# Patient Record
Sex: FEMALE | Race: WHITE | NOT HISPANIC OR LATINO | Employment: OTHER | URBAN - METROPOLITAN AREA
[De-identification: names, ages, dates, MRNs, and addresses within clinical notes are randomized per-mention and may not be internally consistent; named-entity substitution may affect disease eponyms.]

---

## 2019-02-22 LAB — HEMOCCULT STL QL IA: NEGATIVE

## 2019-06-03 ENCOUNTER — OFFICE VISIT (OUTPATIENT)
Dept: INTERNAL MEDICINE | Facility: CLINIC | Age: 64
End: 2019-06-03
Payer: COMMERCIAL

## 2019-06-03 VITALS
TEMPERATURE: 98 F | BODY MASS INDEX: 22.43 KG/M2 | HEART RATE: 81 BPM | SYSTOLIC BLOOD PRESSURE: 108 MMHG | HEIGHT: 63 IN | OXYGEN SATURATION: 98 % | DIASTOLIC BLOOD PRESSURE: 72 MMHG | WEIGHT: 126.56 LBS | RESPIRATION RATE: 16 BRPM

## 2019-06-03 DIAGNOSIS — Z85.41 HISTORY OF CERVICAL CANCER: ICD-10-CM

## 2019-06-03 DIAGNOSIS — Z85.048 HISTORY OF RECTAL OR ANAL CANCER: ICD-10-CM

## 2019-06-03 DIAGNOSIS — L03.012 ACUTE PARONYCHIA OF LEFT THUMB: ICD-10-CM

## 2019-06-03 DIAGNOSIS — E78.5 HYPERLIPIDEMIA, UNSPECIFIED HYPERLIPIDEMIA TYPE: Primary | ICD-10-CM

## 2019-06-03 DIAGNOSIS — K21.9 GASTROESOPHAGEAL REFLUX DISEASE, ESOPHAGITIS PRESENCE NOT SPECIFIED: ICD-10-CM

## 2019-06-03 PROCEDURE — 99999 PR PBB SHADOW E&M-NEW PATIENT-LVL III: ICD-10-PCS | Mod: PBBFAC,,, | Performed by: FAMILY MEDICINE

## 2019-06-03 PROCEDURE — 99999 PR PBB SHADOW E&M-NEW PATIENT-LVL III: CPT | Mod: PBBFAC,,, | Performed by: FAMILY MEDICINE

## 2019-06-03 PROCEDURE — 99204 PR OFFICE/OUTPT VISIT, NEW, LEVL IV, 45-59 MIN: ICD-10-PCS | Mod: S$GLB,,, | Performed by: FAMILY MEDICINE

## 2019-06-03 PROCEDURE — 99204 OFFICE O/P NEW MOD 45 MIN: CPT | Mod: S$GLB,,, | Performed by: FAMILY MEDICINE

## 2019-06-03 PROCEDURE — 3008F BODY MASS INDEX DOCD: CPT | Mod: CPTII,S$GLB,, | Performed by: FAMILY MEDICINE

## 2019-06-03 PROCEDURE — 3008F PR BODY MASS INDEX (BMI) DOCUMENTED: ICD-10-PCS | Mod: CPTII,S$GLB,, | Performed by: FAMILY MEDICINE

## 2019-06-03 RX ORDER — DEXLANSOPRAZOLE 60 MG/1
60 CAPSULE, DELAYED RELEASE ORAL
COMMUNITY
Start: 2019-03-12 | End: 2020-03-12 | Stop reason: SDUPTHER

## 2019-06-03 RX ORDER — SIMVASTATIN 40 MG/1
40 TABLET, FILM COATED ORAL NIGHTLY
Refills: 5 | COMMUNITY
Start: 2019-04-12 | End: 2020-05-17

## 2019-06-03 RX ORDER — MUPIROCIN 20 MG/G
OINTMENT TOPICAL 2 TIMES DAILY
Qty: 30 G | Refills: 1 | Status: SHIPPED | OUTPATIENT
Start: 2019-06-03 | End: 2019-12-03 | Stop reason: ALTCHOICE

## 2019-06-03 RX ORDER — SUCRALFATE 1 G/10ML
500 SUSPENSION ORAL 4 TIMES DAILY
Qty: 400 ML | Refills: 0 | Status: SHIPPED | OUTPATIENT
Start: 2019-06-03 | End: 2020-12-03

## 2019-06-03 RX ORDER — CHOLECALCIFEROL (VITAMIN D3) 25 MCG
185 TABLET ORAL
COMMUNITY

## 2019-06-03 RX ORDER — CLINDAMYCIN HYDROCHLORIDE 300 MG/1
300 CAPSULE ORAL EVERY 6 HOURS
Qty: 30 CAPSULE | Refills: 0 | Status: SHIPPED | OUTPATIENT
Start: 2019-06-03 | End: 2019-12-03 | Stop reason: ALTCHOICE

## 2019-06-03 RX ORDER — CYANOCOBALAMIN (VITAMIN B-12) 250 MCG
250 TABLET ORAL DAILY
COMMUNITY
Start: 2018-08-24

## 2019-06-04 NOTE — PROGRESS NOTES
Subjective:      Patient ID: Rita Dejesus is a 63 y.o. female.    Chief Complaint: Establish Care      Patient presents today to establish care.   She reports history of GERD, was previously taking dexilant daily, had stopped temporarily and started having more pain in esophageal area, recently resumed the dexilant but doesn't seem to be helping now.   She has hyperlipidemia which is controlled on statin.   She has history of cervical and anal cancers - both of which are in remission.   She reports today pain and swelling of left thumb, has been worsening for a few days.     Review of Systems   Constitutional: Negative for activity change, appetite change, fatigue and fever.   HENT: Negative for congestion.    Eyes: Negative for visual disturbance.   Respiratory: Negative for chest tightness and shortness of breath.    Cardiovascular: Negative for chest pain and leg swelling.   Gastrointestinal: Positive for abdominal pain (epigastric, esophageal after eating). Negative for constipation, diarrhea, nausea and vomiting.   Endocrine: Negative for polydipsia, polyphagia and polyuria.   Musculoskeletal: Negative for arthralgias and gait problem.   Skin: Positive for color change and wound.   Allergic/Immunologic: Negative for immunocompromised state.   Neurological: Negative for dizziness.   Psychiatric/Behavioral: Negative for sleep disturbance.     Past Medical History:   Diagnosis Date    GERD (gastroesophageal reflux disease)     History of cervical cancer     History of rectal or anal cancer      Past Surgical History:   Procedure Laterality Date    ADENOIDECTOMY      carpal tunel Bilateral     HERNIA REPAIR      TONSILLECTOMY       Family History   Problem Relation Age of Onset    Diabetes Mother         type II    Hyperlipidemia Mother     Heart disease Father     Hypertension Father     Hypertension Sister      Social History     Socioeconomic History    Marital status:      Spouse name: Not  "on file    Number of children: Not on file    Years of education: Not on file    Highest education level: Not on file   Occupational History    Not on file   Social Needs    Financial resource strain: Not on file    Food insecurity:     Worry: Not on file     Inability: Not on file    Transportation needs:     Medical: Not on file     Non-medical: Not on file   Tobacco Use    Smoking status: Never Smoker    Smokeless tobacco: Never Used   Substance and Sexual Activity    Alcohol use: Yes     Comment: Social     Drug use: Never    Sexual activity: Not Currently     Partners: Male   Lifestyle    Physical activity:     Days per week: Not on file     Minutes per session: Not on file    Stress: Not on file   Relationships    Social connections:     Talks on phone: Not on file     Gets together: Not on file     Attends Mandaeism service: Not on file     Active member of club or organization: Not on file     Attends meetings of clubs or organizations: Not on file     Relationship status: Not on file   Other Topics Concern    Not on file   Social History Narrative    Not on file     Review of patient's allergies indicates:   Allergen Reactions    Sulfa (sulfonamide antibiotics) Nausea And Vomiting    Nitrofuran analogues Nausea And Vomiting     Microdantin        Objective:       /72   Pulse 81   Temp 98.3 °F (36.8 °C)   Resp 16   Ht 5' 2.99" (1.6 m)   Wt 57.4 kg (126 lb 8.7 oz)   SpO2 98%   BMI 22.42 kg/m²   Physical Exam   Constitutional: She is oriented to person, place, and time. Vital signs are normal. She appears well-developed and well-nourished. No distress.   HENT:   Head: Normocephalic and atraumatic.   Right Ear: Hearing, tympanic membrane, external ear and ear canal normal.   Left Ear: Hearing, tympanic membrane, external ear and ear canal normal.   Nose: Nose normal.   Mouth/Throat: Uvula is midline, oropharynx is clear and moist and mucous membranes are normal. No oropharyngeal " exudate.   Eyes: Pupils are equal, round, and reactive to light. Conjunctivae and EOM are normal.   Neck: Normal range of motion. Neck supple. No tracheal deviation present. No thyromegaly present.   Cardiovascular: Normal rate, regular rhythm, normal heart sounds and intact distal pulses.   No murmur heard.  Pulmonary/Chest: Effort normal and breath sounds normal. No respiratory distress.   Abdominal: Soft. Bowel sounds are normal. There is no tenderness. There is no guarding. No hernia.   Musculoskeletal: Normal range of motion. She exhibits no edema.   Lymphadenopathy:     She has no cervical adenopathy.   Neurological: She is alert and oriented to person, place, and time.   Skin: Skin is warm and dry. Capillary refill takes less than 2 seconds. She is not diaphoretic.   Left thumb with erythema, fluctuance, visible pus just proximal to nail.   18 g needle inserted and moderate amount of pus expressed.   Psychiatric: She has a normal mood and affect. Her behavior is normal. Judgment and thought content normal.   Nursing note and vitals reviewed.    Assessment:     1. Hyperlipidemia, unspecified hyperlipidemia type    2. Gastroesophageal reflux disease, esophagitis presence not specified    3. History of cervical cancer    4. History of rectal or anal cancer    5. Acute paronychia of left thumb      Plan:   Hyperlipidemia, unspecified hyperlipidemia type    Gastroesophageal reflux disease, esophagitis presence not specified    History of cervical cancer    History of rectal or anal cancer    Acute paronychia of left thumb    Other orders  -     sucralfate (CARAFATE) 100 mg/mL suspension; Take 5 mLs (500 mg total) by mouth 4 (four) times daily.  Dispense: 400 mL; Refill: 0  -     mupirocin (BACTROBAN) 2 % ointment; Apply topically 2 (two) times daily.  Dispense: 30 g; Refill: 1  -     clindamycin (CLEOCIN) 300 MG capsule; Take 1 capsule (300 mg total) by mouth every 6 (six) hours.  Dispense: 30 capsule; Refill:  0      Medication List with Changes/Refills   New Medications    CLINDAMYCIN (CLEOCIN) 300 MG CAPSULE    Take 1 capsule (300 mg total) by mouth every 6 (six) hours.    MUPIROCIN (BACTROBAN) 2 % OINTMENT    Apply topically 2 (two) times daily.    SUCRALFATE (CARAFATE) 100 MG/ML SUSPENSION    Take 5 mLs (500 mg total) by mouth 4 (four) times daily.   Current Medications    CYANOCOBALAMIN (VITAMIN B-12) 250 MCG TABLET    Take 250 mcg by mouth.    DEXLANSOPRAZOLE (DEXILANT) 60 MG CAPSULE    Take 60 mg by mouth.    LACTOBACILLUS RHAMNOSUS GG (CULTURELLE) 10 BILLION CELL CAPSULE    Take 1 capsule by mouth once daily.    SIMVASTATIN (ZOCOR) 40 MG TABLET    Take 40 mg by mouth every evening.    VITAMIN D (VITAMIN D3) 1000 UNITS TAB    Take 185 mg by mouth.

## 2019-06-07 ENCOUNTER — PATIENT OUTREACH (OUTPATIENT)
Dept: ADMINISTRATIVE | Facility: HOSPITAL | Age: 64
End: 2019-06-07

## 2019-06-14 ENCOUNTER — PATIENT OUTREACH (OUTPATIENT)
Dept: ADMINISTRATIVE | Facility: HOSPITAL | Age: 64
End: 2019-06-14

## 2019-06-14 NOTE — PROGRESS NOTES
Received most recent mammogram, pap smear, dexa scan, and fecal immunochemical test scanned into chart today.

## 2019-06-17 ENCOUNTER — PATIENT OUTREACH (OUTPATIENT)
Dept: ADMINISTRATIVE | Facility: HOSPITAL | Age: 64
End: 2019-06-17

## 2019-11-29 ENCOUNTER — TELEPHONE (OUTPATIENT)
Dept: INTERNAL MEDICINE | Facility: CLINIC | Age: 64
End: 2019-11-29

## 2019-11-29 DIAGNOSIS — K21.9 GASTROESOPHAGEAL REFLUX DISEASE, ESOPHAGITIS PRESENCE NOT SPECIFIED: ICD-10-CM

## 2019-11-29 DIAGNOSIS — E55.9 VITAMIN D DEFICIENCY: ICD-10-CM

## 2019-11-29 DIAGNOSIS — E78.5 HYPERLIPIDEMIA, UNSPECIFIED HYPERLIPIDEMIA TYPE: Primary | ICD-10-CM

## 2019-12-02 ENCOUNTER — LAB VISIT (OUTPATIENT)
Dept: LAB | Facility: HOSPITAL | Age: 64
End: 2019-12-02
Attending: FAMILY MEDICINE
Payer: MEDICARE

## 2019-12-02 DIAGNOSIS — K21.9 GASTROESOPHAGEAL REFLUX DISEASE, ESOPHAGITIS PRESENCE NOT SPECIFIED: ICD-10-CM

## 2019-12-02 DIAGNOSIS — E55.9 VITAMIN D DEFICIENCY: ICD-10-CM

## 2019-12-02 DIAGNOSIS — E78.5 HYPERLIPIDEMIA, UNSPECIFIED HYPERLIPIDEMIA TYPE: ICD-10-CM

## 2019-12-02 LAB
25(OH)D3+25(OH)D2 SERPL-MCNC: 56 NG/ML (ref 30–96)
ALBUMIN SERPL BCP-MCNC: 4.1 G/DL (ref 3.5–5.2)
ALP SERPL-CCNC: 95 U/L (ref 55–135)
ALT SERPL W/O P-5'-P-CCNC: <5 U/L (ref 10–44)
ANION GAP SERPL CALC-SCNC: 8 MMOL/L (ref 8–16)
ANISOCYTOSIS BLD QL SMEAR: SLIGHT
AST SERPL-CCNC: 16 U/L (ref 10–40)
BASOPHILS # BLD AUTO: 0.04 K/UL (ref 0–0.2)
BASOPHILS NFR BLD: 0.9 % (ref 0–1.9)
BILIRUB SERPL-MCNC: 0.4 MG/DL (ref 0.1–1)
BUN SERPL-MCNC: 21 MG/DL (ref 8–23)
CALCIUM SERPL-MCNC: 10 MG/DL (ref 8.7–10.5)
CHLORIDE SERPL-SCNC: 111 MMOL/L (ref 95–110)
CHOLEST SERPL-MCNC: 179 MG/DL (ref 120–199)
CHOLEST/HDLC SERPL: 3.4 {RATIO} (ref 2–5)
CO2 SERPL-SCNC: 24 MMOL/L (ref 23–29)
CREAT SERPL-MCNC: 1.2 MG/DL (ref 0.5–1.4)
DIFFERENTIAL METHOD: ABNORMAL
EOSINOPHIL # BLD AUTO: 0.2 K/UL (ref 0–0.5)
EOSINOPHIL NFR BLD: 5.1 % (ref 0–8)
ERYTHROCYTE [DISTWIDTH] IN BLOOD BY AUTOMATED COUNT: 13.2 % (ref 11.5–14.5)
EST. GFR  (AFRICAN AMERICAN): 54.8 ML/MIN/1.73 M^2
EST. GFR  (NON AFRICAN AMERICAN): 47.5 ML/MIN/1.73 M^2
GLUCOSE SERPL-MCNC: 91 MG/DL (ref 70–110)
HCT VFR BLD AUTO: 41.8 % (ref 37–48.5)
HDLC SERPL-MCNC: 53 MG/DL (ref 40–75)
HDLC SERPL: 29.6 % (ref 20–50)
HGB BLD-MCNC: 13.7 G/DL (ref 12–16)
IMM GRANULOCYTES # BLD AUTO: 0.01 K/UL (ref 0–0.04)
IMM GRANULOCYTES NFR BLD AUTO: 0.2 % (ref 0–0.5)
LDLC SERPL CALC-MCNC: 85.6 MG/DL (ref 63–159)
LYMPHOCYTES # BLD AUTO: 1.6 K/UL (ref 1–4.8)
LYMPHOCYTES NFR BLD: 34.5 % (ref 18–48)
MCH RBC QN AUTO: 30 PG (ref 27–31)
MCHC RBC AUTO-ENTMCNC: 32.8 G/DL (ref 32–36)
MCV RBC AUTO: 92 FL (ref 82–98)
MONOCYTES # BLD AUTO: 0.4 K/UL (ref 0.3–1)
MONOCYTES NFR BLD: 8.5 % (ref 4–15)
NEUTROPHILS # BLD AUTO: 2.4 K/UL (ref 1.8–7.7)
NEUTROPHILS NFR BLD: 50.8 % (ref 38–73)
NONHDLC SERPL-MCNC: 126 MG/DL
NRBC BLD-RTO: 0 /100 WBC
PLATELET # BLD AUTO: 161 K/UL (ref 150–350)
PLATELET BLD QL SMEAR: ABNORMAL
PMV BLD AUTO: 14.3 FL (ref 9.2–12.9)
POIKILOCYTOSIS BLD QL SMEAR: SLIGHT
POTASSIUM SERPL-SCNC: 4.8 MMOL/L (ref 3.5–5.1)
PROT SERPL-MCNC: 7.2 G/DL (ref 6–8.4)
RBC # BLD AUTO: 4.56 M/UL (ref 4–5.4)
SODIUM SERPL-SCNC: 143 MMOL/L (ref 136–145)
TRIGL SERPL-MCNC: 202 MG/DL (ref 30–150)
TSH SERPL DL<=0.005 MIU/L-ACNC: 2.73 UIU/ML (ref 0.4–4)
WBC # BLD AUTO: 4.7 K/UL (ref 3.9–12.7)

## 2019-12-02 PROCEDURE — 80053 COMPREHEN METABOLIC PANEL: CPT

## 2019-12-02 PROCEDURE — 84443 ASSAY THYROID STIM HORMONE: CPT

## 2019-12-02 PROCEDURE — 85025 COMPLETE CBC W/AUTO DIFF WBC: CPT

## 2019-12-02 PROCEDURE — 80061 LIPID PANEL: CPT

## 2019-12-02 PROCEDURE — 82306 VITAMIN D 25 HYDROXY: CPT

## 2019-12-02 PROCEDURE — 36415 COLL VENOUS BLD VENIPUNCTURE: CPT | Mod: PO

## 2019-12-03 ENCOUNTER — OFFICE VISIT (OUTPATIENT)
Dept: INTERNAL MEDICINE | Facility: CLINIC | Age: 64
End: 2019-12-03
Payer: MEDICARE

## 2019-12-03 VITALS
WEIGHT: 130.06 LBS | HEART RATE: 79 BPM | BODY MASS INDEX: 24.55 KG/M2 | SYSTOLIC BLOOD PRESSURE: 98 MMHG | OXYGEN SATURATION: 99 % | HEIGHT: 61 IN | DIASTOLIC BLOOD PRESSURE: 62 MMHG | TEMPERATURE: 99 F

## 2019-12-03 DIAGNOSIS — K21.9 GASTROESOPHAGEAL REFLUX DISEASE, ESOPHAGITIS PRESENCE NOT SPECIFIED: ICD-10-CM

## 2019-12-03 DIAGNOSIS — F41.1 GAD (GENERALIZED ANXIETY DISORDER): ICD-10-CM

## 2019-12-03 DIAGNOSIS — E55.9 VITAMIN D DEFICIENCY: ICD-10-CM

## 2019-12-03 DIAGNOSIS — M79.604 PAIN IN BOTH LOWER EXTREMITIES: ICD-10-CM

## 2019-12-03 DIAGNOSIS — E78.5 HYPERLIPIDEMIA, UNSPECIFIED HYPERLIPIDEMIA TYPE: Primary | ICD-10-CM

## 2019-12-03 DIAGNOSIS — M79.605 PAIN IN BOTH LOWER EXTREMITIES: ICD-10-CM

## 2019-12-03 DIAGNOSIS — L30.9 DERMATITIS: ICD-10-CM

## 2019-12-03 PROCEDURE — 99214 PR OFFICE/OUTPT VISIT, EST, LEVL IV, 30-39 MIN: ICD-10-PCS | Mod: S$PBB,,, | Performed by: FAMILY MEDICINE

## 2019-12-03 PROCEDURE — 99214 OFFICE O/P EST MOD 30 MIN: CPT | Mod: PBBFAC,PO,25 | Performed by: FAMILY MEDICINE

## 2019-12-03 PROCEDURE — 99999 PR PBB SHADOW E&M-EST. PATIENT-LVL IV: ICD-10-PCS | Mod: PBBFAC,,, | Performed by: FAMILY MEDICINE

## 2019-12-03 PROCEDURE — 99999 PR PBB SHADOW E&M-EST. PATIENT-LVL IV: CPT | Mod: PBBFAC,,, | Performed by: FAMILY MEDICINE

## 2019-12-03 PROCEDURE — 99214 OFFICE O/P EST MOD 30 MIN: CPT | Mod: S$PBB,,, | Performed by: FAMILY MEDICINE

## 2019-12-03 PROCEDURE — G0009 ADMIN PNEUMOCOCCAL VACCINE: HCPCS | Mod: PBBFAC,PO

## 2019-12-03 RX ORDER — TIZANIDINE 2 MG/1
2 TABLET ORAL EVERY 6 HOURS PRN
Qty: 30 TABLET | Refills: 0 | Status: SHIPPED | OUTPATIENT
Start: 2019-12-03 | End: 2019-12-13

## 2019-12-03 NOTE — PROGRESS NOTES
Pt given pneumococcal conjugate 13 vaccine IM left deltiod. Pt advised to wait 15 minutes to observe for adverse reaction. Pt tolerated well.

## 2019-12-04 NOTE — PROGRESS NOTES
Subjective:      Patient ID: Rita Dejesus is a 64 y.o. female.    Chief Complaint: Follow-up (6 month)      Patient here for routine follow-up.  Labs drawn yesterday were reviewed with the patient.  Her cholesterol levels are well controlled, she is taking the simvastatin daily.  Patient reports worsened bladder leakage, it is worse when she 1st wakes up in the morning and stands up but happens all day.  Her gynecologist has told in the past she does have a prolapse, is scheduled to see him for follow-up in a couple months.  She feels that she has a lot of increased stress currently with her  and father both ill, finds she is always worrying about if she forgot to do something.  She also reports pain in both legs, often keeps her up at night, feels like muscle cramping .  She has a pruritic rash on anterior left neck , has had for about a week.    Review of Systems   Constitutional: Positive for fatigue. Negative for fever and unexpected weight change.   HENT: Negative for congestion.    Eyes: Negative for visual disturbance.   Respiratory: Negative for shortness of breath.    Cardiovascular: Negative for chest pain, palpitations and leg swelling.   Gastrointestinal: Negative for abdominal pain and constipation.   Endocrine: Negative for polydipsia, polyphagia and polyuria.   Genitourinary: Negative for dysuria, frequency and urgency.   Musculoskeletal: Positive for arthralgias. Negative for gait problem.   Skin: Positive for rash. Negative for color change.   Allergic/Immunologic: Negative for immunocompromised state.   Neurological: Negative for dizziness.   Psychiatric/Behavioral: Negative for dysphoric mood and sleep disturbance. The patient is nervous/anxious.      Past Medical History:   Diagnosis Date    GERD (gastroesophageal reflux disease)     History of cervical cancer     History of rectal or anal cancer           Past Surgical History:   Procedure Laterality Date    ADENOIDECTOMY      carpal  "tunel Bilateral     HERNIA REPAIR      TONSILLECTOMY       Family History   Problem Relation Age of Onset    Diabetes Mother         type II    Hyperlipidemia Mother     Heart disease Father     Hypertension Father     Hypertension Sister      Social History     Socioeconomic History    Marital status:      Spouse name: Not on file    Number of children: Not on file    Years of education: Not on file    Highest education level: Not on file   Occupational History    Not on file   Social Needs    Financial resource strain: Not on file    Food insecurity:     Worry: Not on file     Inability: Not on file    Transportation needs:     Medical: Not on file     Non-medical: Not on file   Tobacco Use    Smoking status: Never Smoker    Smokeless tobacco: Never Used   Substance and Sexual Activity    Alcohol use: Yes     Comment: Social     Drug use: Never    Sexual activity: Not Currently     Partners: Male   Lifestyle    Physical activity:     Days per week: Not on file     Minutes per session: Not on file    Stress: Not on file   Relationships    Social connections:     Talks on phone: Not on file     Gets together: Not on file     Attends Alevism service: Not on file     Active member of club or organization: Not on file     Attends meetings of clubs or organizations: Not on file     Relationship status: Not on file   Other Topics Concern    Not on file   Social History Narrative    Not on file     Review of patient's allergies indicates:   Allergen Reactions    Sulfa (sulfonamide antibiotics) Nausea And Vomiting    Nitrofuran analogues Nausea And Vomiting     Microdantin        Objective:       BP 98/62 (BP Location: Right arm)   Pulse 79   Temp 98.5 °F (36.9 °C) (Tympanic)   Ht 5' 1" (1.549 m)   Wt 59 kg (130 lb 1.1 oz)   SpO2 99%   BMI 24.58 kg/m²   Physical Exam   Constitutional: She is oriented to person, place, and time. Vital signs are normal. She appears well-developed and " well-nourished. No distress.   HENT:   Head: Normocephalic and atraumatic.   Right Ear: Hearing, tympanic membrane, external ear and ear canal normal.   Left Ear: Hearing, tympanic membrane, external ear and ear canal normal.   Nose: Nose normal.   Mouth/Throat: Uvula is midline, oropharynx is clear and moist and mucous membranes are normal. No oropharyngeal exudate.   Eyes: Pupils are equal, round, and reactive to light. Conjunctivae and EOM are normal.   Neck: Normal range of motion. Neck supple. No tracheal deviation present. No thyromegaly present.   Cardiovascular: Normal rate, regular rhythm, normal heart sounds and intact distal pulses.   No murmur heard.  Pulmonary/Chest: Effort normal and breath sounds normal. No respiratory distress.   Abdominal: Soft. Bowel sounds are normal. There is no tenderness. There is no guarding. No hernia.   Musculoskeletal: Normal range of motion. She exhibits no edema.   Lymphadenopathy:     She has no cervical adenopathy.   Neurological: She is alert and oriented to person, place, and time. No sensory deficit.   Skin: Skin is warm and dry. Capillary refill takes less than 2 seconds. Rash (left anterior neck with scattered papules and patches) noted. She is not diaphoretic.   Psychiatric: She has a normal mood and affect. Her behavior is normal. Judgment and thought content normal.   Vitals reviewed.    Assessment:     1. Hyperlipidemia, unspecified hyperlipidemia type    2. Gastroesophageal reflux disease, esophagitis presence not specified    3. Dermatitis    4. Pain in both lower extremities    5. CARLOS (generalized anxiety disorder)    6. Vitamin D deficiency      Plan:   Hyperlipidemia, unspecified hyperlipidemia type  -     CBC auto differential; Future; Expected date: 11/27/2020  -     Comprehensive metabolic panel; Future; Expected date: 11/27/2020  -     Lipid panel; Future; Expected date: 11/27/2020    Gastroesophageal reflux disease, esophagitis presence not  specified    Dermatitis   She has leftover steroid cream at home that she will try    Pain in both lower extremities  -     tiZANidine (ZANAFLEX) 2 MG tablet; Take 1 tablet (2 mg total) by mouth every 6 (six) hours as needed.  Dispense: 30 tablet; Refill: 0    CARLOS (generalized anxiety disorder)   Does not want to start any new medication at this time, will let me know if symptoms worsen and she does want to try something    Vitamin D deficiency  -     Vitamin D; Future; Expected date: 11/27/2020    Other orders  -     (In Office Administered) Pneumococcal Conjugate Vaccine (13 Valent) (IM)    Order to put in for labs for 1 year from now.  Medication List with Changes/Refills   New Medications    TIZANIDINE (ZANAFLEX) 2 MG TABLET    Take 1 tablet (2 mg total) by mouth every 6 (six) hours as needed.   Current Medications    CYANOCOBALAMIN (VITAMIN B-12) 250 MCG TABLET    Take 250 mcg by mouth.    DEXLANSOPRAZOLE (DEXILANT) 60 MG CAPSULE    Take 60 mg by mouth.    LACTOBACILLUS RHAMNOSUS GG (CULTURELLE) 10 BILLION CELL CAPSULE    Take 1 capsule by mouth once daily.    SIMVASTATIN (ZOCOR) 40 MG TABLET    Take 40 mg by mouth every evening.    SUCRALFATE (CARAFATE) 100 MG/ML SUSPENSION    Take 5 mLs (500 mg total) by mouth 4 (four) times daily.    VITAMIN D (VITAMIN D3) 1000 UNITS TAB    Take 185 mg by mouth.   Discontinued Medications    CLINDAMYCIN (CLEOCIN) 300 MG CAPSULE    Take 1 capsule (300 mg total) by mouth every 6 (six) hours.    MUPIROCIN (BACTROBAN) 2 % OINTMENT    Apply topically 2 (two) times daily.

## 2020-01-17 ENCOUNTER — PATIENT OUTREACH (OUTPATIENT)
Dept: ADMINISTRATIVE | Facility: HOSPITAL | Age: 65
End: 2020-01-17

## 2020-01-26 ENCOUNTER — PATIENT MESSAGE (OUTPATIENT)
Dept: INTERNAL MEDICINE | Facility: CLINIC | Age: 65
End: 2020-01-26

## 2020-01-27 RX ORDER — CITALOPRAM 20 MG/1
20 TABLET, FILM COATED ORAL DAILY
Qty: 90 TABLET | Refills: 3 | Status: SHIPPED | OUTPATIENT
Start: 2020-01-27 | End: 2020-07-10 | Stop reason: SDUPTHER

## 2020-02-28 LAB — HEMOCCULT STL QL IA: NEGATIVE

## 2020-03-12 ENCOUNTER — PATIENT MESSAGE (OUTPATIENT)
Dept: INTERNAL MEDICINE | Facility: CLINIC | Age: 65
End: 2020-03-12

## 2020-03-12 RX ORDER — DEXLANSOPRAZOLE 60 MG/1
60 CAPSULE, DELAYED RELEASE ORAL DAILY
Qty: 90 CAPSULE | Refills: 3 | Status: SHIPPED | OUTPATIENT
Start: 2020-03-12 | End: 2020-12-03 | Stop reason: SDUPTHER

## 2020-05-17 RX ORDER — SIMVASTATIN 40 MG/1
TABLET, FILM COATED ORAL
Qty: 90 TABLET | Refills: 3 | Status: SHIPPED | OUTPATIENT
Start: 2020-05-17 | End: 2020-12-03 | Stop reason: SDUPTHER

## 2020-07-10 ENCOUNTER — PATIENT MESSAGE (OUTPATIENT)
Dept: INTERNAL MEDICINE | Facility: CLINIC | Age: 65
End: 2020-07-10

## 2020-07-10 RX ORDER — CITALOPRAM 40 MG/1
40 TABLET, FILM COATED ORAL DAILY
Qty: 90 TABLET | Refills: 3 | Status: SHIPPED | OUTPATIENT
Start: 2020-07-10 | End: 2020-12-03

## 2020-09-17 ENCOUNTER — OFFICE VISIT (OUTPATIENT)
Dept: INTERNAL MEDICINE | Facility: CLINIC | Age: 65
End: 2020-09-17
Payer: MEDICARE

## 2020-09-17 VITALS
RESPIRATION RATE: 20 BRPM | SYSTOLIC BLOOD PRESSURE: 120 MMHG | HEIGHT: 63 IN | HEART RATE: 74 BPM | WEIGHT: 120.56 LBS | BODY MASS INDEX: 21.36 KG/M2 | OXYGEN SATURATION: 99 % | DIASTOLIC BLOOD PRESSURE: 66 MMHG | TEMPERATURE: 99 F

## 2020-09-17 DIAGNOSIS — Z12.11 COLON CANCER SCREENING: ICD-10-CM

## 2020-09-17 DIAGNOSIS — R30.0 DYSURIA: Primary | ICD-10-CM

## 2020-09-17 LAB
BILIRUB SERPL-MCNC: NEGATIVE MG/DL
BLOOD URINE, POC: NEGATIVE
CLARITY, POC UA: NORMAL
COLOR, POC UA: NORMAL
GLUCOSE UR QL STRIP: NORMAL
KETONES UR QL STRIP: NEGATIVE
LEUKOCYTE ESTERASE URINE, POC: NORMAL
NITRITE, POC UA: NEGATIVE
PH, POC UA: 5
PROTEIN, POC: NEGATIVE
SPECIFIC GRAVITY, POC UA: 1
UROBILINOGEN, POC UA: NORMAL

## 2020-09-17 PROCEDURE — 87086 URINE CULTURE/COLONY COUNT: CPT

## 2020-09-17 PROCEDURE — 99213 PR OFFICE/OUTPT VISIT, EST, LEVL III, 20-29 MIN: ICD-10-PCS | Mod: S$PBB,,, | Performed by: FAMILY MEDICINE

## 2020-09-17 PROCEDURE — 99999 PR PBB SHADOW E&M-EST. PATIENT-LVL IV: ICD-10-PCS | Mod: PBBFAC,,, | Performed by: FAMILY MEDICINE

## 2020-09-17 PROCEDURE — 90694 VACC AIIV4 NO PRSRV 0.5ML IM: CPT | Mod: PBBFAC,PO

## 2020-09-17 PROCEDURE — 99214 OFFICE O/P EST MOD 30 MIN: CPT | Mod: PBBFAC,PO | Performed by: FAMILY MEDICINE

## 2020-09-17 PROCEDURE — 81002 URINALYSIS NONAUTO W/O SCOPE: CPT | Mod: PBBFAC,PO | Performed by: FAMILY MEDICINE

## 2020-09-17 PROCEDURE — 99213 OFFICE O/P EST LOW 20 MIN: CPT | Mod: S$PBB,,, | Performed by: FAMILY MEDICINE

## 2020-09-17 PROCEDURE — G0008 ADMIN INFLUENZA VIRUS VAC: HCPCS | Mod: PBBFAC,PO

## 2020-09-17 PROCEDURE — 99999 PR PBB SHADOW E&M-EST. PATIENT-LVL IV: CPT | Mod: PBBFAC,,, | Performed by: FAMILY MEDICINE

## 2020-09-17 NOTE — PROGRESS NOTES
09/17/2020 4:58pm    High dose Flu injection given RD, patient tolerated well, patient advised to wait 15 minutes

## 2020-09-19 LAB — BACTERIA UR CULT: NO GROWTH

## 2020-09-22 ENCOUNTER — PATIENT MESSAGE (OUTPATIENT)
Dept: INTERNAL MEDICINE | Facility: CLINIC | Age: 65
End: 2020-09-22

## 2020-09-22 DIAGNOSIS — Z00.00 ROUTINE ADULT HEALTH MAINTENANCE: Primary | ICD-10-CM

## 2020-09-22 NOTE — PROGRESS NOTES
Subjective:      Patient ID: Rita Dejesus is a 64 y.o. female.    Chief Complaint: Nephrolithiasis      Patient reports two episodes of supapubic pain, has happened twice in the past week. Also reports some dysuria, had some dark urine which appeared to have small amount of blood in it.   Does not curently have any symptoms, thinking it might have been kidney stone.     Review of Systems   Constitutional: Negative for activity change, appetite change, fatigue and fever.   Gastrointestinal: Negative for abdominal pain.   Genitourinary: Positive for dysuria and hematuria. Negative for decreased urine volume, difficulty urinating, flank pain, frequency and urgency.   Musculoskeletal: Negative for back pain.     Past Medical History:   Diagnosis Date    GERD (gastroesophageal reflux disease)     History of cervical cancer     History of rectal or anal cancer           Past Surgical History:   Procedure Laterality Date    ADENOIDECTOMY      carpal tunel Bilateral     HERNIA REPAIR      TONSILLECTOMY       Family History   Problem Relation Age of Onset    Diabetes Mother         type II    Hyperlipidemia Mother     Heart disease Father     Hypertension Father     Hypertension Sister      Social History     Socioeconomic History    Marital status:      Spouse name: Not on file    Number of children: Not on file    Years of education: Not on file    Highest education level: Not on file   Occupational History    Not on file   Social Needs    Financial resource strain: Not on file    Food insecurity     Worry: Not on file     Inability: Not on file    Transportation needs     Medical: Not on file     Non-medical: Not on file   Tobacco Use    Smoking status: Never Smoker    Smokeless tobacco: Never Used   Substance and Sexual Activity    Alcohol use: Yes     Comment: Social     Drug use: Never    Sexual activity: Not Currently     Partners: Male   Lifestyle    Physical activity     Days per  "week: Not on file     Minutes per session: Not on file    Stress: Not on file   Relationships    Social connections     Talks on phone: Not on file     Gets together: Not on file     Attends [employer]ist service: Not on file     Active member of club or organization: Not on file     Attends meetings of clubs or organizations: Not on file     Relationship status: Not on file   Other Topics Concern    Not on file   Social History Narrative    Not on file     Review of patient's allergies indicates:   Allergen Reactions    Sulfa (sulfonamide antibiotics) Nausea And Vomiting    Nitrofuran analogues Nausea And Vomiting     Microdantin        Objective:       /66   Pulse 74   Temp 98.7 °F (37.1 °C) (Tympanic)   Resp 20   Ht 5' 2.5" (1.588 m)   Wt 54.7 kg (120 lb 9.5 oz)   SpO2 99%   BMI 21.71 kg/m²   Physical Exam  Vitals signs and nursing note reviewed.   Constitutional:       General: She is not in acute distress.     Appearance: She is well-developed. She is not diaphoretic.   Cardiovascular:      Rate and Rhythm: Normal rate and regular rhythm.      Heart sounds: Normal heart sounds.   Pulmonary:      Effort: Pulmonary effort is normal. No respiratory distress.      Breath sounds: Normal breath sounds.   Abdominal:      General: Bowel sounds are normal.      Palpations: Abdomen is soft.      Tenderness: There is no abdominal tenderness. There is no right CVA tenderness or left CVA tenderness.   Psychiatric:         Mood and Affect: Mood normal.         Behavior: Behavior normal.         Thought Content: Thought content normal.         Judgment: Judgment normal.       Assessment:     1. Dysuria      Plan:   Dysuria  -     POCT URINE DIPSTICK WITHOUT MICROSCOPE  -     Urine culture; Future; Expected date: 09/17/2020    Other orders  -     Influenza - Quadrivalent (Adjuvanted)    urine dip appears normal will follow culture results.  Medication List with Changes/Refills   Current Medications    CITALOPRAM " (CELEXA) 40 MG TABLET    Take 1 tablet (40 mg total) by mouth once daily.    CYANOCOBALAMIN (VITAMIN B-12) 250 MCG TABLET    Take 250 mcg by mouth.    DEXLANSOPRAZOLE (DEXILANT) 60 MG CAPSULE    Take 1 capsule (60 mg total) by mouth once daily.    LACTOBACILLUS RHAMNOSUS GG (CULTURELLE) 10 BILLION CELL CAPSULE    Take 1 capsule by mouth once daily.    SIMVASTATIN (ZOCOR) 40 MG TABLET    TAKE 1 TABLET BY MOUTH EVERY EVENING    SUCRALFATE (CARAFATE) 100 MG/ML SUSPENSION    Take 5 mLs (500 mg total) by mouth 4 (four) times daily.    VITAMIN D (VITAMIN D3) 1000 UNITS TAB    Take 185 mg by mouth.

## 2020-09-30 ENCOUNTER — PATIENT OUTREACH (OUTPATIENT)
Dept: ADMINISTRATIVE | Facility: OTHER | Age: 65
End: 2020-09-30

## 2020-09-30 NOTE — LETTER
September 30, 2020        Tuan Salmeron MD  [address]  [address]  [place] [address]             [facility]  [address]  [place] [address]  Phone: [phone]   Patient: Rita Dejesus   MR Number: [id number]   YOB: 1955           Dear Dr. Salmeron:    Rita Dejesus is a patient of Dr. Heredia (PCP) at [facility]. While reviewing his/her chart, it has come to our attention that there is an outstanding lab/procedure. Please help keep our Health Maintenance records as accurate and as up to date as possible by supplying the following:     2020 mammogram                                                                             Please fax to [facility] @ [phone].    Thank you for your assistance in our patient's healthcare.     Sincerely,     Steff [place] MA           CC  No Recipients

## 2020-10-01 ENCOUNTER — OFFICE VISIT (OUTPATIENT)
Dept: OPHTHALMOLOGY | Facility: CLINIC | Age: 65
End: 2020-10-01
Payer: MEDICARE

## 2020-10-01 DIAGNOSIS — H25.13 CATARACT, NUCLEAR SCLEROTIC SENILE, BILATERAL: Primary | ICD-10-CM

## 2020-10-01 DIAGNOSIS — H52.4 BILATERAL PRESBYOPIA: ICD-10-CM

## 2020-10-01 DIAGNOSIS — H52.03 HYPEROPIA, BILATERAL: ICD-10-CM

## 2020-10-01 DIAGNOSIS — Z00.00 ROUTINE ADULT HEALTH MAINTENANCE: ICD-10-CM

## 2020-10-01 PROCEDURE — 92015 DETERMINE REFRACTIVE STATE: CPT | Mod: ,,, | Performed by: OPTOMETRIST

## 2020-10-01 PROCEDURE — 99999 PR PBB SHADOW E&M-EST. PATIENT-LVL III: CPT | Mod: PBBFAC,,, | Performed by: OPTOMETRIST

## 2020-10-01 PROCEDURE — 92004 PR EYE EXAM, NEW PATIENT,COMPREHESV: ICD-10-PCS | Mod: S$PBB,,, | Performed by: OPTOMETRIST

## 2020-10-01 PROCEDURE — 92015 PR REFRACTION: ICD-10-PCS | Mod: ,,, | Performed by: OPTOMETRIST

## 2020-10-01 PROCEDURE — 99999 PR PBB SHADOW E&M-EST. PATIENT-LVL III: ICD-10-PCS | Mod: PBBFAC,,, | Performed by: OPTOMETRIST

## 2020-10-01 PROCEDURE — 92004 COMPRE OPH EXAM NEW PT 1/>: CPT | Mod: S$PBB,,, | Performed by: OPTOMETRIST

## 2020-10-01 PROCEDURE — 99213 OFFICE O/P EST LOW 20 MIN: CPT | Mod: PBBFAC | Performed by: OPTOMETRIST

## 2020-10-01 NOTE — LETTER
October 1, 2020      Tripp Heredia MD  [address] [address] [facility]  [address] [place] [address]  Phone: [phone]  Fax: [phone]          Patient: Rita Dejesus   MR Number: [id number]   YOB: 1955   Date of Visit: 10/1/2020       Dear Dr. Tripp Heredia:    Thank you for referring Rita Dejesus to me for evaluation. Attached you will find relevant portions of my assessment and plan of care.    If you have questions, please do not hesitate to call me. I look forward to following Rita Dejesus along with you.    Sincerely,    Ander Lemnos, OD    Enclosure  CC:  No Recipients    If you would like to receive this communication electronically, please contact [email or web address] or [phone] to request more information on [facility] access.    For providers and/or their staff who would like to refer a patient to [facility], please contact us through our one-stop-shop provider referral line, [facility], at [phone].    If you feel you have received this communication in error or would no longer like to receive these types of communications, please e-mail [email or web address]

## 2020-10-01 NOTE — PROGRESS NOTES
Health Maintenance Due   Topic Date Due    Hepatitis C Screening  1955    HIV Screening  11/17/1969    TETANUS VACCINE  11/17/1972    Shingles Vaccine (2 of 3) 02/12/2016    Pneumococcal Vaccine (65+ High/Highest Risk) (2 of 2 - PPSV23) 01/28/2020    Colorectal Cancer Screening  02/22/2020    Mammogram  02/22/2020     Updates were requested from care everywhere.  Chart was reviewed for overdue [facility] ([facility]) topics (CRS, Breast Cancer Screening, Eye exam)  Health Maintenance has been updated.  LINKS immunization registry triggered.  Immunizations were reconciled.  PETER sent to  for 2020 mammogram.

## 2020-10-01 NOTE — PROGRESS NOTES
HPI     Decreased near visual acuity.  Tired eyes when on the computer.  New patient last eye exam 2 years.  Update glasses RX.    Last edited by Ander Lemons, OD on 10/1/2020  3:24 PM. (History)            Assessment /Plan     For exam results, see Encounter Report.    Cataract, nuclear sclerotic senile, bilateral    Routine adult health maintenance  -     Ambulatory referral/consult to Optometry    Hyperopia, bilateral    Bilateral presbyopia      Significant cataracts OU, pt defers the cataract evaluation consult.  Failed glare test OD only    Pt wishes to see consult in 6 months, sooner if Va decreases.

## 2020-10-02 ENCOUNTER — PATIENT OUTREACH (OUTPATIENT)
Dept: ADMINISTRATIVE | Facility: HOSPITAL | Age: 65
End: 2020-10-02

## 2020-10-22 ENCOUNTER — PATIENT OUTREACH (OUTPATIENT)
Dept: ADMINISTRATIVE | Facility: OTHER | Age: 65
End: 2020-10-22

## 2020-10-29 ENCOUNTER — TELEPHONE (OUTPATIENT)
Dept: ENDOSCOPY | Facility: HOSPITAL | Age: 65
End: 2020-10-29

## 2020-10-29 DIAGNOSIS — Z13.9 SCREENING PROCEDURE: Primary | ICD-10-CM

## 2020-10-29 DIAGNOSIS — Z12.11 COLON CANCER SCREENING: Primary | ICD-10-CM

## 2020-10-29 RX ORDER — POLYETHYLENE GLYCOL 3350, SODIUM SULFATE ANHYDROUS, SODIUM BICARBONATE, SODIUM CHLORIDE, POTASSIUM CHLORIDE 236; 22.74; 6.74; 5.86; 2.97 G/4L; G/4L; G/4L; G/4L; G/4L
4 POWDER, FOR SOLUTION ORAL ONCE
Qty: 4000 ML | Refills: 0 | Status: SHIPPED | OUTPATIENT
Start: 2020-10-29 | End: 2020-10-29

## 2020-10-29 NOTE — TELEPHONE ENCOUNTER
Please enter a new order for a colonoscopy. We are not able to schedule on the current order in our computer system. Tentative date is 11/17/2020.

## 2020-10-29 NOTE — TELEPHONE ENCOUNTER
Location Screening:    If answers yes to any of the following, schedule at [facility] ONLY. If No, OK for either location.    1. Is there a diagnosis of heart failure, severe heart valve disease (aortic stenosis) or mechanical valve? no  a. Is the Left Ventricle Ejection Fraction <30% ? no    2. Does the pt have pulmonary hypertension? no   a. Is pulmonary arterial pressure gradient >50mmHg? no   b. Is there evidence of right ventricular dysfunction? no    3. Does the pt have achalasia? no    4. Any history of negative reaction to anesthesia? no   a. Myasthenia gravis? no   b. Malignant hyperthermia? no   c. Other? no    5. Is procedure for esophageal banding? no      COVID Screening    1. Have you had a fever in the last 7 days or have you used fever reducing medicines for a fever in the last 7 days?  no    2. Are you experiencing shortness of breath, cough, muscle aches, loss of taste or loss of smell?  no    If answered yes to questions 1 and 2, the patient must seek medical attention with their PCP.  Do not schedule their procedure.     3. Are you residing with anyone who has tested positive for Covid?  no    If answered yes to question 3, recommend 14 day self-quarantine from the date of relative's positive test and place special needs note in the depot.  Wait to schedule.     ENDO screening    1. Have you been admitted for cardiac, kidney or pulmonary causes to the hospital in the past 3 months? no   If yes, schedule an appointment with PCP before scheduling endoscopic procedure.     2. Have you had a stent placed in the last 12 months? no   If yes, for a screening visit, cancel and message the ordering provider.  The patient will need a new order when the time is appropriate.     3. Have you had a stroke or heart attack in the past 6 months? no   If yes, cancel and refer patient to ordering provider for clearance, also message ordering provider to inform.     4. Have you had any chest pain in the past 3 months?  "no   If yes, Have you been evaluated by your PCP and/or cardiologist and it was determined to not be heart related? not applicable   If No, Pt needs to be seen by PCP or Cardiologist .  Pt can be scheduled once clearance obtained by either of those providers.     5. Do you take prescription weight loss medications?  no   If yes, must stop for 2 weeks prior to procedure.     6. Have you been diagnosed with diverticulitis within the past 3 months? no   If yes, must have been seen by GI within the last 3 months, if not schedule with GI JULIEN.    If pt has been seen by GI, schedule procedure 8-12 weeks post antibiotic treatment.     7. Are you on Dialysis? no  If yes, schedule procedure for the day AFTER dialysis.  Appt time should be 9am or later, patient arrival time is 2 hours prior.  Nulytely or miralax prep for all patients with kidney disease.     8. Are you diabetic?  no   If yes, schedule morning appt. Advise pt to hold all diabetic meds day of procedure.     9. If pt is older than 80 years of age and HAS NOT been seen by GI or PCP within the last 6 months, needs appt with GI JULIEN.   If pt has been seen by the GI provider or PCP within the past 6  months AND meets criteria, schedule procedure AND send message to the endoscopist.     10. Is patient on a "high risk" medication (blood thinner/antiplatelet agent)?  no   If yes, has cardiac clearance been obtained within the last 60 days? N/A   If no, a new clearance needs to be obtained.     Final Questions:    1.I have reviewed the last colonoscopy for recommendations regarding next procedure bowel prep.  no  2. I have reviewed medications and allergies.  yes  3. I have verified the pharmacy information and appropriate prep sent if needed. yes  4. Prep instructions have been mailed or sent to portal per patient request. yes    Instructions sent via portal    All schedulers will have ability to reach out to the ordering GI provider to clarify any issues.     "

## 2020-11-13 ENCOUNTER — TELEPHONE (OUTPATIENT)
Dept: ENDOSCOPY | Facility: HOSPITAL | Age: 65
End: 2020-11-13

## 2020-11-13 ENCOUNTER — PATIENT MESSAGE (OUTPATIENT)
Dept: ENDOSCOPY | Facility: HOSPITAL | Age: 65
End: 2020-11-13

## 2020-11-13 RX ORDER — SODIUM, POTASSIUM,MAG SULFATES 17.5-3.13G
1 SOLUTION, RECONSTITUTED, ORAL ORAL DAILY
Qty: 1 KIT | Refills: 0 | Status: SHIPPED | OUTPATIENT
Start: 2020-11-13 | End: 2020-11-15

## 2020-11-14 ENCOUNTER — APPOINTMENT (OUTPATIENT)
Dept: URGENT CARE | Facility: CLINIC | Age: 65
End: 2020-11-14
Payer: MEDICARE

## 2020-11-14 DIAGNOSIS — Z13.9 SCREENING PROCEDURE: ICD-10-CM

## 2020-11-14 PROCEDURE — U0003 INFECTIOUS AGENT DETECTION BY NUCLEIC ACID (DNA OR RNA); SEVERE ACUTE RESPIRATORY SYNDROME CORONAVIRUS 2 (SARS-COV-2) (CORONAVIRUS DISEASE [COVID-19]), AMPLIFIED PROBE TECHNIQUE, MAKING USE OF HIGH THROUGHPUT TECHNOLOGIES AS DESCRIBED BY CMS-2020-01-R: HCPCS

## 2020-11-16 LAB — SARS-COV-2 RNA RESP QL NAA+PROBE: NOT DETECTED

## 2020-11-17 ENCOUNTER — ANESTHESIA (OUTPATIENT)
Dept: ENDOSCOPY | Facility: HOSPITAL | Age: 65
End: 2020-11-17
Payer: MEDICARE

## 2020-11-17 ENCOUNTER — ANESTHESIA EVENT (OUTPATIENT)
Dept: ENDOSCOPY | Facility: HOSPITAL | Age: 65
End: 2020-11-17
Payer: MEDICARE

## 2020-11-17 ENCOUNTER — HOSPITAL ENCOUNTER (OUTPATIENT)
Facility: HOSPITAL | Age: 65
Discharge: HOME | End: 2020-11-17
Attending: INTERNAL MEDICINE | Admitting: INTERNAL MEDICINE
Payer: MEDICARE

## 2020-11-17 VITALS
TEMPERATURE: 97 F | OXYGEN SATURATION: 98 % | WEIGHT: 118.63 LBS | BODY MASS INDEX: 21.35 KG/M2 | DIASTOLIC BLOOD PRESSURE: 78 MMHG | RESPIRATION RATE: 17 BRPM | HEART RATE: 72 BPM | SYSTOLIC BLOOD PRESSURE: 110 MMHG

## 2020-11-17 DIAGNOSIS — Z85.048 HISTORY OF RECTAL OR ANAL CANCER: Primary | ICD-10-CM

## 2020-11-17 PROCEDURE — 45385 COLONOSCOPY W/LESION REMOVAL: CPT | Mod: PT,,, | Performed by: INTERNAL MEDICINE

## 2020-11-17 PROCEDURE — 45385 COLONOSCOPY W/LESION REMOVAL: CPT | Performed by: INTERNAL MEDICINE

## 2020-11-17 PROCEDURE — 88341 IMHCHEM/IMCYTCHM EA ADD ANTB: CPT | Mod: 26,,, | Performed by: PATHOLOGY

## 2020-11-17 PROCEDURE — 63600175 PHARM REV CODE 636 W HCPCS: Performed by: NURSE ANESTHETIST, CERTIFIED REGISTERED

## 2020-11-17 PROCEDURE — 88342 CHG IMMUNOCYTOCHEMISTRY: ICD-10-PCS | Mod: 26,,, | Performed by: PATHOLOGY

## 2020-11-17 PROCEDURE — 88305 TISSUE EXAM BY PATHOLOGIST: CPT | Mod: 26,,, | Performed by: PATHOLOGY

## 2020-11-17 PROCEDURE — 88341 PR IHC OR ICC EACH ADD'L SINGLE ANTIBODY  STAINPR: ICD-10-PCS | Mod: 26,,, | Performed by: PATHOLOGY

## 2020-11-17 PROCEDURE — 88305 TISSUE EXAM BY PATHOLOGIST: CPT | Mod: 59 | Performed by: PATHOLOGY

## 2020-11-17 PROCEDURE — 45385 PR COLONOSCOPY,REMV LESN,SNARE: ICD-10-PCS | Mod: PT,,, | Performed by: INTERNAL MEDICINE

## 2020-11-17 PROCEDURE — 37000009 HC ANESTHESIA EA ADD 15 MINS: Performed by: INTERNAL MEDICINE

## 2020-11-17 PROCEDURE — 88342 IMHCHEM/IMCYTCHM 1ST ANTB: CPT | Mod: 26,,, | Performed by: PATHOLOGY

## 2020-11-17 PROCEDURE — 27201089 HC SNARE, DISP (ANY): Performed by: INTERNAL MEDICINE

## 2020-11-17 PROCEDURE — 88341 IMHCHEM/IMCYTCHM EA ADD ANTB: CPT | Performed by: PATHOLOGY

## 2020-11-17 PROCEDURE — 25000003 PHARM REV CODE 250: Performed by: NURSE ANESTHETIST, CERTIFIED REGISTERED

## 2020-11-17 PROCEDURE — 88342 IMHCHEM/IMCYTCHM 1ST ANTB: CPT | Performed by: PATHOLOGY

## 2020-11-17 PROCEDURE — 88305 TISSUE EXAM BY PATHOLOGIST: ICD-10-PCS | Mod: 26,,, | Performed by: PATHOLOGY

## 2020-11-17 PROCEDURE — 37000008 HC ANESTHESIA 1ST 15 MINUTES: Performed by: INTERNAL MEDICINE

## 2020-11-17 RX ORDER — LIDOCAINE HYDROCHLORIDE 10 MG/ML
INJECTION, SOLUTION EPIDURAL; INFILTRATION; INTRACAUDAL; PERINEURAL
Status: DISCONTINUED | OUTPATIENT
Start: 2020-11-17 | End: 2020-11-17

## 2020-11-17 RX ORDER — PROPOFOL 10 MG/ML
VIAL (ML) INTRAVENOUS
Status: DISCONTINUED | OUTPATIENT
Start: 2020-11-17 | End: 2020-11-17

## 2020-11-17 RX ORDER — SODIUM CHLORIDE, SODIUM LACTATE, POTASSIUM CHLORIDE, CALCIUM CHLORIDE 600; 310; 30; 20 MG/100ML; MG/100ML; MG/100ML; MG/100ML
INJECTION, SOLUTION INTRAVENOUS CONTINUOUS PRN
Status: DISCONTINUED | OUTPATIENT
Start: 2020-11-17 | End: 2020-11-17

## 2020-11-17 RX ADMIN — LIDOCAINE HYDROCHLORIDE 50 MG: 10 INJECTION, SOLUTION EPIDURAL; INFILTRATION; INTRACAUDAL; PERINEURAL at 01:11

## 2020-11-17 RX ADMIN — PROPOFOL 50 MG: 10 INJECTION, EMULSION INTRAVENOUS at 01:11

## 2020-11-17 RX ADMIN — SODIUM CHLORIDE, SODIUM LACTATE, POTASSIUM CHLORIDE, AND CALCIUM CHLORIDE: 600; 310; 30; 20 INJECTION, SOLUTION INTRAVENOUS at 01:11

## 2020-11-17 RX ADMIN — PROPOFOL 100 MG: 10 INJECTION, EMULSION INTRAVENOUS at 01:11

## 2020-11-17 NOTE — ANESTHESIA POSTPROCEDURE EVALUATION
Anesthesia Post Evaluation    Patient: Rita Dejesus    Procedure(s) Performed: Procedure(s) (LRB):  COLONOSCOPY (N/A)    Final Anesthesia Type: MAC    Patient location during evaluation: GI PACU  Patient participation: Yes- Able to Participate  Level of consciousness: awake and alert  Post-procedure vital signs: reviewed and stable  Pain management: adequate  Airway patency: patent    PONV status at discharge: No PONV  Anesthetic complications: no      Cardiovascular status: blood pressure returned to baseline  Respiratory status: unassisted and spontaneous ventilation  Hydration status: euvolemic  Follow-up not needed.          Vitals Value Taken Time   /78 11/17/20 1405   Temp 36.1 °C (97 °F) 11/17/20 1345   Pulse 72 11/17/20 1405   Resp 17 11/17/20 1405   SpO2 98 % 11/17/20 1405         Event Time   Out of Recovery 14:19:57         Pain/Mamadou Score: Mamadou Score: 10 (11/17/2020  2:05 PM)

## 2020-11-17 NOTE — TRANSFER OF CARE
Anesthesia Transfer of Care Note    Patient: Rita Dejesus    Procedure(s) Performed: Procedure(s) (LRB):  COLONOSCOPY (N/A)    Patient location: GI    Anesthesia Type: MAC    Transport from OR: Transported from OR on room air with adequate spontaneous ventilation    Post pain: adequate analgesia    Post assessment: no apparent anesthetic complications    Post vital signs: stable    Level of consciousness: awake, alert and oriented    Nausea/Vomiting: no nausea/vomiting    Complications: none    Transfer of care protocol was followed      Last vitals:   Visit Vitals  /78   Pulse 72   Temp 36.1 °C (97 °F)   Resp 17   Wt 53.8 kg (118 lb 9.7 oz)   SpO2 98%   Breastfeeding No   BMI 21.35 kg/m²

## 2020-11-17 NOTE — [employer] PATIENT INSTRUCTIONS
Discharge Summary/Instructions after an Endoscopic Procedure  Patient Name: Rita Dejesus  Patient MRN: [id number]  Patient YOB: 1955 Tuesday, November 17, 2020 Kendy De Leon MD  RESTRICTIONS:  During your procedure today, you received medications for sedation.  These   medications may affect your judgment, balance and coordination.  Therefore,   for 24 hours, you have the following restrictions:   - DO NOT drive a car, operate machinery, make legal/financial decisions,   sign important papers or drink alcohol.    ACTIVITY:  Today: no heavy lifting, straining or running due to procedural   sedation/anesthesia.  The following day: return to full activity including work.  DIET:  Eat and drink normally unless instructed otherwise.     TREATMENT FOR COMMON SIDE EFFECTS:  - Mild abdominal pain, nausea, belching, bloating or excessive gas:  rest,   eat lightly and use a heating pad.  - Sore Throat: treat with throat lozenges and/or gargle with warm salt   water.  - Because air was used during the procedure, expelling large amounts of air   from your rectum or belching is normal.  - If a bowel prep was taken, you may not have a bowel movement for 1-3 days.    This is normal.  SYMPTOMS TO WATCH FOR AND REPORT TO YOUR PHYSICIAN:  1. Abdominal pain or bloating, other than gas cramps.  2. Chest pain.  3. Back pain.  4. Signs of infection such as: chills or fever occurring within 24 hours   after the procedure.  5. Rectal bleeding, which would show as bright red, maroon, or black stools.   (A tablespoon of blood from the rectum is not serious, especially if   hemorrhoids are present.)  6. Vomiting.  7. Weakness or dizziness.  GO DIRECTLY TO THE NEAREST EMERGENCY ROOM IF YOU HAVE ANY OF THE FOLLOWING:      Difficulty breathing              Chills and/or fever over 101 F   Persistent vomiting and/or vomiting blood   Severe abdominal pain   Severe chest pain   Black, tarry stools   Bleeding- more than one  tablespoon   Any other symptom or condition that you feel may need urgent attention  Your doctor recommends these additional instructions:  If any biopsies were taken, your doctors clinic will contact you in 1 to 2   weeks with any results.  - Discharge patient to home (with escort).   - Resume previous diet.   - Continue present medications.   - Await pathology results.   - Repeat colonoscopy in 5 years for surveillance based on pathology results.     - Patient has a contact number available for emergencies.  The signs and   symptoms of potential delayed complications were discussed with the   patient.  Return to normal activities tomorrow.  Written discharge   instructions were provided to the patient.  For questions, problems or results please call your physician Kendy De Leon MD at Work:  [phone]  If you have any questions about the above instructions, call the GI   department at [phone] or call the endoscopy unit at [phone]   from 7am until 3 pm.  [facility], EMERGENCY ROOM PHONE NUMBER:   [phone]  IF A COMPLICATION OR EMERGENCY SITUATION ARISES AND YOU ARE UNABLE TO REACH   YOUR PHYSICIAN - GO DIRECTLY TO THE EMERGENCY ROOM.  I have read or have had read to me these discharge instructions for my   procedure and have received a written copy.  I understand these   instructions and will follow-up with my physician if I have any questions.     __________________________________       _____________________________________  Nurse Signature                                          Patient/Designated   Responsible Party Signature  MD Kendy Martinez MD  11/17/2020 1:58:00 PM  This report has been verified and signed electronically.  [employer]

## 2020-11-17 NOTE — ANESTHESIA PREPROCEDURE EVALUATION
11/17/2020  Rita Dejesus is a 65 y.o., female.    Anesthesia Evaluation    I have reviewed the Patient Summary Reports.    I have reviewed the Nursing Notes. I have reviewed the NPO Status.   I have reviewed the Medications.     Review of Systems  Anesthesia Hx:  No problems with previous Anesthesia  Denies Family Hx of Anesthesia complications.   Denies Personal Hx of Anesthesia complications.   Social:  Social Alcohol Use, Non-Smoker    Hematology/Oncology:  Hematology Normal      Oncology Comments: History of rectal or anal cancer  History of cervical cancer    Cardiovascular:   Denies Hypertension.  Denies MI.   Denies CABG/stent.      hyperlipidemia    Pulmonary:   Denies COPD.  Denies Asthma.  Denies Sleep Apnea.    Renal/:  Renal/ Normal     Hepatic/GI:   Bowel Prep. GERD Denies Liver Disease. Denies Hepatitis.    Musculoskeletal:  Musculoskeletal Normal    Neurological:   Denies CVA. Denies Seizures.    Endocrine:  Endocrine Normal        Physical Exam  General:  Well nourished    Airway/Jaw/Neck:  Airway Findings: Mouth Opening: Normal General Airway Assessment: Adult  Mallampati: II      Dental:  Dental Findings: In tact   Chest/Lungs:  Chest/Lungs Findings: Clear to auscultation, Normal Respiratory Rate     Heart/Vascular:  Heart Findings: Rate: Normal  Rhythm: Regular Rhythm             Anesthesia Plan  Type of Anesthesia, risks & benefits discussed:  Anesthesia Type:  MAC  Patient's Preference:   Intra-op Monitoring Plan:   Intra-op Monitoring Plan Comments:   Post Op Pain Control Plan:   Post Op Pain Control Plan Comments:   Induction:   IV  Beta Blocker:  Patient is not currently on a Beta-Blocker (No further documentation required).       Informed Consent: Patient understands risks and agrees with Anesthesia plan.  Questions answered. Anesthesia consent signed with patient.  ASA  Score: 2     Day of Surgery Review of History & Physical: I have interviewed and examined the patient. I have reviewed the patient's H&P dated:  There are no significant changes.  H&P update referred to the surgeon.         Ready For Surgery From Anesthesia Perspective.

## 2020-11-17 NOTE — DISCHARGE INSTRUCTIONS
Diverticulosis    Diverticulosis means that small pouches have formed in the wall of your large intestine (colon). Most often, this problem causes no symptoms and is common as people age. But the pouches in the colon are at risk of becoming infected. When this happens, the condition is called diverticulitis. Although most people with diverticulosis never develop diverticulitis, it is still not uncommon. Rectal bleeding can also occur and in less common situations, a type of colon inflammation called colitis.  While most people do not have symptoms, some people with diverticulosis may have:  · Abdominal cramps and pain  · Bloating  · Constipation  · Change in bowel habits  Causes  The exact cause of diverticulosis (and diverticulitis) has not been proved, but a few things are associated with the condition:  · Low-fiber diet  · Constipation  · Lack of exercise  Your healthcare provider will talk with you about how to manage your condition. Diet changes may be all that are needed to help control diverticulosis and prevent progression to diverticulitis. If you develop diverticulitis, you will likely need other treatments.  Home care  You may be told to take fiber supplements daily. Fiber adds bulk to the stool so that it passes through the colon more easily. Stool softeners may be recommended. You may also be given medications for pain relief. Be sure to take all medications as directed.  In the past, people were told to avoid corn, nuts, and seeds. This is no longer necessary.  Follow these guidelines when caring for yourself at home:  · Eat unprocessed foods that are high in fiber. Whole grains, fruits, and vegetables are good choices.  · Drink 6 to 8 glasses of water every day unless your healthcare provider has you limit how much fluid you should have.  · Watch for changes in your bowel movements. Tell your provider if you notice any changes.  · Begin an exercise program. Ask your provider how to get started.  Generally, walking is the best.  · Get plenty of rest and sleep.  Follow-up care  Follow up with your healthcare provider, or as advised. Regular visits may be needed to check on your health. Sometimes special procedures such as colonoscopy, are needed after an episode of diverticulitis or blooding. Be sure to keep all your appointments.  If a stool sample was taken, or cultures were done, you should be told if they are positive, or if your treatment needs to be changed. You can call as directed for the results.  If X-rays were done, a radiologist will look at them. You will be told if there is a change in your treatment.  If antibiotics were prescribed, be sure to finish them all.  When to seek medical advice  Call your healthcare provider right away if any of these occur:  · Fever of 100.4°F (38°C) or higher, or as directed by your healthcare provider  · Severe cramps in the lower left side of the abdomen or pain that is getting worse  · Tenderness in the lower left side of the abdomen or worsening pain throughout the abdomen  · Diarrhea or constipation that doesn't get better within 24 hours  · Nausea and vomiting  · Bleeding from the rectum  Call 911  Call emergency services if any of the following occur:  · Trouble breathing  · Confusion  · Very drowsy or trouble awakening  · Fainting or loss of consciousness  · Rapid heart rate  · Chest pain  Date Last Reviewed: 12/30/2015 © 2000-2017 [employer]. [address] [place] [address]. All rights reserved. This information is not intended as a substitute for professional medical care. Always follow your healthcare professional's instructions.        Understanding Colon and Rectal Polyps    The colon (also called the large intestine) is a muscular tube that forms the last part of the digestive tract. It absorbs water and stores food waste. The colon is about 4 to 6 feet long. The rectum is the last 6 inches of the colon. The colon and rectum  have a smooth lining composed of millions of cells. Changes in these cells can lead to growths in the colon that can become cancerous and should be removed. Multiple tests are available to screen for colon cancer, but the colonoscopy is the most recommended test. During colonoscopy, these polyps can be removed. How often you need this test depends on many things including your condition, your family history, symptoms, and what the findings were at the previous colonoscopy.   When the colon lining changes  Changes that happen in the cells that line the colon or rectum can lead to growths called polyps. Over a period of years, polyps can turn cancerous. Removing polyps early may prevent cancer from ever forming.  Polyps  Polyps are fleshy clumps of tissue that form on the lining of the colon or rectum. Small polyps are usually benign (not cancerous). However, over time, cells in a polyp can change and become cancerous. Certain types of polyps known as adenomatous polyps are premalignant. The risk for invasive cancer increases with the size of the polyp and certain cell and gene features. This means that they can become cancerous if they're not removed. Hyperplastic polyps are benign. They can grow quite large and not turn cancerous.   Cancer  Almost all colorectal cancers start when polyp cells begin growing abnormally. As a cancerous tumor grows, it may involve more and more of the colon or rectum. In time, cancer can also grow beyond the colon or rectum and spread to nearby organs or to glands called lymph nodes. The cells can also travel to other parts of the body. This is known as metastasis. The earlier a cancerous tumor is removed, the better the chance of preventing its spread.    Date Last Reviewed: 8/1/2016  © 1144-5888 [employer] [place]. [address] [place] [address], [place] [address]. All rights reserved. This information is not intended as a substitute for professional medical care. Always follow your  healthcare professional's instructions.

## 2020-11-17 NOTE — PLAN OF CARE
Dr De Leon came to bedside and discussed findings. NO N/V,  no abdominal pain, no GI bleeding, and vitals stable.  Pt discharged from unit.

## 2020-11-17 NOTE — H&P
PRE PROCEDURE H&P    Patient Name: Rita Dejesus  MRN: [id number]  : 1955  Date of Procedure:  2020  Referring Physician: Jaylyn Franco LPN  Primary Physician: Tripp Heredia MD  Procedure Physician: Kendy De Leon MD       Planned Procedure: Colonoscopy  Diagnosis: hx of anal cancer.  Chief Complaint: Same as above    HPI: Patient is an 65 y.o. female is here for the above. She has a hx of anal cancer dx in  while residing on [place]. She completed combed chemo and radiation in 2014. She relovated to [place] in  and has followed with heme/onc (Dr. Porter) and colorectal surgery (Dr. Smith) at [facility] until recently. Last colonoscopy in  by Dr. Florez (colorectal surgery) for rectal bleeding. No evidence of recurrence was found.      Last colonoscopy:   Family history: none  Anticoagulation: none    Past Medical History:   Past Medical History:   Diagnosis Date    GERD (gastroesophageal reflux disease)     History of cervical cancer     History of rectal or anal cancer         Past Surgical History:  Past Surgical History:   Procedure Laterality Date    ADENOIDECTOMY      carpal tunel Bilateral     HERNIA REPAIR      TONSILLECTOMY          Home Medications:  Prior to Admission medications    Medication Sig Start Date End Date Taking? Authorizing Provider   citalopram (CELEXA) 40 MG tablet Take 1 tablet (40 mg total) by mouth once daily. 7/10/20 7/10/21 Yes Tripp Heredia MD   cyanocobalamin (VITAMIN B-12) 250 MCG tablet Take 250 mcg by mouth. 18  Yes Historical Provider   dexlansoprazole (DEXILANT) 60 mg capsule Take 1 capsule (60 mg total) by mouth once daily. 3/12/20  Yes Tripp Heredia MD   Lactobacillus rhamnosus GG (CULTURELLE) 10 billion cell capsule Take 1 capsule by mouth once daily.   Yes Historical Provider   simvastatin (ZOCOR) 40 MG tablet TAKE 1 TABLET BY MOUTH EVERY EVENING 20  Yes Tripp Heredia MD   sucralfate (CARAFATE) 100  mg/mL suspension Take 5 mLs (500 mg total) by mouth 4 (four) times daily. 6/3/19  Yes Tripp Heredia MD   vitamin D (VITAMIN D3) 1000 units Tab Take 185 mg by mouth.   Yes Historical Provider        Allergies:  Review of patient's allergies indicates:   Allergen Reactions    Sulfa (sulfonamide antibiotics) Nausea And Vomiting    Nitrofuran analogues Nausea And Vomiting     Microdantin         Social History:   Social History     Socioeconomic History    Marital status:      Spouse name: Not on file    Number of children: Not on file    Years of education: Not on file    Highest education level: Not on file   Occupational History    Not on file   Social Needs    Financial resource strain: Not on file    Food insecurity     Worry: Not on file     Inability: Not on file    Transportation needs     Medical: Not on file     Non-medical: Not on file   Tobacco Use    Smoking status: Never Smoker    Smokeless tobacco: Never Used   Substance and Sexual Activity    Alcohol use: Yes     Comment: Social     Drug use: Never    Sexual activity: Not Currently     Partners: Male   Lifestyle    Physical activity     Days per week: Not on file     Minutes per session: Not on file    Stress: Not on file   Relationships    Social connections     Talks on phone: Not on file     Gets together: Not on file     Attends [employer] service: Not on file     Active member of club or organization: Not on file     Attends meetings of clubs or organizations: Not on file     Relationship status: Not on file   Other Topics Concern    Not on file   Social History Narrative    Not on file       Family History:  Family History   Problem Relation Age of Onset    Diabetes Mother         type II    Hyperlipidemia Mother     Heart disease Father     Hypertension Father     Cataracts Father     Hypertension Sister     Diabetes Maternal Grandmother     Diabetes Maternal Grandfather        ROS: No acute cardiac events,  no acute respiratory complaints.     Physical Exam (all patients):    /64 (BP Location: Left arm, Patient Position: Lying)   Pulse 90   Temp 97.2 °F (36.2 °C) (Temporal)   Resp 17   Wt 53.8 kg (118 lb 9.7 oz)   SpO2 99%   Breastfeeding No   BMI 21.35 kg/m²   Lungs: Clear to auscultation bilaterally, respirations unlabored  Heart: Regular rate and rhythm, S1 and S2 normal, no obvious murmurs  Abdomen:         Soft, non-tender, bowel sounds normal, no masses, no organomegaly    Lab Results   Component Value Date    WBC 4.70 12/02/2019    MCV 92 12/02/2019    RDW 13.2 12/02/2019     12/02/2019    GLU 91 12/02/2019    BUN 21 12/02/2019     12/02/2019    K 4.8 12/02/2019     (H) 12/02/2019        SEDATION PLAN: per anesthesia      History reviewed, vital signs satisfactory, cardiopulmonary status satisfactory, sedation options, risks and plans have been discussed with the patient  All their questions were answered and the patient agrees to the sedation procedures as planned and the patient is deemed an appropriate candidate for the sedation as planned.    The risks, benefits and alternatives of the procedure were discussed with the patient in detail. This discussion was had in the presence of her friend, Idalmis. The risks include, risks of adverse reaction to sedation requiring the use of reversal agents, bleeding requiring blood transfusion, perforation requiring surgical intervention and technical failure. Other risks include aspiration leading to respiratory distress and respiratory failure resulting in endotracheal intubation and mechanical ventilation including death. If anesthesia is being utilized for this procedure, it is up to the anesthesiologist to determine airway safety including elective endotracheal intubation. Questions were answered, they agree to proceed. There was no language barriers.     Procedure explained to patient, informed consent obtained and placed in  chart.    Kendy De Leon  11/17/2020  1:01 PM

## 2020-11-25 ENCOUNTER — LAB VISIT (OUTPATIENT)
Dept: LAB | Facility: HOSPITAL | Age: 65
End: 2020-11-25
Attending: FAMILY MEDICINE
Payer: MEDICARE

## 2020-11-25 DIAGNOSIS — E78.5 HYPERLIPIDEMIA, UNSPECIFIED HYPERLIPIDEMIA TYPE: ICD-10-CM

## 2020-11-25 DIAGNOSIS — E55.9 VITAMIN D DEFICIENCY: ICD-10-CM

## 2020-11-25 LAB
25(OH)D3+25(OH)D2 SERPL-MCNC: 66 NG/ML (ref 30–96)
ALBUMIN SERPL BCP-MCNC: 3.7 G/DL (ref 3.5–5.2)
ALP SERPL-CCNC: 80 U/L (ref 55–135)
ALT SERPL W/O P-5'-P-CCNC: 7 U/L (ref 10–44)
ANION GAP SERPL CALC-SCNC: 7 MMOL/L (ref 8–16)
AST SERPL-CCNC: 22 U/L (ref 10–40)
BASOPHILS # BLD AUTO: 0.04 K/UL (ref 0–0.2)
BASOPHILS NFR BLD: 1 % (ref 0–1.9)
BILIRUB SERPL-MCNC: 0.4 MG/DL (ref 0.1–1)
BUN SERPL-MCNC: 18 MG/DL (ref 8–23)
CALCIUM SERPL-MCNC: 9.3 MG/DL (ref 8.7–10.5)
CHLORIDE SERPL-SCNC: 109 MMOL/L (ref 95–110)
CHOLEST SERPL-MCNC: 161 MG/DL (ref 120–199)
CHOLEST/HDLC SERPL: 2.6 {RATIO} (ref 2–5)
CO2 SERPL-SCNC: 26 MMOL/L (ref 23–29)
CREAT SERPL-MCNC: 1.1 MG/DL (ref 0.5–1.4)
DIFFERENTIAL METHOD: ABNORMAL
EOSINOPHIL # BLD AUTO: 0.2 K/UL (ref 0–0.5)
EOSINOPHIL NFR BLD: 4 % (ref 0–8)
ERYTHROCYTE [DISTWIDTH] IN BLOOD BY AUTOMATED COUNT: 13.6 % (ref 11.5–14.5)
EST. GFR  (AFRICAN AMERICAN): >60 ML/MIN/1.73 M^2
EST. GFR  (NON AFRICAN AMERICAN): 52.4 ML/MIN/1.73 M^2
GLUCOSE SERPL-MCNC: 95 MG/DL (ref 70–110)
HCT VFR BLD AUTO: 39.9 % (ref 37–48.5)
HDLC SERPL-MCNC: 62 MG/DL (ref 40–75)
HDLC SERPL: 38.5 % (ref 20–50)
HGB BLD-MCNC: 12.4 G/DL (ref 12–16)
IMM GRANULOCYTES # BLD AUTO: 0.01 K/UL (ref 0–0.04)
IMM GRANULOCYTES NFR BLD AUTO: 0.3 % (ref 0–0.5)
LDLC SERPL CALC-MCNC: 82.4 MG/DL (ref 63–159)
LYMPHOCYTES # BLD AUTO: 1.3 K/UL (ref 1–4.8)
LYMPHOCYTES NFR BLD: 31.8 % (ref 18–48)
MCH RBC QN AUTO: 28.9 PG (ref 27–31)
MCHC RBC AUTO-ENTMCNC: 31.1 G/DL (ref 32–36)
MCV RBC AUTO: 93 FL (ref 82–98)
MONOCYTES # BLD AUTO: 0.4 K/UL (ref 0.3–1)
MONOCYTES NFR BLD: 9.8 % (ref 4–15)
NEUTROPHILS # BLD AUTO: 2.1 K/UL (ref 1.8–7.7)
NEUTROPHILS NFR BLD: 53.1 % (ref 38–73)
NONHDLC SERPL-MCNC: 99 MG/DL
NRBC BLD-RTO: 0 /100 WBC
PLATELET # BLD AUTO: 131 K/UL (ref 150–350)
PMV BLD AUTO: ABNORMAL FL (ref 9.2–12.9)
POTASSIUM SERPL-SCNC: 4.6 MMOL/L (ref 3.5–5.1)
PROT SERPL-MCNC: 6.9 G/DL (ref 6–8.4)
RBC # BLD AUTO: 4.29 M/UL (ref 4–5.4)
SODIUM SERPL-SCNC: 142 MMOL/L (ref 136–145)
TRIGL SERPL-MCNC: 83 MG/DL (ref 30–150)
WBC # BLD AUTO: 3.96 K/UL (ref 3.9–12.7)

## 2020-11-25 PROCEDURE — 82306 VITAMIN D 25 HYDROXY: CPT

## 2020-11-25 PROCEDURE — 80061 LIPID PANEL: CPT

## 2020-11-25 PROCEDURE — 36415 COLL VENOUS BLD VENIPUNCTURE: CPT | Mod: PO

## 2020-11-25 PROCEDURE — 80053 COMPREHEN METABOLIC PANEL: CPT

## 2020-11-25 PROCEDURE — 85025 COMPLETE CBC W/AUTO DIFF WBC: CPT

## 2020-11-27 LAB
COMMENT: NORMAL
FINAL PATHOLOGIC DIAGNOSIS: NORMAL
GROSS: NORMAL
Lab: NORMAL
MICROSCOPIC EXAM: NORMAL

## 2020-11-28 NOTE — PROGRESS NOTES
AN staff: inform patient polyp removed was a precancerous polyp or adenoma. It was completely removed. The biopsies at the anal verge shows chronic inflammation but no evidence of cancer. Repeat colonoscopy in 5 years. Place in recall.

## 2020-12-02 ENCOUNTER — PATIENT MESSAGE (OUTPATIENT)
Dept: GASTROENTEROLOGY | Facility: CLINIC | Age: 65
End: 2020-12-02

## 2020-12-03 ENCOUNTER — OFFICE VISIT (OUTPATIENT)
Dept: INTERNAL MEDICINE | Facility: CLINIC | Age: 65
End: 2020-12-03
Payer: MEDICARE

## 2020-12-03 VITALS
SYSTOLIC BLOOD PRESSURE: 108 MMHG | TEMPERATURE: 98 F | WEIGHT: 125.44 LBS | BODY MASS INDEX: 22.58 KG/M2 | DIASTOLIC BLOOD PRESSURE: 64 MMHG | HEART RATE: 75 BPM | OXYGEN SATURATION: 98 %

## 2020-12-03 DIAGNOSIS — H91.93 HEARING DIFFICULTY OF BOTH EARS: Primary | ICD-10-CM

## 2020-12-03 DIAGNOSIS — K13.70 MOUTH LESION: ICD-10-CM

## 2020-12-03 DIAGNOSIS — K22.719 BARRETT'S ESOPHAGUS WITH DYSPLASIA: ICD-10-CM

## 2020-12-03 DIAGNOSIS — F32.A DEPRESSION, UNSPECIFIED DEPRESSION TYPE: ICD-10-CM

## 2020-12-03 PROCEDURE — G0009 ADMIN PNEUMOCOCCAL VACCINE: HCPCS | Mod: PBBFAC,PO

## 2020-12-03 PROCEDURE — 99999 PR PBB SHADOW E&M-EST. PATIENT-LVL III: ICD-10-PCS | Mod: PBBFAC,,, | Performed by: FAMILY MEDICINE

## 2020-12-03 PROCEDURE — 99214 PR OFFICE/OUTPT VISIT, EST, LEVL IV, 30-39 MIN: ICD-10-PCS | Mod: S$PBB,,, | Performed by: FAMILY MEDICINE

## 2020-12-03 PROCEDURE — 99214 OFFICE O/P EST MOD 30 MIN: CPT | Mod: S$PBB,,, | Performed by: FAMILY MEDICINE

## 2020-12-03 PROCEDURE — 99213 OFFICE O/P EST LOW 20 MIN: CPT | Mod: PBBFAC,PO,25 | Performed by: FAMILY MEDICINE

## 2020-12-03 PROCEDURE — 99999 PR PBB SHADOW E&M-EST. PATIENT-LVL III: CPT | Mod: PBBFAC,,, | Performed by: FAMILY MEDICINE

## 2020-12-03 RX ORDER — VENLAFAXINE HYDROCHLORIDE 37.5 MG/1
37.5 CAPSULE, EXTENDED RELEASE ORAL DAILY
Qty: 30 CAPSULE | Refills: 11 | Status: SHIPPED | OUTPATIENT
Start: 2020-12-03 | End: 2021-03-03 | Stop reason: SDUPTHER

## 2020-12-03 RX ORDER — PSEUDOEPHED/CODEINE/TRIPROLIDN 30-10-1.25
1 SYRUP ORAL DAILY
COMMUNITY

## 2020-12-03 RX ORDER — DEXLANSOPRAZOLE 60 MG/1
60 CAPSULE, DELAYED RELEASE ORAL DAILY
Qty: 90 CAPSULE | Refills: 3 | Status: SHIPPED | OUTPATIENT
Start: 2020-12-03 | End: 2021-12-27

## 2020-12-03 RX ORDER — SIMVASTATIN 40 MG/1
40 TABLET, FILM COATED ORAL NIGHTLY
Qty: 90 TABLET | Refills: 3 | Status: SHIPPED | OUTPATIENT
Start: 2020-12-03 | End: 2021-05-12

## 2020-12-03 NOTE — PROGRESS NOTES
Administered pneumovax 23 shot to left deltoid.  See immunization record.  Pt tolerated well.  Advised to wait at least 15 minutes to monitor for adverse reactions.  Pt verbalizes understanding.  VIS given.    ndc  0225-2620-81  Lot   [id number]  Exp  9-

## 2020-12-03 NOTE — PROGRESS NOTES
Subjective:      Patient ID: Rita Dejesus is a 65 y.o. female.    Chief Complaint: Annual Exam, Hearing Problem (bilat; possibly wax build up), and Pain (mouth pain)      The patient is here today for routine follow up. Labs drawn earlier discussed today with the patient.  Patient reports having noticed recently that she is having increased difficulty hearing, has to ask people to repeat themselves frequently.  She also reports increased dysphoria recently, difficulty sleeping at night from recent loss of her father and the upcoming holidays.  She also reports continued reflux issues and pain in her esophagus.  She had EGD years ago which did show Forte's esophagus, says she was told there was dysplasia in the cells.    Review of Systems   Constitutional: Negative for activity change, appetite change and fever.   HENT: Negative for congestion.    Eyes: Negative for visual disturbance.   Respiratory: Negative for chest tightness and shortness of breath.    Cardiovascular: Negative for palpitations and leg swelling.   Gastrointestinal: Positive for abdominal pain (B gastric, intermittent).   Endocrine: Negative for polyuria.   Musculoskeletal: Negative for gait problem.   Skin: Negative for color change.   Allergic/Immunologic: Negative for immunocompromised state.   Neurological: Negative for dizziness.   Psychiatric/Behavioral: Positive for dysphoric mood and sleep disturbance. Negative for hallucinations and suicidal ideas. The patient is nervous/anxious. The patient is not hyperactive.      Past Medical History:   Diagnosis Date    GERD (gastroesophageal reflux disease)     History of cervical cancer     History of rectal or anal cancer           Past Surgical History:   Procedure Laterality Date    ADENOIDECTOMY      carpal tunel Bilateral     COLONOSCOPY N/A 11/17/2020    Procedure: COLONOSCOPY;  Surgeon: Kendy De Leon MD;  Location: [facility];  Service: Endoscopy;  Laterality: N/A;     HERNIA REPAIR      TONSILLECTOMY       Family History   Problem Relation Age of Onset    Diabetes Mother         type II    Hyperlipidemia Mother     Heart disease Father     Hypertension Father     Cataracts Father     Hypertension Sister     Diabetes Maternal Grandmother     Diabetes Maternal Grandfather      Social History     Socioeconomic History    Marital status:      Spouse name: Not on file    Number of children: Not on file    Years of education: Not on file    Highest education level: Not on file   Occupational History    Not on file   Social Needs    Financial resource strain: Not on file    Food insecurity     Worry: Not on file     Inability: Not on file    Transportation needs     Medical: Not on file     Non-medical: Not on file   Tobacco Use    Smoking status: Never Smoker    Smokeless tobacco: Never Used   Substance and Sexual Activity    Alcohol use: Yes     Comment: Social     Drug use: Never    Sexual activity: Not Currently     Partners: Male   Lifestyle    Physical activity     Days per week: Not on file     Minutes per session: Not on file    Stress: Not on file   Relationships    Social connections     Talks on phone: Not on file     Gets together: Not on file     Attends Voodoo service: Not on file     Active member of club or organization: Not on file     Attends meetings of clubs or organizations: Not on file     Relationship status: Not on file   Other Topics Concern    Not on file   Social History Narrative    Not on file     Review of patient's allergies indicates:   Allergen Reactions    Sulfa (sulfonamide antibiotics) Nausea And Vomiting    Nitrofuran analogues Nausea And Vomiting     Microdantin        Objective:       /64   Pulse 75   Temp 97.5 °F (36.4 °C)   Wt 56.9 kg (125 lb 7.1 oz)   SpO2 98%   BMI 22.58 kg/m²   Physical Exam  Vitals signs reviewed.   Constitutional:       General: She is not in acute distress.     Appearance:  Normal appearance. She is well-developed. She is not ill-appearing or diaphoretic.   HENT:      Head: Normocephalic and atraumatic.      Right Ear: Hearing, tympanic membrane, ear canal and external ear normal.      Left Ear: Hearing, tympanic membrane, ear canal and external ear normal.      Nose: Nose normal.      Mouth/Throat:      Pharynx: Uvula midline. No oropharyngeal exudate.   Eyes:      Conjunctiva/sclera: Conjunctivae normal.      Pupils: Pupils are equal, round, and reactive to light.   Neck:      Musculoskeletal: Normal range of motion and neck supple.      Thyroid: No thyromegaly.      Trachea: No tracheal deviation.   Cardiovascular:      Rate and Rhythm: Normal rate and regular rhythm.      Heart sounds: Normal heart sounds. No murmur.   Pulmonary:      Effort: Pulmonary effort is normal. No respiratory distress.      Breath sounds: Normal breath sounds.   Abdominal:      General: Bowel sounds are normal.      Palpations: Abdomen is soft.      Tenderness: There is no abdominal tenderness. There is no guarding.      Hernia: No hernia is present.   Musculoskeletal: Normal range of motion.   Lymphadenopathy:      Cervical: No cervical adenopathy.   Skin:     General: Skin is warm and dry.      Capillary Refill: Capillary refill takes less than 2 seconds.   Neurological:      General: No focal deficit present.      Mental Status: She is alert and oriented to person, place, and time.   Psychiatric:         Mood and Affect: Mood normal.         Behavior: Behavior normal.         Thought Content: Thought content normal.         Judgment: Judgment normal.       Assessment:     1. Hearing difficulty of both ears    2. Mouth lesion    3. Depression, unspecified depression type    4. Forte's esophagus with dysplasia      Plan:   Hearing difficulty of both ears  -     Ambulatory referral/consult to Audiology; Future; Expected date: 12/10/2020    Mouth lesion    Depression, unspecified depression  type    Forte's esophagus with dysplasia  -     Case Request Endoscopy: ESOPHAGOGASTRODUODENOSCOPY (EGD)    Other orders  -     venlafaxine (EFFEXOR-XR) 37.5 MG 24 hr capsule; Take 1 capsule (37.5 mg total) by mouth once daily.  Dispense: 30 capsule; Refill: 11  -     simvastatin (ZOCOR) 40 MG tablet; Take 1 tablet (40 mg total) by mouth every evening.  Dispense: 90 tablet; Refill: 3  -     dexlansoprazole (DEXILANT) 60 mg capsule; Take 1 capsule (60 mg total) by mouth once daily.  Dispense: 90 capsule; Refill: 3  -     (In Office Administered) Pneumococcal Polysaccharide Vaccine (23 Valent) (SQ/IM)     Continue all other current medications  Medication List with Changes/Refills   New Medications    VENLAFAXINE (EFFEXOR-XR) 37.5 MG 24 HR CAPSULE    Take 1 capsule (37.5 mg total) by mouth once daily.   Current Medications    CALCIUM CARBONATE 650 MG CALCIUM (1,625 MG) TABLET    Take 1 tablet by mouth once daily.    CYANOCOBALAMIN (VITAMIN B-12) 250 MCG TABLET    Take 250 mcg by mouth.    LACTOBACILLUS RHAMNOSUS GG (CULTURELLE) 10 BILLION CELL CAPSULE    Take 1 capsule by mouth once daily.    MULTIVITAMIN CAPSULE    Take 1 capsule by mouth once daily.    VITAMIN D (VITAMIN D3) 1000 UNITS TAB    Take 185 mg by mouth.   Changed and/or Refilled Medications    Modified Medication Previous Medication    DEXLANSOPRAZOLE (DEXILANT) 60 MG CAPSULE dexlansoprazole (DEXILANT) 60 mg capsule       Take 1 capsule (60 mg total) by mouth once daily.    Take 1 capsule (60 mg total) by mouth once daily.    SIMVASTATIN (ZOCOR) 40 MG TABLET simvastatin (ZOCOR) 40 MG tablet       Take 1 tablet (40 mg total) by mouth every evening.    TAKE 1 TABLET BY MOUTH EVERY EVENING   Discontinued Medications    CITALOPRAM (CELEXA) 40 MG TABLET    Take 1 tablet (40 mg total) by mouth once daily.    SUCRALFATE (CARAFATE) 100 MG/ML SUSPENSION    Take 5 mLs (500 mg total) by mouth 4 (four) times daily.

## 2020-12-04 ENCOUNTER — CLINICAL SUPPORT (OUTPATIENT)
Dept: AUDIOLOGY | Facility: CLINIC | Age: 65
End: 2020-12-04
Payer: MEDICARE

## 2020-12-04 DIAGNOSIS — H91.93 HEARING DIFFICULTY OF BOTH EARS: ICD-10-CM

## 2020-12-04 DIAGNOSIS — H91.90 PERCEIVED HEARING LOSS: Primary | ICD-10-CM

## 2020-12-04 PROCEDURE — 92567 TYMPANOMETRY: CPT | Mod: PBBFAC | Performed by: AUDIOLOGIST-HEARING AID FITTER

## 2020-12-04 PROCEDURE — 99999 PR PBB SHADOW E&M-EST. PATIENT-LVL I: CPT | Mod: PBBFAC,,, | Performed by: AUDIOLOGIST-HEARING AID FITTER

## 2020-12-04 PROCEDURE — 99999 PR PBB SHADOW E&M-EST. PATIENT-LVL I: ICD-10-PCS | Mod: PBBFAC,,, | Performed by: AUDIOLOGIST-HEARING AID FITTER

## 2020-12-04 PROCEDURE — 99211 OFF/OP EST MAY X REQ PHY/QHP: CPT | Mod: PBBFAC,25 | Performed by: AUDIOLOGIST-HEARING AID FITTER

## 2020-12-04 PROCEDURE — 92557 COMPREHENSIVE HEARING TEST: CPT | Mod: PBBFAC | Performed by: AUDIOLOGIST-HEARING AID FITTER

## 2020-12-04 NOTE — PROGRESS NOTES
Referring provider: Dr. Yan Salinas Immanuel Dejesus was seen 12/04/2020 for an audiological evaluation.  Patient complains of decreased hearing over the past year. Her  has primarily noticed she is asking for increased repetition. She says speech sounds mumbled at times. She also notes not hearing as well on the phone from her left ear as compared to her right ear. No tinnitus or dizziness. No otalgia, fullness or drainage from ears. Her ears tend to itch.  She has family history of hearing loss, including her daughter with ossicular pathology and paternal grandfather was deaf in the left ear. She has history of occupational loud noise exposure.     Results reveal normal hearing 250-6000 Hz sloping to a mild loss at 8000 Hz for the right ear, and normal hearing 250-8000 Hz for the left ear.   Speech Reception Thresholds were 15 dBHL for the right ear and 15 dBHL for the left ear.   Word recognition scores were excellent for the right ear and excellent for the left ear.   Tympanograms were Type A for the right ear and Type A for the left ear.    Patient was counseled on the above findings.    Recommendations:  1. Hearing protection around loud noises.  2. Return as needed.

## 2020-12-11 ENCOUNTER — TELEPHONE (OUTPATIENT)
Dept: ENDOSCOPY | Facility: HOSPITAL | Age: 65
End: 2020-12-11

## 2020-12-11 ENCOUNTER — PATIENT MESSAGE (OUTPATIENT)
Dept: ENDOSCOPY | Facility: HOSPITAL | Age: 65
End: 2020-12-11

## 2020-12-11 DIAGNOSIS — K22.719 BARRETT'S ESOPHAGUS WITH DYSPLASIA, UNSPECIFIED: Primary | ICD-10-CM

## 2020-12-11 NOTE — TELEPHONE ENCOUNTER
Location Screening:    If answers yes to any of the following, schedule at [facility] ONLY. If No, OK for either location.    1. Is there a diagnosis of heart failure, severe heart valve disease (aortic stenosis) or mechanical valve? no  a. Is the Left Ventricle Ejection Fraction <30% ? no    2. Does the pt have pulmonary hypertension? no   a. Is pulmonary arterial pressure gradient >50mmHg? no   b. Is there evidence of right ventricular dysfunction? no    3. Does the pt have achalasia? no    4. Any history of negative reaction to anesthesia? no   a. Myasthenia gravis? no   b. Malignant hyperthermia? no   c. Other? not applicable    5. Is procedure for esophageal banding? no      COVID Screening    1. Have you had a fever in the last 7 days or have you used fever reducing medicines for a fever in the last 7 days?  no    2. Are you experiencing shortness of breath, cough, muscle aches, loss of taste or loss of smell?  no    If answered yes to questions 1 and 2, the patient must seek medical attention with their PCP.  Do not schedule their procedure.     3. Are you residing with anyone who has tested positive for Covid?  no    If answered yes to question 3, recommend 14 day self-quarantine from the date of relative's positive test and place special needs note in the depot.  Wait to schedule.     ENDO screening    1. Have you been admitted for cardiac, kidney or pulmonary causes to the hospital in the past 3 months? no   If yes, schedule an appointment with PCP before scheduling endoscopic procedure.     2. Have you had a stent placed in the last 12 months? no   If yes, for a screening visit, cancel and message the ordering provider.  The patient will need a new order when the time is appropriate.     3. Have you had a stroke or heart attack in the past 6 months? no   If yes, cancel and refer patient to ordering provider for clearance, also message ordering provider to inform.     4. Have you had any chest pain in the past  "3 months? no   If yes, Have you been evaluated by your PCP and/or cardiologist and it was determined to not be heart related? not applicable   If No, Pt needs to be seen by PCP or Cardiologist .  Pt can be scheduled once clearance obtained by either of those providers.     5. Do you take prescription weight loss medications?  no   If yes, must stop for 2 weeks prior to procedure.     6. Have you been diagnosed with diverticulitis within the past 3 months? no   If yes, must have been seen by GI within the last 3 months, if not schedule with GI JULIEN.    If pt has been seen by GI, schedule procedure 8-12 weeks post antibiotic treatment.     7. Are you on Dialysis? no  If yes, schedule procedure for the day AFTER dialysis.  Appt time should be 9am or later, patient arrival time is 2 hours prior.  Nulytely or miralax prep for all patients with kidney disease.     8. Are you diabetic?  no   If yes, schedule morning appt. Advise pt to hold all diabetic meds day of procedure.     9. If pt is older than 80 years of age and HAS NOT been seen by GI or PCP within the last 6 months, needs appt with GI JULIEN.   If pt has been seen by the GI provider or PCP within the past 6  months AND meets criteria, schedule procedure AND send message to the endoscopist.     10. Is patient on a "high risk" medication (blood thinner/antiplatelet agent)?  no   If yes, has cardiac clearance been obtained within the last 60 days? N/A   If no, a new clearance needs to be obtained.     Final Questions:    1.I have reviewed the last colonoscopy for recommendations regarding next procedure bowel prep.  no  2. I have reviewed medications and allergies.  yes  3. I have verified the pharmacy information and appropriate prep sent if needed. yes  4. Prep instructions have been mailed or sent to portal per patient request. yes        All schedulers will have ability to reach out to the ordering GI provider to clarify any issues.     "

## 2020-12-20 ENCOUNTER — LAB VISIT (OUTPATIENT)
Dept: URGENT CARE | Facility: CLINIC | Age: 65
End: 2020-12-20
Payer: MEDICARE

## 2020-12-20 DIAGNOSIS — K22.719 BARRETT'S ESOPHAGUS WITH DYSPLASIA, UNSPECIFIED: ICD-10-CM

## 2020-12-20 PROCEDURE — U0003 INFECTIOUS AGENT DETECTION BY NUCLEIC ACID (DNA OR RNA); SEVERE ACUTE RESPIRATORY SYNDROME CORONAVIRUS 2 (SARS-COV-2) (CORONAVIRUS DISEASE [COVID-19]), AMPLIFIED PROBE TECHNIQUE, MAKING USE OF HIGH THROUGHPUT TECHNOLOGIES AS DESCRIBED BY CMS-2020-01-R: HCPCS

## 2020-12-21 ENCOUNTER — TELEPHONE (OUTPATIENT)
Dept: ENDOSCOPY | Facility: HOSPITAL | Age: 65
End: 2020-12-21

## 2020-12-21 LAB — SARS-COV-2 RNA RESP QL NAA+PROBE: NOT DETECTED

## 2020-12-22 ENCOUNTER — ANESTHESIA EVENT (OUTPATIENT)
Dept: ENDOSCOPY | Facility: HOSPITAL | Age: 65
End: 2020-12-22
Payer: MEDICARE

## 2020-12-22 ENCOUNTER — HOSPITAL ENCOUNTER (OUTPATIENT)
Facility: HOSPITAL | Age: 65
Discharge: HOME | End: 2020-12-22
Attending: FAMILY MEDICINE | Admitting: FAMILY MEDICINE
Payer: MEDICARE

## 2020-12-22 ENCOUNTER — ANESTHESIA (OUTPATIENT)
Dept: ENDOSCOPY | Facility: HOSPITAL | Age: 65
End: 2020-12-22
Payer: MEDICARE

## 2020-12-22 VITALS
HEIGHT: 61 IN | WEIGHT: 121.06 LBS | DIASTOLIC BLOOD PRESSURE: 58 MMHG | TEMPERATURE: 98 F | HEART RATE: 87 BPM | SYSTOLIC BLOOD PRESSURE: 107 MMHG | BODY MASS INDEX: 22.86 KG/M2 | OXYGEN SATURATION: 98 % | RESPIRATION RATE: 16 BRPM

## 2020-12-22 DIAGNOSIS — K21.9 GERD (GASTROESOPHAGEAL REFLUX DISEASE): Primary | ICD-10-CM

## 2020-12-22 DIAGNOSIS — K44.9 HIATAL HERNIA: ICD-10-CM

## 2020-12-22 DIAGNOSIS — D13.1 BENIGN FUNDIC GLAND POLYPS OF STOMACH: ICD-10-CM

## 2020-12-22 DIAGNOSIS — K29.30 CHRONIC SUPERFICIAL GASTRITIS WITHOUT BLEEDING: ICD-10-CM

## 2020-12-22 PROCEDURE — 37000009 HC ANESTHESIA EA ADD 15 MINS: Performed by: FAMILY MEDICINE

## 2020-12-22 PROCEDURE — 63600175 PHARM REV CODE 636 W HCPCS: Performed by: NURSE ANESTHETIST, CERTIFIED REGISTERED

## 2020-12-22 PROCEDURE — 37000008 HC ANESTHESIA 1ST 15 MINUTES: Performed by: FAMILY MEDICINE

## 2020-12-22 PROCEDURE — 27201012 HC FORCEPS, HOT/COLD, DISP: Performed by: FAMILY MEDICINE

## 2020-12-22 PROCEDURE — 27201089 HC SNARE, DISP (ANY): Performed by: FAMILY MEDICINE

## 2020-12-22 PROCEDURE — 43239 PR EGD, FLEX, W/BIOPSY, SGL/MULTI: ICD-10-PCS | Mod: 59,,, | Performed by: FAMILY MEDICINE

## 2020-12-22 PROCEDURE — 88305 TISSUE EXAM BY PATHOLOGIST: ICD-10-PCS | Mod: 26,,, | Performed by: PATHOLOGY

## 2020-12-22 PROCEDURE — 43251 EGD REMOVE LESION SNARE: CPT | Mod: ,,, | Performed by: FAMILY MEDICINE

## 2020-12-22 PROCEDURE — 43251 PR EGD, FLEX, W/REMOVAL, TUMOR/POLYP/LESION(S), SNARE: ICD-10-PCS | Mod: ,,, | Performed by: FAMILY MEDICINE

## 2020-12-22 PROCEDURE — 43239 EGD BIOPSY SINGLE/MULTIPLE: CPT | Mod: 59,,, | Performed by: FAMILY MEDICINE

## 2020-12-22 PROCEDURE — 43239 EGD BIOPSY SINGLE/MULTIPLE: CPT | Performed by: FAMILY MEDICINE

## 2020-12-22 PROCEDURE — 43251 EGD REMOVE LESION SNARE: CPT | Performed by: FAMILY MEDICINE

## 2020-12-22 PROCEDURE — 88305 TISSUE EXAM BY PATHOLOGIST: CPT | Performed by: PATHOLOGY

## 2020-12-22 PROCEDURE — 88305 TISSUE EXAM BY PATHOLOGIST: CPT | Mod: 26,,, | Performed by: PATHOLOGY

## 2020-12-22 PROCEDURE — 25000003 PHARM REV CODE 250: Performed by: NURSE ANESTHETIST, CERTIFIED REGISTERED

## 2020-12-22 RX ORDER — SODIUM CHLORIDE 0.9 % (FLUSH) 0.9 %
10 SYRINGE (ML) INJECTION
Status: DISCONTINUED | OUTPATIENT
Start: 2020-12-22 | End: 2020-12-22 | Stop reason: HOSPADM

## 2020-12-22 RX ORDER — SODIUM CHLORIDE, SODIUM LACTATE, POTASSIUM CHLORIDE, CALCIUM CHLORIDE 600; 310; 30; 20 MG/100ML; MG/100ML; MG/100ML; MG/100ML
INJECTION, SOLUTION INTRAVENOUS CONTINUOUS PRN
Status: DISCONTINUED | OUTPATIENT
Start: 2020-12-22 | End: 2020-12-22

## 2020-12-22 RX ORDER — LIDOCAINE HCL/PF 100 MG/5ML
SYRINGE (ML) INTRAVENOUS
Status: DISCONTINUED | OUTPATIENT
Start: 2020-12-22 | End: 2020-12-22

## 2020-12-22 RX ORDER — PROPOFOL 10 MG/ML
INJECTION, EMULSION INTRAVENOUS
Status: DISCONTINUED | OUTPATIENT
Start: 2020-12-22 | End: 2020-12-22

## 2020-12-22 RX ADMIN — PROPOFOL 30 MG: 10 INJECTION, EMULSION INTRAVENOUS at 10:12

## 2020-12-22 RX ADMIN — PROPOFOL 70 MG: 10 INJECTION, EMULSION INTRAVENOUS at 10:12

## 2020-12-22 RX ADMIN — LIDOCAINE HYDROCHLORIDE 50 MG: 20 INJECTION, SOLUTION INTRAVENOUS at 10:12

## 2020-12-22 RX ADMIN — SODIUM CHLORIDE, SODIUM LACTATE, POTASSIUM CHLORIDE, AND CALCIUM CHLORIDE: 600; 310; 30; 20 INJECTION, SOLUTION INTRAVENOUS at 10:12

## 2020-12-22 RX ADMIN — GLYCOPYRROLATE 0.2 MG: 0.2 INJECTION, SOLUTION INTRAMUSCULAR; INTRAVITREAL at 10:12

## 2020-12-22 NOTE — TRANSFER OF CARE
"Anesthesia Transfer of Care Note    Patient: Rita Dejesus    Procedure(s) Performed: Procedure(s) (LRB):  ESOPHAGOGASTRODUODENOSCOPY (EGD) (N/A)    Patient location: PACU    Anesthesia Type: MAC    Transport from OR: Transported from OR on room air with adequate spontaneous ventilation    Post pain: adequate analgesia    Post assessment: no apparent anesthetic complications    Post vital signs: stable    Level of consciousness: responds to stimulation    Nausea/Vomiting: no nausea/vomiting    Complications: none    Transfer of care protocol was followed      Last vitals:   Visit Vitals  /77   Pulse 80   Temp 36.3 °C (97.3 °F) (Skin)   Resp 18   Ht 5' 1" (1.549 m)   Wt 54.9 kg (121 lb 0.5 oz)   SpO2 99%   Breastfeeding No   BMI 22.87 kg/m²     "

## 2020-12-22 NOTE — [employer] PATIENT INSTRUCTIONS
Discharge Summary/Instructions after an Endoscopic Procedure  Patient Name: Rita Dejesus  Patient MRN: [id number]  Patient YOB: 1955 Tuesday, December 22, 2020 Goyo Kapadia MD  RESTRICTIONS:  During your procedure today, you received medications for sedation.  These   medications may affect your judgment, balance and coordination.  Therefore,   for 24 hours, you have the following restrictions:   - DO NOT drive a car, operate machinery, make legal/financial decisions,   sign important papers or drink alcohol.    ACTIVITY:  Today: no heavy lifting, straining or running due to procedural   sedation/anesthesia.  The following day: return to full activity including work.  DIET:  Eat and drink normally unless instructed otherwise.     TREATMENT FOR COMMON SIDE EFFECTS:  - Mild abdominal pain, nausea, belching, bloating or excessive gas:  rest,   eat lightly and use a heating pad.  - Sore Throat: treat with throat lozenges and/or gargle with warm salt   water.  - Because air was used during the procedure, expelling large amounts of air   from your rectum or belching is normal.  - If a bowel prep was taken, you may not have a bowel movement for 1-3 days.    This is normal.  SYMPTOMS TO WATCH FOR AND REPORT TO YOUR PHYSICIAN:  1. Abdominal pain or bloating, other than gas cramps.  2. Chest pain.  3. Back pain.  4. Signs of infection such as: chills or fever occurring within 24 hours   after the procedure.  5. Rectal bleeding, which would show as bright red, maroon, or black stools.   (A tablespoon of blood from the rectum is not serious, especially if   hemorrhoids are present.)  6. Vomiting.  7. Weakness or dizziness.  GO DIRECTLY TO THE NEAREST EMERGENCY ROOM IF YOU HAVE ANY OF THE FOLLOWING:      Difficulty breathing              Chills and/or fever over 101 F   Persistent vomiting and/or vomiting blood   Severe abdominal pain   Severe chest pain   Black, tarry stools   Bleeding- more than one  tablespoon   Any other symptom or condition that you feel may need urgent attention  Your doctor recommends these additional instructions:  If any biopsies were taken, your doctors clinic will contact you in 1 to 2   weeks with any results.  - Patient has a contact number available for emergencies.  The signs and   symptoms of potential delayed complications were discussed with the   patient.  Return to normal activities tomorrow.  Written discharge   instructions were provided to the patient.   - The patient should eat a bland diet and avoid caffeine, carbonation,   alcohol, nicotine, aspirin and NSAID's including ibuprofen and advil.    - Follow an antireflux regimen.   - Continue present medications.   - Return to referring physician in 2 weeks.   - Telephone my office for pathology results in 2 weeks.   - Discharge patient to home (via wheelchair).  For questions, problems or results please call your physician Goyo Kapadia MD at Work:  [phone]  If you have any questions about the above instructions, call the GI   department at [phone] or call the endoscopy unit at [phone]   from 7am until 3 pm.  [facility] [place], EMERGENCY ROOM PHONE NUMBER:   [phone]  IF A COMPLICATION OR EMERGENCY SITUATION ARISES AND YOU ARE UNABLE TO REACH   YOUR PHYSICIAN - GO DIRECTLY TO THE EMERGENCY ROOM.  I have read or have had read to me these discharge instructions for my   procedure and have received a written copy.  I understand these   instructions and will follow-up with my physician if I have any questions.     __________________________________       _____________________________________  Nurse Signature                                          Patient/Designated   Responsible Party Signature  Goyo Kapadia MD  12/22/2020 11:06:55 AM  This report has been verified and signed electronically.  [employer]

## 2020-12-22 NOTE — ANESTHESIA POSTPROCEDURE EVALUATION
Anesthesia Post Evaluation    Patient: Rita Dejesus    Procedure(s) Performed: Procedure(s) (LRB):  ESOPHAGOGASTRODUODENOSCOPY (EGD) (N/A)    Final Anesthesia Type: MAC      Patient location during evaluation: PACU  Patient participation: Yes- Able to Participate  Level of consciousness: awake and alert and awake  Post-procedure vital signs: reviewed and stable  Pain management: adequate  Airway patency: patent    PONV status at discharge: No PONV  Anesthetic complications: no      Cardiovascular status: blood pressure returned to baseline  Respiratory status: unassisted, spontaneous ventilation and room air  Hydration status: euvolemic  Follow-up not needed.          Vitals Value Taken Time   /77 12/22/20 0954   Temp 36.3 °C (97.3 °F) 12/22/20 0954   Pulse 80 12/22/20 0954   Resp 18 12/22/20 0954   SpO2 99 % 12/22/20 0954         No case tracking events are documented in the log.      Pain/Mamadou Score: No data recorded

## 2020-12-22 NOTE — ANESTHESIA RELEASE NOTE
"Anesthesia Release from PACU Note    Patient: Rita Dejesus    Procedure(s) Performed: Procedure(s) (LRB):  ESOPHAGOGASTRODUODENOSCOPY (EGD) (N/A)    Anesthesia type: MAC    Post pain: Adequate analgesia    Post assessment: no apparent anesthetic complications, tolerated procedure well and no evidence of recall    Last Vitals:   Visit Vitals  /77   Pulse 80   Temp 36.3 °C (97.3 °F) (Skin)   Resp 18   Ht 5' 1" (1.549 m)   Wt 54.9 kg (121 lb 0.5 oz)   SpO2 99%   Breastfeeding No   BMI 22.87 kg/m²       Post vital signs: stable    Level of consciousness: responds to stimulation    Nausea/Vomiting: no nausea/no vomiting    Complications: none    Airway Patency: patent    Respiratory: unassisted    Cardiovascular: stable and blood pressure at baseline    Hydration: euvolemic     "

## 2020-12-22 NOTE — DISCHARGE INSTRUCTIONS
What Is a Hiatal Hernia?    Hiatal hernia is when the area where the stomach and esophagus meet bulges up through the diaphragm into the chest cavity. In some cases, part of the stomach may bulge above the diaphragm. Stomach acid may move up into the esophagus and cause symptoms. The symptoms are often blamed on gastroesophageal reflux disease (GERD). You may only know about the hernia when it shows up on an X-ray taken for other reasons.   What you may feel  The hiatus is a normal hole in the diaphragm. The esophagus passes through this hole and leads to the stomach. In some cases, part of the stomach may bulge above the diaphragm. This bulge is called a hernia. Stomach acid may move up into the esophagus and cause symptoms.  When you eat, the muscle at the hiatus relaxes to allow food to pass into the stomach. It tightens again to keep food and digestive acids in the stomach.  Many people with hiatal hernias have mild symptoms. You may notice the following GERD symptoms:  · Heartburn or other chest discomfort  · A feeling of chest fullness after a meal  · Frequent burping  · Acid taste in the mouth  · Trouble swallowing  Treating symptoms  If you have been diagnosed with hiatal hernia, these suggestions may help improve symptoms:  · Lose excess weight. Extra weight puts pressure on the stomach and esophagus.  · Dont lie down after eating. Sit up for at least an hour after eating. Lying down after eating can increase symptoms.  · Avoid certain foods and drinks. These include fatty foods, chocolate, coffee, mint, and other foods that cause symptoms for you.  · Dont smoke or drink alcohol. These can worsen symptoms.  · Look at your medicines. Discuss your medicines with your healthcare provider. Many medicines can cause symptoms.  · Consider an antacid medicine. Ask your healthcare provider about over-the-counter and prescription medicines that may help.  · Ask about surgery, if needed. Surgery is a treatment  choice for some people. Your healthcare provider can determine if surgery is an option for you.    Date Last Reviewed: 10/1/2016  © 7785-8727 [employer]. [address] [place] [address], [place] [address]. All rights reserved. This information is not intended as a substitute for professional medical care. Always follow your healthcare professional's instructions.

## 2020-12-22 NOTE — ANESTHESIA PREPROCEDURE EVALUATION
12/22/2020  Rita Dejesus is a 65 y.o., female.    Anesthesia Evaluation    I have reviewed the Patient Summary Reports.    I have reviewed the Nursing Notes. I have reviewed the NPO Status.   I have reviewed the Medications.     Review of Systems  Anesthesia Hx:  No problems with previous Anesthesia  Denies Family Hx of Anesthesia complications.   Denies Personal Hx of Anesthesia complications.   Social:  Non-Smoker    Hematology/Oncology:  Hematology Normal        EENT/Dental:EENT/Dental Normal   Cardiovascular:  Cardiovascular Normal Exercise tolerance: good     Pulmonary:  Pulmonary Normal    Renal/:  Renal/ Normal     Hepatic/GI:   GERD    Musculoskeletal:  Musculoskeletal Normal    Neurological:  Neurology Normal    Endocrine:  Endocrine Normal    Dermatological:  Skin Normal    Psych:  Psychiatric Normal           Physical Exam  General:  Well nourished    Airway/Jaw/Neck:  Airway Findings: Mouth Opening: Normal Tongue: Normal  General Airway Assessment: Adult, Average  Mallampati: II  TM Distance: Normal, at least 6 cm      Dental:  Dental Findings: In tact   Chest/Lungs:  Chest/Lungs Findings: Clear to auscultation, Normal Respiratory Rate     Heart/Vascular:  Heart Findings: Rate: Normal  Rhythm: Regular Rhythm        Mental Status:  Mental Status Findings:  Cooperative, Alert and Oriented         Anesthesia Plan  Type of Anesthesia, risks & benefits discussed:  Anesthesia Type:  MAC  Patient's Preference:   Intra-op Monitoring Plan: standard ASA monitors  Intra-op Monitoring Plan Comments:   Post Op Pain Control Plan:   Post Op Pain Control Plan Comments:   Induction:   IV  Beta Blocker:  Patient is not currently on a Beta-Blocker (No further documentation required).       Informed Consent: Patient understands risks and agrees with Anesthesia plan.  Questions answered. Anesthesia consent  signed with patient.  ASA Score: 2     Day of Surgery Review of History & Physical: I have interviewed and examined the patient. I have reviewed the patient's H&P dated:  There are no significant changes.          Ready For Surgery From Anesthesia Perspective.

## 2020-12-22 NOTE — H&P
Short Stay Endoscopy History and Physical    PCP - Tripp Heredia MD    Procedure - EGD  ASA - 2  Mallampati - per anesthesia  History of Anesthesia problems - no  Family history Anesthesia problems -  no     HPI:  This is a 65 y.o. female here for evaluation of :   Active Hospital Problems    Diagnosis  POA    *GERD (gastroesophageal reflux disease) [K21.9]  Yes      Resolved Hospital Problems   No resolved problems to display.         Reflux - yes  Dysphagia - no  Abdominal pain - no  Diarrhea - no  Anemia - no  GI bleeding - no    ROS:  CONSTITUTIONAL: Denies weight change,  fatigue, fevers, chills, night sweats.  CARDIOVASCULAR: Denies chest pain, shortness of breath, orthopnea and edema.  RESPIRATORY: Denies cough, hemoptysis, dyspnea, and wheezing.  GI: See HPI.    Medical History:   Past Medical History:   Diagnosis Date    GERD (gastroesophageal reflux disease)     History of cervical cancer     History of rectal or anal cancer        Surgical History:   Past Surgical History:   Procedure Laterality Date    ADENOIDECTOMY      carpal tunel Bilateral     COLONOSCOPY N/A 11/17/2020    Procedure: COLONOSCOPY;  Surgeon: Kendy De Leon MD;  Location: [facility];  Service: Endoscopy;  Laterality: N/A;    HERNIA REPAIR      TONSILLECTOMY         Family History:  Family History   Problem Relation Age of Onset    Diabetes Mother         type II    Hyperlipidemia Mother     Heart disease Father     Hypertension Father     Cataracts Father     Hypertension Sister     Diabetes Maternal Grandmother     Diabetes Maternal Grandfather        Social History:   Social History     Tobacco Use    Smoking status: Never Smoker    Smokeless tobacco: Never Used   Substance Use Topics    Alcohol use: Yes     Comment: Social     Drug use: Never       Allergy  Review of patient's allergies indicates:   Allergen Reactions    Sulfa (sulfonamide antibiotics) Nausea And Vomiting    Nitrofuran analogues  Nausea And Vomiting     Microdantin        Medications:   No current facility-administered medications on file prior to encounter.      Current Outpatient Medications on File Prior to Encounter   Medication Sig Dispense Refill    calcium carbonate 650 mg calcium (1,625 mg) tablet Take 1 tablet by mouth once daily.      cyanocobalamin (VITAMIN B-12) 250 MCG tablet Take 250 mcg by mouth.      dexlansoprazole (DEXILANT) 60 mg capsule Take 1 capsule (60 mg total) by mouth once daily. 90 capsule 3    Lactobacillus rhamnosus GG (CULTURELLE) 10 billion cell capsule Take 1 capsule by mouth once daily.      multivitamin capsule Take 1 capsule by mouth once daily.      simvastatin (ZOCOR) 40 MG tablet Take 1 tablet (40 mg total) by mouth every evening. 90 tablet 3    venlafaxine (EFFEXOR-XR) 37.5 MG 24 hr capsule Take 1 capsule (37.5 mg total) by mouth once daily. 30 capsule 11    vitamin D (VITAMIN D3) 1000 units Tab Take 185 mg by mouth.         Physical Exam:  Vital Signs:   Vitals:    12/22/20 0954   BP: 114/77   Pulse: 80   Resp: 18   Temp: 97.3 °F (36.3 °C)     General Appearance: Well appearing in no acute distress  ENT: OP clear  Chest: CTA B  CV: RRR, no m/r/g  Abd: s/nt/nd/nabs  Ext: no edema    Labs:  Reviewed    IMP:  Active Hospital Problems    Diagnosis  POA    *GERD (gastroesophageal reflux disease) [K21.9]  Yes      Resolved Hospital Problems   No resolved problems to display.         Plan:  I have explained the risks and benefits of endoscopy procedures to the patient including but not limited to bleeding, perforation, infection, and death. The patient wishes to proceed.

## 2020-12-23 ENCOUNTER — PATIENT MESSAGE (OUTPATIENT)
Dept: INTERNAL MEDICINE | Facility: CLINIC | Age: 65
End: 2020-12-23

## 2020-12-29 LAB
FINAL PATHOLOGIC DIAGNOSIS: NORMAL
GROSS: NORMAL
Lab: NORMAL

## 2021-01-04 ENCOUNTER — PATIENT OUTREACH (OUTPATIENT)
Dept: ADMINISTRATIVE | Facility: OTHER | Age: 66
End: 2021-01-04

## 2021-01-05 ENCOUNTER — OFFICE VISIT (OUTPATIENT)
Dept: OTOLARYNGOLOGY | Facility: CLINIC | Age: 66
End: 2021-01-05
Payer: MEDICARE

## 2021-01-05 VITALS
WEIGHT: 122.38 LBS | HEART RATE: 79 BPM | TEMPERATURE: 98 F | DIASTOLIC BLOOD PRESSURE: 64 MMHG | SYSTOLIC BLOOD PRESSURE: 95 MMHG | BODY MASS INDEX: 23.12 KG/M2

## 2021-01-05 DIAGNOSIS — K13.70 MOUTH LESION: Primary | ICD-10-CM

## 2021-01-05 PROCEDURE — 99203 OFFICE O/P NEW LOW 30 MIN: CPT | Mod: S$PBB,,, | Performed by: OTOLARYNGOLOGY

## 2021-01-05 PROCEDURE — 99203 PR OFFICE/OUTPT VISIT, NEW, LEVL III, 30-44 MIN: ICD-10-PCS | Mod: S$PBB,,, | Performed by: OTOLARYNGOLOGY

## 2021-01-05 PROCEDURE — 99213 OFFICE O/P EST LOW 20 MIN: CPT | Mod: PBBFAC | Performed by: OTOLARYNGOLOGY

## 2021-01-05 PROCEDURE — 99999 PR PBB SHADOW E&M-EST. PATIENT-LVL III: CPT | Mod: PBBFAC,,, | Performed by: OTOLARYNGOLOGY

## 2021-01-05 PROCEDURE — 99999 PR PBB SHADOW E&M-EST. PATIENT-LVL III: ICD-10-PCS | Mod: PBBFAC,,, | Performed by: OTOLARYNGOLOGY

## 2021-02-12 ENCOUNTER — PATIENT MESSAGE (OUTPATIENT)
Dept: INTERNAL MEDICINE | Facility: CLINIC | Age: 66
End: 2021-02-12

## 2021-03-03 ENCOUNTER — IMMUNIZATION (OUTPATIENT)
Dept: INTERNAL MEDICINE | Facility: CLINIC | Age: 66
End: 2021-03-03
Payer: MEDICARE

## 2021-03-03 ENCOUNTER — PATIENT MESSAGE (OUTPATIENT)
Dept: INTERNAL MEDICINE | Facility: CLINIC | Age: 66
End: 2021-03-03

## 2021-03-03 DIAGNOSIS — Z23 NEED FOR VACCINATION: Primary | ICD-10-CM

## 2021-03-03 PROCEDURE — 91300 COVID-19, MRNA, LNP-S, PF, 30 MCG/0.3 ML DOSE VACCINE: CPT | Mod: PBBFAC

## 2021-03-03 RX ORDER — VENLAFAXINE HYDROCHLORIDE 75 MG/1
75 CAPSULE, EXTENDED RELEASE ORAL DAILY
Qty: 30 CAPSULE | Refills: 11 | Status: SHIPPED | OUTPATIENT
Start: 2021-03-03 | End: 2021-12-22 | Stop reason: SDUPTHER

## 2021-03-24 ENCOUNTER — IMMUNIZATION (OUTPATIENT)
Dept: INTERNAL MEDICINE | Facility: CLINIC | Age: 66
End: 2021-03-24
Payer: MEDICARE

## 2021-03-24 DIAGNOSIS — Z23 NEED FOR VACCINATION: Primary | ICD-10-CM

## 2021-03-24 PROCEDURE — 0002A COVID-19, MRNA, LNP-S, PF, 30 MCG/0.3 ML DOSE VACCINE: CPT | Mod: PBBFAC | Performed by: FAMILY MEDICINE

## 2021-03-24 PROCEDURE — 91300 COVID-19, MRNA, LNP-S, PF, 30 MCG/0.3 ML DOSE VACCINE: CPT | Mod: PBBFAC | Performed by: FAMILY MEDICINE

## 2021-04-26 ENCOUNTER — PATIENT OUTREACH (OUTPATIENT)
Dept: ADMINISTRATIVE | Facility: OTHER | Age: 66
End: 2021-04-26

## 2021-04-27 ENCOUNTER — OFFICE VISIT (OUTPATIENT)
Dept: OPHTHALMOLOGY | Facility: CLINIC | Age: 66
End: 2021-04-27
Payer: MEDICARE

## 2021-04-27 DIAGNOSIS — H35.341 MACULAR CYST, HOLE, OR PSEUDOHOLE, RIGHT EYE: ICD-10-CM

## 2021-04-27 DIAGNOSIS — H52.03 HYPEROPIA, BILATERAL: ICD-10-CM

## 2021-04-27 DIAGNOSIS — H52.7 REFRACTION DISORDER: ICD-10-CM

## 2021-04-27 DIAGNOSIS — H25.011 CORTICAL SENILE CATARACT OF RIGHT EYE: ICD-10-CM

## 2021-04-27 DIAGNOSIS — H25.012 CORTICAL SENILE CATARACT OF LEFT EYE: Primary | ICD-10-CM

## 2021-04-27 PROCEDURE — 92136 OPHTHALMIC BIOMETRY: CPT | Mod: PBBFAC | Performed by: OPHTHALMOLOGY

## 2021-04-27 PROCEDURE — 92136 IOL MASTER - OS - LEFT EYE: ICD-10-PCS | Mod: 26,S$PBB,LT, | Performed by: OPHTHALMOLOGY

## 2021-04-27 PROCEDURE — 99999 PR PBB SHADOW E&M-EST. PATIENT-LVL I: ICD-10-PCS | Mod: PBBFAC,,, | Performed by: OPHTHALMOLOGY

## 2021-04-27 PROCEDURE — 99204 OFFICE O/P NEW MOD 45 MIN: CPT | Mod: S$PBB,,, | Performed by: OPHTHALMOLOGY

## 2021-04-27 PROCEDURE — 92134 POSTERIOR SEGMENT OCT RETINA (OCULAR COHERENCE TOMOGRAPHY)-BOTH EYES: ICD-10-PCS | Mod: 26,S$PBB,, | Performed by: OPHTHALMOLOGY

## 2021-04-27 PROCEDURE — 99211 OFF/OP EST MAY X REQ PHY/QHP: CPT | Mod: PBBFAC | Performed by: OPHTHALMOLOGY

## 2021-04-27 PROCEDURE — 99999 PR PBB SHADOW E&M-EST. PATIENT-LVL I: CPT | Mod: PBBFAC,,, | Performed by: OPHTHALMOLOGY

## 2021-04-27 PROCEDURE — 92134 CPTRZ OPH DX IMG PST SGM RTA: CPT | Mod: PBBFAC | Performed by: OPHTHALMOLOGY

## 2021-04-27 PROCEDURE — 99204 PR OFFICE/OUTPT VISIT, NEW, LEVL IV, 45-59 MIN: ICD-10-PCS | Mod: S$PBB,,, | Performed by: OPHTHALMOLOGY

## 2021-05-06 ENCOUNTER — OUTSIDE PLACE OF SERVICE (OUTPATIENT)
Dept: OPHTHALMOLOGY | Facility: CLINIC | Age: 66
End: 2021-05-06
Payer: MEDICARE

## 2021-05-06 PROCEDURE — 66984 PR REMOVAL, CATARACT, W/INSRT INTRAOC LENS, W/O ENDO CYCLO: ICD-10-PCS | Mod: LT,,, | Performed by: OPHTHALMOLOGY

## 2021-05-06 PROCEDURE — 66984 XCAPSL CTRC RMVL W/O ECP: CPT | Mod: LT,,, | Performed by: OPHTHALMOLOGY

## 2021-05-07 ENCOUNTER — OFFICE VISIT (OUTPATIENT)
Dept: OPHTHALMOLOGY | Facility: CLINIC | Age: 66
End: 2021-05-07
Payer: MEDICARE

## 2021-05-07 DIAGNOSIS — Z98.42 CATARACT EXTRACTION STATUS OF EYE, LEFT: ICD-10-CM

## 2021-05-07 DIAGNOSIS — Z98.890 POST-OPERATIVE STATE: Primary | ICD-10-CM

## 2021-05-07 PROCEDURE — 99024 PR POST-OP FOLLOW-UP VISIT: ICD-10-PCS | Mod: POP,,, | Performed by: OPHTHALMOLOGY

## 2021-05-07 PROCEDURE — 99999 PR PBB SHADOW E&M-EST. PATIENT-LVL I: ICD-10-PCS | Mod: PBBFAC,,, | Performed by: OPHTHALMOLOGY

## 2021-05-07 PROCEDURE — 99211 OFF/OP EST MAY X REQ PHY/QHP: CPT | Mod: PBBFAC | Performed by: OPHTHALMOLOGY

## 2021-05-07 PROCEDURE — 99999 PR PBB SHADOW E&M-EST. PATIENT-LVL I: CPT | Mod: PBBFAC,,, | Performed by: OPHTHALMOLOGY

## 2021-05-07 PROCEDURE — 99024 POSTOP FOLLOW-UP VISIT: CPT | Mod: POP,,, | Performed by: OPHTHALMOLOGY

## 2021-05-14 ENCOUNTER — TELEPHONE (OUTPATIENT)
Dept: OPHTHALMOLOGY | Facility: CLINIC | Age: 66
End: 2021-05-14

## 2021-05-17 ENCOUNTER — OFFICE VISIT (OUTPATIENT)
Dept: OPHTHALMOLOGY | Facility: CLINIC | Age: 66
End: 2021-05-17
Payer: MEDICARE

## 2021-05-17 DIAGNOSIS — Z98.890 POST-OPERATIVE STATE: Primary | ICD-10-CM

## 2021-05-17 DIAGNOSIS — H25.011 CORTICAL SENILE CATARACT OF RIGHT EYE: ICD-10-CM

## 2021-05-17 DIAGNOSIS — Z98.42 CATARACT EXTRACTION STATUS OF EYE, LEFT: ICD-10-CM

## 2021-05-17 PROCEDURE — 99213 OFFICE O/P EST LOW 20 MIN: CPT | Mod: PBBFAC,25 | Performed by: OPHTHALMOLOGY

## 2021-05-17 PROCEDURE — 92136 IOL MASTER - OD - RIGHT EYE: ICD-10-PCS | Mod: 26,S$PBB,RT, | Performed by: OPHTHALMOLOGY

## 2021-05-17 PROCEDURE — 92136 OPHTHALMIC BIOMETRY: CPT | Mod: PBBFAC | Performed by: OPHTHALMOLOGY

## 2021-05-17 PROCEDURE — 99999 PR PBB SHADOW E&M-EST. PATIENT-LVL III: CPT | Mod: PBBFAC,,, | Performed by: OPHTHALMOLOGY

## 2021-05-17 PROCEDURE — 99024 PR POST-OP FOLLOW-UP VISIT: ICD-10-PCS | Mod: POP,,, | Performed by: OPHTHALMOLOGY

## 2021-05-17 PROCEDURE — 99024 POSTOP FOLLOW-UP VISIT: CPT | Mod: POP,,, | Performed by: OPHTHALMOLOGY

## 2021-05-17 PROCEDURE — 99999 PR PBB SHADOW E&M-EST. PATIENT-LVL III: ICD-10-PCS | Mod: PBBFAC,,, | Performed by: OPHTHALMOLOGY

## 2021-05-20 ENCOUNTER — OUTSIDE PLACE OF SERVICE (OUTPATIENT)
Dept: OPHTHALMOLOGY | Facility: CLINIC | Age: 66
End: 2021-05-20
Payer: MEDICARE

## 2021-05-20 PROCEDURE — 99499 UNLISTED E&M SERVICE: CPT | Mod: ,,, | Performed by: OPHTHALMOLOGY

## 2021-05-20 PROCEDURE — 66984 XCAPSL CTRC RMVL W/O ECP: CPT | Mod: 79,RT,, | Performed by: OPHTHALMOLOGY

## 2021-05-20 PROCEDURE — 66984 PR REMOVAL, CATARACT, W/INSRT INTRAOC LENS, W/O ENDO CYCLO: ICD-10-PCS | Mod: 79,RT,, | Performed by: OPHTHALMOLOGY

## 2021-05-20 PROCEDURE — 99499 NO LOS: ICD-10-PCS | Mod: ,,, | Performed by: OPHTHALMOLOGY

## 2021-05-21 ENCOUNTER — OFFICE VISIT (OUTPATIENT)
Dept: OPHTHALMOLOGY | Facility: CLINIC | Age: 66
End: 2021-05-21
Payer: MEDICARE

## 2021-05-21 DIAGNOSIS — Z98.41 CATARACT EXTRACTION STATUS OF RIGHT EYE: ICD-10-CM

## 2021-05-21 DIAGNOSIS — Z98.890 POST-OPERATIVE STATE: Primary | ICD-10-CM

## 2021-05-21 PROCEDURE — 99999 PR PBB SHADOW E&M-EST. PATIENT-LVL I: CPT | Mod: PBBFAC,,, | Performed by: OPHTHALMOLOGY

## 2021-05-21 PROCEDURE — 99024 POSTOP FOLLOW-UP VISIT: CPT | Mod: POP,,, | Performed by: OPHTHALMOLOGY

## 2021-05-21 PROCEDURE — 99999 PR PBB SHADOW E&M-EST. PATIENT-LVL I: ICD-10-PCS | Mod: PBBFAC,,, | Performed by: OPHTHALMOLOGY

## 2021-05-21 PROCEDURE — 99024 PR POST-OP FOLLOW-UP VISIT: ICD-10-PCS | Mod: POP,,, | Performed by: OPHTHALMOLOGY

## 2021-05-21 PROCEDURE — 99211 OFF/OP EST MAY X REQ PHY/QHP: CPT | Mod: PBBFAC | Performed by: OPHTHALMOLOGY

## 2021-05-27 ENCOUNTER — OFFICE VISIT (OUTPATIENT)
Dept: OPHTHALMOLOGY | Facility: CLINIC | Age: 66
End: 2021-05-27
Payer: MEDICARE

## 2021-05-27 DIAGNOSIS — Z98.890 POST-OPERATIVE STATE: Primary | ICD-10-CM

## 2021-05-27 DIAGNOSIS — Z96.1 PSEUDOPHAKIA OF BOTH EYES: ICD-10-CM

## 2021-05-27 PROCEDURE — 99999 PR PBB SHADOW E&M-EST. PATIENT-LVL II: CPT | Mod: PBBFAC,,, | Performed by: OPTOMETRIST

## 2021-05-27 PROCEDURE — 99212 OFFICE O/P EST SF 10 MIN: CPT | Mod: PBBFAC | Performed by: OPTOMETRIST

## 2021-05-27 PROCEDURE — 99999 PR PBB SHADOW E&M-EST. PATIENT-LVL II: ICD-10-PCS | Mod: PBBFAC,,, | Performed by: OPTOMETRIST

## 2021-05-27 PROCEDURE — 99024 POSTOP FOLLOW-UP VISIT: CPT | Mod: POP,,, | Performed by: OPTOMETRIST

## 2021-05-27 PROCEDURE — 99024 PR POST-OP FOLLOW-UP VISIT: ICD-10-PCS | Mod: POP,,, | Performed by: OPTOMETRIST

## 2021-06-10 ENCOUNTER — PATIENT MESSAGE (OUTPATIENT)
Dept: INTERNAL MEDICINE | Facility: CLINIC | Age: 66
End: 2021-06-10

## 2021-06-10 DIAGNOSIS — E78.2 MIXED HYPERLIPIDEMIA: ICD-10-CM

## 2021-06-10 RX ORDER — SIMVASTATIN 40 MG/1
40 TABLET, FILM COATED ORAL NIGHTLY
Qty: 90 TABLET | Refills: 3 | Status: SHIPPED | OUTPATIENT
Start: 2021-06-10 | End: 2022-05-24 | Stop reason: SDUPTHER

## 2021-06-24 ENCOUNTER — OFFICE VISIT (OUTPATIENT)
Dept: OPHTHALMOLOGY | Facility: CLINIC | Age: 66
End: 2021-06-24
Payer: MEDICARE

## 2021-06-24 DIAGNOSIS — Z98.890 POST-OPERATIVE STATE: Primary | ICD-10-CM

## 2021-06-24 DIAGNOSIS — Z96.1 PSEUDOPHAKIA OF BOTH EYES: ICD-10-CM

## 2021-06-24 DIAGNOSIS — H35.341 MACULAR CYST, HOLE, OR PSEUDOHOLE, RIGHT EYE: ICD-10-CM

## 2021-06-24 PROCEDURE — 92015 DETERMINE REFRACTIVE STATE: CPT | Mod: ,,, | Performed by: OPTOMETRIST

## 2021-06-24 PROCEDURE — 92134 POSTERIOR SEGMENT OCT RETINA (OCULAR COHERENCE TOMOGRAPHY)-BOTH EYES: ICD-10-PCS | Mod: 26,S$PBB,, | Performed by: OPTOMETRIST

## 2021-06-24 PROCEDURE — 99999 PR PBB SHADOW E&M-EST. PATIENT-LVL II: CPT | Mod: PBBFAC,,, | Performed by: OPTOMETRIST

## 2021-06-24 PROCEDURE — 92134 CPTRZ OPH DX IMG PST SGM RTA: CPT | Mod: PBBFAC | Performed by: OPTOMETRIST

## 2021-06-24 PROCEDURE — 92015 PR REFRACTION: ICD-10-PCS | Mod: ,,, | Performed by: OPTOMETRIST

## 2021-06-24 PROCEDURE — 99999 PR PBB SHADOW E&M-EST. PATIENT-LVL II: ICD-10-PCS | Mod: PBBFAC,,, | Performed by: OPTOMETRIST

## 2021-06-24 PROCEDURE — 99024 POSTOP FOLLOW-UP VISIT: CPT | Mod: POP,,, | Performed by: OPTOMETRIST

## 2021-06-24 PROCEDURE — 99024 PR POST-OP FOLLOW-UP VISIT: ICD-10-PCS | Mod: POP,,, | Performed by: OPTOMETRIST

## 2021-06-24 PROCEDURE — 99212 OFFICE O/P EST SF 10 MIN: CPT | Mod: PBBFAC | Performed by: OPTOMETRIST

## 2021-06-24 RX ORDER — LEVOCETIRIZINE DIHYDROCHLORIDE 5 MG/1
5 TABLET, FILM COATED ORAL NIGHTLY
COMMUNITY

## 2021-08-09 ENCOUNTER — OFFICE VISIT (OUTPATIENT)
Dept: INTERNAL MEDICINE | Facility: CLINIC | Age: 66
End: 2021-08-09
Payer: MEDICARE

## 2021-08-09 DIAGNOSIS — M79.601 RIGHT ARM PAIN: Primary | ICD-10-CM

## 2021-08-09 PROCEDURE — 99441 PR PHYSICIAN TELEPHONE EVALUATION 5-10 MIN: ICD-10-PCS | Mod: 95,,, | Performed by: FAMILY MEDICINE

## 2021-08-09 PROCEDURE — 99441 PR PHYSICIAN TELEPHONE EVALUATION 5-10 MIN: CPT | Mod: 95,,, | Performed by: FAMILY MEDICINE

## 2021-08-09 RX ORDER — METHYLPREDNISOLONE 4 MG/1
TABLET ORAL
Qty: 1 PACKAGE | Refills: 0 | Status: SHIPPED | OUTPATIENT
Start: 2021-08-09 | End: 2021-08-30

## 2021-09-07 ENCOUNTER — PATIENT MESSAGE (OUTPATIENT)
Dept: INTERNAL MEDICINE | Facility: CLINIC | Age: 66
End: 2021-09-07

## 2021-09-07 RX ORDER — FLUOCINONIDE TOPICAL SOLUTION USP, 0.05% 0.5 MG/ML
SOLUTION TOPICAL 2 TIMES DAILY
Qty: 60 ML | Refills: 1 | Status: SHIPPED | OUTPATIENT
Start: 2021-09-07 | End: 2023-07-26

## 2021-09-29 ENCOUNTER — IMMUNIZATION (OUTPATIENT)
Dept: PRIMARY CARE CLINIC | Facility: CLINIC | Age: 66
End: 2021-09-29
Payer: MEDICARE

## 2021-09-29 DIAGNOSIS — Z23 NEED FOR VACCINATION: Primary | ICD-10-CM

## 2021-09-29 PROCEDURE — 0003A COVID-19, MRNA, LNP-S, PF, 30 MCG/0.3 ML DOSE VACCINE: CPT | Mod: CV19,PBBFAC | Performed by: FAMILY MEDICINE

## 2021-09-29 PROCEDURE — 91300 COVID-19, MRNA, LNP-S, PF, 30 MCG/0.3 ML DOSE VACCINE: CPT | Mod: PBBFAC | Performed by: FAMILY MEDICINE

## 2021-11-19 ENCOUNTER — TELEPHONE (OUTPATIENT)
Dept: INTERNAL MEDICINE | Facility: CLINIC | Age: 66
End: 2021-11-19
Payer: MEDICARE

## 2021-11-19 DIAGNOSIS — E55.9 VITAMIN D DEFICIENCY: ICD-10-CM

## 2021-11-19 DIAGNOSIS — E78.5 HYPERLIPIDEMIA, UNSPECIFIED HYPERLIPIDEMIA TYPE: Primary | ICD-10-CM

## 2021-11-30 ENCOUNTER — LAB VISIT (OUTPATIENT)
Dept: LAB | Facility: HOSPITAL | Age: 66
End: 2021-11-30
Attending: FAMILY MEDICINE
Payer: MEDICARE

## 2021-11-30 DIAGNOSIS — E55.9 VITAMIN D DEFICIENCY: ICD-10-CM

## 2021-11-30 DIAGNOSIS — E78.5 HYPERLIPIDEMIA, UNSPECIFIED HYPERLIPIDEMIA TYPE: ICD-10-CM

## 2021-11-30 LAB
25(OH)D3+25(OH)D2 SERPL-MCNC: 104 NG/ML (ref 30–96)
ALBUMIN SERPL BCP-MCNC: 3.9 G/DL (ref 3.5–5.2)
ALP SERPL-CCNC: 88 U/L (ref 55–135)
ALT SERPL W/O P-5'-P-CCNC: 8 U/L (ref 10–44)
ANION GAP SERPL CALC-SCNC: 8 MMOL/L (ref 8–16)
AST SERPL-CCNC: 23 U/L (ref 10–40)
BASOPHILS # BLD AUTO: 0.04 K/UL (ref 0–0.2)
BASOPHILS NFR BLD: 0.8 % (ref 0–1.9)
BILIRUB SERPL-MCNC: 0.5 MG/DL (ref 0.1–1)
BUN SERPL-MCNC: 17 MG/DL (ref 8–23)
CALCIUM SERPL-MCNC: 10 MG/DL (ref 8.7–10.5)
CHLORIDE SERPL-SCNC: 107 MMOL/L (ref 95–110)
CHOLEST SERPL-MCNC: 161 MG/DL (ref 120–199)
CHOLEST/HDLC SERPL: 2.7 {RATIO} (ref 2–5)
CO2 SERPL-SCNC: 23 MMOL/L (ref 23–29)
CREAT SERPL-MCNC: 1.1 MG/DL (ref 0.5–1.4)
DIFFERENTIAL METHOD: NORMAL
EOSINOPHIL # BLD AUTO: 0.3 K/UL (ref 0–0.5)
EOSINOPHIL NFR BLD: 6.3 % (ref 0–8)
ERYTHROCYTE [DISTWIDTH] IN BLOOD BY AUTOMATED COUNT: 13.1 % (ref 11.5–14.5)
EST. GFR  (AFRICAN AMERICAN): >60 ML/MIN/1.73 M^2
EST. GFR  (NON AFRICAN AMERICAN): 52.1 ML/MIN/1.73 M^2
GLUCOSE SERPL-MCNC: 86 MG/DL (ref 70–110)
HCT VFR BLD AUTO: 41 % (ref 37–48.5)
HDLC SERPL-MCNC: 60 MG/DL (ref 40–75)
HDLC SERPL: 37.3 % (ref 20–50)
HGB BLD-MCNC: 13.3 G/DL (ref 12–16)
IMM GRANULOCYTES # BLD AUTO: 0.01 K/UL (ref 0–0.04)
IMM GRANULOCYTES NFR BLD AUTO: 0.2 % (ref 0–0.5)
LDLC SERPL CALC-MCNC: 85.4 MG/DL (ref 63–159)
LYMPHOCYTES # BLD AUTO: 1.6 K/UL (ref 1–4.8)
LYMPHOCYTES NFR BLD: 31.6 % (ref 18–48)
MCH RBC QN AUTO: 29.6 PG (ref 27–31)
MCHC RBC AUTO-ENTMCNC: 32.4 G/DL (ref 32–36)
MCV RBC AUTO: 91 FL (ref 82–98)
MONOCYTES # BLD AUTO: 0.6 K/UL (ref 0.3–1)
MONOCYTES NFR BLD: 11.7 % (ref 4–15)
NEUTROPHILS # BLD AUTO: 2.5 K/UL (ref 1.8–7.7)
NEUTROPHILS NFR BLD: 49.4 % (ref 38–73)
NONHDLC SERPL-MCNC: 101 MG/DL
NRBC BLD-RTO: 0 /100 WBC
PLATELET # BLD AUTO: 181 K/UL (ref 150–450)
PMV BLD AUTO: NORMAL FL (ref 9.2–12.9)
POTASSIUM SERPL-SCNC: 5.2 MMOL/L (ref 3.5–5.1)
PROT SERPL-MCNC: 7 G/DL (ref 6–8.4)
RBC # BLD AUTO: 4.5 M/UL (ref 4–5.4)
SODIUM SERPL-SCNC: 138 MMOL/L (ref 136–145)
TRIGL SERPL-MCNC: 78 MG/DL (ref 30–150)
WBC # BLD AUTO: 5.06 K/UL (ref 3.9–12.7)

## 2021-11-30 PROCEDURE — 82306 VITAMIN D 25 HYDROXY: CPT | Performed by: FAMILY MEDICINE

## 2021-11-30 PROCEDURE — 80053 COMPREHEN METABOLIC PANEL: CPT | Performed by: FAMILY MEDICINE

## 2021-11-30 PROCEDURE — 36415 COLL VENOUS BLD VENIPUNCTURE: CPT | Mod: PO | Performed by: FAMILY MEDICINE

## 2021-11-30 PROCEDURE — 80061 LIPID PANEL: CPT | Performed by: FAMILY MEDICINE

## 2021-11-30 PROCEDURE — 85025 COMPLETE CBC W/AUTO DIFF WBC: CPT | Performed by: FAMILY MEDICINE

## 2021-12-06 ENCOUNTER — OFFICE VISIT (OUTPATIENT)
Dept: INTERNAL MEDICINE | Facility: CLINIC | Age: 66
End: 2021-12-06
Payer: MEDICARE

## 2021-12-06 VITALS
OXYGEN SATURATION: 99 % | WEIGHT: 128.31 LBS | HEIGHT: 61 IN | BODY MASS INDEX: 24.22 KG/M2 | TEMPERATURE: 98 F | DIASTOLIC BLOOD PRESSURE: 68 MMHG | HEART RATE: 71 BPM | SYSTOLIC BLOOD PRESSURE: 120 MMHG

## 2021-12-06 DIAGNOSIS — R53.83 FATIGUE, UNSPECIFIED TYPE: ICD-10-CM

## 2021-12-06 DIAGNOSIS — K21.9 GASTROESOPHAGEAL REFLUX DISEASE, UNSPECIFIED WHETHER ESOPHAGITIS PRESENT: Primary | ICD-10-CM

## 2021-12-06 DIAGNOSIS — K59.09 CHRONIC CONSTIPATION: ICD-10-CM

## 2021-12-06 DIAGNOSIS — I83.90 VARICOSE VEINS OF LOWER EXTREMITY, UNSPECIFIED LATERALITY, UNSPECIFIED WHETHER COMPLICATED: ICD-10-CM

## 2021-12-06 DIAGNOSIS — N18.31 CHRONIC KIDNEY DISEASE, STAGE 3A: ICD-10-CM

## 2021-12-06 DIAGNOSIS — E78.5 HYPERLIPIDEMIA, UNSPECIFIED HYPERLIPIDEMIA TYPE: ICD-10-CM

## 2021-12-06 PROCEDURE — 99999 PR PBB SHADOW E&M-EST. PATIENT-LVL IV: CPT | Mod: PBBFAC,,, | Performed by: FAMILY MEDICINE

## 2021-12-06 PROCEDURE — 99999 PR PBB SHADOW E&M-EST. PATIENT-LVL IV: ICD-10-PCS | Mod: PBBFAC,,, | Performed by: FAMILY MEDICINE

## 2021-12-06 PROCEDURE — 99214 OFFICE O/P EST MOD 30 MIN: CPT | Mod: PBBFAC,PO | Performed by: FAMILY MEDICINE

## 2021-12-06 PROCEDURE — 99214 OFFICE O/P EST MOD 30 MIN: CPT | Mod: S$PBB,,, | Performed by: FAMILY MEDICINE

## 2021-12-06 PROCEDURE — 99214 PR OFFICE/OUTPT VISIT, EST, LEVL IV, 30-39 MIN: ICD-10-PCS | Mod: S$PBB,,, | Performed by: FAMILY MEDICINE

## 2021-12-06 RX ORDER — FLUTICASONE PROPIONATE 50 MCG
1 SPRAY, SUSPENSION (ML) NASAL DAILY
Qty: 10 G | Refills: 1 | Status: SHIPPED | OUTPATIENT
Start: 2021-12-06 | End: 2022-03-16

## 2021-12-06 RX ORDER — MIRTAZAPINE 15 MG/1
15 TABLET, FILM COATED ORAL NIGHTLY
Qty: 30 TABLET | Refills: 3 | Status: SHIPPED | OUTPATIENT
Start: 2021-12-06 | End: 2022-03-16

## 2021-12-22 RX ORDER — VENLAFAXINE HYDROCHLORIDE 75 MG/1
75 CAPSULE, EXTENDED RELEASE ORAL DAILY
Qty: 30 CAPSULE | Refills: 11 | Status: SHIPPED | OUTPATIENT
Start: 2021-12-22 | End: 2022-02-08

## 2021-12-26 ENCOUNTER — PATIENT MESSAGE (OUTPATIENT)
Dept: INTERNAL MEDICINE | Facility: CLINIC | Age: 66
End: 2021-12-26
Payer: MEDICARE

## 2022-01-05 ENCOUNTER — PATIENT OUTREACH (OUTPATIENT)
Dept: ADMINISTRATIVE | Facility: OTHER | Age: 67
End: 2022-01-05
Payer: COMMERCIAL

## 2022-01-05 NOTE — PROGRESS NOTES
Health Maintenance Due   Topic Date Due    Hepatitis C Screening  Never done    TETANUS VACCINE  Never done    Shingles Vaccine (2 of 3) 02/12/2016    Mammogram  03/02/2022     Updates were requested from care everywhere.  Chart was reviewed for overdue Proactive [facility] Encounters (BHAVIK) topics (CRS, Breast Cancer Screening, Eye exam)  Health Maintenance has been updated.  LINKS immunization registry triggered.  Immunizations were reconciled.

## 2022-01-07 ENCOUNTER — OFFICE VISIT (OUTPATIENT)
Dept: OPHTHALMOLOGY | Facility: CLINIC | Age: 67
End: 2022-01-07
Payer: MEDICARE

## 2022-01-07 DIAGNOSIS — Z96.1 PSEUDOPHAKIA OF BOTH EYES: Primary | ICD-10-CM

## 2022-01-07 DIAGNOSIS — H52.7 REFRACTION DISORDER: ICD-10-CM

## 2022-01-07 PROCEDURE — 92014 PR EYE EXAM, EST PATIENT,COMPREHESV: ICD-10-PCS | Mod: S$PBB,,, | Performed by: OPTOMETRIST

## 2022-01-07 PROCEDURE — 92015 PR REFRACTION: ICD-10-PCS | Mod: ,,, | Performed by: OPTOMETRIST

## 2022-01-07 PROCEDURE — 92014 COMPRE OPH EXAM EST PT 1/>: CPT | Mod: S$PBB,,, | Performed by: OPTOMETRIST

## 2022-01-07 PROCEDURE — 99999 PR PBB SHADOW E&M-EST. PATIENT-LVL II: ICD-10-PCS | Mod: PBBFAC,,, | Performed by: OPTOMETRIST

## 2022-01-07 PROCEDURE — 99999 PR PBB SHADOW E&M-EST. PATIENT-LVL II: CPT | Mod: PBBFAC,,, | Performed by: OPTOMETRIST

## 2022-01-07 PROCEDURE — 92015 DETERMINE REFRACTIVE STATE: CPT | Mod: ,,, | Performed by: OPTOMETRIST

## 2022-01-07 PROCEDURE — 99212 OFFICE O/P EST SF 10 MIN: CPT | Mod: PBBFAC | Performed by: OPTOMETRIST

## 2022-01-07 NOTE — PROGRESS NOTES
HPI     6 months pseudophakic f/u by MGM.  Pt has to hold things out a little to   read fine print.  Monovision, with OS for distance.  PCIOL OD 5/20/2021 (SET DVA) / CDE: 8.99/ SN60WF 23.0   PCIOL OS 5/6/2021 (Aim for Near)   Macula Cyst OD   Drop less sx   Refresh Omega 3      Last edited by Ander Lemons, OD on 1/7/2022 11:25 AM. (History)            Assessment /Plan     For exam results, see Encounter Report.    Pseudophakia of both eyes    Refraction disorder      Stable IOL OU.    Dispense Final Rx for glasses.  RTC 1 year  Discussed above and answered questions.

## 2022-03-09 NOTE — TELEPHONE ENCOUNTER
No new care gaps identified.  Powered by [employer] by [employer]. Reference number: [id number].   3/09/2022 8:17:22 AM CST

## 2022-03-16 RX ORDER — MIRTAZAPINE 15 MG/1
15 TABLET, FILM COATED ORAL NIGHTLY
Qty: 90 TABLET | Refills: 2 | Status: SHIPPED | OUTPATIENT
Start: 2022-03-16 | End: 2022-12-10

## 2022-03-16 RX ORDER — FLUTICASONE PROPIONATE 50 MCG
1 SPRAY, SUSPENSION (ML) NASAL DAILY
Qty: 48 G | Refills: 2 | Status: SHIPPED | OUTPATIENT
Start: 2022-03-16

## 2022-03-16 NOTE — TELEPHONE ENCOUNTER
Refill Routing Note   Medication(s) are not appropriate for processing by [facility] for the following reason(s):      - Unclear indication for medication    [facility] action(s):  Defer  Approve       Medication Therapy Plan: DEFER mirtazapine; APPROVE fluticasone  --->Care Gap information included in message below if applicable.   Medication reconciliation completed: No   Automatic Epic Generated Protocol Data:    Orders Placed This Encounter    fluticasone propionate (FLONASE) 50 mcg/actuation nasal spray      Requested Prescriptions   Pending Prescriptions Disp Refills    mirtazapine (REMERON) 15 MG tablet [Pharmacy Med Name: mirtazapine 15 mg tablet] 90 tablet 2     Sig: Take 1 tablet (15 mg total) by mouth every evening.       Psychiatry: Antidepressants - mirtazapine Passed - 3/9/2022  8:17 AM        Passed - Patient is at least 18 years old        Passed - Valid encounter within last 15 months     Recent Visits  Date Type Provider Dept   12/06/21 Office Visit Tripp Heredia MD [facility] Internal Medicine   08/09/21 Office Visit Tripp Heredia MD [facility] Medicine   12/03/20 Office Visit Tripp Heredia MD [facility] Internal Medicine   09/17/20 Office Visit Tripp Heredia MD [facility] Internal Medicine   Showing recent visits within past 720 days and meeting all other requirements  Future Appointments  No visits were found meeting these conditions.  Showing future appointments within next 150 days and meeting all other requirements      Future Appointments              In 11 months [facility]  MAMMO1 SCREEN [facility] - Mammography, [place] [facility]                Passed - ALT is 131 or below and within 360 days     ALT   Date Value Ref Range Status   11/30/2021 8 (L) 10 - 44 U/L Final   11/25/2020 7 (L) 10 - 44 U/L Final   12/02/2019 <5 (L) 10 - 44 U/L Final              Passed - Triglycerides within 360 days     Lab Results   Component Value Date    TRIG 78 11/30/2021    TRIG 83  2020    TRIG 202 (H) 2019              Passed - Total Cholesterol within 360 days     Lab Results   Component Value Date    CHOL 161 2021    CHOL 161 2020    CHOL 179 2019              Passed - WBC within 360 days     WBC   Date Value Ref Range Status   2021 5.06 3.90 - 12.70 K/uL Final   2020 3.96 3.90 - 12.70 K/uL Final   2019 4.70 3.90 - 12.70 K/uL Final               Signed Prescriptions Disp Refills    fluticasone propionate (FLONASE) 50 mcg/actuation nasal spray 48 g 2     Si spray (50 mcg total) by Each Nostril route once daily.       Ear, Nose, and Throat: Nasal Preparations - Corticosteroids Passed - 3/9/2022  8:17 AM        Passed - Patient is at least 18 years old        Passed - Valid encounter within last 15 months     Recent Visits  Date Type Provider Dept   21 Office Visit Tripp Heredia MD [facility] Internal Medicine   21 Office Visit Tripp Heredia MD [facility] Internal Medicine   20 Office Visit Tripp Heredia MD [facility] Internal Medicine   20 Office Visit Tripp Heredia MD [facility] Internal Medicine   Showing recent visits within past 720 days and meeting all other requirements  Future Appointments  No visits were found meeting these conditions.  Showing future appointments within next 150 days and meeting all other requirements      Future Appointments              In 11 months [facility]  MAMMO1 SCREEN [facility] - Mammography, [place] [facility]                      Appointments  past 12m or future 3m with PCP    Date Provider   Last Visit   2021 Tripp Heredia MD   Next Visit   Visit date not found Tripp Heredia MD   ED visits in past 90 days: 0        Note composed:5:48 PM 2022

## 2022-03-22 ENCOUNTER — PES CALL (OUTPATIENT)
Dept: ADMINISTRATIVE | Facility: CLINIC | Age: 67
End: 2022-03-22
Payer: COMMERCIAL

## 2022-03-28 ENCOUNTER — TELEPHONE (OUTPATIENT)
Dept: ADMINISTRATIVE | Facility: HOSPITAL | Age: 67
End: 2022-03-28
Payer: COMMERCIAL

## 2022-04-04 ENCOUNTER — OFFICE VISIT (OUTPATIENT)
Dept: FAMILY MEDICINE | Facility: CLINIC | Age: 67
End: 2022-04-04
Payer: MEDICARE

## 2022-04-04 VITALS
SYSTOLIC BLOOD PRESSURE: 102 MMHG | DIASTOLIC BLOOD PRESSURE: 68 MMHG | HEIGHT: 63 IN | WEIGHT: 132.06 LBS | TEMPERATURE: 98 F | OXYGEN SATURATION: 97 % | BODY MASS INDEX: 23.4 KG/M2 | HEART RATE: 83 BPM | RESPIRATION RATE: 20 BRPM

## 2022-04-04 DIAGNOSIS — N18.31 CHRONIC KIDNEY DISEASE, STAGE 3A: ICD-10-CM

## 2022-04-04 DIAGNOSIS — K44.9 HIATAL HERNIA: ICD-10-CM

## 2022-04-04 DIAGNOSIS — F32.4 MAJOR DEPRESSIVE DISORDER IN PARTIAL REMISSION, UNSPECIFIED WHETHER RECURRENT: ICD-10-CM

## 2022-04-04 DIAGNOSIS — K29.30 CHRONIC SUPERFICIAL GASTRITIS WITHOUT BLEEDING: ICD-10-CM

## 2022-04-04 DIAGNOSIS — K21.9 GASTROESOPHAGEAL REFLUX DISEASE, UNSPECIFIED WHETHER ESOPHAGITIS PRESENT: ICD-10-CM

## 2022-04-04 DIAGNOSIS — Z85.41 HISTORY OF CERVICAL CANCER: ICD-10-CM

## 2022-04-04 DIAGNOSIS — Z85.048 HISTORY OF RECTAL OR ANAL CANCER: ICD-10-CM

## 2022-04-04 DIAGNOSIS — D13.1 BENIGN FUNDIC GLAND POLYPS OF STOMACH: ICD-10-CM

## 2022-04-04 DIAGNOSIS — Z00.00 ENCOUNTER FOR PREVENTIVE HEALTH EXAMINATION: Primary | ICD-10-CM

## 2022-04-04 DIAGNOSIS — E78.5 HYPERLIPIDEMIA, UNSPECIFIED HYPERLIPIDEMIA TYPE: ICD-10-CM

## 2022-04-04 PROCEDURE — G0439 PPPS, SUBSEQ VISIT: HCPCS | Mod: ,,, | Performed by: NURSE PRACTITIONER

## 2022-04-04 PROCEDURE — 99999 PR PBB SHADOW E&M-EST. PATIENT-LVL V: CPT | Mod: PBBFAC,,, | Performed by: NURSE PRACTITIONER

## 2022-04-04 PROCEDURE — 99215 OFFICE O/P EST HI 40 MIN: CPT | Mod: PBBFAC,PO | Performed by: NURSE PRACTITIONER

## 2022-04-04 PROCEDURE — G0439 PR MEDICARE ANNUAL WELLNESS SUBSEQUENT VISIT: ICD-10-PCS | Mod: ,,, | Performed by: NURSE PRACTITIONER

## 2022-04-04 PROCEDURE — 99999 PR PBB SHADOW E&M-EST. PATIENT-LVL V: ICD-10-PCS | Mod: PBBFAC,,, | Performed by: NURSE PRACTITIONER

## 2022-04-04 NOTE — PATIENT INSTRUCTIONS
Counseling and Referral of Other Preventative  (Italic type indicates deductible and co-insurance are waived)    Patient Name: Rita Dejesus  Today's Date: 4/4/2022    Health Maintenance       Date Due Completion Date    Hepatitis C Screening Never done ---    TETANUS VACCINE Never done ---    Shingles Vaccine (2 of 3) 02/12/2016 12/18/2015    Lipid Panel 11/30/2022 11/30/2021    Mammogram 03/08/2023 3/8/2022    DEXA Scan 03/08/2025 3/8/2022    Colorectal Cancer Screening 11/17/2025 11/17/2020        Orders Placed This Encounter   Procedures    Ambulatory referral/consult to Gastroenterology     The following information is provided to all patients.  This information is to help you find resources for any of the problems found today that may be affecting your health:                Living healthy guide: www.[facility].[place].[facility]      Understanding Diabetes: www.diabetes.org      Eating healthy: www.cdc.gov/healthyweight      [facility] home safety checklist: www.cdc.gov/steadi/patient.html      Agency on Aging: www.goea.[place].[facility]      Alcoholics anonymous (AA): www.aa.org      Physical Activity: www.ariel.nih.gov/ia1csvb      Tobacco use: www.quitwithusla.org

## 2022-04-04 NOTE — PROGRESS NOTES
"  Rita Dejesus presented for a  Medicare AWV and comprehensive Health Risk Assessment today. The following components were reviewed and updated:    · Medical history  · Family History  · Social history  · Allergies and Current Medications  · Health Risk Assessment  · Health Maintenance  · Care Team         ** See Completed Assessments for Annual Wellness Visit within the encounter summary.**         The following assessments were completed:  · Living Situation  · CAGE  · Depression Screening  · Timed Get Up and Go  · Whisper Test  · Cognitive Function Screening  · Nutrition Screening  · ADL Screening  · PAQ Screening        Vitals:    04/04/22 0908   BP: 102/68   Pulse: 83   Resp: 20   Temp: 98 °F (36.7 °C)   TempSrc: Temporal   SpO2: 97%   Weight: 59.9 kg (132 lb 0.9 oz)   Height: 5' 2.5" (1.588 m)     Body mass index is 23.77 kg/m².  Physical Exam  Constitutional:       General: She is not in acute distress.     Appearance: Normal appearance. She is well-developed. She is not ill-appearing, toxic-appearing or diaphoretic.   HENT:      Head: Normocephalic and atraumatic.      Right Ear: External ear normal.      Left Ear: External ear normal.      Nose: Nose normal.      Mouth/Throat:      Mouth: Mucous membranes are moist.      Pharynx: No posterior oropharyngeal erythema.   Eyes:      General: No scleral icterus.        Right eye: No discharge.         Left eye: No discharge.      Conjunctiva/sclera: Conjunctivae normal.      Pupils: Pupils are equal, round, and reactive to light.   Neck:      Thyroid: No thyromegaly.      Vascular: No carotid bruit.      Trachea: No tracheal deviation.   Cardiovascular:      Rate and Rhythm: Normal rate and regular rhythm.      Pulses: Normal pulses.      Heart sounds: Normal heart sounds. No murmur heard.    No friction rub. No gallop.   Pulmonary:      Effort: Pulmonary effort is normal. No respiratory distress.      Breath sounds: Normal breath sounds. No stridor. No " wheezing, rhonchi or rales.   Chest:      Chest wall: No tenderness.   Breasts:      Right: No supraclavicular adenopathy.      Left: No supraclavicular adenopathy.       Abdominal:      General: Bowel sounds are normal. There is no distension.      Palpations: Abdomen is soft. There is no mass.      Tenderness: There is no abdominal tenderness. There is no guarding or rebound.      Hernia: No hernia is present.   Musculoskeletal:         General: No tenderness. Normal range of motion.      Cervical back: Normal range of motion and neck supple. No edema or tenderness. Normal range of motion.   Lymphadenopathy:      Head:      Right side of head: No tonsillar adenopathy.      Left side of head: No tonsillar adenopathy.      Cervical: No cervical adenopathy.      Upper Body:      Right upper body: No supraclavicular adenopathy.      Left upper body: No supraclavicular adenopathy.   Skin:     General: Skin is warm and dry.      Findings: No lesion or rash.   Neurological:      Mental Status: She is alert and oriented to person, place, and time.      Deep Tendon Reflexes: Reflexes are normal and symmetric.   Psychiatric:         Mood and Affect: Mood normal.         Behavior: Behavior normal.               Diagnoses and health risks identified today and associated recommendations/orders:    1. Encounter for preventive health examination  Screenings performed, as noted above.  Personal preventative testing needs reviewed.     2. Gastroesophageal reflux disease, unspecified whether esophagitis present  Monitored/treated on meds, continue the same tx, still having problems, will see GI, follow up with pcp  - Ambulatory referral/consult to Gastroenterology; Future    3. Chronic kidney disease, stage 3a  Monitored/treated on meds, continue the same tx, stable, follow up with pcp    4. Hyperlipidemia, unspecified hyperlipidemia type  Monitored/treated on meds, continue the same tx, stable, follow up with pcp    5. Benign  fundic gland polyps of stomach  Monitored/treated on meds, continue the same tx, still having problems, will see GI, follow up with pcp    6. Hiatal hernia  Monitored/treated on meds, continue the same tx, still having problems, will see GI, follow up with pcp    7. Major depressive disorder in partial remission, unspecified whether recurrent  Monitored/treated on meds, continue the same tx, stable, follow up with pcp    8. History of cervical cancer  Monitored/treated on meds, continue the same tx, stable, follow up with GYN, Dr Salmeron    9. History of rectal or anal cancer  Monitored/treated on meds, continue the same tx, still having problems, will see GI, follow up with pcp    10. Chronic superficial gastritis without bleeding  Monitored/treated on meds, continue the same tx, still having problems, will see GI, follow up with pcp      Provided Ginger with a 5-10 year written screening schedule and personal prevention plan. Recommendations were developed using the USPSTF age appropriate recommendations. Education, counseling, and referrals were provided as needed. After Visit Summary printed and given to patient which includes a list of additional screenings\tests needed.    No follow-ups on file.    Son Lujan, JASPAL  I offered to discuss advanced care planning, including how to pick a person who would make decisions for you if you were unable to make them for yourself, called a health care power of , and what kind of decisions you might make such as use of life sustaining treatments such as ventilators and tube feeding when faced with a life limiting illness recorded on a living will that they will need to know. (How you want to be cared for as you near the end of your natural life)     X Patient is interested in learning more about how to make advanced directives.  I provided them paperwork and offered to discuss this with them.

## 2022-04-11 ENCOUNTER — PATIENT OUTREACH (OUTPATIENT)
Dept: ADMINISTRATIVE | Facility: OTHER | Age: 67
End: 2022-04-11
Payer: COMMERCIAL

## 2022-04-11 NOTE — PROGRESS NOTES
Health Maintenance Due   Topic Date Due    Hepatitis C Screening  Never done    TETANUS VACCINE  Never done    Shingles Vaccine (2 of 3) 02/12/2016     Updates were requested from care everywhere.  Chart was reviewed for overdue Proactive [facility] Encounters (BHAVIK) topics (CRS, Breast Cancer Screening, Eye exam)  Health Maintenance has been updated.  LINKS immunization registry triggered.  Immunizations were reconciled.

## 2022-04-12 ENCOUNTER — OFFICE VISIT (OUTPATIENT)
Dept: GASTROENTEROLOGY | Facility: CLINIC | Age: 67
End: 2022-04-12
Payer: MEDICARE

## 2022-04-12 VITALS
BODY MASS INDEX: 23.25 KG/M2 | SYSTOLIC BLOOD PRESSURE: 110 MMHG | HEART RATE: 90 BPM | WEIGHT: 131.19 LBS | HEIGHT: 63 IN | DIASTOLIC BLOOD PRESSURE: 56 MMHG

## 2022-04-12 DIAGNOSIS — R13.19 OTHER DYSPHAGIA: ICD-10-CM

## 2022-04-12 DIAGNOSIS — K21.9 GASTROESOPHAGEAL REFLUX DISEASE, UNSPECIFIED WHETHER ESOPHAGITIS PRESENT: ICD-10-CM

## 2022-04-12 DIAGNOSIS — R13.13 PHARYNGEAL DYSPHAGIA: Primary | ICD-10-CM

## 2022-04-12 PROCEDURE — 99999 PR PBB SHADOW E&M-EST. PATIENT-LVL IV: ICD-10-PCS | Mod: PBBFAC,,, | Performed by: INTERNAL MEDICINE

## 2022-04-12 PROCEDURE — 99214 OFFICE O/P EST MOD 30 MIN: CPT | Mod: PBBFAC | Performed by: INTERNAL MEDICINE

## 2022-04-12 PROCEDURE — 99999 PR PBB SHADOW E&M-EST. PATIENT-LVL IV: CPT | Mod: PBBFAC,,, | Performed by: INTERNAL MEDICINE

## 2022-04-12 PROCEDURE — 99204 OFFICE O/P NEW MOD 45 MIN: CPT | Mod: S$PBB,,, | Performed by: INTERNAL MEDICINE

## 2022-04-12 PROCEDURE — 99204 PR OFFICE/OUTPT VISIT, NEW, LEVL IV, 45-59 MIN: ICD-10-PCS | Mod: S$PBB,,, | Performed by: INTERNAL MEDICINE

## 2022-04-12 RX ORDER — OMEPRAZOLE 40 MG/1
40 CAPSULE, DELAYED RELEASE ORAL DAILY
Qty: 90 CAPSULE | Refills: 0 | Status: SHIPPED | OUTPATIENT
Start: 2022-04-12 | End: 2022-05-09

## 2022-04-12 NOTE — PROGRESS NOTES
Clinic Consult:  [facility] Consultation Note    Reason for Consult:  The primary encounter diagnosis was Pharyngeal dysphagia. Diagnoses of Gastroesophageal reflux disease, unspecified whether esophagitis present and Other dysphagia were also pertinent to this visit.    PCP: Tripp Heredia   [id number] / [place] [id number]    HPI:  This is a 66 y.o. female here for evaluation of dysphagia and GERD.   Every once in a while, patient gets this nauseated feeling where it feels like food gets stuck substernally.   She was diagnosed with esophageal spasms when she was 15.   Denies vomiting or hematemesis.   She does experience heartburn occasionally.   She has dysphagia with breads.   If she has a bowel movement, symptoms improve.   On Dexilant without relief.   Denies melena, hematochezia, diarrhea or constipation.   On Miralax and Probiotics.     Of note, history of anal cancer.   Last colonoscopy in 2020. Repeat in 5 years.       ROS:  As per HPI.       Medical History:   Past Medical History:   Diagnosis Date    GERD (gastroesophageal reflux disease)     History of cervical cancer     History of rectal or anal cancer        Surgical History:  Past Surgical History:   Procedure Laterality Date    ADENOIDECTOMY      carpal tunel Bilateral     CATARACT EXTRACTION Left 05/06/2021    NEAR    CATARACT EXTRACTION W/ INTRAOCULAR LENS IMPLANT Right 05/20/2021    COLONOSCOPY N/A 11/17/2020    Procedure: COLONOSCOPY;  Surgeon: Kendy De Leon MD;  Location: [facility];  Service: Endoscopy;  Laterality: N/A;    ESOPHAGOGASTRODUODENOSCOPY N/A 12/22/2020    Procedure: ESOPHAGOGASTRODUODENOSCOPY (EGD);  Surgeon: Goyo Kapadia MD;  Location: [facility];  Service: Endoscopy;  Laterality: N/A;    HERNIA REPAIR      TONSILLECTOMY         Family History:   Family History   Problem Relation Age of Onset    Diabetes Mother         type II    Hyperlipidemia Mother     Heart disease Father      "Hypertension Father     Cataracts Father     Hypertension Sister     Diabetes Maternal Grandmother     Diabetes Maternal Grandfather        Social History:   Social History     Tobacco Use    Smoking status: Never Smoker    Smokeless tobacco: Never Used   Substance Use Topics    Alcohol use: Yes     Comment: Social     Drug use: Never       Allergies: Reviewed    Home Medications:   Medication List with Changes/Refills   New Medications    OMEPRAZOLE (PRILOSEC) 40 MG CAPSULE    Take 1 capsule (40 mg total) by mouth once daily.   Current Medications    CALCIUM CARBONATE 650 MG CALCIUM (1,625 MG) TABLET    Take 1 tablet by mouth once daily.    CYANOCOBALAMIN (VITAMIN B-12) 250 MCG TABLET    Take 250 mcg by mouth.    DEXLANSOPRAZOLE (DEXILANT) 60 MG CAPSULE    Take 1 capsule (60 mg total) by mouth once daily.    FLUOCINONIDE (LIDEX) 0.05 % EXTERNAL SOLUTION    Apply topically 2 (two) times daily.    FLUTICASONE PROPIONATE (FLONASE) 50 MCG/ACTUATION NASAL SPRAY    1 spray (50 mcg total) by Each Nostril route once daily.    LACTOBACILLUS RHAMNOSUS GG (CULTURELLE) 10 BILLION CELL CAPSULE    Take 1 capsule by mouth once daily.    LEVOCETIRIZINE (XYZAL) 5 MG TABLET    Take 5 mg by mouth every evening.    MIRTAZAPINE (REMERON) 15 MG TABLET    Take 1 tablet (15 mg total) by mouth every evening.    MULTIVITAMIN CAPSULE    Take 1 capsule by mouth once daily.    SIMVASTATIN (ZOCOR) 40 MG TABLET    Take 1 tablet (40 mg total) by mouth every evening.    VENLAFAXINE (EFFEXOR-XR) 75 MG 24 HR CAPSULE    TAKE 1 CAPSULE BY MOUTH ONCE DAILY    VITAMIN D (VITAMIN D3) 1000 UNITS TAB    Take 185 mg by mouth.         Physical Exam:  Vital Signs:  BP (!) 110/56   Pulse 90   Ht 5' 2.5" (1.588 m)   Wt 59.5 kg (131 lb 2.8 oz)   BMI 23.61 kg/m²   Body mass index is 23.61 kg/m².    Physical Exam  Constitutional:       Appearance: Normal appearance.   HENT:      Head: Normocephalic.   Eyes:      Conjunctiva/sclera: Conjunctivae normal. "   Pulmonary:      Effort: Pulmonary effort is normal.   Abdominal:      General: Abdomen is flat. There is no distension.      Palpations: Abdomen is soft.      Tenderness: There is no abdominal tenderness. There is no guarding.   Neurological:      Mental Status: She is alert.          Labs: Pertinent labs reviewed.        Assessment:  Problem List Items Addressed This Visit        GI    GERD (gastroesophageal reflux disease)    Current Assessment & Plan     Plan:   -Will switch to Prilosec PO daily   -GERD lifestyle modifications            Other dysphagia    Current Assessment & Plan     Plan:   -EGD with biopsies              Other Visit Diagnoses     Pharyngeal dysphagia    -  Primary        Pharyngeal dysphagia  -     Case Request Endoscopy: EGD (ESOPHAGOGASTRODUODENOSCOPY)    Gastroesophageal reflux disease, unspecified whether esophagitis present  -     Ambulatory referral/consult to Gastroenterology  -     Case Request Endoscopy: EGD (ESOPHAGOGASTRODUODENOSCOPY)    Other dysphagia    Other orders  -     omeprazole (PRILOSEC) 40 MG capsule; Take 1 capsule (40 mg total) by mouth once daily.  Dispense: 90 capsule; Refill: 0         Follow-up after diagnostic evaluation.       Order summary:  No orders of the defined types were placed in this encounter.      Thank you so much for allowing me to participate in the care of Rita Crocker MD  Gastroenterology and Hepatology  [facility]

## 2022-04-28 ENCOUNTER — PATIENT MESSAGE (OUTPATIENT)
Dept: INTERNAL MEDICINE | Facility: CLINIC | Age: 67
End: 2022-04-28
Payer: COMMERCIAL

## 2022-04-28 DIAGNOSIS — N18.31 CHRONIC KIDNEY DISEASE, STAGE 3A: Primary | ICD-10-CM

## 2022-05-09 ENCOUNTER — ANESTHESIA (OUTPATIENT)
Dept: ENDOSCOPY | Facility: HOSPITAL | Age: 67
End: 2022-05-09
Payer: MEDICARE

## 2022-05-09 ENCOUNTER — ANESTHESIA EVENT (OUTPATIENT)
Dept: ENDOSCOPY | Facility: HOSPITAL | Age: 67
End: 2022-05-09
Payer: MEDICARE

## 2022-05-09 ENCOUNTER — HOSPITAL ENCOUNTER (OUTPATIENT)
Facility: HOSPITAL | Age: 67
Discharge: HOME | End: 2022-05-09
Attending: INTERNAL MEDICINE | Admitting: INTERNAL MEDICINE
Payer: MEDICARE

## 2022-05-09 ENCOUNTER — PATIENT MESSAGE (OUTPATIENT)
Dept: ENDOSCOPY | Facility: HOSPITAL | Age: 67
End: 2022-05-09
Payer: COMMERCIAL

## 2022-05-09 DIAGNOSIS — R13.19 OTHER DYSPHAGIA: Primary | ICD-10-CM

## 2022-05-09 DIAGNOSIS — R13.10 DYSPHAGIA: ICD-10-CM

## 2022-05-09 PROCEDURE — 88305 TISSUE EXAM BY PATHOLOGIST: ICD-10-PCS | Mod: 26,,, | Performed by: PATHOLOGY

## 2022-05-09 PROCEDURE — 27201042 HC RETRIEVAL NET: Performed by: INTERNAL MEDICINE

## 2022-05-09 PROCEDURE — 27201089 HC SNARE, DISP (ANY): Performed by: INTERNAL MEDICINE

## 2022-05-09 PROCEDURE — 37000009 HC ANESTHESIA EA ADD 15 MINS: Performed by: INTERNAL MEDICINE

## 2022-05-09 PROCEDURE — 43251 PR EGD, FLEX, W/REMOVAL, TUMOR/POLYP/LESION(S), SNARE: ICD-10-PCS | Mod: ,,, | Performed by: INTERNAL MEDICINE

## 2022-05-09 PROCEDURE — 43251 EGD REMOVE LESION SNARE: CPT | Performed by: INTERNAL MEDICINE

## 2022-05-09 PROCEDURE — 88305 TISSUE EXAM BY PATHOLOGIST: CPT | Performed by: PATHOLOGY

## 2022-05-09 PROCEDURE — 88305 TISSUE EXAM BY PATHOLOGIST: CPT | Mod: 26,,, | Performed by: PATHOLOGY

## 2022-05-09 PROCEDURE — 43251 EGD REMOVE LESION SNARE: CPT | Mod: ,,, | Performed by: INTERNAL MEDICINE

## 2022-05-09 PROCEDURE — 25000003 PHARM REV CODE 250: Performed by: NURSE ANESTHETIST, CERTIFIED REGISTERED

## 2022-05-09 PROCEDURE — 63600175 PHARM REV CODE 636 W HCPCS: Performed by: NURSE ANESTHETIST, CERTIFIED REGISTERED

## 2022-05-09 PROCEDURE — 37000008 HC ANESTHESIA 1ST 15 MINUTES: Performed by: INTERNAL MEDICINE

## 2022-05-09 RX ORDER — SODIUM CHLORIDE, SODIUM LACTATE, POTASSIUM CHLORIDE, CALCIUM CHLORIDE 600; 310; 30; 20 MG/100ML; MG/100ML; MG/100ML; MG/100ML
INJECTION, SOLUTION INTRAVENOUS CONTINUOUS
Status: DISCONTINUED | OUTPATIENT
Start: 2022-05-09 | End: 2022-05-09 | Stop reason: HOSPADM

## 2022-05-09 RX ORDER — SODIUM CHLORIDE, SODIUM LACTATE, POTASSIUM CHLORIDE, CALCIUM CHLORIDE 600; 310; 30; 20 MG/100ML; MG/100ML; MG/100ML; MG/100ML
INJECTION, SOLUTION INTRAVENOUS CONTINUOUS PRN
Status: DISCONTINUED | OUTPATIENT
Start: 2022-05-09 | End: 2022-05-09

## 2022-05-09 RX ORDER — PROPOFOL 10 MG/ML
VIAL (ML) INTRAVENOUS
Status: DISCONTINUED | OUTPATIENT
Start: 2022-05-09 | End: 2022-05-09

## 2022-05-09 RX ORDER — OMEPRAZOLE 40 MG/1
40 CAPSULE, DELAYED RELEASE ORAL 2 TIMES DAILY
Qty: 180 CAPSULE | Refills: 0 | Status: SHIPPED | OUTPATIENT
Start: 2022-05-09 | End: 2022-08-07 | Stop reason: SDUPTHER

## 2022-05-09 RX ORDER — LIDOCAINE HYDROCHLORIDE 10 MG/ML
INJECTION, SOLUTION EPIDURAL; INFILTRATION; INTRACAUDAL; PERINEURAL
Status: DISCONTINUED | OUTPATIENT
Start: 2022-05-09 | End: 2022-05-09

## 2022-05-09 RX ADMIN — PROPOFOL 70 MG: 10 INJECTION, EMULSION INTRAVENOUS at 08:05

## 2022-05-09 RX ADMIN — LIDOCAINE HYDROCHLORIDE 50 MG: 10 INJECTION, SOLUTION EPIDURAL; INFILTRATION; INTRACAUDAL; PERINEURAL at 08:05

## 2022-05-09 RX ADMIN — PROPOFOL 30 MG: 10 INJECTION, EMULSION INTRAVENOUS at 08:05

## 2022-05-09 RX ADMIN — SODIUM CHLORIDE, SODIUM LACTATE, POTASSIUM CHLORIDE, AND CALCIUM CHLORIDE: 600; 310; 30; 20 INJECTION, SOLUTION INTRAVENOUS at 08:05

## 2022-05-09 NOTE — ANESTHESIA PREPROCEDURE EVALUATION
05/09/2022  Rita Dejesus is a 66 y.o., female.      Pre-op Assessment    I have reviewed the Patient Summary Reports.     I have reviewed the Nursing Notes. I have reviewed the NPO Status.   I have reviewed the Medications.     Review of Systems  Anesthesia Hx:  No problems with previous Anesthesia    Social:  Non-Smoker, Social Alcohol Use    Hematology/Oncology:  Hematology Normal       -- Cancer in past history:  Other (see Oncology comments) surgery, chemotherapy and radiation  Oncology Comments: Rectal  Cervical     EENT/Dental:EENT/Dental Normal   Cardiovascular:  Cardiovascular Normal     Pulmonary:  Pulmonary Normal    Renal/:   Chronic Renal Disease, CRI    Hepatic/GI:   GERD, poorly controlled    Musculoskeletal:  Musculoskeletal Normal    Neurological:  Neurology Normal    Endocrine:  Endocrine Normal    Dermatological:  Skin Normal    Psych:   anxiety depression          Physical Exam  General: Well nourished, Cooperative, Alert and Oriented    Airway:  Mallampati: II   Mouth Opening: Normal  TM Distance: Normal  Tongue: Normal  Neck ROM: Normal ROM    Dental:  Intact        Anesthesia Plan  Type of Anesthesia, risks & benefits discussed:    Anesthesia Type: MAC  Intra-op Monitoring Plan: Standard ASA Monitors  Post Op Pain Control Plan: multimodal analgesia  Informed Consent: Informed consent signed with the Patient and all parties understand the risks and agree with anesthesia plan.  All questions answered.   ASA Score: 2  Day of Surgery Review of History & Physical: H&P Update referred to the surgeon/provider.    Ready For Surgery From Anesthesia Perspective.     .

## 2022-05-09 NOTE — [employer]ATION PATIENT INSTRUCTIONS
Discharge Summary/Instructions after an Endoscopic Procedure  Patient Name: Rita Dejesus  Patient MRN: [id number]  Patient YOB: 1955  Monday, May 9, 2022 Misa Crocker MD  Dear patient,  As a result of recent federal legislation (The Federal Cures Act), you may   receive lab or pathology results from your procedure in your [facility]   account before your physician is able to contact you. Your physician or   their representative will relay the results to you with their   recommendations at their soonest availability.  Thank you,  RESTRICTIONS:  During your procedure today, you received medications for sedation.  These   medications may affect your judgment, balance and coordination.  Therefore,   for 24 hours, you have the following restrictions:   - DO NOT drive a car, operate machinery, make legal/financial decisions,   sign important papers or drink alcohol.    ACTIVITY:  Today: no heavy lifting, straining or running due to procedural   sedation/anesthesia.  The following day: return to full activity including work.  DIET:  Eat and drink normally unless instructed otherwise.     TREATMENT FOR COMMON SIDE EFFECTS:  - Mild abdominal pain, nausea, belching, bloating or excessive gas:  rest,   eat lightly and use a heating pad.  - Sore Throat: treat with throat lozenges and/or gargle with warm salt   water.  - Because air was used during the procedure, expelling large amounts of air   from your rectum or belching is normal.  - If a bowel prep was taken, you may not have a bowel movement for 1-3 days.    This is normal.  SYMPTOMS TO WATCH FOR AND REPORT TO YOUR PHYSICIAN:  1. Abdominal pain or bloating, other than gas cramps.  2. Chest pain.  3. Back pain.  4. Signs of infection such as: chills or fever occurring within 24 hours   after the procedure.  5. Rectal bleeding, which would show as bright red, maroon, or black stools.   (A tablespoon of blood from the rectum is not serious, especially if    hemorrhoids are present.)  6. Vomiting.  7. Weakness or dizziness.  GO DIRECTLY TO THE NEAREST EMERGENCY ROOM IF YOU HAVE ANY OF THE FOLLOWING:      Difficulty breathing              Chills and/or fever over 101 F   Persistent vomiting and/or vomiting blood   Severe abdominal pain   Severe chest pain   Black, tarry stools   Bleeding- more than one tablespoon   Any other symptom or condition that you feel may need urgent attention  Your doctor recommends these additional instructions:  If any biopsies were taken, your doctors clinic will contact you in 1 to 2   weeks with any results.  - Discharge patient to home.   - Resume previous diet.   - Continue present medications.   - Await pathology results.   - Repeat upper endoscopy in 12 weeks for surveillance.   - Return to referring physician.  For questions, problems or results please call your physician Misa Crocker MD at Work:  [phone]  If you have any questions about the above instructions, call the GI   department at [phone] or call the endoscopy unit at [phone]   from 7am until 3 pm.  [facility] [place], EMERGENCY ROOM PHONE NUMBER:   [phone]  IF A COMPLICATION OR EMERGENCY SITUATION ARISES AND YOU ARE UNABLE TO REACH   YOUR PHYSICIAN - GO DIRECTLY TO THE EMERGENCY ROOM.  I have read or have had read to me these discharge instructions for my   procedure and have received a written copy.  I understand these   instructions and will follow-up with my physician if I have any questions.     __________________________________       _____________________________________  Nurse Signature                                          Patient/Designated   Responsible Party Signature  Misa Crocker MD  5/9/2022 8:16:48 AM  This report has been verified and signed electronically.  Dear patient,  As a result of recent federal legislation (The Federal Cures Act), you may   receive lab or pathology results from your  procedure in your [facility]   account before your physician is able to contact you. Your physician or   their representative will relay the results to you with their   recommendations at their soonest availability.  Thank you,  [employer]ATION

## 2022-05-09 NOTE — ANESTHESIA RELEASE NOTE
"Anesthesia Release from PACU Note    Patient: Rita Dejesus    Procedure(s) Performed: Procedure(s) (LRB):  EGD (ESOPHAGOGASTRODUODENOSCOPY) (N/A)    Anesthesia type: MAC    Post pain: Adequate analgesia    Post assessment: no apparent anesthetic complications and tolerated procedure well    Last Vitals:   Visit Vitals  /62   Pulse 74   Temp 36.6 °C (97.9 °F) (Temporal)   Resp 20   Ht 5' 1.5" (1.562 m)   Wt 59 kg (130 lb)   SpO2 97%   Breastfeeding No   BMI 24.17 kg/m²       Post vital signs: stable    Level of consciousness: awake, alert  and oriented    Nausea/Vomiting: no nausea/no vomiting    Complications: none    Airway Patency: patent    Respiratory: unassisted, spontaneous ventilation, room air    Cardiovascular: stable and blood pressure at baseline    Hydration: euvolemic  "

## 2022-05-09 NOTE — ANESTHESIA POSTPROCEDURE EVALUATION
Anesthesia Post Evaluation    Patient: Rita Dejesus    Procedure(s) Performed: Procedure(s) (LRB):  EGD (ESOPHAGOGASTRODUODENOSCOPY) (N/A)    Final Anesthesia Type: MAC      Patient location during evaluation: GI PACU  Patient participation: Yes- Able to Participate  Level of consciousness: awake and alert and oriented  Post-procedure vital signs: reviewed and stable  Pain management: adequate  Airway patency: patent  PETRA mitigation strategies: Multimodal analgesia  PONV status at discharge: No PONV  Anesthetic complications: no      Cardiovascular status: hemodynamically stable  Respiratory status: unassisted, spontaneous ventilation and room air  Hydration status: euvolemic  Follow-up not needed.          Vitals Value Taken Time   /62 05/09/22 0827   Temp  05/09/22 0835   Pulse 74 05/09/22 0827   Resp 20 05/09/22 0827   SpO2 97 % 05/09/22 0827         No case tracking events are documented in the log.      Pain/Mamadou Score: Mamadou Score: 10 (5/9/2022  8:27 AM)

## 2022-05-09 NOTE — H&P
PRE PROCEDURE H&P    Patient Name: Rita Dejesus  MRN: [id number]  : 1955  Date of Procedure:  2022  Referring Physician: Misa Crocker MD  Primary Physician: Tripp Heredia MD  Procedure Physician: Misa Crocker MD       Planned Procedure: EGD  Diagnosis: GERD and dysphagia   Chief Complaint: Same as above    HPI:   This is a 66 y.o. female here for evaluation of dysphagia and GERD.   Every once in a while, patient gets this nauseated feeling where it feels like food gets stuck substernally.   She was diagnosed with esophageal spasms when she was 15.   Denies vomiting or hematemesis.   She does experience heartburn occasionally.   She has dysphagia with breads.   If she has a bowel movement, symptoms improve.   On Dexilant without relief.   Denies melena, hematochezia, diarrhea or constipation.   On Miralax and Probiotics.      Of note, history of anal cancer.   Last colonoscopy in . Repeat in 5 years.        Past Medical History:   Past Medical History:   Diagnosis Date    GERD (gastroesophageal reflux disease)     History of cervical cancer     History of rectal or anal cancer         Past Surgical History:  Past Surgical History:   Procedure Laterality Date    ADENOIDECTOMY      carpal tunel Bilateral     CATARACT EXTRACTION Left 2021    NEAR    CATARACT EXTRACTION W/ INTRAOCULAR LENS IMPLANT Right 2021    COLONOSCOPY N/A 2020    Procedure: COLONOSCOPY;  Surgeon: Kendy De Leon MD;  Location: [facility];  Service: Endoscopy;  Laterality: N/A;    ESOPHAGOGASTRODUODENOSCOPY N/A 2020    Procedure: ESOPHAGOGASTRODUODENOSCOPY (EGD);  Surgeon: Goyo Kapadia MD;  Location: [facility];  Service: Endoscopy;  Laterality: N/A;    HERNIA REPAIR      TONSILLECTOMY          Home Medications:  Prior to Admission medications    Medication Sig Start Date End Date Taking? Authorizing Provider   calcium carbonate 650 mg calcium (1,625 mg) tablet Take 1  tablet by mouth once daily.   Yes Historical Provider   cyanocobalamin (VITAMIN B-12) 250 MCG tablet Take 250 mcg by mouth. 8/24/18  Yes Historical Provider   fluticasone propionate (FLONASE) 50 mcg/actuation nasal spray 1 spray (50 mcg total) by Each Nostril route once daily. 3/16/22  Yes Tripp Heredia MD   Lactobacillus rhamnosus GG (CULTURELLE) 10 billion cell capsule Take 1 capsule by mouth once daily.   Yes Historical Provider   levocetirizine (XYZAL) 5 MG tablet Take 5 mg by mouth every evening.   Yes Historical Provider   mirtazapine (REMERON) 15 MG tablet Take 1 tablet (15 mg total) by mouth every evening. 3/16/22  Yes rTipp Heredia MD   omeprazole (PRILOSEC) 40 MG capsule Take 1 capsule (40 mg total) by mouth once daily. 4/12/22 7/11/22 Yes Misa Crocker MD   simvastatin (ZOCOR) 40 MG tablet Take 1 tablet (40 mg total) by mouth every evening. 6/10/21  Yes Tripp Heredia MD   venlafaxine (EFFEXOR-XR) 75 MG 24 hr capsule TAKE 1 CAPSULE BY MOUTH ONCE DAILY 2/8/22  Yes Tripp Heredia MD   vitamin D (VITAMIN D3) 1000 units Tab Take 185 mg by mouth.   Yes Historical Provider   fluocinonide (LIDEX) 0.05 % external solution Apply topically 2 (two) times daily.  Patient not taking: No sig reported 9/7/21   Tripp Heredia MD   multivitamin capsule Take 1 capsule by mouth once daily.    Historical Provider   dexlansoprazole (DEXILANT) 60 mg capsule Take 1 capsule (60 mg total) by mouth once daily. 12/27/21 5/9/22  Tripp Heredia MD        Allergies:  Review of patient's allergies indicates:   Allergen Reactions    Sulfa (sulfonamide antibiotics) Nausea And Vomiting    Nitrofuran analogues Nausea And Vomiting     Microdantin         Social History:   Social History     Socioeconomic History    Marital status:    Tobacco Use    Smoking status: Never Smoker    Smokeless tobacco: Never Used   Substance and Sexual Activity    Alcohol use: Yes     Comment: Social      "Drug use: Never    Sexual activity: Not Currently     Partners: Male     Social Determinants of Health     Financial Resource Strain: Low Risk     Difficulty of Paying Living Expenses: Not hard at all   Food Insecurity: No Food Insecurity    Worried About Running Out of Food in the Last Year: Never true    Ran Out of Food in the Last Year: Never true   Transportation Needs: No Transportation Needs    Lack of Transportation (Medical): No    Lack of Transportation (Non-Medical): No   Physical Activity: Inactive    Days of Exercise per Week: 0 days    Minutes of Exercise per Session: 0 min   Stress: No Stress Concern Present    Feeling of Stress : Only a little   Social Connections: Moderately Isolated    Frequency of Communication with Friends and Family: More than three times a week    Frequency of Social Gatherings with Friends and Family: More than three times a week    Attends Nondenominational Services: Never    Active Member of Clubs or Organizations: No    Attends Club or Organization Meetings: Never    Marital Status:    Housing Stability: Unknown    Unable to Pay for Housing in the Last Year: No    Unstable Housing in the Last Year: No       Family History:  Family History   Problem Relation Age of Onset    Diabetes Mother         type II    Hyperlipidemia Mother     Heart disease Father     Hypertension Father     Cataracts Father     Hypertension Sister     Diabetes Maternal Grandmother     Diabetes Maternal Grandfather        ROS: No acute cardiac events, no acute respiratory complaints.     Physical Exam (all patients):    /63   Pulse 73   Temp 97.9 °F (36.6 °C) (Temporal)   Resp 17   Ht 5' 1.5" (1.562 m)   Wt 59 kg (130 lb)   SpO2 100%   Breastfeeding No   BMI 24.17 kg/m²   Lungs: Clear to auscultation bilaterally, respirations unlabored  Heart: Regular rate and rhythm, S1 and S2 normal, no obvious murmurs  Abdomen:         Soft, non-tender, bowel sounds normal, no " masses, no organomegaly    Lab Results   Component Value Date    WBC 5.06 11/30/2021    MCV 91 11/30/2021    RDW 13.1 11/30/2021     11/30/2021    GLU 86 11/30/2021    BUN 17 11/30/2021     11/30/2021    K 5.2 (H) 11/30/2021     11/30/2021        SEDATION PLAN: per anesthesia      History reviewed, vital signs satisfactory, cardiopulmonary status satisfactory, sedation options, risks and plans have been discussed with the patient in front of endoscopy staff.  All their questions were answered and the patient agrees to the sedation procedures as planned and the patient is deemed an appropriate candidate for the sedation as planned.    Procedure explained to patient, informed consent obtained and placed in chart.    Misa Crocker  5/9/2022  7:53 AM

## 2022-05-09 NOTE — TRANSFER OF CARE
"Anesthesia Transfer of Care Note    Patient: Rita Dejesus    Procedure(s) Performed: Procedure(s) (LRB):  EGD (ESOPHAGOGASTRODUODENOSCOPY) (N/A)    Patient location: GI    Anesthesia Type: MAC    Transport from OR: Transported from OR on room air with adequate spontaneous ventilation    Post pain: adequate analgesia    Post assessment: no apparent anesthetic complications and tolerated procedure well    Post vital signs: stable    Level of consciousness: awake, alert and oriented    Nausea/Vomiting: no nausea/vomiting    Complications: none    Transfer of care protocol was followed      Last vitals:   Visit Vitals  /63   Pulse 73   Temp 36.6 °C (97.9 °F) (Temporal)   Resp 17   Ht 5' 1.5" (1.562 m)   Wt 59 kg (130 lb)   SpO2 100%   Breastfeeding No   BMI 24.17 kg/m²     "

## 2022-05-09 NOTE — PLAN OF CARE
Pt instructed to dress as much as possible in bed, not to bend over toward floor, call for assistance

## 2022-05-09 NOTE — PLAN OF CARE
Dr butcher came to bedside to discuss findings. Vital signs stable, no patient c/o nausea/vomitting, no abdominal pain, no gi bleeding. Patient to be discharged from unit.

## 2022-05-10 VITALS
RESPIRATION RATE: 20 BRPM | HEART RATE: 77 BPM | BODY MASS INDEX: 23.92 KG/M2 | TEMPERATURE: 98 F | OXYGEN SATURATION: 97 % | HEIGHT: 62 IN | DIASTOLIC BLOOD PRESSURE: 78 MMHG | SYSTOLIC BLOOD PRESSURE: 116 MMHG | WEIGHT: 130 LBS

## 2022-05-16 LAB
FINAL PATHOLOGIC DIAGNOSIS: NORMAL
GROSS: NORMAL
Lab: NORMAL

## 2022-05-24 DIAGNOSIS — E78.2 MIXED HYPERLIPIDEMIA: ICD-10-CM

## 2022-05-24 RX ORDER — SIMVASTATIN 40 MG/1
40 TABLET, FILM COATED ORAL NIGHTLY
Qty: 90 TABLET | Refills: 3 | Status: SHIPPED | OUTPATIENT
Start: 2022-05-24 | End: 2023-06-15 | Stop reason: SDUPTHER

## 2022-05-24 NOTE — TELEPHONE ENCOUNTER
No new care gaps identified.  [facility] Embedded Care Gaps. Reference number: [id number]. 5/24/2022   10:57:43 AM DAISHAT

## 2022-05-30 ENCOUNTER — LAB VISIT (OUTPATIENT)
Dept: LAB | Facility: HOSPITAL | Age: 67
End: 2022-05-30
Attending: FAMILY MEDICINE
Payer: MEDICARE

## 2022-05-30 DIAGNOSIS — N18.31 CHRONIC KIDNEY DISEASE, STAGE 3A: ICD-10-CM

## 2022-05-30 LAB
ALBUMIN SERPL BCP-MCNC: 3.8 G/DL (ref 3.5–5.2)
ALP SERPL-CCNC: 111 U/L (ref 55–135)
ALT SERPL W/O P-5'-P-CCNC: 8 U/L (ref 10–44)
ANION GAP SERPL CALC-SCNC: 7 MMOL/L (ref 8–16)
AST SERPL-CCNC: 24 U/L (ref 10–40)
BASOPHILS # BLD AUTO: 0.03 K/UL (ref 0–0.2)
BASOPHILS NFR BLD: 0.7 % (ref 0–1.9)
BILIRUB SERPL-MCNC: 0.4 MG/DL (ref 0.1–1)
BUN SERPL-MCNC: 16 MG/DL (ref 8–23)
CALCIUM SERPL-MCNC: 9.4 MG/DL (ref 8.7–10.5)
CHLORIDE SERPL-SCNC: 109 MMOL/L (ref 95–110)
CO2 SERPL-SCNC: 23 MMOL/L (ref 23–29)
CREAT SERPL-MCNC: 1.2 MG/DL (ref 0.5–1.4)
DIFFERENTIAL METHOD: ABNORMAL
EOSINOPHIL # BLD AUTO: 0.2 K/UL (ref 0–0.5)
EOSINOPHIL NFR BLD: 3.5 % (ref 0–8)
ERYTHROCYTE [DISTWIDTH] IN BLOOD BY AUTOMATED COUNT: 14.6 % (ref 11.5–14.5)
EST. GFR  (AFRICAN AMERICAN): 54 ML/MIN/1.73 M^2
EST. GFR  (NON AFRICAN AMERICAN): 46.9 ML/MIN/1.73 M^2
GLUCOSE SERPL-MCNC: 96 MG/DL (ref 70–110)
HCT VFR BLD AUTO: 39 % (ref 37–48.5)
HGB BLD-MCNC: 12 G/DL (ref 12–16)
IMM GRANULOCYTES # BLD AUTO: 0.02 K/UL (ref 0–0.04)
IMM GRANULOCYTES NFR BLD AUTO: 0.4 % (ref 0–0.5)
LYMPHOCYTES # BLD AUTO: 1.9 K/UL (ref 1–4.8)
LYMPHOCYTES NFR BLD: 42.7 % (ref 18–48)
MCH RBC QN AUTO: 28.5 PG (ref 27–31)
MCHC RBC AUTO-ENTMCNC: 30.8 G/DL (ref 32–36)
MCV RBC AUTO: 93 FL (ref 82–98)
MONOCYTES # BLD AUTO: 0.5 K/UL (ref 0.3–1)
MONOCYTES NFR BLD: 10 % (ref 4–15)
NEUTROPHILS # BLD AUTO: 1.9 K/UL (ref 1.8–7.7)
NEUTROPHILS NFR BLD: 42.7 % (ref 38–73)
NRBC BLD-RTO: 0 /100 WBC
PLATELET # BLD AUTO: 183 K/UL (ref 150–450)
PMV BLD AUTO: 14.9 FL (ref 9.2–12.9)
POTASSIUM SERPL-SCNC: 5.5 MMOL/L (ref 3.5–5.1)
PROT SERPL-MCNC: 6.7 G/DL (ref 6–8.4)
RBC # BLD AUTO: 4.21 M/UL (ref 4–5.4)
SODIUM SERPL-SCNC: 139 MMOL/L (ref 136–145)
WBC # BLD AUTO: 4.52 K/UL (ref 3.9–12.7)

## 2022-05-30 PROCEDURE — 85025 COMPLETE CBC W/AUTO DIFF WBC: CPT | Performed by: FAMILY MEDICINE

## 2022-05-30 PROCEDURE — 80053 COMPREHEN METABOLIC PANEL: CPT | Performed by: FAMILY MEDICINE

## 2022-05-30 PROCEDURE — 36415 COLL VENOUS BLD VENIPUNCTURE: CPT | Mod: PO | Performed by: FAMILY MEDICINE

## 2022-06-03 ENCOUNTER — OFFICE VISIT (OUTPATIENT)
Dept: INTERNAL MEDICINE | Facility: CLINIC | Age: 67
End: 2022-06-03
Payer: MEDICARE

## 2022-06-03 VITALS
TEMPERATURE: 97 F | SYSTOLIC BLOOD PRESSURE: 118 MMHG | WEIGHT: 132.94 LBS | BODY MASS INDEX: 24.46 KG/M2 | HEART RATE: 84 BPM | OXYGEN SATURATION: 98 % | DIASTOLIC BLOOD PRESSURE: 62 MMHG | HEIGHT: 62 IN

## 2022-06-03 DIAGNOSIS — E87.5 HYPERKALEMIA: ICD-10-CM

## 2022-06-03 DIAGNOSIS — R32 URINARY INCONTINENCE, UNSPECIFIED TYPE: ICD-10-CM

## 2022-06-03 DIAGNOSIS — N18.31 CHRONIC KIDNEY DISEASE, STAGE 3A: Primary | ICD-10-CM

## 2022-06-03 DIAGNOSIS — N81.6 CYSTOCELE WITH RECTOCELE: ICD-10-CM

## 2022-06-03 DIAGNOSIS — I83.90 VARICOSE VEINS OF LOWER EXTREMITY, UNSPECIFIED LATERALITY, UNSPECIFIED WHETHER COMPLICATED: ICD-10-CM

## 2022-06-03 DIAGNOSIS — N81.10 CYSTOCELE WITH RECTOCELE: ICD-10-CM

## 2022-06-03 PROCEDURE — 99999 PR PBB SHADOW E&M-EST. PATIENT-LVL IV: ICD-10-PCS | Mod: PBBFAC,,, | Performed by: FAMILY MEDICINE

## 2022-06-03 PROCEDURE — 99214 PR OFFICE/OUTPT VISIT, EST, LEVL IV, 30-39 MIN: ICD-10-PCS | Mod: S$PBB,,, | Performed by: FAMILY MEDICINE

## 2022-06-03 PROCEDURE — 99214 OFFICE O/P EST MOD 30 MIN: CPT | Mod: PBBFAC,PO | Performed by: FAMILY MEDICINE

## 2022-06-03 PROCEDURE — 99214 OFFICE O/P EST MOD 30 MIN: CPT | Mod: S$PBB,,, | Performed by: FAMILY MEDICINE

## 2022-06-03 PROCEDURE — 99999 PR PBB SHADOW E&M-EST. PATIENT-LVL IV: CPT | Mod: PBBFAC,,, | Performed by: FAMILY MEDICINE

## 2022-06-05 NOTE — PROGRESS NOTES
Subjective:      Patient ID: Rita Dejesus is a 66 y.o. female.    Chief Complaint: Follow-up (Bowel and bladder incont.)      The patient is here today for routine follow up. Labs drawn earlier discussed today with the patient.  Hyperkalemic, potassium 5.5. she is not on any medication which should be causing this.  Reports worsening both urinary and fecal incontinence, was told she has prolapse by her gynec logist, advised her to consider surgical repair.  Also worsening varicositites on both LE.      Review of Systems   Constitutional: Negative for activity change and appetite change.   Respiratory: Negative for shortness of breath.    Cardiovascular: Negative for leg swelling.   Gastrointestinal: Negative for abdominal pain.   Genitourinary: Negative for difficulty urinating, dysuria and menstrual problem.     Past Medical History:   Diagnosis Date    GERD (gastroesophageal reflux disease)     History of cervical cancer     History of rectal or anal cancer           Past Surgical History:   Procedure Laterality Date    ADENOIDECTOMY      carpal tunel Bilateral     CATARACT EXTRACTION Left 05/06/2021    NEAR    CATARACT EXTRACTION W/ INTRAOCULAR LENS IMPLANT Right 05/20/2021    COLONOSCOPY N/A 11/17/2020    Procedure: COLONOSCOPY;  Surgeon: Kendy De Leon MD;  Location: [facility];  Service: Endoscopy;  Laterality: N/A;    ESOPHAGOGASTRODUODENOSCOPY N/A 12/22/2020    Procedure: ESOPHAGOGASTRODUODENOSCOPY (EGD);  Surgeon: Goyo Kapadia MD;  Location: [facility];  Service: Endoscopy;  Laterality: N/A;    ESOPHAGOGASTRODUODENOSCOPY N/A 5/9/2022    Procedure: EGD (ESOPHAGOGASTRODUODENOSCOPY);  Surgeon: Misa Crocker MD;  Location: [facility];  Service: Gastroenterology;  Laterality: N/A;    HERNIA REPAIR      TONSILLECTOMY       Family History   Problem Relation Age of Onset    Diabetes Mother         type II    Hyperlipidemia Mother     Heart disease Father     Hypertension Father   "   Cataracts Father     Hypertension Sister     Diabetes Maternal Grandmother     Diabetes Maternal Grandfather      Social History     Socioeconomic History    Marital status:    Tobacco Use    Smoking status: Never Smoker    Smokeless tobacco: Never Used   Substance and Sexual Activity    Alcohol use: Yes     Comment: Social     Drug use: Never    Sexual activity: Not Currently     Partners: Male     Social Determinants of Health     Financial Resource Strain: Low Risk     Difficulty of Paying Living Expenses: Not hard at all   Food Insecurity: No Food Insecurity    Worried About Running Out of Food in the Last Year: Never true    Ran Out of Food in the Last Year: Never true   Transportation Needs: No Transportation Needs    Lack of Transportation (Medical): No    Lack of Transportation (Non-Medical): No   Physical Activity: Inactive    Days of Exercise per Week: 0 days    Minutes of Exercise per Session: 0 min   Stress: No Stress Concern Present    Feeling of Stress : Only a little   Social Connections: Moderately Isolated    Frequency of Communication with Friends and Family: More than three times a week    Frequency of Social Gatherings with Friends and Family: More than three times a week    Attends Quaker Services: Never    Active Member of Clubs or Organizations: No    Attends Club or Organization Meetings: Never    Marital Status:    Housing Stability: Unknown    Unable to Pay for Housing in the Last Year: No    Unstable Housing in the Last Year: No     Review of patient's allergies indicates:   Allergen Reactions    Sulfa (sulfonamide antibiotics) Nausea And Vomiting    Nitrofuran analogues Nausea And Vomiting     Microdantin        Objective:       /62 (BP Location: Left arm, Patient Position: Sitting, BP Method: Medium (Manual))   Pulse 84   Temp 97 °F (36.1 °C) (Temporal)   Ht 5' 2" (1.575 m)   Wt 60.3 kg (132 lb 15 oz)   SpO2 98%   BMI 24.31 " kg/m²   Physical Exam  Vitals reviewed.   Constitutional:       General: She is not in acute distress.     Appearance: Normal appearance. She is well-developed. She is not ill-appearing or diaphoretic.   HENT:      Head: Normocephalic and atraumatic.      Right Ear: Hearing, tympanic membrane, ear canal and external ear normal.      Left Ear: Hearing, tympanic membrane, ear canal and external ear normal.      Nose: Nose normal.      Mouth/Throat:      Pharynx: Uvula midline. No oropharyngeal exudate.   Eyes:      Conjunctiva/sclera: Conjunctivae normal.      Pupils: Pupils are equal, round, and reactive to light.   Neck:      Thyroid: No thyromegaly.      Trachea: No tracheal deviation.   Cardiovascular:      Rate and Rhythm: Normal rate and regular rhythm.      Heart sounds: Normal heart sounds. No murmur heard.  Pulmonary:      Effort: Pulmonary effort is normal. No respiratory distress.      Breath sounds: Normal breath sounds.   Abdominal:      General: Bowel sounds are normal.      Palpations: Abdomen is soft.      Tenderness: There is no abdominal tenderness. There is no guarding.      Hernia: No hernia is present.   Musculoskeletal:         General: Normal range of motion.      Cervical back: Normal range of motion and neck supple.   Lymphadenopathy:      Cervical: No cervical adenopathy.   Skin:     General: Skin is warm and dry.      Capillary Refill: Capillary refill takes less than 2 seconds.      Comments: Mild varicositites both LE   Neurological:      General: No focal deficit present.      Mental Status: She is alert and oriented to person, place, and time.   Psychiatric:         Mood and Affect: Mood normal.         Behavior: Behavior normal.         Thought Content: Thought content normal.         Judgment: Judgment normal.       Assessment:     1. Chronic kidney disease, stage 3a    2. Hyperkalemia    3. Urinary incontinence, unspecified type    4. Cystocele with rectocele    5. Varicose veins of  lower extremity, unspecified laterality, unspecified whether complicated      Plan:   Chronic kidney disease, stage 3a  -     Basic Metabolic Panel; Future; Expected date: 06/03/2022  -     Comprehensive Metabolic Panel; Future; Expected date: 06/05/2022  -     CBC Auto Differential; Future; Expected date: 06/05/2022  -     Lipid Panel; Future; Expected date: 06/05/2022    Hyperkalemia  -     Basic Metabolic Panel; Future; Expected date: 06/03/2022  -     Comprehensive Metabolic Panel; Future; Expected date: 06/05/2022  -     CBC Auto Differential; Future; Expected date: 06/05/2022  -     Lipid Panel; Future; Expected date: 06/05/2022    Urinary incontinence, unspecified type  -     Ambulatory referral/consult to Urology; Future; Expected date: 06/10/2022    Cystocele with rectocele  -     Ambulatory referral/consult to Urology; Future; Expected date: 06/10/2022    Varicose veins of lower extremity, unspecified laterality, unspecified whether complicated    Above labs in 6 months prior to visit with me.    Medication List with Changes/Refills   Current Medications    CALCIUM CARBONATE 650 MG CALCIUM (1,625 MG) TABLET    Take 1 tablet by mouth once daily.    CYANOCOBALAMIN (VITAMIN B-12) 250 MCG TABLET    Take 250 mcg by mouth.    FLUOCINONIDE (LIDEX) 0.05 % EXTERNAL SOLUTION    Apply topically 2 (two) times daily.    FLUTICASONE PROPIONATE (FLONASE) 50 MCG/ACTUATION NASAL SPRAY    1 spray (50 mcg total) by Each Nostril route once daily.    LACTOBACILLUS RHAMNOSUS GG (CULTURELLE) 10 BILLION CELL CAPSULE    Take 1 capsule by mouth once daily.    LEVOCETIRIZINE (XYZAL) 5 MG TABLET    Take 5 mg by mouth every evening.    MIRTAZAPINE (REMERON) 15 MG TABLET    Take 1 tablet (15 mg total) by mouth every evening.    MULTIVITAMIN CAPSULE    Take 1 capsule by mouth once daily.    OMEPRAZOLE (PRILOSEC) 40 MG CAPSULE    Take 1 capsule (40 mg total) by mouth 2 (two) times a day.    SIMVASTATIN (ZOCOR) 40 MG TABLET    Take 1  tablet (40 mg total) by mouth every evening.    VENLAFAXINE (EFFEXOR-XR) 75 MG 24 HR CAPSULE    TAKE 1 CAPSULE BY MOUTH ONCE DAILY    VITAMIN D (VITAMIN D3) 1000 UNITS TAB    Take 185 mg by mouth.

## 2022-06-15 ENCOUNTER — OFFICE VISIT (OUTPATIENT)
Dept: UROLOGY | Facility: CLINIC | Age: 67
End: 2022-06-15
Payer: MEDICARE

## 2022-06-15 VITALS
SYSTOLIC BLOOD PRESSURE: 104 MMHG | HEIGHT: 62 IN | BODY MASS INDEX: 24.42 KG/M2 | DIASTOLIC BLOOD PRESSURE: 67 MMHG | HEART RATE: 90 BPM | WEIGHT: 132.69 LBS

## 2022-06-15 DIAGNOSIS — N81.10 CYSTOCELE WITH RECTOCELE: ICD-10-CM

## 2022-06-15 DIAGNOSIS — N81.6 CYSTOCELE WITH RECTOCELE: ICD-10-CM

## 2022-06-15 DIAGNOSIS — R32 URINARY INCONTINENCE, UNSPECIFIED TYPE: ICD-10-CM

## 2022-06-15 PROCEDURE — 99214 OFFICE O/P EST MOD 30 MIN: CPT | Mod: PBBFAC | Performed by: NURSE PRACTITIONER

## 2022-06-15 PROCEDURE — 99204 OFFICE O/P NEW MOD 45 MIN: CPT | Mod: S$PBB,,, | Performed by: NURSE PRACTITIONER

## 2022-06-15 PROCEDURE — 99999 PR PBB SHADOW E&M-EST. PATIENT-LVL IV: CPT | Mod: PBBFAC,,, | Performed by: NURSE PRACTITIONER

## 2022-06-15 PROCEDURE — 99999 PR PBB SHADOW E&M-EST. PATIENT-LVL IV: ICD-10-PCS | Mod: PBBFAC,,, | Performed by: NURSE PRACTITIONER

## 2022-06-15 PROCEDURE — 99204 PR OFFICE/OUTPT VISIT, NEW, LEVL IV, 45-59 MIN: ICD-10-PCS | Mod: S$PBB,,, | Performed by: NURSE PRACTITIONER

## 2022-06-15 NOTE — PROGRESS NOTES
Chief Complaint:   Cystocele and rectocele    HPI:   Patient is a 66-year-old female that is presenting per gyn MD ( WILY Salmeron MD). Patient states that she was seen by gyn physician and instructed to see urologist secondary to rectocele and cystocele.  Patient has a history of anal cancer and is concerned that rectocele is secondary to anal cancer treatment.  Patient states that she has not had sexual intercourse in 10 years and is wanting to proceed with sexual intercourse.  Reports that she has had an increase in urge incontinence and is now wearing a pad.  Does not want to consider PT pelvic floor training or medication, wants to have a surgical option. No abd/pelvic pain and no exac/rel factors.  No hematuria.      Allergies:  Sulfa (sulfonamide antibiotics) and Nitrofuran analogues    Medications:  has a current medication list which includes the following prescription(s): calcium carbonate, cyanocobalamin, fluocinonide, fluticasone propionate, lactobacillus rhamnosus gg, levocetirizine, mirtazapine, multivitamin, omeprazole, simvastatin, venlafaxine, and vitamin d.    Review of Systems:  General: No fever, chills, fatigability, or weight loss.  Skin: No rashes, itching, or changes in color or texture of skin.  Chest: Denies BONILLA, cyanosis, wheezing, cough, and sputum production.  Abdomen: Appetite fine. No weight loss. Denies diarrhea, abdominal pain, hematemesis, or blood in stool.  Musculoskeletal: No joint stiffness or swelling. Denies back pain.  : As above.  All other review of systems negative.    PMH:   has a past medical history of GERD (gastroesophageal reflux disease), History of cervical cancer, and History of rectal or anal cancer.    PSH:   has a past surgical history that includes Tonsillectomy; Adenoidectomy; Hernia repair; carpal tunel (Bilateral); Colonoscopy (N/A, 11/17/2020); Esophagogastroduodenoscopy (N/A, 12/22/2020); Cataract extraction w/ intraocular lens implant (Right, 05/20/2021);  Cataract extraction (Left, 05/06/2021); and Esophagogastroduodenoscopy (N/A, 5/9/2022).    FamHx: family history includes Cataracts in her father; Diabetes in her maternal grandfather, maternal grandmother, and mother; Heart disease in her father; Hyperlipidemia in her mother; Hypertension in her father and sister.    SocHx:  reports that she has never smoked. She has never used smokeless tobacco. She reports current alcohol use. She reports that she does not use drugs.      Physical Exam:  Vitals:    06/15/22 0804   BP: 104/67   Pulse: 90     General: A&Ox3, no apparent distress, no deformities  Neck: No masses, normal thyroid  Lungs: normal inspiration, no use of accessory muscles  Heart: normal pulse, no arrhythmias  Abdomen: Soft, NT, ND, no masses, no hernias, no hepatosplenomegaly  Lymphatic: Neck and groin nodes negative  Skin: The skin is warm and dry. No jaundice.  Ext: No c/c/e.  :  Normal female external genitalia, vaginal atrophy.  Grade 2 cystocele and rectocele with Valsalva maneuver.  Labs/Studies:   Urine in clinic was negative  PVR= 6 ml    Impression/Plan:   Grade 2 cystocele and rectocele with Valsalva maneuver  Patient was referred to MD for possible surgical intervention, see HPI.

## 2022-07-26 ENCOUNTER — OFFICE VISIT (OUTPATIENT)
Dept: GASTROENTEROLOGY | Facility: CLINIC | Age: 67
End: 2022-07-26
Payer: MEDICARE

## 2022-07-26 VITALS
BODY MASS INDEX: 24.51 KG/M2 | SYSTOLIC BLOOD PRESSURE: 110 MMHG | HEIGHT: 62 IN | WEIGHT: 133.19 LBS | HEART RATE: 97 BPM | DIASTOLIC BLOOD PRESSURE: 68 MMHG

## 2022-07-26 DIAGNOSIS — K20.90 ESOPHAGITIS: Primary | ICD-10-CM

## 2022-07-26 PROCEDURE — 99999 PR PBB SHADOW E&M-EST. PATIENT-LVL IV: CPT | Mod: PBBFAC,,, | Performed by: INTERNAL MEDICINE

## 2022-07-26 PROCEDURE — 99214 OFFICE O/P EST MOD 30 MIN: CPT | Mod: PBBFAC | Performed by: INTERNAL MEDICINE

## 2022-07-26 PROCEDURE — 99214 PR OFFICE/OUTPT VISIT, EST, LEVL IV, 30-39 MIN: ICD-10-PCS | Mod: S$PBB,,, | Performed by: INTERNAL MEDICINE

## 2022-07-26 PROCEDURE — 99214 OFFICE O/P EST MOD 30 MIN: CPT | Mod: S$PBB,,, | Performed by: INTERNAL MEDICINE

## 2022-07-26 PROCEDURE — 99999 PR PBB SHADOW E&M-EST. PATIENT-LVL IV: ICD-10-PCS | Mod: PBBFAC,,, | Performed by: INTERNAL MEDICINE

## 2022-07-26 NOTE — PROGRESS NOTES
Clinic Progress Note:  [facility] Progress Note    Reason for Visit:  There were no encounter diagnoses.    PCP: Tripp Heredia       HPI:  This is a 66 y.o. female here for evaluation of dysphagia and GERD.   Every once in a while, patient gets this nauseated feeling where it feels like food gets stuck substernally.   She was diagnosed with esophageal spasms when she was 15.   Denies vomiting or hematemesis.   She does experience heartburn occasionally.   She has dysphagia with breads.   If she has a bowel movement, symptoms improve.   On Dexilant without relief.   Denies melena, hematochezia, diarrhea or constipation.   On Miralax and Probiotics.      Of note, history of anal cancer.   Last colonoscopy in 2020. Repeat in 5 years.     S/p EGD with me that revealed Normal duodenal bulb and second portion of the                          duodenum.                          - Multiple gastric polyps (HP polyps)                         - Large hiatal hernia.                          - Z-line regular, 31 cm from the incisors.                          - LA Grade C esophagitis with no bleeding         ROS:  As per HPI       Allergies: Reviewed    Home Medications:   Medication List with Changes/Refills   Current Medications    BRAN/GUM/FIB/SOLANGE/PSYL/KELP/PEC (FIBER 6 ORAL)    Take by mouth 2 (two) times a day. 12 mg collin    CALCIUM CARBONATE 650 MG CALCIUM (1,625 MG) TABLET    Take 1 tablet by mouth once daily.    CYANOCOBALAMIN (VITAMIN B-12) 250 MCG TABLET    Take 250 mcg by mouth.    FLUOCINONIDE (LIDEX) 0.05 % EXTERNAL SOLUTION    Apply topically 2 (two) times daily.    FLUTICASONE PROPIONATE (FLONASE) 50 MCG/ACTUATION NASAL SPRAY    1 spray (50 mcg total) by Each Nostril route once daily.    LACTOBACILLUS RHAMNOSUS GG (CULTURELLE) 10 BILLION CELL CAPSULE    Take 1 capsule by mouth once daily.    LEVOCETIRIZINE (XYZAL) 5 MG TABLET    Take 5 mg by mouth every evening.    MIRTAZAPINE (REMERON) 15 MG  "TABLET    Take 1 tablet (15 mg total) by mouth every evening.    MULTIVITAMIN CAPSULE    Take 1 capsule by mouth once daily.    OMEPRAZOLE (PRILOSEC) 40 MG CAPSULE    Take 1 capsule (40 mg total) by mouth 2 (two) times a day.    SIMVASTATIN (ZOCOR) 40 MG TABLET    Take 1 tablet (40 mg total) by mouth every evening.    VENLAFAXINE (EFFEXOR-XR) 75 MG 24 HR CAPSULE    TAKE 1 CAPSULE BY MOUTH ONCE DAILY    VITAMIN D (VITAMIN D3) 1000 UNITS TAB    Take 185 mg by mouth.         Physical Exam:  Vital Signs:  /68   Pulse 97   Ht 5' 2" (1.575 m)   Wt 60.4 kg (133 lb 2.5 oz)   BMI 24.35 kg/m²   Body mass index is 24.35 kg/m².    Physical Exam  Constitutional:       Appearance: Normal appearance.   HENT:      Head: Normocephalic.      Mouth/Throat:      Mouth: Mucous membranes are moist.   Pulmonary:      Effort: Pulmonary effort is normal.   Neurological:      Mental Status: She is alert.          Labs: Pertinent labs reviewed.      Assessment:    Problem List Items Addressed This Visit     Esophagitis   - Plan for repeat EGD at this time                     GERD (gastroesophageal reflux disease)      Current Assessment & Plan        Plan:   -Continue PPI PO BID    -GERD lifestyle modifications                Other dysphagia      Current Assessment & Plan        Plan:   -Improved   -Did not get biopsies due to LA Grade C esophagitis                 Problem List Items Addressed This Visit    None       There are no diagnoses linked to this encounter.            Follow-up after diagnostic evaluation.       Order summary:  No orders of the defined types were placed in this encounter.      Thank you so much for allowing me to participate in the care of Rita Crocker MD  Gastroenterology and Hepatology  [facility]     "

## 2022-08-08 ENCOUNTER — OFFICE VISIT (OUTPATIENT)
Dept: UROLOGY | Facility: CLINIC | Age: 67
End: 2022-08-08
Payer: MEDICARE

## 2022-08-08 VITALS
SYSTOLIC BLOOD PRESSURE: 105 MMHG | BODY MASS INDEX: 24.19 KG/M2 | DIASTOLIC BLOOD PRESSURE: 65 MMHG | HEART RATE: 80 BPM | WEIGHT: 132.25 LBS

## 2022-08-08 DIAGNOSIS — N81.6 RECTOCELE: ICD-10-CM

## 2022-08-08 DIAGNOSIS — N39.46 MIXED INCONTINENCE: ICD-10-CM

## 2022-08-08 DIAGNOSIS — Z85.048 HISTORY OF RECTAL OR ANAL CANCER: ICD-10-CM

## 2022-08-08 DIAGNOSIS — R15.9 INCONTINENCE OF FECES, UNSPECIFIED FECAL INCONTINENCE TYPE: Primary | ICD-10-CM

## 2022-08-08 DIAGNOSIS — N81.10 BADEN-WALKER GRADE 2 CYSTOCELE: ICD-10-CM

## 2022-08-08 PROCEDURE — 99999 PR PBB SHADOW E&M-EST. PATIENT-LVL V: CPT | Mod: PBBFAC,,, | Performed by: UROLOGY

## 2022-08-08 PROCEDURE — 99214 OFFICE O/P EST MOD 30 MIN: CPT | Mod: S$PBB,,, | Performed by: UROLOGY

## 2022-08-08 PROCEDURE — 99215 OFFICE O/P EST HI 40 MIN: CPT | Mod: PBBFAC | Performed by: UROLOGY

## 2022-08-08 PROCEDURE — 99999 PR PBB SHADOW E&M-EST. PATIENT-LVL V: ICD-10-PCS | Mod: PBBFAC,,, | Performed by: UROLOGY

## 2022-08-08 PROCEDURE — 99214 PR OFFICE/OUTPT VISIT, EST, LEVL IV, 30-39 MIN: ICD-10-PCS | Mod: S$PBB,,, | Performed by: UROLOGY

## 2022-08-08 RX ORDER — OXYBUTYNIN CHLORIDE 10 MG/1
10 TABLET, EXTENDED RELEASE ORAL DAILY
Qty: 30 TABLET | Refills: 11 | Status: SHIPPED | OUTPATIENT
Start: 2022-08-08 | End: 2023-08-10 | Stop reason: SDUPTHER

## 2022-08-08 NOTE — PROGRESS NOTES
"Chief Complaint:   rectocele    HPI:   8/8/22- Patient reports that she has fecal urgency and FI and urinary incontinence. Changed a diaper 3 times yesterday. She reports having CHICHI with cough/lifting and also has UUI. Urinary issues 10-12 years. She reports having anal cancer, and during the treatment, noticed rectocele. She had chemo and radiation for the anal cancer. No surgery. She reports having insensate fecal incontinence. Has difficulty discerning between gas and solid. Has never taken any medications for urinary complaints. Taking metamucil does seem to help with GI symptoms.     Per Amanda Leyva NP:   "Patient is a 66-year-old female that is presenting per gyn MD ( WILY Salmeron MD). Patient states that she was seen by gyn physician and instructed to see urologist secondary to rectocele and cystocele.  Patient has a history of anal cancer and is concerned that rectocele is secondary to anal cancer treatment.  Patient states that she has not had sexual intercourse in 10 years and is wanting to proceed with sexual intercourse.  Reports that she has had an increase in urge incontinence and is now wearing a pad.  Does not want to consider PT pelvic floor training or medication, wants to have a surgical option. No abd/pelvic pain and no exac/rel factors.  No hematuria."    Allergies:  Sulfa (sulfonamide antibiotics) and Nitrofuran analogues    Medications:  has a current medication list which includes the following prescription(s): bran/gum/fib/eliecer/psyl/kelp/pec, calcium carbonate, cyanocobalamin, fluocinonide, fluticasone propionate, lactobacillus rhamnosus gg, levocetirizine, mirtazapine, multivitamin, simvastatin, venlafaxine, vitamin d, omeprazole, and oxybutynin.    Review of Systems:  General: No fever, chills, fatigability, or weight loss.  Skin: No rashes, itching, or changes in color or texture of skin.  Chest: Denies BONILLA, cyanosis, wheezing, cough, and sputum production.  Abdomen: Appetite fine. No " weight loss. Denies diarrhea, abdominal pain, hematemesis, or blood in stool.  Musculoskeletal: No joint stiffness or swelling. Denies back pain.  : As above.  All other review of systems negative.    PMH:   has a past medical history of GERD (gastroesophageal reflux disease), History of cervical cancer, and History of rectal or anal cancer.    PSH:   has a past surgical history that includes Tonsillectomy; Adenoidectomy; Hernia repair; carpal tunel (Bilateral); Colonoscopy (N/A, 11/17/2020); Esophagogastroduodenoscopy (N/A, 12/22/2020); Cataract extraction w/ intraocular lens implant (Right, 05/20/2021); Cataract extraction (Left, 05/06/2021); Esophagogastroduodenoscopy (N/A, 05/09/2022); and Cervical conization w/ laser.    FamHx: family history includes Cataracts in her father; Diabetes in her maternal grandfather, maternal grandmother, and mother; Heart disease in her father; Hyperlipidemia in her mother; Hypertension in her father and sister.    SocHx:  reports that she has never smoked. She has never used smokeless tobacco. She reports current alcohol use. She reports that she does not use drugs.      Physical Exam:    Vitals:    08/08/22 1025   BP: 105/65   Pulse: 80     General: A&Ox3, no apparent distress, no deformities  Neck: No masses, normal thyroid  Lungs: normal inspiration, no use of accessory muscles  Heart: normal pulse, no arrhythmias  Abdomen: Soft, NT, ND, no masses, no hernias, no hepatosplenomegaly  Lymphatic: Neck and groin nodes negative  Skin: The skin is warm and dry. No jaundice.  Ext: No c/c/e.  :  Normal female external genitalia, orthotopic urethral meatus, atrophic vaginal mucosa, Grade 2 cystocele, grade 2 rectocele    Labs/Studies:   PVR= 6 ml  6/15/22    Incontinence of feces, unspecified fecal incontinence type  -     Ambulatory referral/consult to Colorectal Surgery; Future; Expected date: 08/15/2022  -     Ambulatory referral/consult to Physical/Occupational Therapy; Future;  Expected date: 08/15/2022    Mixed incontinence  -     Ambulatory referral/consult to Physical/Occupational Therapy; Future; Expected date: 08/15/2022    History of rectal or anal cancer    Rectocele    [place] grade 2 cystocele    Other orders  -     oxybutynin (DITROPAN-XL) 10 MG 24 hr tablet; Take 1 tablet (10 mg total) by mouth once daily.  Dispense: 30 tablet; Refill: 11    I had a detailed discussion with the patient regarding her symptoms and what to expect with surgical repair of prolapse. We discussed that her history of radiation would make prolapse repair challenging and prone to failure. We discussed that rectocele repair would likely not cure her fecal incontinence, and I will refer her to colorectal surgery for such.   Follow up in about 4 months (around 12/8/2022).

## 2022-08-09 ENCOUNTER — TELEPHONE (OUTPATIENT)
Dept: SURGERY | Facility: CLINIC | Age: 67
End: 2022-08-09
Payer: MEDICARE

## 2022-08-11 ENCOUNTER — OFFICE VISIT (OUTPATIENT)
Dept: SURGERY | Facility: CLINIC | Age: 67
End: 2022-08-11
Payer: MEDICARE

## 2022-08-11 VITALS — BODY MASS INDEX: 24.36 KG/M2 | WEIGHT: 133.19 LBS

## 2022-08-11 DIAGNOSIS — D13.1 BENIGN FUNDIC GLAND POLYPS OF STOMACH: ICD-10-CM

## 2022-08-11 DIAGNOSIS — Z85.048 HISTORY OF RECTAL OR ANAL CANCER: ICD-10-CM

## 2022-08-11 DIAGNOSIS — R15.9 INCONTINENCE OF FECES, UNSPECIFIED FECAL INCONTINENCE TYPE: ICD-10-CM

## 2022-08-11 PROCEDURE — 99213 OFFICE O/P EST LOW 20 MIN: CPT | Mod: PBBFAC,25 | Performed by: COLON & RECTAL SURGERY

## 2022-08-11 PROCEDURE — 99999 PR PBB SHADOW E&M-EST. PATIENT-LVL III: ICD-10-PCS | Mod: PBBFAC,,, | Performed by: COLON & RECTAL SURGERY

## 2022-08-11 PROCEDURE — 46600 PR DIAG2STIC A2SCOPY: ICD-10-PCS | Mod: S$PBB,,, | Performed by: COLON & RECTAL SURGERY

## 2022-08-11 PROCEDURE — 99999 PR PBB SHADOW E&M-EST. PATIENT-LVL III: CPT | Mod: PBBFAC,,, | Performed by: COLON & RECTAL SURGERY

## 2022-08-11 PROCEDURE — 46600 DIAGNOSTIC ANOSCOPY SPX: CPT | Mod: PBBFAC | Performed by: COLON & RECTAL SURGERY

## 2022-08-11 PROCEDURE — 46600 DIAGNOSTIC ANOSCOPY SPX: CPT | Mod: S$PBB,,, | Performed by: COLON & RECTAL SURGERY

## 2022-08-11 PROCEDURE — 99204 OFFICE O/P NEW MOD 45 MIN: CPT | Mod: 25,S$PBB,, | Performed by: COLON & RECTAL SURGERY

## 2022-08-11 PROCEDURE — 99204 PR OFFICE/OUTPT VISIT, NEW, LEVL IV, 45-59 MIN: ICD-10-PCS | Mod: 25,S$PBB,, | Performed by: COLON & RECTAL SURGERY

## 2022-08-11 NOTE — Clinical Note
Hey there, I think her most pressing issue is her fecal and urinary incontinence. I'm gonna try to get her into pelvic floor PT, but I think she's probably gonna end up needing an InterStim/SNS. Think I asked you before and you said you implanted them, still interested in that for her?  Thanks, LAMIN

## 2022-08-11 NOTE — PROGRESS NOTES
History & Physical    SUBJECTIVE:     Chief Complaint   Patient presents with    Other     Fecal incontinence    Ref: Andra Painting MD    History of Present Illness:  Patient is a 66 y.o. female presents for evaluation of fecal incontinence and rectocele.  Patient states that she had anal cancer treated with chemo radiation in 2014 and said no evidence of recurrence since that time.  She also had previous cervical cancer in 1983 treated with surgical excision.  She has suffered from a rectocele for multiple years now.  She states she has intermittently had to digitize her vagina to expel stool but has had decreasing frequency requiring this with both MiraLax and Metamucil administration.  She has suffered from some fecal incontinence over the past year that has occurred progressively.  This occurs most days and is to be all 3, liquid, solid and gas.  She has started taking Metamucil which she states does help the consistency and decrease the frequency of the incontinence although distal occurring most days.  She denies any blood per rectum.  She denies any fever, chills, nausea, vomiting.  If she has noticed improvement from keel exercises previously when she did these after her radiation treatment but these have become less effective and she is pending starting pelvic floor physical therapy currently.  Last colonoscopy performed in 2020 with 1 adenoma removed.  She is also suffering from urinary incontinence.    Review of patient's allergies indicates:   Allergen Reactions    Sulfa (sulfonamide antibiotics) Nausea And Vomiting    Nitrofuran analogues Nausea And Vomiting     Microdantin        Current Outpatient Medications   Medication Sig Dispense Refill    bran/gum/fib/eliecer/psyl/kelp/pec (FIBER 6 ORAL) Take by mouth 2 (two) times a day. 12 mg collin      calcium carbonate 650 mg calcium (1,625 mg) tablet Take 1 tablet by mouth once daily.      cyanocobalamin (VITAMIN B-12) 250 MCG tablet Take 250 mcg by  mouth.      fluocinonide (LIDEX) 0.05 % external solution Apply topically 2 (two) times daily. 60 mL 1    fluticasone propionate (FLONASE) 50 mcg/actuation nasal spray 1 spray (50 mcg total) by Each Nostril route once daily. 48 g 2    Lactobacillus rhamnosus GG (CULTURELLE) 10 billion cell capsule Take 1 capsule by mouth once daily.      levocetirizine (XYZAL) 5 MG tablet Take 5 mg by mouth every evening.      mirtazapine (REMERON) 15 MG tablet Take 1 tablet (15 mg total) by mouth every evening. 90 tablet 2    multivitamin capsule Take 1 capsule by mouth once daily.      omeprazole (PRILOSEC) 40 MG capsule Take 1 capsule (40 mg total) by mouth 2 (two) times a day. 180 capsule 0    oxybutynin (DITROPAN-XL) 10 MG 24 hr tablet Take 1 tablet (10 mg total) by mouth once daily. 30 tablet 11    simvastatin (ZOCOR) 40 MG tablet Take 1 tablet (40 mg total) by mouth every evening. 90 tablet 3    venlafaxine (EFFEXOR-XR) 75 MG 24 hr capsule TAKE 1 CAPSULE BY MOUTH ONCE DAILY 30 capsule 11    vitamin D (VITAMIN D3) 1000 units Tab Take 185 mg by mouth.       No current facility-administered medications for this visit.       Past Medical History:   Diagnosis Date    GERD (gastroesophageal reflux disease)     History of cervical cancer     History of rectal or anal cancer      Past Surgical History:   Procedure Laterality Date    ADENOIDECTOMY      carpal tunel Bilateral     CATARACT EXTRACTION Left 05/06/2021    NEAR    CATARACT EXTRACTION W/ INTRAOCULAR LENS IMPLANT Right 05/20/2021    CERVICAL CONIZATION   W/ LASER      COLONOSCOPY N/A 11/17/2020    Procedure: COLONOSCOPY;  Surgeon: Kendy De Leon MD;  Location: [facility];  Service: Endoscopy;  Laterality: N/A;    ESOPHAGOGASTRODUODENOSCOPY N/A 12/22/2020    Procedure: ESOPHAGOGASTRODUODENOSCOPY (EGD);  Surgeon: Goyo Kapadia MD;  Location: [facility];  Service: Endoscopy;  Laterality: N/A;    ESOPHAGOGASTRODUODENOSCOPY N/A 05/09/2022    Procedure:  EGD (ESOPHAGOGASTRODUODENOSCOPY);  Surgeon: Misa Crocker MD;  Location: [facility];  Service: Gastroenterology;  Laterality: N/A;    HERNIA REPAIR      TONSILLECTOMY       Family History   Problem Relation Age of Onset    Diabetes Mother         type II    Hyperlipidemia Mother     Heart disease Father     Hypertension Father     Cataracts Father     Hypertension Sister     Diabetes Maternal Grandmother     Diabetes Maternal Grandfather      Social History     Tobacco Use    Smoking status: Never Smoker    Smokeless tobacco: Never Used   Substance Use Topics    Alcohol use: Yes     Comment: Social     Drug use: Never        Review of Systems:  Review of Systems   Constitutional: Negative for activity change, appetite change, chills, fatigue, fever and unexpected weight change.   HENT: Negative for congestion, ear pain, sore throat and trouble swallowing.    Eyes: Negative for pain, redness and itching.   Respiratory: Negative for cough, shortness of breath and wheezing.    Cardiovascular: Negative for chest pain, palpitations and leg swelling.   Gastrointestinal: Negative for abdominal distention, abdominal pain, anal bleeding, blood in stool, nausea, rectal pain and vomiting.        +fecal incontinence   Endocrine: Negative for cold intolerance, heat intolerance and polyuria.   Genitourinary: Negative for dysuria, flank pain, frequency and hematuria.   Musculoskeletal: Negative for gait problem, joint swelling and neck pain.   Skin: Negative for color change, rash and wound.   Allergic/Immunologic: Negative for environmental allergies and immunocompromised state.   Neurological: Negative for dizziness, speech difficulty, weakness and numbness.   Psychiatric/Behavioral: Negative for agitation, confusion and hallucinations.       OBJECTIVE:     Physical Exam:  Physical Exam  Exam conducted with a chaperone present.   Constitutional:       Appearance: She is well-developed.   HENT:      Head:  Normocephalic and atraumatic.   Eyes:      Conjunctiva/sclera: Conjunctivae normal.   Neck:      Thyroid: No thyromegaly.   Cardiovascular:      Rate and Rhythm: Normal rate and regular rhythm.   Pulmonary:      Effort: Pulmonary effort is normal. No respiratory distress.   Abdominal:      General: There is no distension.      Palpations: Abdomen is soft. There is no mass.      Tenderness: There is no abdominal tenderness.   Genitourinary:     Comments: Anorectal: no external lesions, some mild circumferential skin changes consistent with radiation changes; KOLE with globally decreased tone, no blood, decreased squeeze, good push; +2-3cm rectocele  Musculoskeletal:         General: No tenderness. Normal range of motion.      Cervical back: Normal range of motion.   Skin:     General: Skin is warm and dry.      Capillary Refill: Capillary refill takes less than 2 seconds.      Findings: No rash.   Neurological:      Mental Status: She is alert and oriented to person, place, and time.       Anoscopy Procedure Note    Pre-procedure diagnosis: rectocele/fecal incontinence    Post-procedure diagnosis: rectocele/fecal incontinence    Procedure: Anoscopy    Surgeon: Juwan Choi MD    Assistant: She Dumont RN    Specimen: none    Findings:  Anoscope inserted and all 4 quadrants examined.  Very minimal enlargement of the hemorrhoidal columns in the left lateral, right posterior right anterior columns.  No other large lesions or defects appreciated.    Patient tolerated procedure well.     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  - discussed that she may require interest M/sacral nerve stimulation to achieve improvement with her fecal and urinary incontinence.  The fecal and urinary incontinence or her most pressing issues according to her any should be addressed 1st.  Discussed that if the pelvic floor physical therapy fails to drastically improve her quality of life, she should reconsider the InterStim/sacral nerve stimulation and she is comfortable with this  - as for the rectocele, the skin definitively surgically be repaired although with the previous radiation to this area it may require a transvaginal or at best a transperineal approach.  We would defer this to gyn at that time but I would address the fecal incontinence 1st.  Discussed that poor wound healing is a risk with this given her previous radiation to this area.  Discussed that if we treat the fecal incontinence 1st and the rectocele becomes asymptomatic, I would not recommend surgical repair unless absolutely necessary  - encouraged high fiber diet and fiber supplementation  - RTC 3 months or sooner if necessary    Juwan Choi MD  Colon and Rectal Surgery  [facility]

## 2022-08-18 ENCOUNTER — CLINICAL SUPPORT (OUTPATIENT)
Dept: REHABILITATION | Facility: HOSPITAL | Age: 67
End: 2022-08-18
Attending: UROLOGY
Payer: MEDICARE

## 2022-08-18 DIAGNOSIS — N39.46 MIXED INCONTINENCE: ICD-10-CM

## 2022-08-18 DIAGNOSIS — R15.9 FREQUENT FECAL INCONTINENCE: ICD-10-CM

## 2022-08-18 DIAGNOSIS — N39.46 MIXED STRESS AND URGE URINARY INCONTINENCE: ICD-10-CM

## 2022-08-18 DIAGNOSIS — R15.9 INCONTINENCE OF FECES, UNSPECIFIED FECAL INCONTINENCE TYPE: ICD-10-CM

## 2022-08-18 PROCEDURE — 97162 PT EVAL MOD COMPLEX 30 MIN: CPT

## 2022-08-18 PROCEDURE — 97530 THERAPEUTIC ACTIVITIES: CPT

## 2022-08-18 NOTE — PATIENT INSTRUCTIONS
"Check Ins    Start doing pelvic floor check ins. For this, I want you to periodically throughout the day check in with your pelvic floor muscles and your tummy/abdominals. See if you are pushing or bearing down through these muscles, and if you are, try to relax them. Over time, you will learn to break this habit.      URINARY URGE CONTROL   What to do when you "gotta go, gotta go"     FREEZE, BREATHE, SQUEEZE, repeat  Do this when you experience a strong urge to urinate and feel like you are going to leak to stop yourself from leaking.      FIRST - FREEZE: Do your best not to panic or rush to the toilet! You are more likely to leak if you do. Stop whatever you are doing, stand or sit quietly, and stay as calm as possible.     SECOND - BREATHE: Relax and take a few good deep breaths, letting it out slowly. This helps you further calm the nervous system and settles your bladder. Try thinking of something else to distract yourself from the urge (example: list categories of items like animals, fruits, cars, etc.)     THIRD - SQUEEZE: Do 5-10 "Quick Flicks" (quick LIFT and squeeze of pelvic floor muscles, followed by a full DROP). Pelvic floor contractions send a message to the bladder to relax and hold urine. Continue doing your best to remain calm and distract yourself from the urge.     FINALLY - REPEAT: Repeat this as many times as you need to on the way to the bathroom (i.e., every time the urge comes back). We only want to be walking calmly to the bathroom once the urge has passed. When you get inside and close the door behind you, repeat this process one last time so that you have enough time to get to the toilet and pull your pants down without rushing.        Remember......"Control your bladder before it controls YOU!"     "

## 2022-08-18 NOTE — PLAN OF CARE
"[facility]  Pelvic Health Physical Therapy Initial Evaluation      Visit Date: 8/18/2022   Name: Rita Dejesus  Clinic Number: [id number]  Therapy Diagnosis:   Encounter Diagnoses   Name Primary?    Incontinence of feces, unspecified fecal incontinence type     Mixed incontinence     Frequent fecal incontinence     Mixed stress and urge urinary incontinence      Physician: Andra Painting MD    Physician Orders: PT Eval and Treat  Medical Diagnosis from Referral: Incontinence of feces, unspecified fecal incontinence type [R15.9], Mixed incontinence [N39.46]  Evaluation Date: 8/18/2022  Authorization Period Expiration: 08/08/2023  Plan of Care Expiration: 10/17/2022  Progress Note Due: 09/16/2022   Visit # / Visits Authorized: 1/1  FOTO: 1    Precautions: universal, GERD    Time In: 9:05 am  Time Out: 10:00 am  Total Appointment Time (timed & untimed codes): 55 minutes    Subjective     Date of onset: 10 years for urinary leakage, 8 years for fecal leakage    History of current condition - Rita reports: Having issues controlling bowel and bladder function. Has a history of anal cancer in 2014 with chemo and radiation. Also had cervical cancer in 1983 - had a laser surgery for this. No hysterectomy. Has been having urinary leakage since before anal cancer (about 10 years at least). Reports having a job in the past where she was unable to go to the bathroom for 14 hours at a time and wonders if this contributed. Has had fecal incontinence since treatment for anal cancer. Reports the fecal incontinence comes and goes depending on stool consistency - if constipated, will have some control. Reports that even when constipated, she will have "amina" come out in her pants. Sometimes has urge to go poop but not always. Has fecal urge incontinence, also passive leakage and leakage when walking to bathroom. Since treatment for rectal cancer, has also been struggling with rectocele as well. Pt " reports being sexually inactive for several years and wants to return to intercourse. Has cystocele as well.  Finds when she checks in she is bearing down.     Bladder History: none prior to 10 years ago; urinary leakage x 10 years; cystocele  Frequency of urination:   Daytime: sometimes it can be every hour, sometimes only 2-3x; small volumes at times           Nighttime: 1-2 or 3-4, this has been going on for as long as she can remember  Difficulty initiating urine stream: No  Urine stream: weak at times, mostly strong  Complete emptying: Yes, double voids already  Urinary urgency: No, able to delay the urge for as long as desired  Bladder leakage: Yes, sometimes CHICHI and urge UI  Frequency of incidents: several times per day for urge UI, few times per week for CHICHI  Amount leaked (urine): once the urge UI starts, she cannot stop it; few drops for CHICHI  Pain/Bleeding: No    Form of protection: Depends at night, pad or depends during day  Number of pads required in 24 hours: 3 per day, 1 at night (sometimes soiled in morning)    Bowel History: had constipation prior to cancer diagnosis in 2014; anal cancer with chemo and radiation  Frequency of bowel movements: usually every day, more than 1x per day (once she starts, she will have a good one and then it's like everything gets stuck and things will come out a little bit at a time - lots (half gallon in an hour's time) water will assist in moving them but she leaks fluid constantly after that)  Difficulty initiating BM: Yes sometimes - when she can tell she needs to go (when she has the urge)  Quality/Shape of BM: [place] Stool Chart 1 or 4 with no urge (finds this in her underwear sometimes); when she does have an urge it is a 3 or 4 (has urge incontinence with this at times); has passive leakage of mucousy type material at times  Complete emptying: No  Fecal urgency: Yes  Colon leakage: Yes  Frequency of incidents: several times per day   Amount leaked (bowels):  "streaking/staining, sometimes a few "drops" of type 1 or 4  OTC medication/supplementation?: very little Miralax daily, 4 Metamucil gummies per day; also drinks a protein shake 5 days per week that has 6 grams of fiber  Pain/Bleeding: No, occasionally BRB    Types of fluid intake: TBA at next visit  Diet: TBA at next visit    OB/GYN History: , vaginal delivery, episiotomy, Forceps-Assisted Vaginal Delivery  and menopause; cervical cancer  Sexually active?: No, wants to be  Pain with vaginal exams, intercourse or tampon use?: No  Appropriate lubrication/hydration?: Yes      Medical History: Rita  has a past medical history of GERD (gastroesophageal reflux disease), History of cervical cancer, and History of rectal or anal cancer.     Surgical History: Rita Dejesus  has a past surgical history that includes Tonsillectomy; Adenoidectomy; Hernia repair; carpal tunel (Bilateral); Colonoscopy (N/A, 2020); Esophagogastroduodenoscopy (N/A, 2020); Cataract extraction w/ intraocular lens implant (Right, 2021); Cataract extraction (Left, 2021); Esophagogastroduodenoscopy (N/A, 2022); and Cervical conization w/ laser.    Medications: Rita has a current medication list which includes the following prescription(s): bran/gum/fib/eliecer/psyl/kelp/pec, calcium carbonate, cyanocobalamin, fluocinonide, fluticasone propionate, lactobacillus rhamnosus gg, levocetirizine, mirtazapine, multivitamin, omeprazole, oxybutynin, simvastatin, venlafaxine, and vitamin d.    Allergies:   Review of patient's allergies indicates:   Allergen Reactions    Sulfa (sulfonamide antibiotics) Nausea And Vomiting    Nitrofuran analogues Nausea And Vomiting     Microdantin         Imaging:  PVR on 06/15/2022: 6 mL    Prior Therapy/Previous treatment included: none prior  Social History: live at home with   Current exercise: none current  Occupation: retired , chemical engineering, taught " school  Prior Level of Function: decreased urinary and fecal continence with ADLs  Current Level of Function: see above    Habitus: well developed, well nourished  Abuse/Neglect: No      Pts goals: improve control of bowel and bladder, return to intercourse    OBJECTIVE     ABDOMINAL WALL ASSESSMENT - NT TODAY SECONDARY TO TIME CONSTRAINTS      BREATHING MECHANICS ASSESSMENT   Thorax Assessment During Quiet Respiration: Decreased excursion of abdominal wall  and Decreased excursion bilaterally of lateral ribs   Thorax Assessment During Deep Respiration: Decreased excursion of abdominal wall  and Decreased excursion bilaterally of lateral ribs       VAGINAL PELVIC FLOOR EXAM - NT TODAY SECONDARY TO TIME CONSTRAINTS      Limitation/Restriction for FOTO Bowel Leakage Survey    Therapist reviewed FOTO scores for Rita Dejesus on 8/18/2022.   FOTO documents entered into [employer] - see Media section.    Limitation Score at Eval: 55%  Expected Score at Discharge: 42%       TREATMENT     Treatment Time In: 9:45 am  Treatment Time Out: 9:55 am  Total Treatment time (time-based codes) separate from Evaluation: 10 minutes    Therapeutic Activity Patient participated in dynamic functional therapeutic activities to improve functional performance for 10 minutes. Including: Education as described below.     Patient Education Provided:   - Instructed on general anatomy/physiology  - Role of therapy in multi-disciplinary team  - Instructed in purpose of physical therapy and the benefits/risks of treatment  - Instructed in alternative methods of treatment  - Risks of refusing treatment  - POC and goals for therapy  - Instructed on general anatomy/physiology of urinary/bowel system     Also educated in:   - PF check ins  - Urinary urge suppression strategy    Home Exercises Provided:  Written Home Exercises Provided: Yes  Exercises were reviewed and Rita was able to demonstrate them prior to the end of the session.    Rita  demonstrated good  understanding of the education provided.     See EMR under Patient Instructions for exercises provided 8/18/2022.    Assessment     Rita is a 66 y.o. female referred to outpatient physical therapy with a medical diagnosis of Incontinence of feces, unspecified fecal incontinence type [R15.9], Mixed incontinence [N39.46]. Pt presents with pelvic floor tenderness, decreased phasic ability of the pelvic muscles, poor quality of pelvic muscle contraction, increased frequency of urination, increased nocturia, poor coordination of pelvic floor muscles during ADL's leading to urinary or fecal leakage and dysfunctional defecation. These deficits have reduced pt's ability to participate in ADLs and desired recreational activities without limitation and have therefore decreased QOL.     Pt will benefit from skilled physical therapy to address the deficits stated above, provide pt/family education, maximize pt's level of independence, and therefore increase overall QOL.    Pt prognosis is Fair.       Plan of care discussed with patient: Yes  Pt's spiritual, cultural and educational needs considered and patient is agreeable to the plan of care and goals as stated below:       Anticipated Barriers for therapy: irradiated tissue, chronicity of symptoms    Medical Necessity is demonstrated by the following    History  Co-morbidities and personal factors that may impact the plan of care Co-morbidities:   advanced age, chronic constipation and history of cancer    Personal Factors:   no deficits     high   Examination  Body Structures and Functions, activity limitations and participation restrictions that may impact the plan of care Body Regions/Systems/Functions:  pelvic floor tenderness, decreased phasic ability of the pelvic muscles, poor quality of pelvic muscle contraction, increased frequency of urination, increased nocturia, poor coordination of pelvic floor muscles during ADL's leading to urinary or fecal  "leakage and dysfunctional defecation    Activity Limitations:  urgency , delaying urge to urinate or have a BM, initiating a BM, full bladder emptying, intercourse/vaginal exam/tampon use without pain, sleep uninterrupted by excessive nocturia and incontinence with ADLs    Participation Restrictions:  all ADLs/iADLs uninterrupted by urinary incontinence/urgency/frequency, all ADLs/iADLs uninterrupted by fecal incontinence/urgency/frequency, social activities with friends/family, relationship with spouse/partner, regularly having a comfortable BM, Sleep restrictions and exercise restrictions due to incontinence     Activity limitations:   Learning and applying knowledge  no deficits    General Tasks and Commands  no deficits    Communication  no deficits    Mobility  no deficits    Self care  no deficits    Domestic Life  no deficits    Interactions/Relationships  no deficits    Life Areas  no deficits    Community and Social Life  no deficits       high   Clinical Presentation evolving clinical presentation with changing clinical characteristics moderate   Decision Making/ Complexity Score: moderate       Goals:  Short Term Goals: 6 weeks   - Pt will be I in diaphragmatic breathing with proper technique to promote relaxation and pelvic floor functional mobility for improved urinary and fecal continence with ADLs and improved QOL.  - Pt to demonstrate proper positioning on commode with breathing techniques to decrease strain with BM to enable pt to feel empty after BM.  - Pt to demonstrate independence with performing bowel massage to help with gut motility.  - Pt to correctly and consistently perform "the knack" prior to coughing, laughing or sneezing to decrease risk of leakage.  - Pt will be able to correctly and consistently explain urge control strategies to demonstrate understanding of these strategies, decrease likelihood of leakage, and increase time between voids.  - Pt to voice understanding of the role " that diet and fluid intake plays on urinary urgency.   - Pt will report a 50% reduction in frequency of leakage to demonstrate improved pelvic floor coordination needed for continence with ADLs.  - Pt will report ability to successfully perform double voiding for complete urinary emptying after initial void to prevent immediate return to bathroom.    Long Term Goals: 12 weeks   - Pt to be I with home plan for carry over after discharge.    - Pt to be able to bulge pelvic floor with proper technique and no dyssynergia of EAS, which is needed for comfortable BM and complete evacuation.  - Pt will report a 75% reduction in frequency of leakage to demonstrate improved pelvic floor coordination needed for continence with ADLs.  - Pt to report improved restorative sleeping, waking up no more than 0-1x/night due to urge to urinate.  - Pt to be educated on strategies for pressure management and appropriate bear down technique to prevent worsening of prolapse for maintenance of urinary continence long-term.  - Pt will be independent with use of dilators in order to progress towards self management of pelvic pain.  - Pt to demonstrate an improved score in the FOTO Bowel Leakage survey to 42% or less to demonstrate improving pelvic floor function for improved fecal continence with ADLs and improved QOL.    Plan     Plan of care Certification: 8/18/2022 to 10/16/2022.    Outpatient Physical Therapy 1-2 times weekly for 12 weeks to include the following interventions: therapeutic exercises, therapeutic activity, neuromuscular re-education, manual therapy, modalities PRN, patient/family education, dry needling and self care/home management    I appreciate your consult and look forward to participating in this patient's care.    Rhiannon Santos, PT, DPT

## 2022-09-01 ENCOUNTER — CLINICAL SUPPORT (OUTPATIENT)
Dept: REHABILITATION | Facility: HOSPITAL | Age: 67
End: 2022-09-01
Payer: MEDICARE

## 2022-09-01 DIAGNOSIS — R15.9 FREQUENT FECAL INCONTINENCE: Primary | ICD-10-CM

## 2022-09-01 DIAGNOSIS — N39.46 MIXED STRESS AND URGE URINARY INCONTINENCE: ICD-10-CM

## 2022-09-01 PROCEDURE — 97112 NEUROMUSCULAR REEDUCATION: CPT

## 2022-09-01 PROCEDURE — 97530 THERAPEUTIC ACTIVITIES: CPT

## 2022-09-01 NOTE — PROGRESS NOTES
Pelvic Health Physical Therapy   Treatment Note     Name: Rita Dejesus  Clinic Number: [id number]    Therapy Diagnosis:   Encounter Diagnoses   Name Primary?    Frequent fecal incontinence Yes    Mixed stress and urge urinary incontinence      Physician: Andra Painting MD    Visit Date: 9/1/2022    Physician Orders: PT Eval and Treat  Medical Diagnosis from Referral: Incontinence of feces, unspecified fecal incontinence type [R15.9], Mixed incontinence [N39.46]  Evaluation Date: 8/18/2022  Authorization Period Expiration: 12/31/2022  Plan of Care Expiration: 10/17/2022  Progress Note Due: 09/16/2022   Visit # / Visits Authorized: 1/20  Cancelled Visits: 0  No Show Visits: 0  FOTO: 1     Precautions: universal, GERD    Time In: 2:35 pm  Time Out: 2:28 pm  Total Billable Time: 53 minutes    Subjective     Pt reports today: Things are going well with urinary urge suppression. Did start taking bladder meds and has been experiencing some side effects of dry mouth and decreased stream strength and incomplete emptying (if she sits there, more will come out). Rocking back and forth has not been helping to complete emptying, has had to sit there. Did do check ins but is still bearing down at rest.     She was compliant with home exercise program.  Response to previous treatment: N/A  Functional change: N/A    Pain Pre-Treatment: 0/10  Pain Post-Treatment: 0/10  Location: N/A    Constitutional Symptoms Review: The patient denies having any constitutional symptoms.     Objective     Types of fluid intake: cup of tea in morning (sometimes caf, sometimes not), drinks water all day after this (sometimes large volumes), beer occasionally, unsweetened iced tea once in a while  Diet: protein shake and banana in the morning, does not eat lunch all the time (may have a sandwich), for dinner may have a sandwich or red/white beans (no rice) with sausage or pork, not a lot of red meat    See EMR under MEDIA for written  consent provided 9/1/2022  Chaperone: declined    VAGINAL PELVIC FLOOR EXAM    EXTERNAL ASSESSMENT  Introitus: changes from radiation  Skin condition: WNL  Scarring: NT  Sensation: NT  Pain: NT  Pelvic clock assessment: tender and increased tension in B bulbocavernosus  Voluntary contraction: visible lift  Voluntary relaxation: visible drop  Involuntary contraction: bulge  Bearing down: bulge  Comments: changes to shape of introitus potentially due to radiation      INTERNAL ASSESSMENT  Pain: tender areas noted as follows: B OI, L deep layer, R mod layer (radiates to R suprapubic region), and L superficial layer of PFM   Sensation: numbness noted on L mod layer of PFM    Vaginal vault: largely within normal limits, some changes due to radiation  Muscle Bulk: largely within normal limits, mild changes to elasticity of tissue (decreased) due to radiation  Muscle Power: 3/5; pain in tailbone with kegel  Muscle Endurance: 3 sec  # Reps To Fatigue: NT    Fast Contractions in 10 seconds: 5     Quality of contraction: decreased hold   Specificity: WNL   Involuntary Contraction: bulge  Bearing Down: bulge    Coordination: tends to hold breath during PFM contration   Prolapse check: only able to palpate cystocele (grade 1-2)  Comments: mild changes to introitus structure and tissues in canal secondary to radiation    Rita participated in neuromuscular re-education activities to develop Coordination, Control, Down training, Proprioception, and Sense for 25 minutes including:     Diaphragmatic breathing for PF relaxation and awareness - pt with good form, reporting difficulty avoiding volitionally bearing down with inhale  Internal:  Completed internal assessment of PF coordination      Rita participated in dynamic functional therapeutic activities to improve functional performance for 28 minutes, including: Education as described below.      Home Exercises Provided and Patient Education Provided     Education Provided:    - behavior modifications  Discussed progression of plan of care with patient; educated pt in activity modification; reviewed HEP with pt. Pt demonstrated and verbalized understanding of all instruction and was provided with a handout of HEP (see Patient Instructions).  - Rectal exam may be indicated  - PFM exam and results   - Diaphragmatic breathing - technique, benefits, PF ROM, etc.    Written Home Exercises Provided: yes.  Exercises were reviewed and Rita was able to demonstrate them prior to the end of the session.  Rita demonstrated good  understanding of the education provided.     See EMR under Patient Instructions for exercises provided 9/1/2022.    Assessment     Pt with good tolerance for treatment today. PFM examination reveals decreased strength, endurance, and coordination of the PFM as well as increased pain and some tissue changes from radiation that are likely contributing to her symptoms. Will likely complete rectal assessment for EAS function and tissue changes at a future visit. At next visit will continue to progress as tolerated per POC.    Rita Is progressing well towards her goals.   Pt prognosis is Fair.     Pt will continue to benefit from skilled outpatient physical therapy to address the deficits listed in the problem list box on initial evaluation, provide pt/family education and to maximize pt's level of independence in the home and community environment.     Pt's spiritual, cultural and educational needs considered and pt agreeable to plan of care and goals.     Anticipated barriers to physical therapy: irradiated tissue, chronicity of symptoms    Goals:  Short Term Goals: 6 weeks   - Pt will be I in diaphragmatic breathing with proper technique to promote relaxation and pelvic floor functional mobility for improved urinary and fecal continence with ADLs and improved QOL.  - Pt to demonstrate proper positioning on commode with breathing techniques to decrease strain with BM to  "enable pt to feel empty after BM.  - Pt to demonstrate independence with performing bowel massage to help with gut motility.  - Pt to correctly and consistently perform "the knack" prior to coughing, laughing or sneezing to decrease risk of leakage.  - Pt will be able to correctly and consistently explain urge control strategies to demonstrate understanding of these strategies, decrease likelihood of leakage, and increase time between voids.  - Pt to voice understanding of the role that diet and fluid intake plays on urinary urgency.   - Pt will report a 50% reduction in frequency of leakage to demonstrate improved pelvic floor coordination needed for continence with ADLs.  - Pt will report ability to successfully perform double voiding for complete urinary emptying after initial void to prevent immediate return to bathroom.     Long Term Goals: 12 weeks   - Pt to be I with home plan for carry over after discharge.    - Pt to be able to bulge pelvic floor with proper technique and no dyssynergia of EAS, which is needed for comfortable BM and complete evacuation.  - Pt will report a 75% reduction in frequency of leakage to demonstrate improved pelvic floor coordination needed for continence with ADLs.  - Pt to report improved restorative sleeping, waking up no more than 0-1x/night due to urge to urinate.  - Pt to be educated on strategies for pressure management and appropriate bear down technique to prevent worsening of prolapse for maintenance of urinary continence long-term.  - Pt will be independent with use of dilators in order to progress towards self management of pelvic pain.  - Pt to demonstrate an improved score in the FOTO Bowel Leakage survey to 42% or less to demonstrate improving pelvic floor function for improved fecal continence with ADLs and improved QOL.    Plan     Continue per established POC as tolerated.    Rhiannon Santos, PT, DPT      "

## 2022-09-01 NOTE — PATIENT INSTRUCTIONS
Pelvic Floor Diaphragmatic Breathing     The diaphragm is the most efficient muscle of breathing. It is a large, dome-shaped muscle located at the base of the lungs under the bottom of your ribcage. There are many benefits to diaphragmatic (belly) breathing including:     Strengthens the diaphragm (& other accessory muscles)   Helps slow down your breathing rate and calms you   Decreases oxygen demand and uses less effort and energy to breathe   Reduces stress and anxiety, which calms your nervous system   Improves the coordination and range of motion of your pelvic floor muscles, allowing them to relax       Technique:   Place one hand on your chest and one on your ribs/belly.  Try to push your hands out as you inhale. Feel your ribcage and abdomen expand.   As you exhale, blow the air out of your body. Feel your ribcage pull back in and down and your abdomen deflate.   Perform this for 5-10 minutes on most days of the week, allowing the entire body to relax as you do.               Whenever you inhale and expand your lower ribcage and abdomen, your pelvic floor muscles will relax and push down. Whenever you exhale and deflate your ribcage and abdomen, your pelvic floor muscles will tighten slightly and pull up. Your pelvic floor muscles copy the movement of your diaphragm! Try to tune in to this movement as you breathe.

## 2022-09-07 ENCOUNTER — ANESTHESIA EVENT (OUTPATIENT)
Dept: ENDOSCOPY | Facility: HOSPITAL | Age: 67
End: 2022-09-07
Payer: MEDICARE

## 2022-09-07 ENCOUNTER — HOSPITAL ENCOUNTER (OUTPATIENT)
Facility: HOSPITAL | Age: 67
Discharge: HOME | End: 2022-09-07
Attending: INTERNAL MEDICINE | Admitting: INTERNAL MEDICINE
Payer: MEDICARE

## 2022-09-07 ENCOUNTER — ANESTHESIA (OUTPATIENT)
Dept: ENDOSCOPY | Facility: HOSPITAL | Age: 67
End: 2022-09-07
Payer: MEDICARE

## 2022-09-07 DIAGNOSIS — R13.10 DYSPHAGIA: ICD-10-CM

## 2022-09-07 DIAGNOSIS — K21.9 GASTROESOPHAGEAL REFLUX DISEASE, UNSPECIFIED WHETHER ESOPHAGITIS PRESENT: Primary | ICD-10-CM

## 2022-09-07 PROCEDURE — 63600175 PHARM REV CODE 636 W HCPCS: Performed by: NURSE ANESTHETIST, CERTIFIED REGISTERED

## 2022-09-07 PROCEDURE — 88305 TISSUE EXAM BY PATHOLOGIST: CPT | Mod: 26,,, | Performed by: STUDENT IN AN ORGANIZED HEALTH CARE EDUCATION/TRAINING PROGRAM

## 2022-09-07 PROCEDURE — 37000009 HC ANESTHESIA EA ADD 15 MINS: Performed by: INTERNAL MEDICINE

## 2022-09-07 PROCEDURE — 43239 PR EGD, FLEX, W/BIOPSY, SGL/MULTI: ICD-10-PCS | Mod: 59,,, | Performed by: INTERNAL MEDICINE

## 2022-09-07 PROCEDURE — 43239 EGD BIOPSY SINGLE/MULTIPLE: CPT | Performed by: INTERNAL MEDICINE

## 2022-09-07 PROCEDURE — 27201012 HC FORCEPS, HOT/COLD, DISP: Performed by: INTERNAL MEDICINE

## 2022-09-07 PROCEDURE — 37000008 HC ANESTHESIA 1ST 15 MINUTES: Performed by: INTERNAL MEDICINE

## 2022-09-07 PROCEDURE — 88305 TISSUE EXAM BY PATHOLOGIST: CPT | Performed by: STUDENT IN AN ORGANIZED HEALTH CARE EDUCATION/TRAINING PROGRAM

## 2022-09-07 PROCEDURE — 43251 EGD REMOVE LESION SNARE: CPT | Mod: ,,, | Performed by: INTERNAL MEDICINE

## 2022-09-07 PROCEDURE — 25000003 PHARM REV CODE 250: Performed by: NURSE ANESTHETIST, CERTIFIED REGISTERED

## 2022-09-07 PROCEDURE — 27201089 HC SNARE, DISP (ANY): Performed by: INTERNAL MEDICINE

## 2022-09-07 PROCEDURE — 43239 EGD BIOPSY SINGLE/MULTIPLE: CPT | Mod: 59,,, | Performed by: INTERNAL MEDICINE

## 2022-09-07 PROCEDURE — 43251 EGD REMOVE LESION SNARE: CPT | Performed by: INTERNAL MEDICINE

## 2022-09-07 PROCEDURE — 88305 TISSUE EXAM BY PATHOLOGIST: ICD-10-PCS | Mod: 26,,, | Performed by: STUDENT IN AN ORGANIZED HEALTH CARE EDUCATION/TRAINING PROGRAM

## 2022-09-07 PROCEDURE — 43251 PR EGD, FLEX, W/REMOVAL, TUMOR/POLYP/LESION(S), SNARE: ICD-10-PCS | Mod: ,,, | Performed by: INTERNAL MEDICINE

## 2022-09-07 RX ORDER — LIDOCAINE HYDROCHLORIDE 10 MG/ML
INJECTION, SOLUTION EPIDURAL; INFILTRATION; INTRACAUDAL; PERINEURAL
Status: DISCONTINUED | OUTPATIENT
Start: 2022-09-07 | End: 2022-09-07

## 2022-09-07 RX ORDER — PROPOFOL 10 MG/ML
VIAL (ML) INTRAVENOUS
Status: DISCONTINUED | OUTPATIENT
Start: 2022-09-07 | End: 2022-09-07

## 2022-09-07 RX ORDER — SODIUM CHLORIDE 9 MG/ML
INJECTION, SOLUTION INTRAVENOUS CONTINUOUS
Status: DISCONTINUED | OUTPATIENT
Start: 2022-09-07 | End: 2022-09-07 | Stop reason: HOSPADM

## 2022-09-07 RX ORDER — SODIUM CHLORIDE, SODIUM LACTATE, POTASSIUM CHLORIDE, CALCIUM CHLORIDE 600; 310; 30; 20 MG/100ML; MG/100ML; MG/100ML; MG/100ML
INJECTION, SOLUTION INTRAVENOUS CONTINUOUS PRN
Status: DISCONTINUED | OUTPATIENT
Start: 2022-09-07 | End: 2022-09-07

## 2022-09-07 RX ADMIN — PROPOFOL 30 MG: 10 INJECTION, EMULSION INTRAVENOUS at 06:09

## 2022-09-07 RX ADMIN — SODIUM CHLORIDE, SODIUM LACTATE, POTASSIUM CHLORIDE, AND CALCIUM CHLORIDE: 600; 310; 30; 20 INJECTION, SOLUTION INTRAVENOUS at 06:09

## 2022-09-07 RX ADMIN — PROPOFOL 70 MG: 10 INJECTION, EMULSION INTRAVENOUS at 06:09

## 2022-09-07 RX ADMIN — LIDOCAINE HYDROCHLORIDE 20 MG: 10 INJECTION, SOLUTION EPIDURAL; INFILTRATION; INTRACAUDAL; PERINEURAL at 06:09

## 2022-09-07 NOTE — TRANSFER OF CARE
"Anesthesia Transfer of Care Note    Patient: Rita Dejesus    Procedure(s) Performed: Procedure(s) (LRB):  EGD (ESOPHAGOGASTRODUODENOSCOPY) (N/A)    Patient location: GI    Anesthesia Type: MAC    Transport from OR: Transported from OR on room air with adequate spontaneous ventilation    Post pain: adequate analgesia    Post assessment: no apparent anesthetic complications and tolerated procedure well    Post vital signs: stable    Level of consciousness: awake, alert and oriented    Nausea/Vomiting: no nausea/vomiting    Complications: none    Transfer of care protocol was followed      Last vitals:   Visit Vitals  BP (!) 108/59 (BP Location: Left arm, Patient Position: Lying)   Pulse 75   Temp 36.9 °C (98.4 °F) (Temporal)   Resp 18   Ht 5' 1.5" (1.562 m)   Wt 59 kg (130 lb)   SpO2 96%   Breastfeeding No   BMI 24.17 kg/m²     "

## 2022-09-07 NOTE — H&P
PRE PROCEDURE H&P    Patient Name: Rita Dejesus  MRN: [id number]  : 1955  Date of Procedure:  2022  Referring Physician: Misa Crocker MD  Primary Physician: Tripp Heredia MD  Procedure Physician: Misa Crocker MD       Planned Procedure: EGD  Diagnosis: dysphagia and esophagitis   Chief Complaint: Same as above    HPI: Patient is an 66 y.o. female is here for the above.     Anticoagulation: None     Past Medical History:   Past Medical History:   Diagnosis Date    GERD (gastroesophageal reflux disease)     History of cervical cancer     History of rectal or anal cancer         Past Surgical History:  Past Surgical History:   Procedure Laterality Date    ADENOIDECTOMY      carpal tunel Bilateral     CATARACT EXTRACTION Left 2021    NEAR    CATARACT EXTRACTION W/ INTRAOCULAR LENS IMPLANT Right 2021    CERVICAL CONIZATION   W/ LASER      COLONOSCOPY N/A 2020    Procedure: COLONOSCOPY;  Surgeon: Kendy De Leon MD;  Location: [facility];  Service: Endoscopy;  Laterality: N/A;    ESOPHAGOGASTRODUODENOSCOPY N/A 2020    Procedure: ESOPHAGOGASTRODUODENOSCOPY (EGD);  Surgeon: Goyo Kapadia MD;  Location: [facility];  Service: Endoscopy;  Laterality: N/A;    ESOPHAGOGASTRODUODENOSCOPY N/A 2022    Procedure: EGD (ESOPHAGOGASTRODUODENOSCOPY);  Surgeon: Misa Crocker MD;  Location: [facility];  Service: Gastroenterology;  Laterality: N/A;    HERNIA REPAIR      TONSILLECTOMY          Home Medications:  Prior to Admission medications    Medication Sig Start Date End Date Taking? Authorizing Provider   bran/gum/fib/eliecer/psyl/kelp/pec (FIBER 6 ORAL) Take by mouth 2 (two) times a day. 12 mg collin   Yes Historical Provider   calcium carbonate 650 mg calcium (1,625 mg) tablet Take 1 tablet by mouth once daily.   Yes Historical Provider   cyanocobalamin (VITAMIN B-12) 250 MCG tablet Take 250 mcg by mouth. 18  Yes Historical Provider   fluocinonide (LIDEX)  0.05 % external solution Apply topically 2 (two) times daily. 9/7/21  Yes Tripp Heredia MD   fluticasone propionate (FLONASE) 50 mcg/actuation nasal spray 1 spray (50 mcg total) by Each Nostril route once daily. 3/16/22  Yes Tripp Heredia MD   Lactobacillus rhamnosus GG (CULTURELLE) 10 billion cell capsule Take 1 capsule by mouth once daily.   Yes Historical Provider   levocetirizine (XYZAL) 5 MG tablet Take 5 mg by mouth every evening.   Yes Historical Provider   mirtazapine (REMERON) 15 MG tablet Take 1 tablet (15 mg total) by mouth every evening. 3/16/22  Yes Tripp Heredia MD   multivitamin capsule Take 1 capsule by mouth once daily.   Yes Historical Provider   omeprazole (PRILOSEC) 40 MG capsule Take 1 capsule (40 mg total) by mouth 2 (two) times a day. 8/9/22 11/7/22 Yes Misa Crocker MD   oxybutynin (DITROPAN-XL) 10 MG 24 hr tablet Take 1 tablet (10 mg total) by mouth once daily. 8/8/22 8/8/23 Yes Andra Painting MD   simvastatin (ZOCOR) 40 MG tablet Take 1 tablet (40 mg total) by mouth every evening. 5/24/22  Yes Tripp Heredia MD   venlafaxine (EFFEXOR-XR) 75 MG 24 hr capsule TAKE 1 CAPSULE BY MOUTH ONCE DAILY 2/8/22  Yes Tripp Heredia MD   vitamin D (VITAMIN D3) 1000 units Tab Take 185 mg by mouth.   Yes Historical Provider        Allergies:  Review of patient's allergies indicates:   Allergen Reactions    Sulfa (sulfonamide antibiotics) Nausea And Vomiting    Nitrofuran analogues Nausea And Vomiting     Microdantin         Social History:   Social History     Socioeconomic History    Marital status:    Tobacco Use    Smoking status: Never    Smokeless tobacco: Never   Substance and Sexual Activity    Alcohol use: Yes     Comment: Social     Drug use: Never    Sexual activity: Not Currently     Partners: Male     Social Determinants of Health     Financial Resource Strain: Low Risk     Difficulty of Paying Living Expenses: Not hard at all   Food Insecurity:  "No Food Insecurity    Worried About Running Out of Food in the Last Year: Never true    Ran Out of Food in the Last Year: Never true   Transportation Needs: No Transportation Needs    Lack of Transportation (Medical): No    Lack of Transportation (Non-Medical): No   Physical Activity: Inactive    Days of Exercise per Week: 0 days    Minutes of Exercise per Session: 0 min   Stress: No Stress Concern Present    Feeling of Stress : Only a little   Social Connections: Moderately Isolated    Frequency of Communication with Friends and Family: More than three times a week    Frequency of Social Gatherings with Friends and Family: More than three times a week    Attends Islam Services: Never    Active Member of Clubs or Organizations: No    Attends Club or Organization Meetings: Never    Marital Status:    Housing Stability: Unknown    Unable to Pay for Housing in the Last Year: No    Unstable Housing in the Last Year: No       Family History:  Family History   Problem Relation Age of Onset    Diabetes Mother         type II    Hyperlipidemia Mother     Heart disease Father     Hypertension Father     Cataracts Father     Hypertension Sister     Diabetes Maternal Grandmother     Diabetes Maternal Grandfather        ROS: No acute cardiac events, no acute respiratory complaints.     Physical Exam (all patients):    BP (!) 108/59 (BP Location: Left arm, Patient Position: Lying)   Pulse 75   Temp 98.4 °F (36.9 °C) (Temporal)   Resp 18   Ht 5' 1.5" (1.562 m)   Wt 59 kg (130 lb)   SpO2 96%   Breastfeeding No   BMI 24.17 kg/m²   Lungs: Clear to auscultation bilaterally, respirations unlabored  Heart: Regular rate and rhythm, S1 and S2 normal, no obvious murmurs  Abdomen:         Soft, non-tender, bowel sounds normal, no masses, no organomegaly    Lab Results   Component Value Date    WBC 4.52 05/30/2022    MCV 93 05/30/2022    RDW 14.6 (H) 05/30/2022     05/30/2022    GLU 96 05/30/2022    BUN 16 " 05/30/2022     05/30/2022    K 5.5 (H) 05/30/2022     05/30/2022        SEDATION PLAN: per anesthesia      History reviewed, vital signs satisfactory, cardiopulmonary status satisfactory, sedation options, risks and plans have been discussed with the patient  All their questions were answered and the patient agrees to the sedation procedures as planned and the patient is deemed an appropriate candidate for the sedation as planned.    Procedure explained to patient, informed consent obtained and placed in chart.    Misa Crocker  9/7/2022  6:46 AM

## 2022-09-07 NOTE — ANESTHESIA PREPROCEDURE EVALUATION
09/07/2022  Rita Dejesus is a 66 y.o., female.      Pre-op Assessment    I have reviewed the Patient Summary Reports.     I have reviewed the Nursing Notes. I have reviewed the NPO Status.   I have reviewed the Medications.     Review of Systems  Anesthesia Hx:  No problems with previous Anesthesia    Social:  Non-Smoker, No Alcohol Use    Hematology/Oncology:  Hematology Normal       -- Cancer in past history:  Other (see Oncology comments) surgery, chemotherapy and radiation  Oncology Comments: Cervical and anal     EENT/Dental:EENT/Dental Normal   Cardiovascular:  Cardiovascular Normal     Pulmonary:  Pulmonary Normal    Renal/:   Chronic Renal Disease, CRI    Hepatic/GI:   Hiatal Hernia, GERD, well controlled    Musculoskeletal:  Musculoskeletal Normal    Neurological:  Neurology Normal    Dermatological:  Skin Normal    Psych:   depression          Physical Exam  General: Well nourished, Cooperative, Alert and Oriented    Airway:  Mallampati: II   Mouth Opening: Normal  TM Distance: Normal  Tongue: Normal  Neck ROM: Normal ROM    Dental:  Intact        Anesthesia Plan  Type of Anesthesia, risks & benefits discussed:    Anesthesia Type: MAC  Intra-op Monitoring Plan: Standard ASA Monitors  Post Op Pain Control Plan: multimodal analgesia  Informed Consent: Informed consent signed with the Patient and all parties understand the risks and agree with anesthesia plan.  All questions answered.   ASA Score: 2  Day of Surgery Review of History & Physical: H&P Update referred to the surgeon/provider.    Ready For Surgery From Anesthesia Perspective.     .

## 2022-09-07 NOTE — ANESTHESIA RELEASE NOTE
"Anesthesia Release from PACU Note    Patient: Rita Dejesus    Procedure(s) Performed: Procedure(s) (LRB):  EGD (ESOPHAGOGASTRODUODENOSCOPY) (N/A)    Anesthesia type: MAC    Post pain: Adequate analgesia    Post assessment: no apparent anesthetic complications and tolerated procedure well    Last Vitals:   Visit Vitals  /70 (BP Location: Left arm, Patient Position: Lying)   Pulse 68   Temp 36.8 °C (98.2 °F) (Temporal)   Resp 18   Ht 5' 1.5" (1.562 m)   Wt 59 kg (130 lb)   SpO2 98%   Breastfeeding No   BMI 24.17 kg/m²       Post vital signs: stable    Level of consciousness: awake, alert  and oriented    Nausea/Vomiting: no nausea/no vomiting    Complications: none    Airway Patency: patent    Respiratory: unassisted, spontaneous ventilation, room air    Cardiovascular: stable and blood pressure at baseline    Hydration: euvolemic  "
0

## 2022-09-07 NOTE — PROVATION PATIENT INSTRUCTIONS
Discharge Summary/Instructions after an Endoscopic Procedure  Patient Name: Rita Dejesus  Patient MRN: [id number]  Patient YOB: 1955 Wednesday, September 7, 2022 Misa Crocker MD  Dear patient,  As a result of recent federal legislation (The Federal Cures Act), you may   receive lab or pathology results from your procedure in your [facility]   account before your physician is able to contact you. Your physician or   their representative will relay the results to you with their   recommendations at their soonest availability.  Thank you,  RESTRICTIONS:  During your procedure today, you received medications for sedation.  These   medications may affect your judgment, balance and coordination.  Therefore,   for 24 hours, you have the following restrictions:   - DO NOT drive a car, operate machinery, make legal/financial decisions,   sign important papers or drink alcohol.    ACTIVITY:  Today: no heavy lifting, straining or running due to procedural   sedation/anesthesia.  The following day: return to full activity including work.  DIET:  Eat and drink normally unless instructed otherwise.     TREATMENT FOR COMMON SIDE EFFECTS:  - Mild abdominal pain, nausea, belching, bloating or excessive gas:  rest,   eat lightly and use a heating pad.  - Sore Throat: treat with throat lozenges and/or gargle with warm salt   water.  - Because air was used during the procedure, expelling large amounts of air   from your rectum or belching is normal.  - If a bowel prep was taken, you may not have a bowel movement for 1-3 days.    This is normal.  SYMPTOMS TO WATCH FOR AND REPORT TO YOUR PHYSICIAN:  1. Abdominal pain or bloating, other than gas cramps.  2. Chest pain.  3. Back pain.  4. Signs of infection such as: chills or fever occurring within 24 hours   after the procedure.  5. Rectal bleeding, which would show as bright red, maroon, or black stools.   (A tablespoon of blood from the rectum is not serious,  especially if   hemorrhoids are present.)  6. Vomiting.  7. Weakness or dizziness.  GO DIRECTLY TO THE NEAREST EMERGENCY ROOM IF YOU HAVE ANY OF THE FOLLOWING:      Difficulty breathing              Chills and/or fever over 101 F   Persistent vomiting and/or vomiting blood   Severe abdominal pain   Severe chest pain   Black, tarry stools   Bleeding- more than one tablespoon   Any other symptom or condition that you feel may need urgent attention  Your doctor recommends these additional instructions:  If any biopsies were taken, your doctors clinic will contact you in 1 to 2   weeks with any results.  - Discharge patient to home.   - Resume previous diet.   - Continue present medications.   - Await pathology results.   - Return to referring physician.  For questions, problems or results please call your physician Misa Crocker MD at Work:  [phone]  If you have any questions about the above instructions, call the GI   department at [phone] or call the endoscopy unit at [phone]   from 7am until 3 pm.  [facility] [place], EMERGENCY ROOM PHONE NUMBER:   [phone]  IF A COMPLICATION OR EMERGENCY SITUATION ARISES AND YOU ARE UNABLE TO REACH   YOUR PHYSICIAN - GO DIRECTLY TO THE EMERGENCY ROOM.  I have read or have had read to me these discharge instructions for my   procedure and have received a written copy.  I understand these   instructions and will follow-up with my physician if I have any questions.     __________________________________       _____________________________________  Nurse Signature                                          Patient/Designated   Responsible Party Signature  Misa Crocker MD  9/7/2022 7:06:33 AM  This report has been verified and signed electronically.  Dear patient,  As a result of recent federal legislation (The Federal Cures Act), you may   receive lab or pathology results from your procedure in your [facility]   account before  your physician is able to contact you. Your physician or   their representative will relay the results to you with their   recommendations at their soonest availability.  Thank you,  PROVATION

## 2022-09-07 NOTE — ANESTHESIA POSTPROCEDURE EVALUATION
Anesthesia Post Evaluation    Patient: Rita Dejesus    Procedure(s) Performed: Procedure(s) (LRB):  EGD (ESOPHAGOGASTRODUODENOSCOPY) (N/A)    Final Anesthesia Type: MAC      Patient location during evaluation: GI PACU  Patient participation: Yes- Able to Participate  Level of consciousness: awake and alert and oriented  Post-procedure vital signs: reviewed and stable  Pain management: adequate  Airway patency: patent  PETRA mitigation strategies: Multimodal analgesia  PONV status at discharge: No PONV  Anesthetic complications: no      Cardiovascular status: hemodynamically stable  Respiratory status: unassisted, spontaneous ventilation and room air  Hydration status: euvolemic  Follow-up not needed.          Vitals Value Taken Time   /70 09/07/22 0728   Temp 36.8 °C (98.2 °F) 09/07/22 0705   Pulse 68 09/07/22 0728   Resp 18 09/07/22 0728   SpO2 98 % 09/07/22 0728         Event Time   Out of Recovery 07:31:58         Pain/Mamadou Score: Mamadou Score: 10 (9/7/2022  7:29 AM)

## 2022-09-07 NOTE — PLAN OF CARE
Dr. Crocker at bedside reviewing procedure findings and plan with patient and her . Patient verbalizes understanding.

## 2022-09-08 ENCOUNTER — CLINICAL SUPPORT (OUTPATIENT)
Dept: REHABILITATION | Facility: HOSPITAL | Age: 67
End: 2022-09-08
Payer: MEDICARE

## 2022-09-08 VITALS
WEIGHT: 130 LBS | TEMPERATURE: 98 F | DIASTOLIC BLOOD PRESSURE: 70 MMHG | SYSTOLIC BLOOD PRESSURE: 107 MMHG | HEIGHT: 62 IN | RESPIRATION RATE: 18 BRPM | OXYGEN SATURATION: 98 % | BODY MASS INDEX: 23.92 KG/M2 | HEART RATE: 68 BPM

## 2022-09-08 DIAGNOSIS — N39.46 MIXED STRESS AND URGE URINARY INCONTINENCE: ICD-10-CM

## 2022-09-08 DIAGNOSIS — R15.9 FREQUENT FECAL INCONTINENCE: Primary | ICD-10-CM

## 2022-09-08 PROCEDURE — 97112 NEUROMUSCULAR REEDUCATION: CPT

## 2022-09-08 PROCEDURE — 97530 THERAPEUTIC ACTIVITIES: CPT

## 2022-09-08 PROCEDURE — 97140 MANUAL THERAPY 1/> REGIONS: CPT

## 2022-09-08 NOTE — PATIENT INSTRUCTIONS
"Pelvic Floor Diaphragmatic Breathing     The diaphragm is the most efficient muscle of breathing. It is a large, dome-shaped muscle located at the base of the lungs under the bottom of your ribcage. There are many benefits to diaphragmatic (belly) breathing including:     Strengthens the diaphragm (& other accessory muscles)   Helps slow down your breathing rate and calms you   Decreases oxygen demand and uses less effort and energy to breathe   Reduces stress and anxiety, which calms your nervous system   Improves the coordination and range of motion of your pelvic floor muscles, allowing them to relax       Technique:   Place one hand on your chest and one on your ribs/belly.  Try to push your hands out as you inhale. Feel your ribcage and abdomen expand.   As you exhale, blow the air out of your body. Feel your ribcage pull back in and down and your abdomen deflate.   Perform this for 5-10 minutes on most days of the week, allowing the entire body to relax as you do.               Whenever you inhale and expand your lower ribcage and abdomen, your pelvic floor muscles will relax and push down. Whenever you exhale and deflate your ribcage and abdomen, your pelvic floor muscles will tighten slightly and pull up. Your pelvic floor muscles copy the movement of your diaphragm! Try to tune in to this movement as you breathe.      Bowel Movement Body Mechanics    1. Sit on the toilet comfortably with legs and buttocks relaxed.  2. Put your feet on a step stool or squatty potty (8 inches tall). This helps the poop come out easier.  3. Use good "potty posture": Lean forward while keeping your back straight and rest your elbows or forearms on your knees, keeping the knees apart.  4. The pelvic floor has to fully relax for the poop to come out. Let your whole body relax, even letting the belly hang. Try to think about fully relaxing or "dropping" the pelvic floor muscles. You may want to do a few diaphragmatic " breaths to help with this.  5. Exhale like you are blowing out birthday candles while you gently bear down. Do not strain or hold your breath!              With strong fecal urge that threatens to make you leak before making it to the bathroom, stop in place and squeeze the external anal sphincter (this is a kegel, but it is focused toward the back/around the anus). Try to hold it for 30 seconds before walking to the bathroom to suppress the urge, as 30 seconds is the required time to shut down the urge. This is not to delay going, this is to get you there without leaking.     After having a bowel movement, to encourage the valves to close so you don't have leakage, wipe 2x and then squeeze the external anal sphincter (this is a kegel, but it is focused toward the back/around the anus) 2x. Repeat as necessary (wipe 2x, squeeze 2x) until the tissue is clean. Do this EVERY TIME you have a bowel movement for 6-8 weeks to retrain the valves to close on their own.

## 2022-09-08 NOTE — PROGRESS NOTES
Pelvic Health Physical Therapy   Treatment Note     Name: Rita Dejesus  Clinic Number: [id number]    Therapy Diagnosis:   Encounter Diagnoses   Name Primary?    Frequent fecal incontinence Yes    Mixed stress and urge urinary incontinence        Physician: Andra Painting MD    Visit Date: 9/8/2022    Physician Orders: PT Eval and Treat  Medical Diagnosis from Referral: Incontinence of feces, unspecified fecal incontinence type [R15.9], Mixed incontinence [N39.46]  Evaluation Date: 8/18/2022  Authorization Period Expiration: 12/31/2022  Plan of Care Expiration: 10/17/2022  Progress Note Due: 09/16/2022   Visit # / Visits Authorized: 2/20  Cancelled Visits: 0  No Show Visits: 0  FOTO: 1     Precautions: universal, GERD    Time In: 2:40 pm  Time Out: 2:30 pm  Total Billable Time: 50 minutes    Subjective     Pt reports today: Things are a lot better with urinary leakage. Leakage of bowels was bad yesterday. Has not had any watery stool recently. Every time she has a BM, only a little will come out and it may take her a few days to get everything out.    She was compliant with home exercise program.  Response to previous treatment: N/A  Functional change: N/A    Pain Pre-Treatment: 0/10  Pain Post-Treatment: 0/10  Location: N/A    Constitutional Symptoms Review: The patient denies having any constitutional symptoms.     Objective     RECTAL PELVIC FLOOR EXAM    EXTERNAL ASSESSMENT  Anus: within normal limits, some skin tags present  Skin condition: redness noted surrounding anal opening  Scarring: none visible  Sensation: decreased sensation noted in superior regions, largely within normal limits  Pain: NT  Voluntary contraction: visible lift  Voluntary relaxation: visible drop  Involuntary contraction: bulge  Bearing down: bulge  Anal Conception Junction: diminished  Discharge: none       INTERNAL ASSESSMENT  EAS tone: hypotonic   Impaction: none   Pain: tender areas noted as follows: B coccygeus, B puborectalis; B  levator ani externally  Sensation: able to localize pressure appropriately   Muscle Bulk: WFL, some hypotonicity noted at both IAS and EAS  Muscle Power: 3/5  Muscle Endurance: 3 sec  # Reps To Fatigue: NT    Fast Contractions in 10 seconds: 6     Quality of contraction: decreased hold   Specificity: WNL  Coordination: tends to hold breath during PFM contration   Comments: no stiffening of tissues from radiation noted        Rita participated in neuromuscular re-education activities to develop Coordination, Control, Down training, Proprioception, and Sense for 15 minutes including:     Diaphragmatic breathing for PF relaxation and awareness - pt with good form, reporting difficulty avoiding volitionally bearing down with inhale  Internal:  Completed internal assessment of PF coordination      Rita received the following manual therapy techniques: to develop extensibility and desensitization for 10 minutes including:     Internal rectal:   TrP release to: B coccygeus, B levator ani  P/A of tailbone (gentle)      Rita participated in dynamic functional therapeutic activities to improve functional performance for 25 minutes, including: Education as described below.        Home Exercises Provided and Patient Education Provided     Education Provided:   - behavior modifications  Discussed progression of plan of care with patient; educated pt in activity modification; reviewed HEP with pt. Pt demonstrated and verbalized understanding of all instruction and was provided with a handout of HEP (see Patient Instructions).  - Digestive system, defecation reflex, RAIR, IAS and EAS, etc.  - Wipe 2x, squeeze 2x  - Fecal urge suppression  - Poop positioning, blow out to bear down  Deferred:  - PFM exam and results   - Diaphragmatic breathing - technique, benefits, PF ROM, etc.    Written Home Exercises Provided: yes.  Exercises were reviewed and Rita was able to demonstrate them prior to the end of the session.  Rita  "demonstrated good  understanding of the education provided.     See EMR under Patient Instructions for exercises provided 09/08/2022.    Assessment     Pt with good tolerance for treatment today, demonstrating good response to interventions provided. Rectal PFM examination reveals decreased strength and resting tone of EAS which is likely contributing to her fecal leakage. At next visit will continue to progress as tolerated per POC.    Rita Is progressing well towards her goals.   Pt prognosis is Fair.     Pt will continue to benefit from skilled outpatient physical therapy to address the deficits listed in the problem list box on initial evaluation, provide pt/family education and to maximize pt's level of independence in the home and community environment.     Pt's spiritual, cultural and educational needs considered and pt agreeable to plan of care and goals.     Anticipated barriers to physical therapy: irradiated tissue, chronicity of symptoms    Goals:  Short Term Goals: 6 weeks   - Pt will be I in diaphragmatic breathing with proper technique to promote relaxation and pelvic floor functional mobility for improved urinary and fecal continence with ADLs and improved QOL.  - Pt to demonstrate proper positioning on commode with breathing techniques to decrease strain with BM to enable pt to feel empty after BM.  - Pt to demonstrate independence with performing bowel massage to help with gut motility.  - Pt to correctly and consistently perform "the knack" prior to coughing, laughing or sneezing to decrease risk of leakage.  - Pt will be able to correctly and consistently explain urge control strategies to demonstrate understanding of these strategies, decrease likelihood of leakage, and increase time between voids.  - Pt to voice understanding of the role that diet and fluid intake plays on urinary urgency.   - Pt will report a 50% reduction in frequency of leakage to demonstrate improved pelvic floor " coordination needed for continence with ADLs.  - Pt will report ability to successfully perform double voiding for complete urinary emptying after initial void to prevent immediate return to bathroom.     Long Term Goals: 12 weeks   - Pt to be I with home plan for carry over after discharge.    - Pt to be able to bulge pelvic floor with proper technique and no dyssynergia of EAS, which is needed for comfortable BM and complete evacuation.  - Pt will report a 75% reduction in frequency of leakage to demonstrate improved pelvic floor coordination needed for continence with ADLs.  - Pt to report improved restorative sleeping, waking up no more than 0-1x/night due to urge to urinate.  - Pt to be educated on strategies for pressure management and appropriate bear down technique to prevent worsening of prolapse for maintenance of urinary continence long-term.  - Pt will be independent with use of dilators in order to progress towards self management of pelvic pain.  - Pt to demonstrate an improved score in the FOTO Bowel Leakage survey to 42% or less to demonstrate improving pelvic floor function for improved fecal continence with ADLs and improved QOL.    Plan     Continue per established POC as tolerated.    Rhiannon Santos, PT, DPT

## 2022-09-14 ENCOUNTER — OFFICE VISIT (OUTPATIENT)
Dept: URGENT CARE | Facility: CLINIC | Age: 67
End: 2022-09-14
Payer: MEDICARE

## 2022-09-14 VITALS
WEIGHT: 130 LBS | OXYGEN SATURATION: 100 % | HEART RATE: 78 BPM | BODY MASS INDEX: 24.55 KG/M2 | DIASTOLIC BLOOD PRESSURE: 55 MMHG | RESPIRATION RATE: 18 BRPM | SYSTOLIC BLOOD PRESSURE: 98 MMHG | TEMPERATURE: 98 F | HEIGHT: 61 IN

## 2022-09-14 DIAGNOSIS — N30.01 ACUTE CYSTITIS WITH HEMATURIA: Primary | ICD-10-CM

## 2022-09-14 DIAGNOSIS — R30.0 DYSURIA: ICD-10-CM

## 2022-09-14 LAB
BILIRUB UR QL STRIP: NEGATIVE
FINAL PATHOLOGIC DIAGNOSIS: NORMAL
GLUCOSE UR QL STRIP: NEGATIVE
GROSS: NORMAL
KETONES UR QL STRIP: NEGATIVE
LEUKOCYTE ESTERASE UR QL STRIP: POSITIVE
Lab: NORMAL
PH, POC UA: 5
POC BLOOD, URINE: POSITIVE
POC NITRATES, URINE: NEGATIVE
PROT UR QL STRIP: POSITIVE
SP GR UR STRIP: 1.01 (ref 1–1.03)
UROBILINOGEN UR STRIP-ACNC: NORMAL (ref 0.1–1.1)

## 2022-09-14 PROCEDURE — 87086 URINE CULTURE/COLONY COUNT: CPT

## 2022-09-14 PROCEDURE — 87077 CULTURE AEROBIC IDENTIFY: CPT

## 2022-09-14 PROCEDURE — 99213 PR OFFICE/OUTPT VISIT, EST, LEVL III, 20-29 MIN: ICD-10-PCS | Mod: S$GLB,,,

## 2022-09-14 PROCEDURE — 87088 URINE BACTERIA CULTURE: CPT

## 2022-09-14 PROCEDURE — 87186 SC STD MICRODIL/AGAR DIL: CPT

## 2022-09-14 PROCEDURE — 99213 OFFICE O/P EST LOW 20 MIN: CPT | Mod: S$GLB,,,

## 2022-09-14 PROCEDURE — 81003 URINALYSIS AUTO W/O SCOPE: CPT | Mod: QW,S$GLB,,

## 2022-09-14 PROCEDURE — 81003 POCT URINALYSIS, DIPSTICK, AUTOMATED, W/O SCOPE: ICD-10-PCS | Mod: QW,S$GLB,,

## 2022-09-14 RX ORDER — GRANULES FOR ORAL 3 G/1
3 POWDER ORAL ONCE
Qty: 3 G | Refills: 0 | Status: SHIPPED | OUTPATIENT
Start: 2022-09-14 | End: 2022-09-14

## 2022-09-14 NOTE — PATIENT INSTRUCTIONS
PLEASE READ YOUR DISCHARGE INSTRUCTIONS ENTIRELY AS IT CONTAINS IMPORTANT INFORMATION.      Take the antibiotics to completion.     Drink plenty of fluids, wipe front to back, take showers not baths, no scented soaps, wear breathable cotton underwear, urinate after sexual intercourse.     IF A URINE CULTURE WAS SENT: You will be contacted once it results and appropriate action will be taken if needed.       Avoid pyridium/AZO as this can affect your kidney function    If you are are female and on BCP use additional methods to prevent pregnancy while on the antibiotics and for one cycle after.   Cranberry juice may help. Get the 100% cranberry juice and mix 4 oz of juice with 4 oz of water and drink this 8 oz glass of liquid once a day.     Please go to the ER for worsening symptoms including fever, worsening flank pain, vomiting, etc.       Please return or see your primary care doctor if you develop new or worsening symptoms.     Please arrange follow up with your primary medical clinic as soon as possible. You must understand that you've received an Urgent Care treatment only and that you may be released before all of your medical problems are known or treated. You, the patient, will arrange for follow up as instructed. If your symptoms worsen or fail to improve you should go to the Emergency Room.    WE CANNOT RULE OUT ALL POSSIBLE CAUSES OF YOUR SYMPTOMS IN THE URGENT CARE SETTING PLEASE GO TO THE ER IF YOU FEELS YOUR CONDITION IS WORSENING OR YOU WOULD LIKE EMERGENT EVALUATION.

## 2022-09-14 NOTE — PROGRESS NOTES
"Subjective:       Patient ID: Rita Dejesus is a 66 y.o. female.    Vitals:  height is 5' 1" (1.549 m) and weight is 59 kg (130 lb). Her tympanic temperature is 98.2 °F (36.8 °C). Her blood pressure is 98/55 (abnormal) and her pulse is 78. Her respiration is 18 and oxygen saturation is 100%.     Chief Complaint: Dysuria    Pt presents today with UTI Concerns.Pt states that starting today she has been experiencing painful urination and urgency. Also notes lower abdomen/pelvic pain. pt states that her pain is 9/10.She hasn't taken any medications for theses symptoms.  Denies fever, nausea/vomiting, flank pain    Dysuria   This is a new problem. The current episode started acute onset. The problem has been gradually worsening. The quality of the pain is described as stabbing and burning. The pain is at a severity of 9/10. The pain is severe. There has been no fever. The fever has been present for Less than 1 day. She is Sexually active. Associated symptoms include frequency and urgency. Pertinent negatives include no behavior changes, chills, discharge, flank pain, hematuria, hesitancy, nausea, possible pregnancy, sweats, vomiting, weight loss, bubble bath use, constipation, rash or withholding. She has tried nothing for the symptoms. The treatment provided no relief.     Constitution: Negative for chills.   Gastrointestinal:  Negative for nausea, vomiting and constipation.   Genitourinary:  Positive for dysuria, frequency and urgency. Negative for flank pain and hematuria.   Skin:  Negative for rash.     Objective:      Physical Exam   Constitutional: She is oriented to person, place, and time. She appears well-developed.   HENT:   Head: Normocephalic and atraumatic.   Ears:   Right Ear: External ear normal.   Left Ear: External ear normal.   Nose: Nose normal. No nasal deformity. No epistaxis.   Mouth/Throat: Oropharynx is clear and moist and mucous membranes are normal.   Eyes: Lids are normal.   Neck: " Trachea normal and phonation normal. Neck supple.   Cardiovascular: Normal pulses.   Pulmonary/Chest: Effort normal.   Abdominal: Normal appearance and bowel sounds are normal. She exhibits no distension. Soft. There is no abdominal tenderness. There is no left CVA tenderness and no right CVA tenderness.   Neurological: She is alert and oriented to person, place, and time.   Skin: Skin is warm, dry and intact.   Psychiatric: Her speech is normal and behavior is normal.   Nursing note and vitals reviewed.      Results for orders placed or performed in visit on 09/14/22   POCT Urinalysis, Dipstick, Automated, W/O Scope   Result Value Ref Range    POC Blood, Urine Positive (A) Negative    POC Bilirubin, Urine Negative Negative    POC Urobilinogen, Urine Normal 0.1 - 1.1    POC Ketones, Urine Negative Negative    POC Protein, Urine Positive (A) Negative    POC Nitrates, Urine Negative Negative    POC Glucose, Urine Negative Negative    pH, UA 5.0     POC Specific Gravity, Urine 1.010 1.003 - 1.029    POC Leukocytes, Urine Positive (A) Negative       Assessment:       1. Acute cystitis with hematuria    2. Dysuria            Plan:         UA + leuks. Patient has allergy to sulfa drugs and macrobid. Will tx with fosfomycin. Ucx sent. Red flag signs/sx that warrants ED evaluation discussed with patient/parent who verbalized understanding      Acute cystitis with hematuria  -     CULTURE, URINE  -     fosfomycin (MONUROL) 3 gram Pack; Take 3 g by mouth once. for 1 dose  Dispense: 3 g; Refill: 0    Dysuria  -     POCT Urinalysis, Dipstick, Automated, W/O Scope       Patient Instructions   PLEASE READ YOUR DISCHARGE INSTRUCTIONS ENTIRELY AS IT CONTAINS IMPORTANT INFORMATION.      Take the antibiotics to completion.     Drink plenty of fluids, wipe front to back, take showers not baths, no scented soaps, wear breathable cotton underwear, urinate after sexual intercourse.     IF A URINE CULTURE WAS SENT: You will be contacted  once it results and appropriate action will be taken if needed.       Avoid pyridium/AZO as this can affect your kidney function    If you are are female and on BCP use additional methods to prevent pregnancy while on the antibiotics and for one cycle after.   Cranberry juice may help. Get the 100% cranberry juice and mix 4 oz of juice with 4 oz of water and drink this 8 oz glass of liquid once a day.     Please go to the ER for worsening symptoms including fever, worsening flank pain, vomiting, etc.       Please return or see your primary care doctor if you develop new or worsening symptoms.     Please arrange follow up with your primary medical clinic as soon as possible. You must understand that you've received an Urgent Care treatment only and that you may be released before all of your medical problems are known or treated. You, the patient, will arrange for follow up as instructed. If your symptoms worsen or fail to improve you should go to the Emergency Room.    WE CANNOT RULE OUT ALL POSSIBLE CAUSES OF YOUR SYMPTOMS IN THE URGENT CARE SETTING PLEASE GO TO THE ER IF YOU FEELS YOUR CONDITION IS WORSENING OR YOU WOULD LIKE EMERGENT EVALUATION.

## 2022-09-17 LAB — BACTERIA UR CULT: ABNORMAL

## 2022-09-19 ENCOUNTER — TELEPHONE (OUTPATIENT)
Dept: URGENT CARE | Facility: CLINIC | Age: 67
End: 2022-09-19
Payer: MEDICARE

## 2022-09-19 NOTE — TELEPHONE ENCOUNTER
Spoke with patient regarding positive urine culture.  E coli with resistance to multiple antibiotics.  Patient given fossa mycin with complete resolution of symptoms.  Discussed intermediate response to this antibiotic.  Return to clinic if symptoms return.  Patient verbalized understanding and agrees with plan.

## 2022-10-24 ENCOUNTER — CLINICAL SUPPORT (OUTPATIENT)
Dept: REHABILITATION | Facility: HOSPITAL | Age: 67
End: 2022-10-24
Payer: MEDICARE

## 2022-10-24 DIAGNOSIS — N39.46 MIXED STRESS AND URGE URINARY INCONTINENCE: ICD-10-CM

## 2022-10-24 DIAGNOSIS — R15.9 FREQUENT FECAL INCONTINENCE: Primary | ICD-10-CM

## 2022-10-24 PROCEDURE — 97112 NEUROMUSCULAR REEDUCATION: CPT

## 2022-10-24 PROCEDURE — 97530 THERAPEUTIC ACTIVITIES: CPT

## 2022-10-24 NOTE — PATIENT INSTRUCTIONS
Practice urge suppression in front of trigger (running water in kitchen) after urinating (don't go just fr the purpose of this exercise, pick a time after you already had to go pee). First practice this with a totally empty bladder (I.e., go immediately after urinating), and as this gets easier, you are going to practice at 30 min after, then 1 hour, then 1.5 hours, and so on so that the bladder is more full and difficulty increases.  Challenging but doable is out goal.        Pelvic Floor Diaphragmatic Breathing     The diaphragm is the most efficient muscle of breathing. It is a large, dome-shaped muscle located at the base of the lungs under the bottom of your ribcage. There are many benefits to diaphragmatic (belly) breathing including:     Strengthens the diaphragm (& other accessory muscles)   Helps slow down your breathing rate and calms you   Decreases oxygen demand and uses less effort and energy to breathe   Reduces stress and anxiety, which calms your nervous system   Improves the coordination and range of motion of your pelvic floor muscles, allowing them to relax       Technique:   Place one hand on your chest and one on your ribs/belly.  Try to push your hands out as you inhale. Feel your ribcage and abdomen expand.   As you exhale, blow the air out of your body. Feel your ribcage pull back in and down and your abdomen deflate.   Perform this for 5-10 minutes on most days of the week, allowing the entire body to relax as you do.               Whenever you inhale and expand your lower ribcage and abdomen, your pelvic floor muscles will relax and push down. Whenever you exhale and deflate your ribcage and abdomen, your pelvic floor muscles will tighten slightly and pull up. Your pelvic floor muscles copy the movement of your diaphragm! Try to tune in to this movement as you breathe.      Bowel Movement Body Mechanics    1. Sit on the toilet comfortably with legs and buttocks relaxed.  2. Put your  "feet on a step stool or squatty potty (8 inches tall). This helps the poop come out easier.  3. Use good "potty posture": Lean forward while keeping your back straight and rest your elbows or forearms on your knees, keeping the knees apart.  4. The pelvic floor has to fully relax for the poop to come out. Let your whole body relax, even letting the belly hang. Try to think about fully relaxing or "dropping" the pelvic floor muscles. You may want to do a few diaphragmatic breaths to help with this.  5. Exhale like you are blowing out birthday candles while you gently bear down. Do not strain or hold your breath!              With strong fecal urge that threatens to make you leak before making it to the bathroom, stop in place and squeeze the external anal sphincter (this is a kegel, but it is focused toward the back/around the anus). Try to hold it for 30 seconds before walking to the bathroom to suppress the urge, as 30 seconds is the required time to shut down the urge. This is not to delay going, this is to get you there without leaking.     After having a bowel movement, to encourage the valves to close so you don't have leakage, wipe 2x and then squeeze the external anal sphincter (this is a kegel, but it is focused toward the back/around the anus) 2x. Repeat as necessary (wipe 2x, squeeze 2x) until the tissue is clean. Do this EVERY TIME you have a bowel movement for 6-8 weeks to retrain the valves to close on their own.    DOUBLE VOIDING    Sometimes after you urinate, you may feel the urge to go again immediately or soon after. However, when you go back to the bathroom, only a few drops come out. This can be due to incomplete emptying of the bladder. Double voiding is a technique that may assist the bladder to empty more effectively when urine is left in the bladder at the end of urination. It involves passing urine more than once each time that you go to the toilet. This makes sure that the bladder is " completely empty.    Here are 3 strategies you can try to fully empty your bladder.  You do not have to do all of these things every single time. Find which ones work best for you.     Check to make sure your pelvic floor is relaxed, which is required for voiding completely.   Do a body scan - make sure your legs, buttocks, and abdominals are relaxed.  Take a couple deep, slow breaths to encourage your pelvic floor muscles to DROP (i.e., try diaphragmatic breathing).  Gently apply pressure over your bladder.  Change your pelvic position: lean forward, rock your pelvis forward and backward 2x and side to side 2x, stand up group home then sit back down 2x.     Wait at least 15-30 seconds to see if a second urine stream begins. If not, you may get up and leave the bathroom.

## 2022-10-24 NOTE — PLAN OF CARE
[facility]  Pelvic Health Physical Therapy Updated Plan of Care Note    Name: Rita Dejesus  Clinic Number: [id number]    Therapy Diagnosis:   Encounter Diagnoses   Name Primary?    Frequent fecal incontinence Yes    Mixed stress and urge urinary incontinence      Physician: Andra Painting MD    Visit Date: 10/24/2022    Physician Orders: PT Eval and Treat  Medical Diagnosis from Referral: Incontinence of feces, unspecified fecal incontinence type [R15.9], Mixed incontinence [N39.46]  Evaluation Date: 8/18/2022  Authorization Period Expiration: 12/31/2022  Plan of Care Expiration: 01/22/2023  Progress Note Due: 11/23/2022  Visit # / Visits Authorized: 3/20  Cancelled Visits: 0  No Show Visits: 0  FOTO: 1     Precautions: universal, GERD  Functional Level Prior to Evaluation: urinary and fecal leakage with ADLs, voiding dysfunction    SUBJECTIVE     Update: Pt reports today: Both urinary and fecal leakage are doing better - still having some leakage of both, but is able to get by with a small pad during the day. Did have a bad UTI (this is the first bad one she's had since pregnancy 40 years ago) about 2 weeks after last session. Had a lot more urinary leakage with this. No pain after internal rectal releases last time.   Since UTI, has been trying to keep stool consistency harder to prevent leakage so that she does not get another UTI.   Goes to poop every 3 days and reports this makes sense considering what she eats.   Has had an urge to poop since last night, tried to go this morning and nothing wanted to come out.    OBJECTIVE     Update:    Bladder History: none prior to 10 years ago; urinary leakage x 10 years; cystocele  Frequency of urination:   Daytime: every 4 hours or so, good volumes; at eval: sometimes it can be every hour, sometimes only 2-3x; small volumes at times           Nighttime: 1x; at eval: 1-2 or 3-4, this has been going on for as long as she can  remember  Difficulty initiating urine stream: no; at eval: No  Urine stream: has some come out then will sit and more comes out, strong stream now; at eval: weak at times, mostly strong  Complete emptying: Yes; at eval: Yes, double voids already  Urinary urgency: able to delay variable amounts of time - sometimes an hour, depending on bladder fullness and context; at eval: No, able to delay the urge for as long as desired  Bladder leakage: few times per week for urge, has not had CHICHI; at eval: Yes, sometimes CHICHI and urge UI  Frequency of incidents: see above; at eval: several times per day for urge UI, few times per week for CHICHI  Amount leaked (urine): few drops; at eval: once the urge UI starts, she cannot stop it; few drops for CHICHI  Pain/Bleeding: no other than with UTI; at eval: No    Form of protection: 1 light pad during the day; at eval: Depends at night, pad or depends during day  Number of pads required in 24 hours: 1 per day, 1 light pad at night but no soiling; at eval: 3 per day, 1 at night (sometimes soiled in morning)    Bowel History: had constipation prior to cancer diagnosis in 2014; anal cancer with chemo and radiation  Frequency of bowel movements: about every 3 days - reports this makes sense with how frequently she eats; at eval: usually every day, more than 1x per day (once she starts, she will have a good one and then it's like everything gets stuck and things will come out a little bit at a time - lots (half gallon in an hour's time) water will assist in moving them but she leaks fluid constantly after that)  Difficulty initiating BM: Sometimes, not as often as it used to be - sometimes things won't come out; at eval: Yes sometimes - when she can tell she needs to go (when she has the urge)  Quality/Shape of BM: BSC 4 and 5, every now and then has type 1; at eval: [place] Stool Chart 1 or 4 with no urge (finds this in her underwear sometimes); when she does have an urge it is a 3 or 4 (has  "urge incontinence with this at times); has passive leakage of mucousy type material at times  Complete emptying: Yes - feels like it now; at eval: No  Fecal urgency: No; at eval: Yes  Colon leakage: Yes - with 4 and 5 (no urge, finds in underwear after BM - 1-2x per week now), no more overwhelming urge or leakage with this, mucousy leakage after BM (almost every time she poops); at eval: Yes  Frequency of incidents: few times we week; at eval: several times per day   Amount leaked (bowels): streaking/staining only; at eval: streaking/staining, sometimes a few "drops" of type 1 or 4  OTC medication/supplementation?: quit Miralax, doing metamucil gummies, not doing protein shakes anymore, saw palmetto; at eval: very little Miralax daily, 4 Metamucil gummies per day; also drinks a protein shake 5 days per week that has 6 grams of fiber  Pain/Bleeding: occasional BRB but less due to less straining and it is just a drop; at eval: No, occasionally BRB    Types of fluid intake: NT; at eval: cup of tea in morning (sometimes caf, sometimes not), drinks water all day after this (sometimes large volumes), beer occasionally, unsweetened iced tea once in a while  Diet: NT; at eval: protein shake and banana in the morning, does not eat lunch all the time (may have a sandwich), for dinner may have a sandwich or red/white beans (no rice) with sausage or pork, not a lot of red meat      OB/GYN History: , vaginal delivery, episiotomy, Forceps-Assisted Vaginal Delivery  and menopause; cervical cancer  Sexually active?: NT; at eval: No, wants to be  Pain with vaginal exams, intercourse or tampon use?: NT; at eval: No  Appropriate lubrication/hydration?: NT; at eval: Yes      ASSESSMENT     Update: Pt with excellent tolerance for treatment today, reporting a reduction in urinary and feal leakage since eval. Pt also reporting an improvement in urinary emptying and stool consistency. Pt continues to demonstrate deficits in PFM " "strength, endurance, and coordination (particularly of the EAS) which need to be resolved in order to fully address incontinence. Pt will therefore continue to benefit from skilled PT services to improve condition and improve QOL. At next visit will continue to progress as tolerated per POC.    Previous Short Term Goals Status: 5/8 met  New Short Term Goals Status: N/A; continue unmet STGs; added PFM strength and endurance goal  Long Term Goal Status: continue per initial plan of care; added PFM strength and endurance goal  Reasons for Recertification of Therapy: Pt is making progress with therapy but has not yet met her goals.    Goals:  Short Term Goals: 6 weeks   - Pt will be I in diaphragmatic breathing with proper technique to promote relaxation and pelvic floor functional mobility for improved urinary and fecal continence with ADLs and improved QOL. - MET 10/24  - Pt to demonstrate proper positioning on commode with breathing techniques to decrease strain with BM to enable pt to feel empty after BM. - MET 10/24  - Pt to demonstrate independence with performing bowel massage to help with gut motility. - NOT MET 10/24  - Pt to correctly and consistently perform "the knack" prior to coughing, laughing or sneezing to decrease risk of leakage. - NOT MET 10/24  - Pt will be able to correctly and consistently explain urge control strategies to demonstrate understanding of these strategies, decrease likelihood of leakage, and increase time between voids. - MET 10/24  - Pt to voice understanding of the role that diet and fluid intake plays on urinary urgency. - MET 10/24  - Pt will report a 50% reduction in frequency of leakage to demonstrate improved pelvic floor coordination needed for continence with ADLs. - MET 10/24  - Pt will report ability to successfully perform double voiding for complete urinary emptying after initial void to prevent immediate return to bathroom. - PARTIALLY MET 10/24  - Pt to increase pelvic " floor strength to at least 3+/5 to demonstrate improved strength needed for continence with ADLs.   - Pt will demonstrate ability to contract external anal sphincter x 15 seconds for improved suppression of fecal urge and improved fecal continence with ADLs.    Long Term Goals: 12 weeks   - Pt to be I with home plan for carry over after discharge. - PARTIALLY MET 10/24  - Pt to be able to bulge pelvic floor with proper technique and no dyssynergia of EAS, which is needed for comfortable BM and complete evacuation. - NOT MET 10/24  - Pt will report a 75% reduction in frequency of leakage to demonstrate improved pelvic floor coordination needed for continence with ADLs. - PARTIALLY MET 10/24  - Pt to report improved restorative sleeping, waking up no more than 0-1x/night due to urge to urinate. - MET 10/24  - Pt to be educated on strategies for pressure management and appropriate bear down technique to prevent worsening of prolapse for maintenance of urinary continence long-term. - NOT MET 10/24  - Pt will be independent with use of dilators in order to progress towards self management of pelvic pain. - NOT MET 10/24  - Pt to increase pelvic floor strength to at least 4/5 to demonstrate improved strength needed for continence with ADLs.   - Pt will demonstrate ability to contract external anal sphincter x 30 seconds for improved suppression of fecal urge and improved fecal continence with ADLs.  - Pt to demonstrate an improved score in the FOTO Bowel Leakage survey to 42% or less to demonstrate improving pelvic floor function for improved fecal continence with ADLs and improved QOL. - NOT ASSESSED 10/24    PLAN     Updated Certification Period: 10/24/2022 to 01/22/2023   Recommended Treatment Plan: 1-2 times per week for 12 weeks: therapeutic exercises, therapeutic activity, neuromuscular re-education, gait training, manual therapy, modalities PRN, patient/family education, dry needling, and self care/home  management  Other Recommendations: N/A    Rhiannon Santos, PT, DPT    I CERTIFY THE NEED FOR THESE SERVICES FURNISHED UNDER THIS PLAN OF TREATMENT AND WHILE UNDER MY CARE  Physician's comments:      Physician's Signature: ___________________________________________________

## 2022-10-24 NOTE — PROGRESS NOTES
Pelvic Health Physical Therapy   Treatment Note     Name: Rita Dejesus  Clinic Number: [id number]    Therapy Diagnosis:   Encounter Diagnoses   Name Primary?    Frequent fecal incontinence Yes    Mixed stress and urge urinary incontinence        Physician: Adnra Painting MD    Visit Date: 10/24/2022    Physician Orders: PT Eval and Treat  Medical Diagnosis from Referral: Incontinence of feces, unspecified fecal incontinence type [R15.9], Mixed incontinence [N39.46]  Evaluation Date: 8/18/2022  Authorization Period Expiration: 12/31/2022  Plan of Care Expiration: 01/22/2023  Progress Note Due: 11/23/2022  Visit # / Visits Authorized: 3/20  Cancelled Visits: 0  No Show Visits: 0  FOTO: 1     Precautions: universal, GERD    Time In: 9:06 am  Time Out: 10:00 am  Total Billable Time: 54 minutes    Subjective     Pt reports today: Both urinary and fecal leakage are doing better - still having some leakage of both, but is able to get by with a small pad during the day. Did have a bad UTI (this is the first bad one she's had since pregnancy 40 years ago) about 2 weeks after last session. Had a lot more urinary leakage with this. No pain after internal rectal releases last time.   Since UTI, has been trying to keep stool consistency harder to prevent leakage so that she does not get another UTI.   Goes to poop every 3 days and reports this makes sense considering what she eats.   Has had an urge to poop since last night, tried to go this morning and nothing wanted to come out.    She was compliant with home exercise program.  Response to previous treatment: N/A  Functional change: N/A    Pain Pre-Treatment: 0/10  Pain Post-Treatment: 0/10  Location: N/A    Constitutional Symptoms Review: The patient denies having any constitutional symptoms.     Objective     Bladder History: none prior to 10 years ago; urinary leakage x 10 years; cystocele  Frequency of urination:   Daytime: every 4 hours or so, good volumes;  at eval: sometimes it can be every hour, sometimes only 2-3x; small volumes at times           Nighttime: 1x; at eval: 1-2 or 3-4, this has been going on for as long as she can remember  Difficulty initiating urine stream: no; at eval: No  Urine stream: has some come out then will sit and more comes out, strong stream now; at eval: weak at times, mostly strong  Complete emptying: Yes; at eval: Yes, double voids already  Urinary urgency: able to delay variable amounts of time - sometimes an hour, depending on bladder fullness and context; at eval: No, able to delay the urge for as long as desired  Bladder leakage: few times per week for urge, has not had CHICHI; at eval: Yes, sometimes CHICHI and urge UI  Frequency of incidents: see above; at eval: several times per day for urge UI, few times per week for CHICHI  Amount leaked (urine): few drops; at eval: once the urge UI starts, she cannot stop it; few drops for CHICHI  Pain/Bleeding: no other than with UTI; at eval: No    Form of protection: 1 light pad during the day; at eval: Depends at night, pad or depends during day  Number of pads required in 24 hours: 1 per day, 1 light pad at night but no soiling; at eval: 3 per day, 1 at night (sometimes soiled in morning)    Bowel History: had constipation prior to cancer diagnosis in 2014; anal cancer with chemo and radiation  Frequency of bowel movements: about every 3 days - reports this makes sense with how frequently she eats; at eval: usually every day, more than 1x per day (once she starts, she will have a good one and then it's like everything gets stuck and things will come out a little bit at a time - lots (half gallon in an hour's time) water will assist in moving them but she leaks fluid constantly after that)  Difficulty initiating BM: Sometimes, not as often as it used to be - sometimes things won't come out; at eval: Yes sometimes - when she can tell she needs to go (when she has the urge)  Quality/Shape of BM: BSC 4  "and 5, every now and then has type 1; at eval: [place] Stool Chart 1 or 4 with no urge (finds this in her underwear sometimes); when she does have an urge it is a 3 or 4 (has urge incontinence with this at times); has passive leakage of mucousy type material at times  Complete emptying: Yes - feels like it now; at eval: No  Fecal urgency: No; at eval: Yes  Colon leakage: Yes - with 4 and 5 (no urge, finds in underwear after BM - 1-2x per week now), no more overwhelming urge or leakage with this, mucousy leakage after BM (almost every time she poops); at eval: Yes  Frequency of incidents: few times we week; at eval: several times per day   Amount leaked (bowels): streaking/staining only; at eval: streaking/staining, sometimes a few "drops" of type 1 or 4  OTC medication/supplementation?: quit Miralax, doing metamucil gummies, not doing protein shakes anymore, saw palmetto; at eval: very little Miralax daily, 4 Metamucil gummies per day; also drinks a protein shake 5 days per week that has 6 grams of fiber  Pain/Bleeding: occasional BRB but less due to less straining and it is just a drop; at eval: No, occasionally BRB    Types of fluid intake: NT; at eval: cup of tea in morning (sometimes caf, sometimes not), drinks water all day after this (sometimes large volumes), beer occasionally, unsweetened iced tea once in a while  Diet: NT; at eval: protein shake and banana in the morning, does not eat lunch all the time (may have a sandwich), for dinner may have a sandwich or red/white beans (no rice) with sausage or pork, not a lot of red meat      OB/GYN History: , vaginal delivery, episiotomy, Forceps-Assisted Vaginal Delivery  and menopause; cervical cancer  Sexually active?: NT; at eval: No, wants to be  Pain with vaginal exams, intercourse or tampon use?: NT; at eval: No  Appropriate lubrication/hydration?: NT; at eval: Yes      Rita participated in neuromuscular re-education activities to develop Coordination, " Control, Down training, Proprioception, and Sense for 25 minutes including:     Completed reassessment of PF coordination through symptom report    Deferred:  Diaphragmatic breathing for PF relaxation and awareness - pt with good form, reporting difficulty avoiding volitionally bearing down with inhale  Internal rectal:  Completed internal assessment of PF coordination      Rita received the following manual therapy techniques: to develop extensibility and desensitization for 0 minutes including:     Deferred:  Internal rectal:   TrP release to: B coccygeus, B levator ani  P/A of tailbone (gentle)      Rita participated in dynamic functional therapeutic activities to improve functional performance for 29 minutes, including: Education as described below.        Home Exercises Provided and Patient Education Provided     Education Provided:   - behavior modifications  Discussed progression of plan of care with patient; educated pt in activity modification; reviewed HEP with pt. Pt demonstrated and verbalized understanding of all instruction and was provided with a handout of HEP (see Patient Instructions).  - PFM need to relax to urinate  - Don't push to pee  - Double voiding  - Reviewed urinary urge suppression  - Practicing urge suppression in front of triggers   - 7-9 inches is target for standard toilet height  Deferred:  - Digestive system, defecation reflex, RAIR, IAS and EAS, etc.  - Wipe 2x, squeeze 2x  - Fecal urge suppression  - Poop positioning, blow out to bear down  - PFM exam and results   - Diaphragmatic breathing - technique, benefits, PF ROM, etc.    Written Home Exercises Provided: yes.  Exercises were reviewed and Rita was able to demonstrate them prior to the end of the session.  Rita demonstrated good  understanding of the education provided.     See EMR under Patient Instructions for exercises provided 10/24/2022.    Assessment     Pt with excellent tolerance for treatment today, reporting  "a reduction in urinary and feal leakage since eval. Pt also reporting an improvement in urinary emptying and stool consistency. Pt continues to demonstrate deficits in PFM strength, endurance, and coordination (particularly of the EAS) which need to be resolved in order to fully address incontinence. Pt will therefore continue to benefit from skilled PT services to improve condition and improve QOL. At next visit will continue to progress as tolerated per POC.    Rita Is progressing well towards her goals.   Pt prognosis is Fair.     Pt will continue to benefit from skilled outpatient physical therapy to address the deficits listed in the problem list box on initial evaluation, provide pt/family education and to maximize pt's level of independence in the home and community environment.     Pt's spiritual, cultural and educational needs considered and pt agreeable to plan of care and goals.     Anticipated barriers to physical therapy: irradiated tissue, chronicity of symptoms    Goals:  Short Term Goals: 6 weeks   - Pt will be I in diaphragmatic breathing with proper technique to promote relaxation and pelvic floor functional mobility for improved urinary and fecal continence with ADLs and improved QOL. - MET 10/24  - Pt to demonstrate proper positioning on commode with breathing techniques to decrease strain with BM to enable pt to feel empty after BM. - MET 10/24  - Pt to demonstrate independence with performing bowel massage to help with gut motility. - NOT MET 10/24  - Pt to correctly and consistently perform "the knack" prior to coughing, laughing or sneezing to decrease risk of leakage. - NOT MET 10/24  - Pt will be able to correctly and consistently explain urge control strategies to demonstrate understanding of these strategies, decrease likelihood of leakage, and increase time between voids. - MET 10/24  - Pt to voice understanding of the role that diet and fluid intake plays on urinary urgency. - MET " 10/24  - Pt will report a 50% reduction in frequency of leakage to demonstrate improved pelvic floor coordination needed for continence with ADLs. - MET 10/24  - Pt will report ability to successfully perform double voiding for complete urinary emptying after initial void to prevent immediate return to bathroom. - PARTIALLY MET 10/24  - Pt to increase pelvic floor strength to at least 3+/5 to demonstrate improved strength needed for continence with ADLs.   - Pt will demonstrate ability to contract external anal sphincter x 15 seconds for improved suppression of fecal urge and improved fecal continence with ADLs.    Long Term Goals: 12 weeks   - Pt to be I with home plan for carry over after discharge. - PARTIALLY MET 10/24  - Pt to be able to bulge pelvic floor with proper technique and no dyssynergia of EAS, which is needed for comfortable BM and complete evacuation. - NOT MET 10/24  - Pt will report a 75% reduction in frequency of leakage to demonstrate improved pelvic floor coordination needed for continence with ADLs. - PARTIALLY MET 10/24  - Pt to report improved restorative sleeping, waking up no more than 0-1x/night due to urge to urinate. - MET 10/24  - Pt to be educated on strategies for pressure management and appropriate bear down technique to prevent worsening of prolapse for maintenance of urinary continence long-term. - NOT MET 10/24  - Pt will be independent with use of dilators in order to progress towards self management of pelvic pain. - NOT MET 10/24  - Pt to increase pelvic floor strength to at least 4/5 to demonstrate improved strength needed for continence with ADLs.   - Pt will demonstrate ability to contract external anal sphincter x 30 seconds for improved suppression of fecal urge and improved fecal continence with ADLs.  - Pt to demonstrate an improved score in the FOTO Bowel Leakage survey to 42% or less to demonstrate improving pelvic floor function for improved fecal continence with  ADLs and improved QOL. - NOT ASSESSED 10/24    Plan     Continue per established POC as tolerated.    Rhiannon Santos, PT, DPT

## 2022-10-31 ENCOUNTER — CLINICAL SUPPORT (OUTPATIENT)
Dept: REHABILITATION | Facility: HOSPITAL | Age: 67
End: 2022-10-31
Payer: MEDICARE

## 2022-10-31 DIAGNOSIS — N39.46 MIXED STRESS AND URGE URINARY INCONTINENCE: ICD-10-CM

## 2022-10-31 DIAGNOSIS — R15.9 FREQUENT FECAL INCONTINENCE: Primary | ICD-10-CM

## 2022-10-31 PROCEDURE — 97530 THERAPEUTIC ACTIVITIES: CPT

## 2022-10-31 NOTE — PROGRESS NOTES
Pelvic Health Physical Therapy   Treatment Note     Name: Rita Dejesus  Clinic Number: [id number]    Therapy Diagnosis:   Encounter Diagnoses   Name Primary?    Frequent fecal incontinence Yes    Mixed stress and urge urinary incontinence      Physician: Andra Painting MD    Visit Date: 10/31/2022    Physician Orders: PT Eval and Treat  Medical Diagnosis from Referral: Incontinence of feces, unspecified fecal incontinence type [R15.9], Mixed incontinence [N39.46]  Evaluation Date: 8/18/2022  Authorization Period Expiration: 12/31/2022  Plan of Care Expiration: 01/22/2023  Progress Note Due: 11/23/2022  Visit # / Visits Authorized: 4/20  Cancelled Visits: 0  No Show Visits: 0  FOTO: 1     Precautions: universal, GERD    Time In: 7:07 am  Time Out: 8:00 am  Total Billable Time: 53 minutes    Subjective     Pt reports today: Realized she is having type 1 leakage passively as well as type 4 and 5. When she goes to void, it will be a 4 and 5 - she feels that poop is there but she cannot get it out. Feels like she stays clenched all the time - has already been doing check ins without direction.   Has been doing double voiding and finds that she does get more out - urine and stool.   Has forgotten to practice urge suppression in front of triggers with empty bladder, did practice urinary urge suppression with actual urge the other night and had leakage.     She was compliant with home exercise program.  Response to previous treatment: N/A  Functional change: N/A    Pain Pre-Treatment: 0/10  Pain Post-Treatment: 0/10  Location: N/A    Constitutional Symptoms Review: The patient denies having any constitutional symptoms.     Objective     Rita participated in neuromuscular re-education activities to develop Coordination, Control, Down training, Proprioception, and Sense for 0 minutes including:     Deferred:  Completed reassessment of PF coordination through symptom report  Diaphragmatic breathing for PF  relaxation and awareness - pt with good form, reporting difficulty avoiding volitionally bearing down with inhale  Internal rectal:  Completed internal assessment of PF coordination      Rita received the following manual therapy techniques: to develop extensibility and desensitization for 0 minutes including:     Deferred:  Internal rectal:   TrP release to: B coccygeus, B levator ani  P/A of tailbone (gentle)      Rita participated in dynamic functional therapeutic activities to improve functional performance for 53 minutes, including: Education as described below.        Home Exercises Provided and Patient Education Provided     Education Provided:   - behavior modifications  Discussed progression of plan of care with patient; educated pt in activity modification; reviewed HEP with pt. Pt demonstrated and verbalized understanding of all instruction and was provided with a handout of HEP (see Patient Instructions).  - PF check ins  - Pressure management  - Constipation can contribute to GERD  - Discussed things that assist with relaxation of PFM for urinating  - Reviewed double voiding  - Reviewed practicing urge suppression in front of triggers   Deferred:  - PFM need to relax to urinate  - Don't push to pee  - Double voiding  - Reviewed urinary urge suppression  - 7-9 inches is target for standard toilet height  - Digestive system, defecation reflex, RAIR, IAS and EAS, etc.  - Wipe 2x, squeeze 2x  - Fecal urge suppression  - Poop positioning, blow out to bear down  - PFM exam and results   - Diaphragmatic breathing - technique, benefits, PF ROM, etc.    Written Home Exercises Provided: yes.  Exercises were reviewed and Rita was able to demonstrate them prior to the end of the session.  Rita demonstrated good  understanding of the education provided.     See EMR under Patient Instructions for exercises provided 10/31/2022.    Assessment     Pt with good tolerance for treatment today, reporting good  "understanding of all education provided. Continues to report passive leakage of stool as well as urinary urge incontinence although decreased from eval. Educated on check ins today to see impact of PFM relaxation on this issue. At next visit will continue to progress as tolerated per POC.    Rita Is progressing well towards her goals.   Pt prognosis is Fair.     Pt will continue to benefit from skilled outpatient physical therapy to address the deficits listed in the problem list box on initial evaluation, provide pt/family education and to maximize pt's level of independence in the home and community environment.     Pt's spiritual, cultural and educational needs considered and pt agreeable to plan of care and goals.     Anticipated barriers to physical therapy: irradiated tissue, chronicity of symptoms    Goals:  Short Term Goals: 6 weeks   - Pt will be I in diaphragmatic breathing with proper technique to promote relaxation and pelvic floor functional mobility for improved urinary and fecal continence with ADLs and improved QOL. - MET 10/24  - Pt to demonstrate proper positioning on commode with breathing techniques to decrease strain with BM to enable pt to feel empty after BM. - MET 10/24  - Pt to demonstrate independence with performing bowel massage to help with gut motility. - NOT MET 10/24  - Pt to correctly and consistently perform "the knack" prior to coughing, laughing or sneezing to decrease risk of leakage. - NOT MET 10/24  - Pt will be able to correctly and consistently explain urge control strategies to demonstrate understanding of these strategies, decrease likelihood of leakage, and increase time between voids. - MET 10/24  - Pt to voice understanding of the role that diet and fluid intake plays on urinary urgency. - MET 10/24  - Pt will report a 50% reduction in frequency of leakage to demonstrate improved pelvic floor coordination needed for continence with ADLs. - MET 10/24  - Pt will report " ability to successfully perform double voiding for complete urinary emptying after initial void to prevent immediate return to bathroom. - PARTIALLY MET 10/24  - Pt to increase pelvic floor strength to at least 3+/5 to demonstrate improved strength needed for continence with ADLs.   - Pt will demonstrate ability to contract external anal sphincter x 15 seconds for improved suppression of fecal urge and improved fecal continence with ADLs.    Long Term Goals: 12 weeks   - Pt to be I with home plan for carry over after discharge. - PARTIALLY MET 10/24  - Pt to be able to bulge pelvic floor with proper technique and no dyssynergia of EAS, which is needed for comfortable BM and complete evacuation. - NOT MET 10/24  - Pt will report a 75% reduction in frequency of leakage to demonstrate improved pelvic floor coordination needed for continence with ADLs. - PARTIALLY MET 10/24  - Pt to report improved restorative sleeping, waking up no more than 0-1x/night due to urge to urinate. - MET 10/24  - Pt to be educated on strategies for pressure management and appropriate bear down technique to prevent worsening of prolapse for maintenance of urinary continence long-term. - NOT MET 10/24  - Pt will be independent with use of dilators in order to progress towards self management of pelvic pain. - NOT MET 10/24  - Pt to increase pelvic floor strength to at least 4/5 to demonstrate improved strength needed for continence with ADLs.   - Pt will demonstrate ability to contract external anal sphincter x 30 seconds for improved suppression of fecal urge and improved fecal continence with ADLs.  - Pt to demonstrate an improved score in the FOTO Bowel Leakage survey to 42% or less to demonstrate improving pelvic floor function for improved fecal continence with ADLs and improved QOL. - NOT ASSESSED 10/24    Plan     Continue per established POC as tolerated.    Rhiannon Santos, PT, DPT

## 2022-10-31 NOTE — PATIENT INSTRUCTIONS
"Daily Exercise: belly breathing (a few minutes), practice urge suppression in front of trigger with empty bladder (1x per day)  Things to Focus on Daily: CHECK INS!, pressure management  Habits: urinary urge suppression, wipe 2x/squeeze 2x, double voiding, relaxing pelvic floor muscles to urinate and not pushing, new poop positioning, blow out to bear down        Practice urge suppression in front of trigger (running water in kitchen) after urinating (don't go just for the purpose of this exercise, pick a time after you already had to go pee). First practice this with a totally empty bladder (I.e., go immediately after urinating), and as this gets easier, you are going to practice at 30 min after, then 1 hour, then 1.5 hours, and so on so that the bladder is more full and difficulty increases. Challenging but doable is out goal.      Check Ins  This exercise helps the muscles learn to stop clenching constantly  Start doing pelvic floor check ins. For this, I want you to periodically throughout the day check in with your pelvic floor muscles and your tummy/abdominals. See if you are gripping or clenching these muscles, and if you are, try to relax them. Over time, you will learn to break this habit. Tips for relaxing the muscles: first, just try to DROP the muscles. If that doesn't work, try to squeeze and then DROP those muscles. If even that doesn't work, then try a few of the belly breaths that we learned.      Pressure Management ("Blow As You Go")    Blow as you go! Any time you might hold the breath and strain, try to avoid doing this by blowing out (like you are blowing out birthday candles) instead. Straining like this puts undue pressure on the pelvic floor and abdominal wall. Good times to do this include: when picking up baby or anything heavy, when bearing down to have a bowel movement, pushing open a heavy door, standing from a low chair with lots of effort, on the harder phase of an exercise, " "etc.      Pelvic Floor Diaphragmatic Breathing     The diaphragm is the most efficient muscle of breathing. It is a large, dome-shaped muscle located at the base of the lungs under the bottom of your ribcage. There are many benefits to diaphragmatic (belly) breathing including:     Strengthens the diaphragm (& other accessory muscles)   Helps slow down your breathing rate and calms you   Decreases oxygen demand and uses less effort and energy to breathe   Reduces stress and anxiety, which calms your nervous system   Improves the coordination and range of motion of your pelvic floor muscles, allowing them to relax       Technique:   Place one hand on your chest and one on your ribs/belly.  Try to push your hands out as you inhale. Feel your ribcage and abdomen expand.   As you exhale, blow the air out of your body. Feel your ribcage pull back in and down and your abdomen deflate.   Perform this for 5-10 minutes on most days of the week, allowing the entire body to relax as you do.               Whenever you inhale and expand your lower ribcage and abdomen, your pelvic floor muscles will relax and push down. Whenever you exhale and deflate your ribcage and abdomen, your pelvic floor muscles will tighten slightly and pull up. Your pelvic floor muscles copy the movement of your diaphragm! Try to tune in to this movement as you breathe.      Bowel Movement Body Mechanics    1. Sit on the toilet comfortably with legs and buttocks relaxed.  2. Put your feet on a step stool or squatty potty (8 inches tall). This helps the poop come out easier.  3. Use good "potty posture": Lean forward while keeping your back straight and rest your elbows or forearms on your knees, keeping the knees apart.  4. The pelvic floor has to fully relax for the poop to come out. Let your whole body relax, even letting the belly hang. Try to think about fully relaxing or "dropping" the pelvic floor muscles. You may want to do a few " diaphragmatic breaths to help with this.  5. Exhale like you are blowing out birthday candles while you gently bear down. Do not strain or hold your breath!              With strong fecal urge that threatens to make you leak before making it to the bathroom, stop in place and squeeze the external anal sphincter (this is a kegel, but it is focused toward the back/around the anus). Try to hold it for 30 seconds before walking to the bathroom to suppress the urge, as 30 seconds is the required time to shut down the urge. This is not to delay going, this is to get you there without leaking.     After having a bowel movement, to encourage the valves to close so you don't have leakage, wipe 2x and then squeeze the external anal sphincter (this is a kegel, but it is focused toward the back/around the anus) 2x. Repeat as necessary (wipe 2x, squeeze 2x) until the tissue is clean. Do this EVERY TIME you have a bowel movement for 6-8 weeks to retrain the valves to close on their own.      When urinating, sit down on the toilet and relax your whole body. Turn the knees in and feet out and relax the whole body. Then focus on getting the pelvic floor muscles to relax, using whatever visualization helps you - think of the valve around the urethra releasing, etc. Can do some belly breaths as well.       DOUBLE VOIDING    Sometimes after you urinate, you may feel the urge to go again immediately or soon after. However, when you go back to the bathroom, only a few drops come out. This can be due to incomplete emptying of the bladder. Double voiding is a technique that may assist the bladder to empty more effectively when urine is left in the bladder at the end of urination. It involves passing urine more than once each time that you go to the toilet. This makes sure that the bladder is completely empty.    Here are 3 strategies you can try to fully empty your bladder.  You do not have to do all of these things every single time. Find  which ones work best for you.     Check to make sure your pelvic floor is relaxed, which is required for voiding completely.   Do a body scan - make sure your legs, buttocks, and abdominals are relaxed.  Take a couple deep, slow breaths to encourage your pelvic floor muscles to DROP (i.e., try diaphragmatic breathing).  Gently apply pressure over your bladder.  Change your pelvic position: lean forward, rock your pelvis forward and backward 2x and side to side 2x, stand up retirement then sit back down 2x.     Wait at least 15-30 seconds to see if a second urine stream begins. If not, you may get up and leave the bathroom.

## 2022-11-01 ENCOUNTER — PATIENT MESSAGE (OUTPATIENT)
Dept: GASTROENTEROLOGY | Facility: CLINIC | Age: 67
End: 2022-11-01
Payer: MEDICARE

## 2022-11-04 ENCOUNTER — CLINICAL SUPPORT (OUTPATIENT)
Dept: REHABILITATION | Facility: HOSPITAL | Age: 67
End: 2022-11-04
Payer: MEDICARE

## 2022-11-04 DIAGNOSIS — N39.46 MIXED STRESS AND URGE URINARY INCONTINENCE: ICD-10-CM

## 2022-11-04 DIAGNOSIS — R15.9 FREQUENT FECAL INCONTINENCE: Primary | ICD-10-CM

## 2022-11-04 PROCEDURE — 97530 THERAPEUTIC ACTIVITIES: CPT

## 2022-11-04 PROCEDURE — 97140 MANUAL THERAPY 1/> REGIONS: CPT

## 2022-11-04 PROCEDURE — 97112 NEUROMUSCULAR REEDUCATION: CPT

## 2022-11-04 NOTE — PROGRESS NOTES
Pelvic Health Physical Therapy   Treatment Note     Name: Rita Dejesus  Clinic Number: [id number]    Therapy Diagnosis:   Encounter Diagnoses   Name Primary?    Frequent fecal incontinence Yes    Mixed stress and urge urinary incontinence        Physician: Andra Painting MD    Visit Date: 11/4/2022    Physician Orders: PT Eval and Treat  Medical Diagnosis from Referral: Incontinence of feces, unspecified fecal incontinence type [R15.9], Mixed incontinence [N39.46]  Evaluation Date: 8/18/2022  Authorization Period Expiration: 12/31/2022  Plan of Care Expiration: 01/22/2023  Progress Note Due: 11/23/2022  Visit # / Visits Authorized: 5/20  Cancelled Visits: 0  No Show Visits: 0  FOTO: 1     Precautions: universal, GERD    Time In: 8:09 am  Time Out: 9:08 am  Total Billable Time: 59 minutes    Subjective     Pt reports today: Reports check ins are going well - does this very frequently and notices that she is holding tension 95% (was 100% of the time before). Also feels like her whole body is less tense and she is less anxious in general. Did practice urge suppression in front of triggers with completely empty bladder and discovered that she still has lots of urgency. Has some urinary leakage with getting out of bed this morning (had lots of urge). Has tried techniques for PFM relaxation on the toilet and feels she has difficulty relaxing. Gets about the same amount out with double voiding.   Still having fecal leakage with build up (type 1 balls coming out) and then after BM has leakage of mucous. Has added lots of fiber.     She was compliant with home exercise program.  Response to previous treatment: N/A  Functional change: N/A    Pain Pre-Treatment: 0/10  Pain Post-Treatment: 0/10  Location: N/A    Constitutional Symptoms Review: The patient denies having any constitutional symptoms.     Objective     Pt verbally consents to intravaginal treatment today. Signed consent form already on file.        Rita participated in neuromuscular re-education activities to develop Coordination, Control, Down training, Proprioception, and Sense for 10 minutes including:     Diaphragmatic breathing for PF relaxation and awareness  Internal vaginal:  Diaphragmatic breathing for PF relaxation and awareness - cues to reduce bearing down, good response      Deferred:  Completed reassessment of PF coordination through symptom report  Diaphragmatic breathing for PF relaxation and awareness - pt with good form, reporting difficulty avoiding volitionally bearing down with inhale  Internal rectal:  Completed internal assessment of PF coordination      Rita received the following manual therapy techniques: to develop extensibility and desensitization for 15 minutes including:     Internal vaginal:  TrP release to: B OI, B deep, mod, and superficial layers of PFM (no tenderness to L superficial layer)      Deferred:  Internal rectal:   TrP release to: B coccygeus, B levator ani  P/A of tailbone (gentle)      Rita participated in dynamic functional therapeutic activities to improve functional performance for 34 minutes, including:   - Education as described below.    - Practiced supine to sit for reduced intra-abdominal pressure for reduced urinary leakage      Home Exercises Provided and Patient Education Provided     Education Provided:   - behavior modifications  Discussed progression of plan of care with patient; educated pt in activity modification; reviewed HEP with pt. Pt demonstrated and verbalized understanding of all instruction and was provided with a handout of HEP (see Patient Instructions).  - Bed mobility for reduced pressure with getting out of bed in morning and having to urinate  - Discussed ensuring she practices urge suppression in front of triggers during least urgent time of day  Deferred:  - PF check ins  - Pressure management  - Constipation can contribute to GERD  - Discussed things that assist with  relaxation of PFM for urinating  - Reviewed double voiding  - Reviewed practicing urge suppression in front of triggers     - PFM need to relax to urinate  - Don't push to pee  - Double voiding  - Reviewed urinary urge suppression  - 7-9 inches is target for standard toilet height  - Digestive system, defecation reflex, RAIR, IAS and EAS, etc.  - Wipe 2x, squeeze 2x  - Fecal urge suppression  - Poop positioning, blow out to bear down  - PFM exam and results   - Diaphragmatic breathing - technique, benefits, PF ROM, etc.    Written Home Exercises Provided: yes.  Exercises were reviewed and Rita was able to demonstrate them prior to the end of the session.  Rita demonstrated good  understanding of the education provided.     See EMR under Patient Instructions for exercises provided 10/31/2022.    Assessment     Pt with good tolerance for treatment today, reporting good understanding of all education provided. Reporting success with use of check ins. Pt with excellent response to internal manual treatment of TrP (vaginally). Will progress to rectal treatment at next visit. Continuing to assess for effectiveness of interventions on urinary and fecal leakage, both of which have improved but have not resolved. At next visit will continue to progress as tolerated per POC.    Rita Is progressing well towards her goals.   Pt prognosis is Fair.     Pt will continue to benefit from skilled outpatient physical therapy to address the deficits listed in the problem list box on initial evaluation, provide pt/family education and to maximize pt's level of independence in the home and community environment.     Pt's spiritual, cultural and educational needs considered and pt agreeable to plan of care and goals.     Anticipated barriers to physical therapy: irradiated tissue, chronicity of symptoms    Goals:  Short Term Goals: 6 weeks   - Pt will be I in diaphragmatic breathing with proper technique to promote relaxation and  "pelvic floor functional mobility for improved urinary and fecal continence with ADLs and improved QOL. - MET 10/24  - Pt to demonstrate proper positioning on commode with breathing techniques to decrease strain with BM to enable pt to feel empty after BM. - MET 10/24  - Pt to demonstrate independence with performing bowel massage to help with gut motility. - NOT MET 10/24  - Pt to correctly and consistently perform "the knack" prior to coughing, laughing or sneezing to decrease risk of leakage. - NOT MET 10/24  - Pt will be able to correctly and consistently explain urge control strategies to demonstrate understanding of these strategies, decrease likelihood of leakage, and increase time between voids. - MET 10/24  - Pt to voice understanding of the role that diet and fluid intake plays on urinary urgency. - MET 10/24  - Pt will report a 50% reduction in frequency of leakage to demonstrate improved pelvic floor coordination needed for continence with ADLs. - MET 10/24  - Pt will report ability to successfully perform double voiding for complete urinary emptying after initial void to prevent immediate return to bathroom. - PARTIALLY MET 10/24  - Pt to increase pelvic floor strength to at least 3+/5 to demonstrate improved strength needed for continence with ADLs.   - Pt will demonstrate ability to contract external anal sphincter x 15 seconds for improved suppression of fecal urge and improved fecal continence with ADLs.    Long Term Goals: 12 weeks   - Pt to be I with home plan for carry over after discharge. - PARTIALLY MET 10/24  - Pt to be able to bulge pelvic floor with proper technique and no dyssynergia of EAS, which is needed for comfortable BM and complete evacuation. - NOT MET 10/24  - Pt will report a 75% reduction in frequency of leakage to demonstrate improved pelvic floor coordination needed for continence with ADLs. - PARTIALLY MET 10/24  - Pt to report improved restorative sleeping, waking up no more " than 0-1x/night due to urge to urinate. - MET 10/24  - Pt to be educated on strategies for pressure management and appropriate bear down technique to prevent worsening of prolapse for maintenance of urinary continence long-term. - NOT MET 10/24  - Pt will be independent with use of dilators in order to progress towards self management of pelvic pain. - NOT MET 10/24  - Pt to increase pelvic floor strength to at least 4/5 to demonstrate improved strength needed for continence with ADLs.   - Pt will demonstrate ability to contract external anal sphincter x 30 seconds for improved suppression of fecal urge and improved fecal continence with ADLs.  - Pt to demonstrate an improved score in the FOTO Bowel Leakage survey to 42% or less to demonstrate improving pelvic floor function for improved fecal continence with ADLs and improved QOL. - NOT ASSESSED 10/24    Plan     Continue per established POC as tolerated.    Rhiannon Santos, PT, DPT

## 2022-11-04 NOTE — PATIENT INSTRUCTIONS
"Daily Exercise: belly breathing (a few minutes), practice urge suppression in front of trigger with empty bladder (1x per day)  Things to Focus on Daily: CHECK INS!, pressure management  Habits: urinary urge suppression, wipe 2x/squeeze 2x, double voiding, relaxing pelvic floor muscles to urinate and not pushing, new poop positioning, blow out to bear down        Practice urge suppression in front of trigger (running water in kitchen) after urinating (don't go just for the purpose of this exercise, pick a time after you already had to go pee). First practice this with a totally empty bladder (I.e., go immediately after urinating), and as this gets easier, you are going to practice at 30 min after, then 1 hour, then 1.5 hours, and so on so that the bladder is more full and difficulty increases. Challenging but doable is out goal.      Check Ins  This exercise helps the muscles learn to stop clenching constantly  Start doing pelvic floor check ins. For this, I want you to periodically throughout the day check in with your pelvic floor muscles and your tummy/abdominals. See if you are gripping or clenching these muscles, and if you are, try to relax them. Over time, you will learn to break this habit. Tips for relaxing the muscles: first, just try to DROP the muscles. If that doesn't work, try to squeeze and then DROP those muscles. If even that doesn't work, then try a few of the belly breaths that we learned.      Pressure Management ("Blow As You Go")    Blow as you go! Any time you might hold the breath and strain, try to avoid doing this by blowing out (like you are blowing out birthday candles) instead. Straining like this puts undue pressure on the pelvic floor and abdominal wall. Good times to do this include: when picking up baby or anything heavy, when bearing down to have a bowel movement, pushing open a heavy door, standing from a low chair with lots of effort, on the harder phase of an exercise, " "etc.      Pelvic Floor Diaphragmatic Breathing     The diaphragm is the most efficient muscle of breathing. It is a large, dome-shaped muscle located at the base of the lungs under the bottom of your ribcage. There are many benefits to diaphragmatic (belly) breathing including:     Strengthens the diaphragm (& other accessory muscles)   Helps slow down your breathing rate and calms you   Decreases oxygen demand and uses less effort and energy to breathe   Reduces stress and anxiety, which calms your nervous system   Improves the coordination and range of motion of your pelvic floor muscles, allowing them to relax       Technique:   Place one hand on your chest and one on your ribs/belly.  Try to push your hands out as you inhale. Feel your ribcage and abdomen expand.   As you exhale, blow the air out of your body. Feel your ribcage pull back in and down and your abdomen deflate.   Perform this for 5-10 minutes on most days of the week, allowing the entire body to relax as you do.               Whenever you inhale and expand your lower ribcage and abdomen, your pelvic floor muscles will relax and push down. Whenever you exhale and deflate your ribcage and abdomen, your pelvic floor muscles will tighten slightly and pull up. Your pelvic floor muscles copy the movement of your diaphragm! Try to tune in to this movement as you breathe.      Bowel Movement Body Mechanics    1. Sit on the toilet comfortably with legs and buttocks relaxed.  2. Put your feet on a step stool or squatty potty (8 inches tall). This helps the poop come out easier.  3. Use good "potty posture": Lean forward while keeping your back straight and rest your elbows or forearms on your knees, keeping the knees apart.  4. The pelvic floor has to fully relax for the poop to come out. Let your whole body relax, even letting the belly hang. Try to think about fully relaxing or "dropping" the pelvic floor muscles. You may want to do a few " diaphragmatic breaths to help with this.  5. Exhale like you are blowing out birthday candles while you gently bear down. Do not strain or hold your breath!              With strong fecal urge that threatens to make you leak before making it to the bathroom, stop in place and squeeze the external anal sphincter (this is a kegel, but it is focused toward the back/around the anus). Try to hold it for 30 seconds before walking to the bathroom to suppress the urge, as 30 seconds is the required time to shut down the urge. This is not to delay going, this is to get you there without leaking.     After having a bowel movement, to encourage the valves to close so you don't have leakage, wipe 2x and then squeeze the external anal sphincter (this is a kegel, but it is focused toward the back/around the anus) 2x. Repeat as necessary (wipe 2x, squeeze 2x) until the tissue is clean. Do this EVERY TIME you have a bowel movement for 6-8 weeks to retrain the valves to close on their own.      When urinating, sit down on the toilet and relax your whole body. Turn the knees in and feet out and relax the whole body. Then focus on getting the pelvic floor muscles to relax, using whatever visualization helps you - think of the valve around the urethra releasing, etc. Can do some belly breaths as well.       DOUBLE VOIDING    Sometimes after you urinate, you may feel the urge to go again immediately or soon after. However, when you go back to the bathroom, only a few drops come out. This can be due to incomplete emptying of the bladder. Double voiding is a technique that may assist the bladder to empty more effectively when urine is left in the bladder at the end of urination. It involves passing urine more than once each time that you go to the toilet. This makes sure that the bladder is completely empty.    Here are 3 strategies you can try to fully empty your bladder.  You do not have to do all of these things every single time. Find  which ones work best for you.     Check to make sure your pelvic floor is relaxed, which is required for voiding completely.   Do a body scan - make sure your legs, buttocks, and abdominals are relaxed.  Take a couple deep, slow breaths to encourage your pelvic floor muscles to DROP (i.e., try diaphragmatic breathing).  Gently apply pressure over your bladder.  Change your pelvic position: lean forward, rock your pelvis forward and backward 2x and side to side 2x, stand up prison then sit back down 2x.     Wait at least 15-30 seconds to see if a second urine stream begins. If not, you may get up and leave the bathroom.        Bed Mobility for Pelvic Girdle Pain    To get in bed:  1.) Walk to the bed and turn around so that your back is to the bed. Sit down on the edge of the bed.  2.) Lay your upper body down, bracing yourself with the elbow of the arm closest to the head of the bed and your other hand.   3.) Pull your legs into the bed, keeping the knees together and bent.  4.) Roll as a unit onto your back (hips and shoulders move at the same time, no twisting!).  5.) If you need to move closer to the middle of the bed, first bridge up, lifting your butt and shifting it over toward the middle of the bed and then set it down. Then move your shoulders and feet. Repeat this process until you have reached where you want to go.      To get out of bed:  1.) Use the bridging technique (see step 5 above) to work your way to the edge of the bed until you have just enough room to roll onto your side.  2.) Roll as a unit onto your side by reaching the arm closest to the middle of the bed across your chest and rolling your hips at the same time (hips and shoulders move at the same time - no twisting!).  3.) Let your legs come off the edge of the bed, keeping the knees together. This will allow gravity to assist you in sitting up.  4.) Push up with the elbow of the arm on bottom and the hand of the arm on top, and come to a  sitting position. BLOW OUT!  5.) Stand up. BLOW OUT!

## 2022-11-08 ENCOUNTER — IMMUNIZATION (OUTPATIENT)
Dept: PRIMARY CARE CLINIC | Facility: CLINIC | Age: 67
End: 2022-11-08
Payer: MEDICARE

## 2022-11-08 DIAGNOSIS — Z23 NEED FOR VACCINATION: Primary | ICD-10-CM

## 2022-11-08 PROCEDURE — 91312 COVID-19, MRNA, LNP-S, BIVALENT BOOSTER, PF, 30 MCG/0.3 ML DOSE: CPT | Mod: PBBFAC | Performed by: FAMILY MEDICINE

## 2022-11-08 PROCEDURE — 0124A COVID-19, MRNA, LNP-S, BIVALENT BOOSTER, PF, 30 MCG/0.3 ML DOSE: CPT | Mod: CV19,PBBFAC | Performed by: FAMILY MEDICINE

## 2022-11-09 RX ORDER — OMEPRAZOLE 40 MG/1
40 CAPSULE, DELAYED RELEASE ORAL 2 TIMES DAILY
Qty: 180 CAPSULE | Refills: 0 | Status: SHIPPED | OUTPATIENT
Start: 2022-11-09 | End: 2023-02-14 | Stop reason: SDUPTHER

## 2022-11-11 ENCOUNTER — CLINICAL SUPPORT (OUTPATIENT)
Dept: REHABILITATION | Facility: HOSPITAL | Age: 67
End: 2022-11-11
Payer: MEDICARE

## 2022-11-11 DIAGNOSIS — N39.46 MIXED STRESS AND URGE URINARY INCONTINENCE: ICD-10-CM

## 2022-11-11 DIAGNOSIS — R15.9 FREQUENT FECAL INCONTINENCE: Primary | ICD-10-CM

## 2022-11-11 PROCEDURE — 97112 NEUROMUSCULAR REEDUCATION: CPT | Mod: PN

## 2022-11-11 PROCEDURE — 97140 MANUAL THERAPY 1/> REGIONS: CPT | Mod: PN

## 2022-11-11 PROCEDURE — 97530 THERAPEUTIC ACTIVITIES: CPT | Mod: PN

## 2022-11-11 NOTE — PROGRESS NOTES
Pelvic Health Physical Therapy   Treatment Note     Name: Rita Dejesus  Clinic Number: [id number]    Therapy Diagnosis:   Encounter Diagnoses   Name Primary?    Frequent fecal incontinence Yes    Mixed stress and urge urinary incontinence        Physician: Andra Painting MD    Visit Date: 11/11/2022    Physician Orders: PT Eval and Treat  Medical Diagnosis from Referral: Incontinence of feces, unspecified fecal incontinence type [R15.9], Mixed incontinence [N39.46]  Evaluation Date: 8/18/2022  Authorization Period Expiration: 12/31/2022  Plan of Care Expiration: 01/22/2023  Progress Note Due: 11/23/2022  Visit # / Visits Authorized: 6/20  Cancelled Visits: 0  No Show Visits: 0  FOTO: 1     Precautions: universal, GERD    Time In: 8:07 am  Time Out: 9:00 am  Total Billable Time: 53 minutes    Subjective     Pt reports today: Has not been able to get the hang of getting out of bed for urinary leakage yet. Has had reduced fecal leakage (still has a few balls that come out every now and then) and has an easier time emptying. Has not done urge suppression in front of triggers as frequently.    She was compliant with home exercise program.  Response to previous treatment: N/A  Functional change: N/A    Pain Pre-Treatment: 0/10  Pain Post-Treatment: 0/10  Location: N/A    Constitutional Symptoms Review: The patient denies having any constitutional symptoms.     Objective     Bowel History: had constipation prior to cancer diagnosis in 2014; anal cancer with chemo and radiation  Frequency of bowel movements: every 3 days; at update: about every 3 days - reports this makes sense with how frequently she eats; at eval: usually every day, more than 1x per day (once she starts, she will have a good one and then it's like everything gets stuck and things will come out a little bit at a time - lots (half gallon in an hour's time) water will assist in moving them but she leaks fluid constantly after that)  Difficulty  "initiating BM: 60% of the time but not as bad; at eval: Sometimes, not as often as it used to be - sometimes things won't come out; at eval: Yes sometimes - when she can tell she needs to go (when she has the urge)  Quality/Shape of BM: BSC 1 (will have like 1 ball leak) followed by BSC 2 and 4 in the same BM, may have a second BM with type 4 and then will have leakage of type 5 later; at eval: BSC 4 and 5, every now and then has type 1; at eval: [place] Stool Chart 1 or 4 with no urge (finds this in her underwear sometimes); when she does have an urge it is a 3 or 4 (has urge incontinence with this at times); has passive leakage of mucousy type material at times  Complete emptying: Yes; at eval: Yes - feels like it now; at eval: No  Fecal urgency: A little issues with this; at eval: No; at eval: Yes  Colon leakage: type 1 before BM (reduced), type 5/mucousy leakage following BM; at eval: Yes - with 4 and 5 (no urge, finds in underwear after BM - 1-2x per week now), no more overwhelming urge or leakage with this, mucousy leakage after BM (almost every time she poops); at eval: Yes  Frequency of incidents: each time she has a BM; at eval: few times we week; at eval: several times per day   Amount leaked (bowels): 1 ball, less mucous; at eval: streaking/staining only; at eval: streaking/staining, sometimes a few "drops" of type 1 or 4  OTC medication/supplementation?: saw palmetto, metamucil gummies, collagen; at eval: quit Miralax, doing metamucil gummies, not doing protein shakes anymore, saw palmetto; at eval: very little Miralax daily, 4 Metamucil gummies per day; also drinks a protein shake 5 days per week that has 6 grams of fiber  Pain/Bleeding: NT; at eval: occasional BRB but less due to less straining and it is just a drop; at eval: No, occasionally BRB    Types of fluid intake: NT; at update: NT; at eval: cup of tea in morning (sometimes caf, sometimes not), drinks water all day after this (sometimes large " volumes), beer occasionally, unsweetened iced tea once in a while  Diet: eating more frequently and having different stuff; at update: NT; at eval: protein shake and banana in the morning, does not eat lunch all the time (may have a sandwich), for dinner may have a sandwich or red/white beans (no rice) with sausage or pork, not a lot of red meat      Pt verbally consents to intravaginal treatment today. Signed consent form already on file.       Rita participated in neuromuscular re-education activities to develop Coordination, Control, Down training, Proprioception, and Sense for 28 minutes including:     Completed reassessment of PF coordination through symptom report  Internal vaginal:  Diaphragmatic breathing for PF relaxation and awareness  Layer 3 contractions x several reps for improved urinary urge suppression    Deferred:  Diaphragmatic breathing for PF relaxation and awareness - pt with good form, reporting difficulty avoiding volitionally bearing down with inhale  Internal rectal:  Completed internal assessment of PF coordination  Internal vaginal:  Diaphragmatic breathing for PF relaxation and awareness - cues to reduce bearing down, good response      Rita received the following manual therapy techniques: to develop extensibility and desensitization for 15 minutes including:     Internal vaginal  TrP release to: B OI (L > R), B deep, mod, and superficial layers of PFM    Deferred:  Internal rectal:   TrP release to: B coccygeus, B levator ani  P/A of tailbone (gentle)      Rita participated in dynamic functional therapeutic activities to improve functional performance for 10 minutes, including:   - Education as described below.    - Practiced supine to sit for reduced intra-abdominal pressure for reduced urinary leakage      Home Exercises Provided and Patient Education Provided     Education Provided:   - behavior modifications  Discussed progression of plan of care with patient; educated pt in  activity modification; reviewed HEP with pt. Pt demonstrated and verbalized understanding of all instruction and was provided with a handout of HEP (see Patient Instructions).  - Layer 3 for urge suppression  - Decreased rectal sensitivity  - Balloon retraining? Ice-cream consistency for a while?  Deferred:  - Bed mobility for reduced pressure with getting out of bed in morning and having to urinate  - Discussed ensuring she practices urge suppression in front of triggers during least urgent time of day  - PF check ins  - Pressure management  - Constipation can contribute to GERD  - Discussed things that assist with relaxation of PFM for urinating  - Reviewed double voiding  - Reviewed practicing urge suppression in front of triggers   - PFM need to relax to urinate  - Don't push to pee  - Double voiding  - Reviewed urinary urge suppression  - 7-9 inches is target for standard toilet height  - Digestive system, defecation reflex, RAIR, IAS and EAS, etc.  - Wipe 2x, squeeze 2x  - Fecal urge suppression  - Poop positioning, blow out to bear down  - PFM exam and results   - Diaphragmatic breathing - technique, benefits, PF ROM, etc.    Written Home Exercises Provided: yes.  Exercises were reviewed and Rita was able to demonstrate them prior to the end of the session.  Rita demonstrated good  understanding of the education provided.     See EMR under Patient Instructions for exercises provided 11/11/2022.    Assessment     Pt with good tolerance for treatment today, reporting a reduction in both urinary and bowel leakage though still experiencing some. Pt responding well to internal manual treatment today. At next visit will continue to progress as tolerated per POC.    Rita Is progressing well towards her goals.   Pt prognosis is Fair.     Pt will continue to benefit from skilled outpatient physical therapy to address the deficits listed in the problem list box on initial evaluation, provide pt/family education  "and to maximize pt's level of independence in the home and community environment.     Pt's spiritual, cultural and educational needs considered and pt agreeable to plan of care and goals.     Anticipated barriers to physical therapy: irradiated tissue, chronicity of symptoms    Goals:  Short Term Goals: 6 weeks   - Pt will be I in diaphragmatic breathing with proper technique to promote relaxation and pelvic floor functional mobility for improved urinary and fecal continence with ADLs and improved QOL. - MET 10/24  - Pt to demonstrate proper positioning on commode with breathing techniques to decrease strain with BM to enable pt to feel empty after BM. - MET 10/24  - Pt to demonstrate independence with performing bowel massage to help with gut motility. - NOT MET 10/24  - Pt to correctly and consistently perform "the knack" prior to coughing, laughing or sneezing to decrease risk of leakage. - NOT MET 10/24  - Pt will be able to correctly and consistently explain urge control strategies to demonstrate understanding of these strategies, decrease likelihood of leakage, and increase time between voids. - MET 10/24  - Pt to voice understanding of the role that diet and fluid intake plays on urinary urgency. - MET 10/24  - Pt will report a 50% reduction in frequency of leakage to demonstrate improved pelvic floor coordination needed for continence with ADLs. - MET 10/24  - Pt will report ability to successfully perform double voiding for complete urinary emptying after initial void to prevent immediate return to bathroom. - PARTIALLY MET 10/24  - Pt to increase pelvic floor strength to at least 3+/5 to demonstrate improved strength needed for continence with ADLs.   - Pt will demonstrate ability to contract external anal sphincter x 15 seconds for improved suppression of fecal urge and improved fecal continence with ADLs.    Long Term Goals: 12 weeks   - Pt to be I with home plan for carry over after discharge. - " PARTIALLY MET 10/24  - Pt to be able to bulge pelvic floor with proper technique and no dyssynergia of EAS, which is needed for comfortable BM and complete evacuation. - NOT MET 10/24  - Pt will report a 75% reduction in frequency of leakage to demonstrate improved pelvic floor coordination needed for continence with ADLs. - PARTIALLY MET 10/24  - Pt to report improved restorative sleeping, waking up no more than 0-1x/night due to urge to urinate. - MET 10/24  - Pt to be educated on strategies for pressure management and appropriate bear down technique to prevent worsening of prolapse for maintenance of urinary continence long-term. - NOT MET 10/24  - Pt will be independent with use of dilators in order to progress towards self management of pelvic pain. - NOT MET 10/24  - Pt to increase pelvic floor strength to at least 4/5 to demonstrate improved strength needed for continence with ADLs.   - Pt will demonstrate ability to contract external anal sphincter x 30 seconds for improved suppression of fecal urge and improved fecal continence with ADLs.  - Pt to demonstrate an improved score in the FOTO Bowel Leakage survey to 42% or less to demonstrate improving pelvic floor function for improved fecal continence with ADLs and improved QOL. - NOT ASSESSED 10/24    Plan     Continue per established POC as tolerated.    Rhiannon Santos, PT, DPT

## 2022-11-11 NOTE — PATIENT INSTRUCTIONS
"Daily Exercise: belly breathing (a few minutes), practice urge suppression in front of trigger with empty bladder (1x per day), layer 3 kegels x 10 (squeeze sit bones together)  Things to Focus on Daily: CHECK INS!, pressure management  Habits: urinary urge suppression (use layer 3), wipe 2x/squeeze 2x, double voiding, relaxing pelvic floor muscles to urinate and not pushing, new poop positioning, blow out to bear down        Practice urge suppression in front of trigger (running water in kitchen) after urinating (don't go just for the purpose of this exercise, pick a time after you already had to go pee). First practice this with a totally empty bladder (I.e., go immediately after urinating), and as this gets easier, you are going to practice at 30 min after, then 1 hour, then 1.5 hours, and so on so that the bladder is more full and difficulty increases. Challenging but doable is out goal.      Check Ins  This exercise helps the muscles learn to stop clenching constantly  Start doing pelvic floor check ins. For this, I want you to periodically throughout the day check in with your pelvic floor muscles and your tummy/abdominals. See if you are gripping or clenching these muscles, and if you are, try to relax them. Over time, you will learn to break this habit. Tips for relaxing the muscles: first, just try to DROP the muscles. If that doesn't work, try to squeeze and then DROP those muscles. If even that doesn't work, then try a few of the belly breaths that we learned.      Pressure Management ("Blow As You Go")    Blow as you go! Any time you might hold the breath and strain, try to avoid doing this by blowing out (like you are blowing out birthday candles) instead. Straining like this puts undue pressure on the pelvic floor and abdominal wall. Good times to do this include: when picking up baby or anything heavy, when bearing down to have a bowel movement, pushing open a heavy door, standing from a low chair " "with lots of effort, on the harder phase of an exercise, etc.      Pelvic Floor Diaphragmatic Breathing     The diaphragm is the most efficient muscle of breathing. It is a large, dome-shaped muscle located at the base of the lungs under the bottom of your ribcage. There are many benefits to diaphragmatic (belly) breathing including:     Strengthens the diaphragm (& other accessory muscles)   Helps slow down your breathing rate and calms you   Decreases oxygen demand and uses less effort and energy to breathe   Reduces stress and anxiety, which calms your nervous system   Improves the coordination and range of motion of your pelvic floor muscles, allowing them to relax       Technique:   Place one hand on your chest and one on your ribs/belly.  Try to push your hands out as you inhale. Feel your ribcage and abdomen expand.   As you exhale, blow the air out of your body. Feel your ribcage pull back in and down and your abdomen deflate.   Perform this for 5-10 minutes on most days of the week, allowing the entire body to relax as you do.               Whenever you inhale and expand your lower ribcage and abdomen, your pelvic floor muscles will relax and push down. Whenever you exhale and deflate your ribcage and abdomen, your pelvic floor muscles will tighten slightly and pull up. Your pelvic floor muscles copy the movement of your diaphragm! Try to tune in to this movement as you breathe.      Bowel Movement Body Mechanics    1. Sit on the toilet comfortably with legs and buttocks relaxed.  2. Put your feet on a step stool or squatty potty (8 inches tall). This helps the poop come out easier.  3. Use good "potty posture": Lean forward while keeping your back straight and rest your elbows or forearms on your knees, keeping the knees apart.  4. The pelvic floor has to fully relax for the poop to come out. Let your whole body relax, even letting the belly hang. Try to think about fully relaxing or "dropping" the " pelvic floor muscles. You may want to do a few diaphragmatic breaths to help with this.  5. Exhale like you are blowing out birthday candles while you gently bear down. Do not strain or hold your breath!              With strong fecal urge that threatens to make you leak before making it to the bathroom, stop in place and squeeze the external anal sphincter (this is a kegel, but it is focused toward the back/around the anus). Try to hold it for 30 seconds before walking to the bathroom to suppress the urge, as 30 seconds is the required time to shut down the urge. This is not to delay going, this is to get you there without leaking.     After having a bowel movement, to encourage the valves to close so you don't have leakage, wipe 2x and then squeeze the external anal sphincter (this is a kegel, but it is focused toward the back/around the anus) 2x. Repeat as necessary (wipe 2x, squeeze 2x) until the tissue is clean. Do this EVERY TIME you have a bowel movement for 6-8 weeks to retrain the valves to close on their own.      When urinating, sit down on the toilet and relax your whole body. Turn the knees in and feet out and relax the whole body. Then focus on getting the pelvic floor muscles to relax, using whatever visualization helps you - think of the valve around the urethra releasing, etc. Can do some belly breaths as well.       DOUBLE VOIDING    Sometimes after you urinate, you may feel the urge to go again immediately or soon after. However, when you go back to the bathroom, only a few drops come out. This can be due to incomplete emptying of the bladder. Double voiding is a technique that may assist the bladder to empty more effectively when urine is left in the bladder at the end of urination. It involves passing urine more than once each time that you go to the toilet. This makes sure that the bladder is completely empty.    Here are 3 strategies you can try to fully empty your bladder.  You do not have to  do all of these things every single time. Find which ones work best for you.     Check to make sure your pelvic floor is relaxed, which is required for voiding completely.   Do a body scan - make sure your legs, buttocks, and abdominals are relaxed.  Take a couple deep, slow breaths to encourage your pelvic floor muscles to DROP (i.e., try diaphragmatic breathing).  Gently apply pressure over your bladder.  Change your pelvic position: lean forward, rock your pelvis forward and backward 2x and side to side 2x, stand up group home then sit back down 2x.     Wait at least 15-30 seconds to see if a second urine stream begins. If not, you may get up and leave the bathroom.        Bed Mobility for Pelvic Girdle Pain    To get in bed:  1.) Walk to the bed and turn around so that your back is to the bed. Sit down on the edge of the bed.  2.) Lay your upper body down, bracing yourself with the elbow of the arm closest to the head of the bed and your other hand.   3.) Pull your legs into the bed, keeping the knees together and bent.  4.) Roll as a unit onto your back (hips and shoulders move at the same time, no twisting!).  5.) If you need to move closer to the middle of the bed, first bridge up, lifting your butt and shifting it over toward the middle of the bed and then set it down. Then move your shoulders and feet. Repeat this process until you have reached where you want to go.      To get out of bed:  1.) Use the bridging technique (see step 5 above) to work your way to the edge of the bed until you have just enough room to roll onto your side.  2.) Roll as a unit onto your side by reaching the arm closest to the middle of the bed across your chest and rolling your hips at the same time (hips and shoulders move at the same time - no twisting!).  3.) Let your legs come off the edge of the bed, keeping the knees together. This will allow gravity to assist you in sitting up.  4.) Push up with the elbow of the arm on bottom  and the hand of the arm on top, and come to a sitting position. BLOW OUT!  5.) Stand up. BLOW OUT!

## 2022-11-14 ENCOUNTER — TELEPHONE (OUTPATIENT)
Dept: SURGERY | Facility: CLINIC | Age: 67
End: 2022-11-14
Payer: MEDICARE

## 2022-11-14 NOTE — TELEPHONE ENCOUNTER
Called and spoke with patient regarding appointment with Dr. Choi. Informed patient we will need to reschedule due to Dr. Choi being called into an emergency surgery. Informed patient we will call back once we confirm with Dr. Choi a new appointment. Patient verbalized understanding.

## 2022-11-18 ENCOUNTER — DOCUMENTATION ONLY (OUTPATIENT)
Dept: REHABILITATION | Facility: HOSPITAL | Age: 67
End: 2022-11-18
Payer: MEDICARE

## 2022-11-18 DIAGNOSIS — R15.9 FREQUENT FECAL INCONTINENCE: Primary | ICD-10-CM

## 2022-11-18 DIAGNOSIS — N39.46 MIXED STRESS AND URGE URINARY INCONTINENCE: ICD-10-CM

## 2022-11-18 NOTE — PROGRESS NOTES
"[facility]  Pelvic Floor Physical Therapy Discharge Note    Name: Rita Dejesus  Clinic Number: [id number]    Therapy Diagnosis:   Encounter Diagnoses   Name Primary?    Frequent fecal incontinence Yes    Mixed stress and urge urinary incontinence      Physician: Andra Painting MD     Physician Orders: PT Eval and Treat  Medical Diagnosis from Referral: Incontinence of feces, unspecified fecal incontinence type [R15.9], Mixed incontinence [N39.46]  Evaluation Date: 8/18/2022      Date of Last visit: 11/11/2022  Total Visits Received: 6 + 1 eval    ASSESSMENT      From 11/11/2022: Pt with good tolerance for treatment today, reporting a reduction in both urinary and bowel leakage though still experiencing some. Pt responding well to internal manual treatment today. At next visit will continue to progress as tolerated per POC.    Discharge reason: Other: Patient requests discharge as she is unable to attend PFPT at the remaining facilities where [facility] provides PFPT.    Discharge FOTO Score: N/A    Goals:  Short Term Goals: 6 weeks   - Pt will be I in diaphragmatic breathing with proper technique to promote relaxation and pelvic floor functional mobility for improved urinary and fecal continence with ADLs and improved QOL. - MET 10/24  - Pt to demonstrate proper positioning on commode with breathing techniques to decrease strain with BM to enable pt to feel empty after BM. - MET 10/24  - Pt to demonstrate independence with performing bowel massage to help with gut motility. - NOT MET 10/24  - Pt to correctly and consistently perform "the knack" prior to coughing, laughing or sneezing to decrease risk of leakage. - NOT MET 10/24  - Pt will be able to correctly and consistently explain urge control strategies to demonstrate understanding of these strategies, decrease likelihood of leakage, and increase time between voids. - MET 10/24  - Pt to voice understanding of the role that " diet and fluid intake plays on urinary urgency. - MET 10/24  - Pt will report a 50% reduction in frequency of leakage to demonstrate improved pelvic floor coordination needed for continence with ADLs. - MET 10/24  - Pt will report ability to successfully perform double voiding for complete urinary emptying after initial void to prevent immediate return to bathroom. - PARTIALLY MET 10/24  - Pt to increase pelvic floor strength to at least 3+/5 to demonstrate improved strength needed for continence with ADLs.   - Pt will demonstrate ability to contract external anal sphincter x 15 seconds for improved suppression of fecal urge and improved fecal continence with ADLs.     Long Term Goals: 12 weeks   - Pt to be I with home plan for carry over after discharge. - PARTIALLY MET 10/24  - Pt to be able to bulge pelvic floor with proper technique and no dyssynergia of EAS, which is needed for comfortable BM and complete evacuation. - NOT MET 10/24  - Pt will report a 75% reduction in frequency of leakage to demonstrate improved pelvic floor coordination needed for continence with ADLs. - PARTIALLY MET 10/24  - Pt to report improved restorative sleeping, waking up no more than 0-1x/night due to urge to urinate. - MET 10/24  - Pt to be educated on strategies for pressure management and appropriate bear down technique to prevent worsening of prolapse for maintenance of urinary continence long-term. - NOT MET 10/24  - Pt will be independent with use of dilators in order to progress towards self management of pelvic pain. - NOT MET 10/24  - Pt to increase pelvic floor strength to at least 4/5 to demonstrate improved strength needed for continence with ADLs.   - Pt will demonstrate ability to contract external anal sphincter x 30 seconds for improved suppression of fecal urge and improved fecal continence with ADLs.  - Pt to demonstrate an improved score in the FOTO Bowel Leakage survey to 42% or less to demonstrate improving  pelvic floor function for improved fecal continence with ADLs and improved QOL. - NOT ASSESSED 10/24    PLAN     This patient is discharged from PT.    Rhiannon Santos, PT, DPT

## 2022-11-28 ENCOUNTER — OFFICE VISIT (OUTPATIENT)
Dept: SURGERY | Facility: CLINIC | Age: 67
End: 2022-11-28
Payer: MEDICARE

## 2022-11-28 VITALS
TEMPERATURE: 97 F | WEIGHT: 128.5 LBS | SYSTOLIC BLOOD PRESSURE: 110 MMHG | DIASTOLIC BLOOD PRESSURE: 67 MMHG | BODY MASS INDEX: 24.29 KG/M2 | HEART RATE: 80 BPM

## 2022-11-28 DIAGNOSIS — R15.9 INCONTINENCE OF FECES, UNSPECIFIED FECAL INCONTINENCE TYPE: Primary | ICD-10-CM

## 2022-11-28 PROCEDURE — 99213 OFFICE O/P EST LOW 20 MIN: CPT | Mod: PBBFAC | Performed by: COLON & RECTAL SURGERY

## 2022-11-28 PROCEDURE — 99214 OFFICE O/P EST MOD 30 MIN: CPT | Mod: S$PBB,,, | Performed by: COLON & RECTAL SURGERY

## 2022-11-28 PROCEDURE — 99214 PR OFFICE/OUTPT VISIT, EST, LEVL IV, 30-39 MIN: ICD-10-PCS | Mod: S$PBB,,, | Performed by: COLON & RECTAL SURGERY

## 2022-11-28 PROCEDURE — 99999 PR PBB SHADOW E&M-EST. PATIENT-LVL III: ICD-10-PCS | Mod: PBBFAC,,, | Performed by: COLON & RECTAL SURGERY

## 2022-11-28 PROCEDURE — 99999 PR PBB SHADOW E&M-EST. PATIENT-LVL III: CPT | Mod: PBBFAC,,, | Performed by: COLON & RECTAL SURGERY

## 2022-11-28 NOTE — PROGRESS NOTES
History & Physical    SUBJECTIVE:     Chief Complaint   Patient presents with    Follow-up     Follow up// incontinence   Ref: Andra Painting MD    History of Present Illness:  Patient is a 67 y.o. female presents for evaluation of fecal incontinence and rectocele.  Patient states that she had anal cancer treated with chemo radiation in 2014 and said no evidence of recurrence since that time.  She also had previous cervical cancer in 1983 treated with surgical excision.  She has suffered from a rectocele for multiple years now.  She states she has intermittently had to digitize her vagina to expel stool but has had decreasing frequency requiring this with both MiraLax and Metamucil administration.  She has suffered from some fecal incontinence over the past year that has occurred progressively.  This occurs most days and is to be all 3, liquid, solid and gas.  She has started taking Metamucil which she states does help the consistency and decrease the frequency of the incontinence although distal occurring most days.  She denies any blood per rectum.  She denies any fever, chills, nausea, vomiting.  If she has noticed improvement from keel exercises previously when she did these after her radiation treatment but these have become less effective and she is pending starting pelvic floor physical therapy currently.  Last colonoscopy performed in 2020 with 1 adenoma removed.  She is also suffering from urinary incontinence.    Interval History:  Since the last clinic visit, the patient has been attending pelvic floor PT and feels she has greatly improved. She is taking fiber supplementation and has stopped Miralax in the interim as well. She is no longer digitizing the vagina to have a bowel movement. She still has some fecal and urinary continence, but it is in a much smaller volume than prior. She overall is feeling much better.    Review of patient's allergies indicates:   Allergen Reactions    Sulfa (sulfonamide  antibiotics) Nausea And Vomiting    Nitrofuran analogues Nausea And Vomiting     Microdantin        Current Outpatient Medications   Medication Sig Dispense Refill    bran/gum/fib/eliecer/psyl/kelp/pec (FIBER 6 ORAL) Take by mouth 2 (two) times a day. 12 mg collin      calcium carbonate 650 mg calcium (1,625 mg) tablet Take 1 tablet by mouth once daily.      cyanocobalamin (VITAMIN B-12) 250 MCG tablet Take 250 mcg by mouth.      fluocinonide (LIDEX) 0.05 % external solution Apply topically 2 (two) times daily. 60 mL 1    fluticasone propionate (FLONASE) 50 mcg/actuation nasal spray 1 spray (50 mcg total) by Each Nostril route once daily. 48 g 2    Lactobacillus rhamnosus GG (CULTURELLE) 10 billion cell capsule Take 1 capsule by mouth once daily.      levocetirizine (XYZAL) 5 MG tablet Take 5 mg by mouth every evening.      mirtazapine (REMERON) 15 MG tablet Take 1 tablet (15 mg total) by mouth every evening. 90 tablet 2    multivitamin capsule Take 1 capsule by mouth once daily.      omeprazole (PRILOSEC) 40 MG capsule Take 1 capsule (40 mg total) by mouth 2 (two) times a day. 180 capsule 0    oxybutynin (DITROPAN-XL) 10 MG 24 hr tablet Take 1 tablet (10 mg total) by mouth once daily. 30 tablet 11    simvastatin (ZOCOR) 40 MG tablet Take 1 tablet (40 mg total) by mouth every evening. 90 tablet 3    venlafaxine (EFFEXOR-XR) 75 MG 24 hr capsule TAKE 1 CAPSULE BY MOUTH ONCE DAILY 30 capsule 11    vitamin D (VITAMIN D3) 1000 units Tab Take 185 mg by mouth.       No current facility-administered medications for this visit.       Past Medical History:   Diagnosis Date    GERD (gastroesophageal reflux disease)     History of cervical cancer     History of rectal or anal cancer      Past Surgical History:   Procedure Laterality Date    ADENOIDECTOMY      carpal tunel Bilateral     CATARACT EXTRACTION Left 05/06/2021    NEAR    CATARACT EXTRACTION W/ INTRAOCULAR LENS IMPLANT Right 05/20/2021    CERVICAL CONIZATION   W/ LASER       COLONOSCOPY N/A 11/17/2020    Procedure: COLONOSCOPY;  Surgeon: Kendy De Leon MD;  Location: [facility];  Service: Endoscopy;  Laterality: N/A;    ESOPHAGOGASTRODUODENOSCOPY N/A 12/22/2020    Procedure: ESOPHAGOGASTRODUODENOSCOPY (EGD);  Surgeon: Goyo Kapadia MD;  Location: [facility];  Service: Endoscopy;  Laterality: N/A;    ESOPHAGOGASTRODUODENOSCOPY N/A 05/09/2022    Procedure: EGD (ESOPHAGOGASTRODUODENOSCOPY);  Surgeon: Misa Crocker MD;  Location: [facility];  Service: Gastroenterology;  Laterality: N/A;    ESOPHAGOGASTRODUODENOSCOPY N/A 9/7/2022    Procedure: EGD (ESOPHAGOGASTRODUODENOSCOPY);  Surgeon: Misa Crocker MD;  Location: [facility];  Service: Gastroenterology;  Laterality: N/A;    HERNIA REPAIR      TONSILLECTOMY       Family History   Problem Relation Age of Onset    Diabetes Mother         type II    Hyperlipidemia Mother     Heart disease Father     Hypertension Father     Cataracts Father     Hypertension Sister     Diabetes Maternal Grandmother     Diabetes Maternal Grandfather      Social History     Tobacco Use    Smoking status: Never    Smokeless tobacco: Never   Substance Use Topics    Alcohol use: Yes     Comment: Social     Drug use: Never        Review of Systems:  Review of Systems   Constitutional:  Negative for activity change, appetite change, chills, fatigue, fever and unexpected weight change.   HENT:  Negative for congestion, ear pain, sore throat and trouble swallowing.    Eyes:  Negative for pain, redness and itching.   Respiratory:  Negative for cough, shortness of breath and wheezing.    Cardiovascular:  Negative for chest pain, palpitations and leg swelling.   Gastrointestinal:  Negative for abdominal distention, abdominal pain, anal bleeding, blood in stool, nausea, rectal pain and vomiting.        +fecal incontinence   Endocrine: Negative for cold intolerance, heat intolerance and polyuria.   Genitourinary:  Negative for dysuria, flank pain, frequency  and hematuria.        + urinary incontinence   Musculoskeletal:  Negative for gait problem, joint swelling and neck pain.   Skin:  Negative for color change, rash and wound.   Allergic/Immunologic: Negative for environmental allergies and immunocompromised state.   Neurological:  Negative for dizziness, speech difficulty, weakness and numbness.   Psychiatric/Behavioral:  Negative for agitation, confusion and hallucinations.      OBJECTIVE:     Vitals:    11/28/22 1059   BP: 110/67   Pulse: 80   Temp: 97 °F (36.1 °C)       Physical Exam:  Physical Exam  Constitutional:       Appearance: She is well-developed.   HENT:      Head: Normocephalic and atraumatic.      Right Ear: External ear normal.      Left Ear: External ear normal.   Eyes:      Extraocular Movements: Extraocular movements intact.      Conjunctiva/sclera: Conjunctivae normal.   Neck:      Thyroid: No thyromegaly.   Cardiovascular:      Rate and Rhythm: Normal rate.   Pulmonary:      Effort: Pulmonary effort is normal. No respiratory distress.   Abdominal:      General: There is no distension.      Palpations: Abdomen is soft. There is no mass.      Tenderness: There is no abdominal tenderness.   Genitourinary:     Comments: Anorectal: deferred  Musculoskeletal:         General: No tenderness. Normal range of motion.      Cervical back: Normal range of motion.   Skin:     General: Skin is warm and dry.      Capillary Refill: Capillary refill takes less than 2 seconds.      Findings: No rash.   Neurological:      Mental Status: She is alert and oriented to person, place, and time.   Psychiatric:         Behavior: Behavior normal.       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    - Discussed again possibilities of continued pelvic floor PT versus evaluation for stimulator placement. Patient would like to continue pelvic floor PT at this time but is seeing urology in near future to discuss possible InterStim/SNS  - needs referral to external PT for possible advanced pelvic floor PT not offered at [facility]  - Discussed that as rectocele seems to be less symptomatic, would not recommend aggressive surgical intervention at this time given prior radiation status.  - RTC 3 months    Juwan Choi MD  Colon and Rectal Surgery  [facility]

## 2022-11-30 DIAGNOSIS — Z01.818 PREOP TESTING: Primary | ICD-10-CM

## 2022-12-05 ENCOUNTER — OFFICE VISIT (OUTPATIENT)
Dept: INTERNAL MEDICINE | Facility: CLINIC | Age: 67
End: 2022-12-05
Payer: MEDICARE

## 2022-12-05 ENCOUNTER — LAB VISIT (OUTPATIENT)
Dept: LAB | Facility: HOSPITAL | Age: 67
End: 2022-12-05
Attending: FAMILY MEDICINE
Payer: MEDICARE

## 2022-12-05 VITALS
HEART RATE: 98 BPM | OXYGEN SATURATION: 99 % | DIASTOLIC BLOOD PRESSURE: 72 MMHG | WEIGHT: 128.31 LBS | HEIGHT: 61 IN | TEMPERATURE: 98 F | BODY MASS INDEX: 24.22 KG/M2 | SYSTOLIC BLOOD PRESSURE: 112 MMHG

## 2022-12-05 DIAGNOSIS — N18.31 CHRONIC KIDNEY DISEASE, STAGE 3A: ICD-10-CM

## 2022-12-05 DIAGNOSIS — E87.5 HYPERKALEMIA: ICD-10-CM

## 2022-12-05 DIAGNOSIS — R35.0 URINARY FREQUENCY: Primary | ICD-10-CM

## 2022-12-05 LAB
ALBUMIN SERPL BCP-MCNC: 3.8 G/DL (ref 3.5–5.2)
ALP SERPL-CCNC: 101 U/L (ref 55–135)
ALT SERPL W/O P-5'-P-CCNC: 9 U/L (ref 10–44)
ANION GAP SERPL CALC-SCNC: 9 MMOL/L (ref 8–16)
AST SERPL-CCNC: 19 U/L (ref 10–40)
BASOPHILS # BLD AUTO: 0.03 K/UL (ref 0–0.2)
BASOPHILS NFR BLD: 0.5 % (ref 0–1.9)
BILIRUB SERPL-MCNC: 0.3 MG/DL (ref 0.1–1)
BILIRUB SERPL-MCNC: NEGATIVE MG/DL
BLOOD URINE, POC: 250
BUN SERPL-MCNC: 23 MG/DL (ref 8–23)
CALCIUM SERPL-MCNC: 10 MG/DL (ref 8.7–10.5)
CHLORIDE SERPL-SCNC: 108 MMOL/L (ref 95–110)
CHOLEST SERPL-MCNC: 152 MG/DL (ref 120–199)
CHOLEST/HDLC SERPL: 3.2 {RATIO} (ref 2–5)
CLARITY, POC UA: ABNORMAL
CO2 SERPL-SCNC: 22 MMOL/L (ref 23–29)
COLOR, POC UA: ABNORMAL
CREAT SERPL-MCNC: 1.2 MG/DL (ref 0.5–1.4)
DIFFERENTIAL METHOD: ABNORMAL
EOSINOPHIL # BLD AUTO: 0.2 K/UL (ref 0–0.5)
EOSINOPHIL NFR BLD: 3.2 % (ref 0–8)
ERYTHROCYTE [DISTWIDTH] IN BLOOD BY AUTOMATED COUNT: 14.3 % (ref 11.5–14.5)
EST. GFR  (NO RACE VARIABLE): 49.3 ML/MIN/1.73 M^2
GLUCOSE SERPL-MCNC: 91 MG/DL (ref 70–110)
GLUCOSE UR QL STRIP: NORMAL
HCT VFR BLD AUTO: 40.3 % (ref 37–48.5)
HDLC SERPL-MCNC: 48 MG/DL (ref 40–75)
HDLC SERPL: 31.6 % (ref 20–50)
HGB BLD-MCNC: 13 G/DL (ref 12–16)
IMM GRANULOCYTES # BLD AUTO: 0.01 K/UL (ref 0–0.04)
IMM GRANULOCYTES NFR BLD AUTO: 0.2 % (ref 0–0.5)
KETONES UR QL STRIP: NEGATIVE
LDLC SERPL CALC-MCNC: 81.8 MG/DL (ref 63–159)
LEUKOCYTE ESTERASE URINE, POC: ABNORMAL
LYMPHOCYTES # BLD AUTO: 1.5 K/UL (ref 1–4.8)
LYMPHOCYTES NFR BLD: 23.1 % (ref 18–48)
MCH RBC QN AUTO: 29.3 PG (ref 27–31)
MCHC RBC AUTO-ENTMCNC: 32.3 G/DL (ref 32–36)
MCV RBC AUTO: 91 FL (ref 82–98)
MONOCYTES # BLD AUTO: 0.6 K/UL (ref 0.3–1)
MONOCYTES NFR BLD: 9.6 % (ref 4–15)
NEUTROPHILS # BLD AUTO: 4.2 K/UL (ref 1.8–7.7)
NEUTROPHILS NFR BLD: 63.4 % (ref 38–73)
NITRITE, POC UA: NEGATIVE
NONHDLC SERPL-MCNC: 104 MG/DL
NRBC BLD-RTO: 0 /100 WBC
PH, POC UA: 5
PLATELET # BLD AUTO: 208 K/UL (ref 150–450)
PMV BLD AUTO: 14.4 FL (ref 9.2–12.9)
POTASSIUM SERPL-SCNC: 4.5 MMOL/L (ref 3.5–5.1)
PROT SERPL-MCNC: 7.4 G/DL (ref 6–8.4)
PROTEIN, POC: 100
RBC # BLD AUTO: 4.44 M/UL (ref 4–5.4)
SODIUM SERPL-SCNC: 139 MMOL/L (ref 136–145)
SPECIFIC GRAVITY, POC UA: 1.01
TRIGL SERPL-MCNC: 111 MG/DL (ref 30–150)
UROBILINOGEN, POC UA: NORMAL
WBC # BLD AUTO: 6.59 K/UL (ref 3.9–12.7)

## 2022-12-05 PROCEDURE — 99214 OFFICE O/P EST MOD 30 MIN: CPT | Mod: PBBFAC,PO | Performed by: FAMILY MEDICINE

## 2022-12-05 PROCEDURE — 99999 PR PBB SHADOW E&M-EST. PATIENT-LVL IV: ICD-10-PCS | Mod: PBBFAC,,, | Performed by: FAMILY MEDICINE

## 2022-12-05 PROCEDURE — 85025 COMPLETE CBC W/AUTO DIFF WBC: CPT | Performed by: FAMILY MEDICINE

## 2022-12-05 PROCEDURE — 81002 URINALYSIS NONAUTO W/O SCOPE: CPT | Mod: PBBFAC,PO | Performed by: FAMILY MEDICINE

## 2022-12-05 PROCEDURE — 99213 PR OFFICE/OUTPT VISIT, EST, LEVL III, 20-29 MIN: ICD-10-PCS | Mod: S$PBB,,, | Performed by: FAMILY MEDICINE

## 2022-12-05 PROCEDURE — 87186 SC STD MICRODIL/AGAR DIL: CPT | Performed by: FAMILY MEDICINE

## 2022-12-05 PROCEDURE — 87086 URINE CULTURE/COLONY COUNT: CPT | Performed by: FAMILY MEDICINE

## 2022-12-05 PROCEDURE — 80053 COMPREHEN METABOLIC PANEL: CPT | Performed by: FAMILY MEDICINE

## 2022-12-05 PROCEDURE — 99999 PR PBB SHADOW E&M-EST. PATIENT-LVL IV: CPT | Mod: PBBFAC,,, | Performed by: FAMILY MEDICINE

## 2022-12-05 PROCEDURE — 36415 COLL VENOUS BLD VENIPUNCTURE: CPT | Mod: PO | Performed by: FAMILY MEDICINE

## 2022-12-05 PROCEDURE — 87088 URINE BACTERIA CULTURE: CPT | Performed by: FAMILY MEDICINE

## 2022-12-05 PROCEDURE — 80061 LIPID PANEL: CPT | Performed by: FAMILY MEDICINE

## 2022-12-05 PROCEDURE — 87077 CULTURE AEROBIC IDENTIFY: CPT | Performed by: FAMILY MEDICINE

## 2022-12-05 PROCEDURE — 99213 OFFICE O/P EST LOW 20 MIN: CPT | Mod: S$PBB,,, | Performed by: FAMILY MEDICINE

## 2022-12-05 RX ORDER — CIPROFLOXACIN 500 MG/1
500 TABLET ORAL 2 TIMES DAILY
Qty: 10 TABLET | Refills: 0 | Status: SHIPPED | OUTPATIENT
Start: 2022-12-05 | End: 2023-01-03

## 2022-12-05 NOTE — PROGRESS NOTES
Subjective:      Patient ID: Rita Dejesus is a 67 y.o. female.    Chief Complaint: Abdominal Pain and Urinary Frequency      Patient reports urinary frequency, pain in urethra.  Reports similar feeling when she had infection in September.    Abdominal Pain  Associated symptoms include frequency.   Urinary Frequency   Associated symptoms include frequency.   Review of Systems   Gastrointestinal:  Positive for abdominal pain.   Genitourinary:  Positive for frequency.   Past Medical History:   Diagnosis Date    GERD (gastroesophageal reflux disease)     History of cervical cancer     History of rectal or anal cancer           Past Surgical History:   Procedure Laterality Date    ADENOIDECTOMY      carpal tunel Bilateral     CATARACT EXTRACTION Left 05/06/2021    NEAR    CATARACT EXTRACTION W/ INTRAOCULAR LENS IMPLANT Right 05/20/2021    CERVICAL CONIZATION   W/ LASER      COLONOSCOPY N/A 11/17/2020    Procedure: COLONOSCOPY;  Surgeon: Kendy De Leon MD;  Location: [facility];  Service: Endoscopy;  Laterality: N/A;    ESOPHAGOGASTRODUODENOSCOPY N/A 12/22/2020    Procedure: ESOPHAGOGASTRODUODENOSCOPY (EGD);  Surgeon: Goyo Kapadia MD;  Location: [facility];  Service: Endoscopy;  Laterality: N/A;    ESOPHAGOGASTRODUODENOSCOPY N/A 05/09/2022    Procedure: EGD (ESOPHAGOGASTRODUODENOSCOPY);  Surgeon: Misa Crocker MD;  Location: [facility];  Service: Gastroenterology;  Laterality: N/A;    ESOPHAGOGASTRODUODENOSCOPY N/A 9/7/2022    Procedure: EGD (ESOPHAGOGASTRODUODENOSCOPY);  Surgeon: Misa Crocker MD;  Location: [facility];  Service: Gastroenterology;  Laterality: N/A;    HERNIA REPAIR      TONSILLECTOMY       Family History   Problem Relation Age of Onset    Diabetes Mother         type II    Hyperlipidemia Mother     Heart disease Father     Hypertension Father     Cataracts Father     Hypertension Sister     Diabetes Maternal Grandmother     Diabetes Maternal Grandfather      Social History  "    Socioeconomic History    Marital status:    Tobacco Use    Smoking status: Never    Smokeless tobacco: Never   Substance and Sexual Activity    Alcohol use: Yes     Comment: Social     Drug use: Never    Sexual activity: Not Currently     Partners: Male     Social Determinants of Health     Financial Resource Strain: Low Risk     Difficulty of Paying Living Expenses: Not hard at all   Food Insecurity: No Food Insecurity    Worried About Running Out of Food in the Last Year: Never true    Ran Out of Food in the Last Year: Never true   Transportation Needs: No Transportation Needs    Lack of Transportation (Medical): No    Lack of Transportation (Non-Medical): No   Physical Activity: Inactive    Days of Exercise per Week: 0 days    Minutes of Exercise per Session: 0 min   Stress: No Stress Concern Present    Feeling of Stress : Only a little   Social Connections: Moderately Isolated    Frequency of Communication with Friends and Family: More than three times a week    Frequency of Social Gatherings with Friends and Family: More than three times a week    Attends Anabaptism Services: Never    Active Member of Clubs or Organizations: No    Attends Club or Organization Meetings: Never    Marital Status:    Housing Stability: Unknown    Unable to Pay for Housing in the Last Year: No    Unstable Housing in the Last Year: No     Review of patient's allergies indicates:   Allergen Reactions    Sulfa (sulfonamide antibiotics) Nausea And Vomiting    Nitrofuran analogues Nausea And Vomiting     Microdantin        Objective:       /72 (BP Location: Left arm, Patient Position: Sitting, BP Method: Large (Manual))   Pulse 98   Temp 98 °F (36.7 °C) (Temporal)   Ht 5' 1" (1.549 m)   Wt 58.2 kg (128 lb 4.9 oz)   SpO2 99%   BMI 24.24 kg/m²   Physical Exam  Vitals reviewed.   Constitutional:       General: She is not in acute distress.     Appearance: Normal appearance. She is well-developed. She is not " diaphoretic.   Cardiovascular:      Rate and Rhythm: Normal rate and regular rhythm.      Heart sounds: Normal heart sounds.   Pulmonary:      Effort: Pulmonary effort is normal. No respiratory distress.      Breath sounds: Normal breath sounds.   Abdominal:      General: Bowel sounds are normal.      Palpations: Abdomen is soft.      Tenderness: There is no abdominal tenderness.   Neurological:      Mental Status: She is alert.   Psychiatric:         Mood and Affect: Mood normal.         Behavior: Behavior normal.         Thought Content: Thought content normal.         Judgment: Judgment normal.     Assessment:     1. Urinary frequency      Plan:   Urinary frequency  -     Urine culture; Future; Expected date: 12/05/2022  -     POCT urine dipstick without microscope    Other orders  -     ciprofloxacin HCl (CIPRO) 500 MG tablet; Take 1 tablet (500 mg total) by mouth 2 (two) times daily.  Dispense: 10 tablet; Refill: 0    Urine dip does indicate infection, will follow culture results.    Medication List with Changes/Refills   New Medications    CIPROFLOXACIN HCL (CIPRO) 500 MG TABLET    Take 1 tablet (500 mg total) by mouth 2 (two) times daily.   Current Medications    BRAN/GUM/FIB/SOLANGE/PSYL/KELP/PEC (FIBER 6 ORAL)    Take by mouth 2 (two) times a day. 12 mg collin    CALCIUM CARBONATE 650 MG CALCIUM (1,625 MG) TABLET    Take 1 tablet by mouth once daily.    COLLAGEN MISC    by Misc.(Non-Drug; Combo Route) route.    CYANOCOBALAMIN (VITAMIN B-12) 250 MCG TABLET    Take 250 mcg by mouth once daily.    FLUOCINONIDE (LIDEX) 0.05 % EXTERNAL SOLUTION    Apply topically 2 (two) times daily.    FLUTICASONE PROPIONATE (FLONASE) 50 MCG/ACTUATION NASAL SPRAY    1 spray (50 mcg total) by Each Nostril route once daily.    LACTOBACILLUS RHAMNOSUS GG (CULTURELLE) 10 BILLION CELL CAPSULE    Take 1 capsule by mouth once daily.    LEVOCETIRIZINE (XYZAL) 5 MG TABLET    Take 5 mg by mouth every evening.    MIRTAZAPINE (REMERON) 15 MG  TABLET    Take 1 tablet (15 mg total) by mouth every evening.    MULTIVITAMIN CAPSULE    Take 1 capsule by mouth once daily.    OMEPRAZOLE (PRILOSEC) 40 MG CAPSULE    Take 1 capsule (40 mg total) by mouth 2 (two) times a day.    OXYBUTYNIN (DITROPAN-XL) 10 MG 24 HR TABLET    Take 1 tablet (10 mg total) by mouth once daily.    SIMVASTATIN (ZOCOR) 40 MG TABLET    Take 1 tablet (40 mg total) by mouth every evening.    VENLAFAXINE (EFFEXOR-XR) 75 MG 24 HR CAPSULE    TAKE 1 CAPSULE BY MOUTH ONCE DAILY    VITAMIN D (VITAMIN D3) 1000 UNITS TAB    Take 185 mg by mouth.

## 2022-12-07 LAB — BACTERIA UR CULT: ABNORMAL

## 2022-12-12 ENCOUNTER — HOSPITAL ENCOUNTER (OUTPATIENT)
Dept: CARDIOLOGY | Facility: HOSPITAL | Age: 67
Discharge: HOME | End: 2022-12-12
Attending: UROLOGY
Payer: MEDICARE

## 2022-12-12 ENCOUNTER — HOSPITAL ENCOUNTER (OUTPATIENT)
Dept: RADIOLOGY | Facility: HOSPITAL | Age: 67
Discharge: HOME | End: 2022-12-12
Attending: UROLOGY
Payer: MEDICARE

## 2022-12-12 ENCOUNTER — OFFICE VISIT (OUTPATIENT)
Dept: UROLOGY | Facility: CLINIC | Age: 67
End: 2022-12-12
Payer: MEDICARE

## 2022-12-12 VITALS
HEART RATE: 78 BPM | WEIGHT: 129.44 LBS | SYSTOLIC BLOOD PRESSURE: 104 MMHG | BODY MASS INDEX: 24.45 KG/M2 | DIASTOLIC BLOOD PRESSURE: 62 MMHG

## 2022-12-12 DIAGNOSIS — Z01.818 PREOP TESTING: ICD-10-CM

## 2022-12-12 DIAGNOSIS — R15.1 FECAL SMEARING: ICD-10-CM

## 2022-12-12 DIAGNOSIS — N81.6 RECTOCELE: ICD-10-CM

## 2022-12-12 DIAGNOSIS — N39.0 RECURRENT UTI: Primary | ICD-10-CM

## 2022-12-12 DIAGNOSIS — N39.3 SUI (STRESS URINARY INCONTINENCE, FEMALE): ICD-10-CM

## 2022-12-12 PROCEDURE — 99214 PR OFFICE/OUTPT VISIT, EST, LEVL IV, 30-39 MIN: ICD-10-PCS | Mod: S$PBB,,, | Performed by: UROLOGY

## 2022-12-12 PROCEDURE — 71046 X-RAY EXAM CHEST 2 VIEWS: CPT | Mod: TC

## 2022-12-12 PROCEDURE — 99999 PR PBB SHADOW E&M-EST. PATIENT-LVL V: CPT | Mod: PBBFAC,,, | Performed by: UROLOGY

## 2022-12-12 PROCEDURE — 71046 XR CHEST PA AND LATERAL PRE-OP: ICD-10-PCS | Mod: 26,,, | Performed by: STUDENT IN AN ORGANIZED HEALTH CARE EDUCATION/TRAINING PROGRAM

## 2022-12-12 PROCEDURE — 99999 PR PBB SHADOW E&M-EST. PATIENT-LVL V: ICD-10-PCS | Mod: PBBFAC,,, | Performed by: UROLOGY

## 2022-12-12 PROCEDURE — 93010 EKG 12-LEAD: ICD-10-PCS | Mod: ,,, | Performed by: INTERNAL MEDICINE

## 2022-12-12 PROCEDURE — 93010 ELECTROCARDIOGRAM REPORT: CPT | Mod: ,,, | Performed by: INTERNAL MEDICINE

## 2022-12-12 PROCEDURE — 93005 ELECTROCARDIOGRAM TRACING: CPT

## 2022-12-12 PROCEDURE — 99215 OFFICE O/P EST HI 40 MIN: CPT | Mod: PBBFAC,25 | Performed by: UROLOGY

## 2022-12-12 PROCEDURE — 99214 OFFICE O/P EST MOD 30 MIN: CPT | Mod: S$PBB,,, | Performed by: UROLOGY

## 2022-12-12 PROCEDURE — 71046 X-RAY EXAM CHEST 2 VIEWS: CPT | Mod: 26,,, | Performed by: STUDENT IN AN ORGANIZED HEALTH CARE EDUCATION/TRAINING PROGRAM

## 2022-12-12 NOTE — PROGRESS NOTES
"Chief Complaint:   rectocele    HPI:     12/12/22- Pt has been seeing pelvic floor PT and has seen improvement, especially with CHICHI. Rectocele is less symptomatic and not splinting. She did see Dr. Choi. She does have FI a couple times a week still and Interstim was recommended.   Has had 2 E. Coli UTIs, 1st treated with fosfomycin, 2nd treated with cipro x 5 days last week. Currently without UTI symptoms, but prior was having urethral pain.     8/8/22- Patient reports that she has fecal urgency and FI and urinary incontinence. Changed a diaper 3 times yesterday. She reports having CHICHI with cough/lifting and also has UUI. Urinary issues 10-12 years. She reports having anal cancer, and during the treatment, noticed rectocele. She had chemo and radiation for the anal cancer. No surgery. She reports having insensate fecal incontinence. Has difficulty discerning between gas and solid. Has never taken any medications for urinary complaints. Taking metamucil does seem to help with GI symptoms.     Per Amanda Leyva NP:   "Patient is a 66-year-old female that is presenting per gyn MD ( WILY Salmeron MD). Patient states that she was seen by gyn physician and instructed to see urologist secondary to rectocele and cystocele.  Patient has a history of anal cancer and is concerned that rectocele is secondary to anal cancer treatment.  Patient states that she has not had sexual intercourse in 10 years and is wanting to proceed with sexual intercourse.  Reports that she has had an increase in urge incontinence and is now wearing a pad.  Does not want to consider PT pelvic floor training or medication, wants to have a surgical option. No abd/pelvic pain and no exac/rel factors.  No hematuria."    Allergies:  Sulfa (sulfonamide antibiotics) and Nitrofuran analogues    Medications:  has a current medication list which includes the following prescription(s): bran/gum/fib/eliecer/psyl/kelp/pec, calcium carbonate, ciprofloxacin hcl, " collagen, cyanocobalamin, fluocinonide, fluticasone propionate, lactobacillus rhamnosus gg, levocetirizine, mirtazapine, multivitamin, omeprazole, oxybutynin, simvastatin, venlafaxine, and vitamin d.    Review of Systems:  General: No fever, chills, fatigability, or weight loss.  Skin: No rashes, itching, or changes in color or texture of skin.  Chest: Denies BONILLA, cyanosis, wheezing, cough, and sputum production.  Abdomen: Appetite fine. No weight loss. Denies diarrhea, abdominal pain, hematemesis, or blood in stool.  Musculoskeletal: No joint stiffness or swelling. Denies back pain.  : As above.  All other review of systems negative.    PMH:   has a past medical history of GERD (gastroesophageal reflux disease), History of cervical cancer, and History of rectal or anal cancer.    PSH:   has a past surgical history that includes Tonsillectomy; Adenoidectomy; Hernia repair; carpal tunel (Bilateral); Colonoscopy (N/A, 11/17/2020); Esophagogastroduodenoscopy (N/A, 12/22/2020); Cataract extraction w/ intraocular lens implant (Right, 05/20/2021); Cataract extraction (Left, 05/06/2021); Esophagogastroduodenoscopy (N/A, 05/09/2022); Cervical conization w/ laser; and Esophagogastroduodenoscopy (N/A, 9/7/2022).    FamHx: family history includes Cataracts in her father; Diabetes in her maternal grandfather, maternal grandmother, and mother; Heart disease in her father; Hyperlipidemia in her mother; Hypertension in her father and sister.    SocHx:  reports that she has never smoked. She has never used smokeless tobacco. She reports current alcohol use. She reports that she does not use drugs.      Physical Exam:    Vitals:    12/12/22 1051   BP: 104/62   Pulse: 78       General: A&Ox3, no apparent distress, no deformities  Neck: No masses, normal thyroid  Lungs: normal inspiration, no use of accessory muscles  Heart: normal pulse, no arrhythmias  Abdomen: Soft, NT, ND, no masses, no hernias, no hepatosplenomegaly  Lymphatic:  Neck and groin nodes negative  Skin: The skin is warm and dry. No jaundice.  Ext: No c/c/e.  :  8/8/22-Normal female external genitalia, orthotopic urethral meatus, atrophic vaginal mucosa, Grade 2 cystocele, grade 2 rectocele    Labs/Studies:   PVR= 6 ml  6/15/22  Urinalysis: negative for blood, LE, nit      Recurrent UTI  -     Urine culture    Fecal smearing  -     Place in Outpatient; Standing  -     Case Request Operating Room: INSERTION, NEUROSTIMULATOR, TEMPORARY, SACRAL  -     Diet NPO; Standing  -     Place sequential compression device; Standing  -     Diet NPO; Standing  -     Place sequential compression device; Standing  -     Case Request Operating Room: INSERTION, NEUROSTIMULATOR, PERMANENT, SACRAL    CHICHI (stress urinary incontinence, female)    Rectocele    Preop testing  -     SCHEDULED EKG 12-LEAD (to Muse); Future  -     X-Ray Chest PA And Lateral Pre-OP; Future; Expected date: 12/12/2022    Other orders  -     IP VTE LOW RISK PATIENT; Standing  -     ceFAZolin (ANCEF) 2 g in dextrose 5 % 50 mL IVPB  -     IP VTE LOW RISK PATIENT; Standing  -     ceFAZolin (ANCEF) 2 g in dextrose 5 % 50 mL IVPB      Discussed interstim again with patient. Risks/benefits discussed and she would like to proceed. Staged procedure scheduled for 1/5/22 and 1/19/22.

## 2023-01-03 ENCOUNTER — OFFICE VISIT (OUTPATIENT)
Dept: INTERNAL MEDICINE | Facility: CLINIC | Age: 68
End: 2023-01-03
Payer: MEDICARE

## 2023-01-03 ENCOUNTER — TELEPHONE (OUTPATIENT)
Dept: UROLOGY | Facility: CLINIC | Age: 68
End: 2023-01-03
Payer: MEDICARE

## 2023-01-03 VITALS
WEIGHT: 129.19 LBS | SYSTOLIC BLOOD PRESSURE: 100 MMHG | TEMPERATURE: 98 F | DIASTOLIC BLOOD PRESSURE: 60 MMHG | BODY MASS INDEX: 22.89 KG/M2 | OXYGEN SATURATION: 97 % | RESPIRATION RATE: 20 BRPM | HEART RATE: 100 BPM | HEIGHT: 63 IN

## 2023-01-03 DIAGNOSIS — L03.012 CELLULITIS OF LEFT THUMB: Primary | ICD-10-CM
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
## 2023-01-03 PROCEDURE — 99213 PR OFFICE/OUTPT VISIT, EST, LEVL III, 20-29 MIN: ICD-10-PCS | Mod: S$PBB,,, | Performed by: FAMILY MEDICINE

## 2023-01-03 PROCEDURE — 99213 OFFICE O/P EST LOW 20 MIN: CPT | Mod: S$PBB,,, | Performed by: FAMILY MEDICINE

## 2023-01-03 PROCEDURE — 99999 PR PBB SHADOW E&M-EST. PATIENT-LVL IV: ICD-10-PCS | Mod: PBBFAC,,, | Performed by: FAMILY MEDICINE

## 2023-01-03 PROCEDURE — 99999 PR PBB SHADOW E&M-EST. PATIENT-LVL IV: CPT | Mod: PBBFAC,,, | Performed by: FAMILY MEDICINE

## 2023-01-03 PROCEDURE — 90714 TD VACC NO PRESV 7 YRS+ IM: CPT | Mod: PBBFAC,PO

## 2023-01-03 PROCEDURE — 99214 OFFICE O/P EST MOD 30 MIN: CPT | Mod: PBBFAC,25,PO | Performed by: FAMILY MEDICINE

## 2023-01-03 NOTE — TELEPHONE ENCOUNTER
Patient  just wanted to update on her finger and will await call of rescheduling surgery appointment.

## 2023-01-03 NOTE — PROGRESS NOTES
Subjective:      Patient ID: Rita Dejesus is a 67 y.o. female.    Chief Complaint: left hand thumb      Patient reports about a week ago was working on plumbing - small shar of metal got stuck in her left thumb, pain is increasing, was hoping it would work its way out but not getting any better    Review of Systems   Skin:  Positive for color change and wound.   Past Medical History:   Diagnosis Date    GERD (gastroesophageal reflux disease)     History of cervical cancer     History of rectal or anal cancer           Past Surgical History:   Procedure Laterality Date    ADENOIDECTOMY      carpal tunel Bilateral     CATARACT EXTRACTION Left 05/06/2021    NEAR    CATARACT EXTRACTION W/ INTRAOCULAR LENS IMPLANT Right 05/20/2021    CERVICAL CONIZATION   W/ LASER      COLONOSCOPY N/A 11/17/2020    Procedure: COLONOSCOPY;  Surgeon: Kendy De Leon MD;  Location: [facility];  Service: Endoscopy;  Laterality: N/A;    ESOPHAGOGASTRODUODENOSCOPY N/A 12/22/2020    Procedure: ESOPHAGOGASTRODUODENOSCOPY (EGD);  Surgeon: Goyo Kapadia MD;  Location: [facility];  Service: Endoscopy;  Laterality: N/A;    ESOPHAGOGASTRODUODENOSCOPY N/A 05/09/2022    Procedure: EGD (ESOPHAGOGASTRODUODENOSCOPY);  Surgeon: Misa Crocker MD;  Location: [facility];  Service: Gastroenterology;  Laterality: N/A;    ESOPHAGOGASTRODUODENOSCOPY N/A 9/7/2022    Procedure: EGD (ESOPHAGOGASTRODUODENOSCOPY);  Surgeon: Misa Crocker MD;  Location: [facility];  Service: Gastroenterology;  Laterality: N/A;    HERNIA REPAIR      TONSILLECTOMY       Family History   Problem Relation Age of Onset    Diabetes Mother         type II    Hyperlipidemia Mother     Heart disease Father     Hypertension Father     Cataracts Father     Hypertension Sister     Diabetes Maternal Grandmother     Diabetes Maternal Grandfather      Social History     Socioeconomic History    Marital status:    Tobacco Use    Smoking status: Never    Smokeless tobacco:  "Never   Substance and Sexual Activity    Alcohol use: Yes     Comment: Social     Drug use: Never    Sexual activity: Not Currently     Partners: Male     Social Determinants of Health     Financial Resource Strain: Low Risk     Difficulty of Paying Living Expenses: Not hard at all   Food Insecurity: No Food Insecurity    Worried About Running Out of Food in the Last Year: Never true    Ran Out of Food in the Last Year: Never true   Transportation Needs: No Transportation Needs    Lack of Transportation (Medical): No    Lack of Transportation (Non-Medical): No   Physical Activity: Inactive    Days of Exercise per Week: 0 days    Minutes of Exercise per Session: 0 min   Stress: No Stress Concern Present    Feeling of Stress : Only a little   Social Connections: Moderately Isolated    Frequency of Communication with Friends and Family: More than three times a week    Frequency of Social Gatherings with Friends and Family: More than three times a week    Attends Taoism Services: Never    Active Member of Clubs or Organizations: No    Attends Club or Organization Meetings: Never    Marital Status:    Housing Stability: Unknown    Unable to Pay for Housing in the Last Year: No    Unstable Housing in the Last Year: No     Review of patient's allergies indicates:   Allergen Reactions    Sulfa (sulfonamide antibiotics) Nausea And Vomiting    Nitrofuran analogues Nausea And Vomiting     Microdantin        Objective:       /60 (BP Location: Right arm, Patient Position: Sitting, BP Method: Medium (Manual))   Pulse 100   Temp 97.7 °F (36.5 °C) (Temporal)   Resp 20   Ht 5' 2.5" (1.588 m)   Wt 58.6 kg (129 lb 3 oz)   SpO2 97%   BMI 23.25 kg/m²   Physical Exam  Constitutional:       General: She is not in acute distress.     Appearance: Normal appearance. She is well-developed. She is not ill-appearing or diaphoretic.   Skin:     Findings: Erythema present.      Comments: Right thumb with small area of " fluctuance, tender to touch, mild swelling, surrounded by erythema   Neurological:      Mental Status: She is alert and oriented to person, place, and time.   Psychiatric:         Mood and Affect: Mood normal.         Behavior: Behavior normal.         Thought Content: Thought content normal.         Judgment: Judgment normal.     1/2 cc lidocaine injected just proximal to area, about 1 mm incision made with 11blade scalpel, small amount of purulent material released, unable to locate foreign object  1. Cellulitis of left thumb    2. Other foreign body or object entering through skin, initial encounter      Plan:   Cellulitis of left thumb    Other foreign body or object entering through skin, initial encounter    Other orders  -     (In Office Administered) Td Vaccine - Preservative Free    Will let me know if any sign of worsening/ not improving tomorrow - consider xray to look for foreign object/PO abx  Medication List with Changes/Refills   Current Medications    BRAN/GUM/FIB/SOLANGE/PSYL/KELP/PEC (FIBER 6 ORAL)    Take 1 tablet by mouth 2 (two) times a day. 12 mg collin    CALCIUM CARBONATE 650 MG CALCIUM (1,625 MG) TABLET    Take 1 tablet by mouth once daily.    COLLAGEN MISC    by Misc.(Non-Drug; Combo Route) route.    CYANOCOBALAMIN (VITAMIN B-12) 250 MCG TABLET    Take 250 mcg by mouth once daily.    FLUOCINONIDE (LIDEX) 0.05 % EXTERNAL SOLUTION    Apply topically 2 (two) times daily.    FLUTICASONE PROPIONATE (FLONASE) 50 MCG/ACTUATION NASAL SPRAY    1 spray (50 mcg total) by Each Nostril route once daily.    LACTOBACILLUS RHAMNOSUS GG (CULTURELLE) 10 BILLION CELL CAPSULE    Take 1 capsule by mouth once daily.    LEVOCETIRIZINE (XYZAL) 5 MG TABLET    Take 5 mg by mouth every evening.    MIRTAZAPINE (REMERON) 15 MG TABLET    TAKE 1 TABLET BY MOUTH EVERY EVENING    MULTIVITAMIN CAPSULE    Take 1 capsule by mouth once daily.    OMEPRAZOLE (PRILOSEC) 40 MG CAPSULE    Take 1 capsule (40 mg total) by mouth 2 (two)  times a day.    OXYBUTYNIN (DITROPAN-XL) 10 MG 24 HR TABLET    Take 1 tablet (10 mg total) by mouth once daily.    SIMVASTATIN (ZOCOR) 40 MG TABLET    Take 1 tablet (40 mg total) by mouth every evening.    VENLAFAXINE (EFFEXOR-XR) 75 MG 24 HR CAPSULE    TAKE 1 CAPSULE BY MOUTH ONCE DAILY    VITAMIN D (VITAMIN D3) 1000 UNITS TAB    Take 185 mg by mouth.   Discontinued Medications    CIPROFLOXACIN HCL (CIPRO) 500 MG TABLET    Take 1 tablet (500 mg total) by mouth 2 (two) times daily.

## 2023-01-03 NOTE — TELEPHONE ENCOUNTER
"----- Message from Kirsten Rossi RN sent at 1/3/2023  1:37 PM CST -----  Regarding: infedction  Hello, this pt states she is currently in her PCP's office because of an "infected thumb." Her PCP has placed her on Cipro and is "lancing" the finger and removing foreign object. She states her PCP recommends she reach out to Dr. Painting concerning weather the surgery should move forward. Please advise. Sx date: 1/05. Thank you    "

## 2023-01-04 ENCOUNTER — PATIENT MESSAGE (OUTPATIENT)
Dept: INTERNAL MEDICINE | Facility: CLINIC | Age: 68
End: 2023-01-04
Payer: MEDICARE

## 2023-01-05 ENCOUNTER — PATIENT MESSAGE (OUTPATIENT)
Dept: INTERNAL MEDICINE | Facility: CLINIC | Age: 68
End: 2023-01-05
Payer: MEDICARE

## 2023-01-11 ENCOUNTER — TELEPHONE (OUTPATIENT)
Dept: PREADMISSION TESTING | Facility: HOSPITAL | Age: 68
End: 2023-01-11
Payer: MEDICARE

## 2023-01-11 NOTE — TELEPHONE ENCOUNTER
Called and LVM for pt about the following:     Please arrive to [facility] ([facility]) at 1045 am on 1/12/23 for your scheduled procedure.  Address: [address] [place] [address] [place] [address] ([address] [place] [address], [address] [facility])  >>>NO eating or drinking after midnight unless instructed otherwise by your Surgeon<<<    Thank you,  -[facility] Pre [facility]mit [facility].  Mon-Fri 8 am - 4 pm [phone]

## 2023-01-12 ENCOUNTER — ANESTHESIA EVENT (OUTPATIENT)
Dept: SURGERY | Facility: HOSPITAL | Age: 68
End: 2023-01-12
Payer: MEDICARE

## 2023-01-12 ENCOUNTER — ANESTHESIA (OUTPATIENT)
Dept: SURGERY | Facility: HOSPITAL | Age: 68
End: 2023-01-12
Payer: MEDICARE

## 2023-01-12 ENCOUNTER — HOSPITAL ENCOUNTER (OUTPATIENT)
Facility: HOSPITAL | Age: 68
Discharge: HOME | End: 2023-01-12
Attending: UROLOGY | Admitting: UROLOGY
Payer: MEDICARE

## 2023-01-12 DIAGNOSIS — R15.1 FECAL SMEARING: ICD-10-CM

## 2023-01-12 DIAGNOSIS — N39.0 RECURRENT UTI: ICD-10-CM

## 2023-01-12 DIAGNOSIS — R15.9 FREQUENT FECAL INCONTINENCE: Primary | ICD-10-CM

## 2023-01-12 PROCEDURE — 36000706: Performed by: UROLOGY

## 2023-01-12 PROCEDURE — 63600175 PHARM REV CODE 636 W HCPCS: Performed by: NURSE ANESTHETIST, CERTIFIED REGISTERED

## 2023-01-12 PROCEDURE — 71000033 HC RECOVERY, INTIAL HOUR: Performed by: UROLOGY

## 2023-01-12 PROCEDURE — 64561 IMPLANT NEUROELECTRODES: CPT | Mod: RT,,, | Performed by: UROLOGY

## 2023-01-12 PROCEDURE — 37000008 HC ANESTHESIA 1ST 15 MINUTES: Performed by: UROLOGY

## 2023-01-12 PROCEDURE — 25000003 PHARM REV CODE 250: Performed by: STUDENT IN AN ORGANIZED HEALTH CARE EDUCATION/TRAINING PROGRAM

## 2023-01-12 PROCEDURE — 64561 PR PERCUT IMPLANT,NEUROELEC,SACRAL NERVE: ICD-10-PCS | Mod: RT,,, | Performed by: UROLOGY

## 2023-01-12 PROCEDURE — 37000009 HC ANESTHESIA EA ADD 15 MINS: Performed by: UROLOGY

## 2023-01-12 PROCEDURE — C1767 GENERATOR, NEURO NON-RECHARG: HCPCS | Performed by: UROLOGY

## 2023-01-12 PROCEDURE — 36000707: Performed by: UROLOGY

## 2023-01-12 PROCEDURE — C1883 ADAPT/EXT, PACING/NEURO LEAD: HCPCS | Performed by: UROLOGY

## 2023-01-12 PROCEDURE — 25000003 PHARM REV CODE 250: Performed by: NURSE ANESTHETIST, CERTIFIED REGISTERED

## 2023-01-12 PROCEDURE — 71000015 HC POSTOP RECOV 1ST HR: Performed by: UROLOGY

## 2023-01-12 PROCEDURE — C1778 LEAD, NEUROSTIMULATOR: HCPCS | Performed by: UROLOGY

## 2023-01-12 DEVICE — LEAD INTERSTIM 2 SURESCAN 33CM: Type: IMPLANTABLE DEVICE | Site: SACRUM | Status: FUNCTIONAL

## 2023-01-12 RX ORDER — CEFAZOLIN SODIUM 1 G/3ML
INJECTION, POWDER, FOR SOLUTION INTRAMUSCULAR; INTRAVENOUS
Status: DISCONTINUED | OUTPATIENT
Start: 2023-01-12 | End: 2023-01-12

## 2023-01-12 RX ORDER — LIDOCAINE HYDROCHLORIDE 20 MG/ML
INJECTION INTRAVENOUS
Status: DISCONTINUED | OUTPATIENT
Start: 2023-01-12 | End: 2023-01-12

## 2023-01-12 RX ORDER — HYDROMORPHONE HYDROCHLORIDE 2 MG/ML
0.2 INJECTION, SOLUTION INTRAMUSCULAR; INTRAVENOUS; SUBCUTANEOUS EVERY 5 MIN PRN
Status: DISCONTINUED | OUTPATIENT
Start: 2023-01-12 | End: 2023-01-12 | Stop reason: HOSPADM

## 2023-01-12 RX ORDER — SUCCINYLCHOLINE CHLORIDE 20 MG/ML
INJECTION INTRAMUSCULAR; INTRAVENOUS
Status: DISCONTINUED | OUTPATIENT
Start: 2023-01-12 | End: 2023-01-12

## 2023-01-12 RX ORDER — ROCURONIUM BROMIDE 10 MG/ML
INJECTION, SOLUTION INTRAVENOUS
Status: DISCONTINUED | OUTPATIENT
Start: 2023-01-12 | End: 2023-01-12

## 2023-01-12 RX ORDER — DEXAMETHASONE SODIUM PHOSPHATE 4 MG/ML
INJECTION, SOLUTION INTRA-ARTICULAR; INTRALESIONAL; INTRAMUSCULAR; INTRAVENOUS; SOFT TISSUE
Status: DISCONTINUED | OUTPATIENT
Start: 2023-01-12 | End: 2023-01-12

## 2023-01-12 RX ORDER — DIPHENHYDRAMINE HYDROCHLORIDE 50 MG/ML
12.5 INJECTION INTRAMUSCULAR; INTRAVENOUS
Status: DISCONTINUED | OUTPATIENT
Start: 2023-01-12 | End: 2023-01-12 | Stop reason: HOSPADM

## 2023-01-12 RX ORDER — CEFAZOLIN SODIUM 2 G/50ML
2 SOLUTION INTRAVENOUS
Status: DISCONTINUED | OUTPATIENT
Start: 2023-01-12 | End: 2023-01-12 | Stop reason: HOSPADM

## 2023-01-12 RX ORDER — HYDROCODONE BITARTRATE AND ACETAMINOPHEN 10; 325 MG/1; MG/1
1 TABLET ORAL EVERY 6 HOURS PRN
Qty: 15 TABLET | Refills: 0 | Status: SHIPPED | OUTPATIENT
Start: 2023-01-12 | End: 2023-05-18

## 2023-01-12 RX ORDER — MIDAZOLAM HYDROCHLORIDE 1 MG/ML
INJECTION, SOLUTION INTRAMUSCULAR; INTRAVENOUS
Status: DISCONTINUED | OUTPATIENT
Start: 2023-01-12 | End: 2023-01-12

## 2023-01-12 RX ORDER — PROPOFOL 10 MG/ML
VIAL (ML) INTRAVENOUS
Status: DISCONTINUED | OUTPATIENT
Start: 2023-01-12 | End: 2023-01-12

## 2023-01-12 RX ORDER — CEFAZOLIN SODIUM 2 G/50ML
2 SOLUTION INTRAVENOUS
Status: DISCONTINUED | OUTPATIENT
Start: 2023-01-12 | End: 2023-01-12 | Stop reason: SDUPTHER

## 2023-01-12 RX ORDER — OXYCODONE AND ACETAMINOPHEN 5; 325 MG/1; MG/1
1 TABLET ORAL
Status: DISCONTINUED | OUTPATIENT
Start: 2023-01-12 | End: 2023-01-12 | Stop reason: HOSPADM

## 2023-01-12 RX ORDER — ACETAMINOPHEN 10 MG/ML
INJECTION, SOLUTION INTRAVENOUS
Status: DISCONTINUED | OUTPATIENT
Start: 2023-01-12 | End: 2023-01-12

## 2023-01-12 RX ORDER — ONDANSETRON 2 MG/ML
4 INJECTION INTRAMUSCULAR; INTRAVENOUS DAILY PRN
Status: DISCONTINUED | OUTPATIENT
Start: 2023-01-12 | End: 2023-01-12 | Stop reason: HOSPADM

## 2023-01-12 RX ORDER — FENTANYL CITRATE 50 UG/ML
INJECTION, SOLUTION INTRAMUSCULAR; INTRAVENOUS
Status: DISCONTINUED | OUTPATIENT
Start: 2023-01-12 | End: 2023-01-12

## 2023-01-12 RX ORDER — ONDANSETRON 2 MG/ML
INJECTION INTRAMUSCULAR; INTRAVENOUS
Status: DISCONTINUED | OUTPATIENT
Start: 2023-01-12 | End: 2023-01-12

## 2023-01-12 RX ADMIN — FENTANYL CITRATE 100 MCG: 50 INJECTION, SOLUTION INTRAMUSCULAR; INTRAVENOUS at 12:01

## 2023-01-12 RX ADMIN — SODIUM CHLORIDE, POTASSIUM CHLORIDE, SODIUM LACTATE AND CALCIUM CHLORIDE: 600; 310; 30; 20 INJECTION, SOLUTION INTRAVENOUS at 12:01

## 2023-01-12 RX ADMIN — CEFAZOLIN 2 G: 1 INJECTION, POWDER, FOR SOLUTION INTRAMUSCULAR; INTRAVENOUS at 12:01

## 2023-01-12 RX ADMIN — PROPOFOL 100 MG: 10 INJECTION, EMULSION INTRAVENOUS at 12:01

## 2023-01-12 RX ADMIN — OXYCODONE HYDROCHLORIDE AND ACETAMINOPHEN 1 TABLET: 5; 325 TABLET ORAL at 02:01

## 2023-01-12 RX ADMIN — LIDOCAINE HYDROCHLORIDE 50 MG: 20 INJECTION, SOLUTION INTRAVENOUS at 12:01

## 2023-01-12 RX ADMIN — ONDANSETRON 4 MG: 2 INJECTION, SOLUTION INTRAMUSCULAR; INTRAVENOUS at 12:01

## 2023-01-12 RX ADMIN — DEXAMETHASONE SODIUM PHOSPHATE 12 MG: 4 INJECTION, SOLUTION INTRAMUSCULAR; INTRAVENOUS at 12:01

## 2023-01-12 RX ADMIN — SUCCINYLCHOLINE CHLORIDE 120 MG: 20 INJECTION, SOLUTION INTRAMUSCULAR; INTRAVENOUS at 12:01

## 2023-01-12 RX ADMIN — MIDAZOLAM 2 MG: 1 INJECTION INTRAMUSCULAR; INTRAVENOUS at 12:01

## 2023-01-12 RX ADMIN — ROCURONIUM BROMIDE 5 MG: 10 INJECTION, SOLUTION INTRAVENOUS at 12:01

## 2023-01-12 RX ADMIN — ACETAMINOPHEN 1000 MG: 10 INJECTION, SOLUTION INTRAVENOUS at 12:01

## 2023-01-12 NOTE — H&P (VIEW-ONLY)
"CC: FI    History of Present Illness:       1/12/23-Here for Stage I interstim. No interval change in history.     12/12/22- Pt has been seeing pelvic floor PT and has seen improvement, especially with CHICHI. Rectocele is less symptomatic and not splinting. She did see Dr. Choi. She does have FI a couple times a week still and Interstim was recommended.   Has had 2 E. Coli UTIs, 1st treated with fosfomycin, 2nd treated with cipro x 5 days last week. Currently without UTI symptoms, but prior was having urethral pain.      8/8/22- Patient reports that she has fecal urgency and FI and urinary incontinence. Changed a diaper 3 times yesterday. She reports having CHICHI with cough/lifting and also has UUI. Urinary issues 10-12 years. She reports having anal cancer, and during the treatment, noticed rectocele. She had chemo and radiation for the anal cancer. No surgery. She reports having insensate fecal incontinence. Has difficulty discerning between gas and solid. Has never taken any medications for urinary complaints. Taking metamucil does seem to help with GI symptoms.      Per Amanda Leyva NP:   "Patient is a 66-year-old female that is presenting per gyn MD ( WILY Salmeron MD). Patient states that she was seen by gyn physician and instructed to see urologist secondary to rectocele and cystocele.  Patient has a history of anal cancer and is concerned that rectocele is secondary to anal cancer treatment.  Patient states that she has not had sexual intercourse in 10 years and is wanting to proceed with sexual intercourse.  Reports that she has had an increase in urge incontinence and is now wearing a pad.  Does not want to consider PT pelvic floor training or medication, wants to have a surgical option. No abd/pelvic pain and no exac/rel factors.  No hematuria."      Review of Systems   Constitutional:  Negative for chills and fever.   Respiratory:  Negative for shortness of breath.    Cardiovascular:  Negative for chest " pain.   All other systems reviewed and are negative.      Past Medical History:   Diagnosis Date    General anesthetics causing adverse effect in therapeutic use     difficulty breathing when waking    GERD (gastroesophageal reflux disease)     History of cervical cancer     History of rectal or anal cancer        Past Surgical History:   Procedure Laterality Date    ADENOIDECTOMY      carpal tunel Bilateral     CATARACT EXTRACTION Left 05/06/2021    NEAR    CATARACT EXTRACTION W/ INTRAOCULAR LENS IMPLANT Right 05/20/2021    CERVICAL CONIZATION   W/ LASER      COLONOSCOPY N/A 11/17/2020    Procedure: COLONOSCOPY;  Surgeon: Kendy De Leon MD;  Location: [facility];  Service: Endoscopy;  Laterality: N/A;    ESOPHAGOGASTRODUODENOSCOPY N/A 12/22/2020    Procedure: ESOPHAGOGASTRODUODENOSCOPY (EGD);  Surgeon: Goyo Kapadia MD;  Location: [facility];  Service: Endoscopy;  Laterality: N/A;    ESOPHAGOGASTRODUODENOSCOPY N/A 05/09/2022    Procedure: EGD (ESOPHAGOGASTRODUODENOSCOPY);  Surgeon: Misa Crocker MD;  Location: [facility];  Service: Gastroenterology;  Laterality: N/A;    ESOPHAGOGASTRODUODENOSCOPY N/A 9/7/2022    Procedure: EGD (ESOPHAGOGASTRODUODENOSCOPY);  Surgeon: Misa Crocker MD;  Location: [facility];  Service: Gastroenterology;  Laterality: N/A;    HERNIA REPAIR      TONSILLECTOMY         Family History   Problem Relation Age of Onset    Diabetes Mother         type II    Hyperlipidemia Mother     Heart disease Father     Hypertension Father     Cataracts Father     Hypertension Sister     Diabetes Maternal Grandmother     Diabetes Maternal Grandfather        Social History     Tobacco Use    Smoking status: Never    Smokeless tobacco: Never   Substance Use Topics    Alcohol use: Yes     Comment: Social , hold told prior to sx    Drug use: Never       No current facility-administered medications for this encounter.       Review of patient's allergies indicates:   Allergen Reactions    Sulfa  (sulfonamide antibiotics) Nausea And Vomiting    Nitrofuran analogues Nausea And Vomiting     Microdantin        Physical Exam  Vitals:    01/12/23 1030   BP: 106/60   Pulse: 98   Resp: 18   Temp: 97.5 °F (36.4 °C)     General: Well-developed, well-nourished, in no acute distress  HEENT: Normocephalic, atraumatic, extraocular movements intact  Neck: Supple, no supraclavicular or cervical lymphadenopathy, trachea midline  Respirations: even and unlabored  Extremities: moves all equally, no clubbing, cyanosis or edema  Skin: Warm and dry. No lesions  Psych: normal affect  Neuro: Alert and oriented x 3. Cranial nerves II-XII intact      Urinalysis  pH, UA   Date Value Ref Range Status   12/05/2022 5  Final     Nitrite, UA   Date Value Ref Range Status   12/05/2022 negative  Final       Assessment:  Fecal incontinence      Plan:   Risks/benefits discussed. Will proceed with Stage I interstim today.

## 2023-01-12 NOTE — DISCHARGE SUMMARY
[facility] (Hospital)  Discharge Note  Short Stay    Procedure(s) (LRB):  INSERTION, NEUROSTIMULATOR, TEMPORARY, SACRAL (N/A)      OUTCOME: Patient tolerated treatment/procedure well without complication and is now ready for discharge.    DISPOSITION: Home or Self Care    FINAL DIAGNOSIS:  Fecal smearing    FOLLOWUP: In clinic    DISCHARGE INSTRUCTIONS:    Discharge Procedure Orders   Diet Adult Regular     Sponge bath only until clinic visit     Notify your health care provider if you experience any of the following:  temperature >100.4     Notify your health care provider if you experience any of the following:  persistent nausea and vomiting or diarrhea     Notify your health care provider if you experience any of the following:  severe uncontrolled pain     Leave dressing on - Keep it clean, dry, and intact until clinic visit         Clinical Reference Documents Added to Patient Instructions         Document    SURGICAL WOUND DISCHARGE INSTRUCTIONS (ENGLISH)            TIME SPENT ON DISCHARGE: 10 minutes

## 2023-01-12 NOTE — ANESTHESIA PROCEDURE NOTES
Intubation    Date/Time: 1/12/2023 12:13 PM  Performed by: Jolie Okeefe CRNA  Authorized by: Tuan Mcdonough MD     Intubation:     Induction:  Intravenous    Intubated:  Postinduction    Mask Ventilation:  Easy mask    Attempts:  1    Attempted By:  CRNA    Method of Intubation:  Direct    Blade:  Cruz 2    Laryngeal View Grade: Grade I - full view of cords      Difficult Airway Encountered?: No      Complications:  None    Airway Device:  Oral endotracheal tube    Airway Device Size:  7.0    Style/Cuff Inflation:  Cuffed (inflated to minimal occlusive pressure)    Inflation Amount (mL):  5    Tube secured:  20    Secured at:  The lips    Placement Verified By:  Capnometry    Complicating Factors:  None    Findings Post-Intubation:  BS equal bilateral and atraumatic/condition of teeth unchanged

## 2023-01-12 NOTE — PLAN OF CARE
Gave home care instructions to patient and , verbalized understanding.  All questions answered to the satisfaction of both.

## 2023-01-12 NOTE — TRANSFER OF CARE
"Anesthesia Transfer of Care Note    Patient: Rita Dejesus    Procedure(s) Performed: Procedure(s) (LRB):  INSERTION, NEUROSTIMULATOR, TEMPORARY, SACRAL (N/A)    Patient location: PACU    Anesthesia Type: general    Transport from OR: Transported from OR on room air with adequate spontaneous ventilation    Post pain: adequate analgesia    Post assessment: no apparent anesthetic complications and tolerated procedure well    Post vital signs: stable    Level of consciousness: alert, awake and oriented    Nausea/Vomiting: no nausea/vomiting    Complications: none    Transfer of care protocol was followed      Last vitals:   Visit Vitals  /62 (BP Location: Right arm, Patient Position: Lying)   Pulse 85   Temp 36.2 °C (97.1 °F) (Temporal)   Resp 16   Ht 5' 2" (1.575 m)   Wt 58 kg (127 lb 12.1 oz)   SpO2 97%   Breastfeeding No   BMI 23.37 kg/m²     "

## 2023-01-12 NOTE — ANESTHESIA PREPROCEDURE EVALUATION
01/12/2023  Rita Dejesus is a 67 y.o., female     Patient Active Problem List   Diagnosis    GERD (gastroesophageal reflux disease)    History of cervical cancer    History of rectal or anal cancer    Hyperlipidemia    Chronic superficial gastritis without bleeding    Benign fundic gland polyps of stomach    Hiatal hernia    Chronic kidney disease, stage 3a    Major depression in partial remission    Other dysphagia    Frequent fecal incontinence    Mixed stress and urge urinary incontinence     Past Medical History:   Diagnosis Date    General anesthetics causing adverse effect in therapeutic use     difficulty breathing when waking    GERD (gastroesophageal reflux disease)     History of cervical cancer     History of rectal or anal cancer      Past Surgical History:   Procedure Laterality Date    ADENOIDECTOMY      carpal tunel Bilateral     CATARACT EXTRACTION Left 05/06/2021    NEAR    CATARACT EXTRACTION W/ INTRAOCULAR LENS IMPLANT Right 05/20/2021    CERVICAL CONIZATION   W/ LASER      COLONOSCOPY N/A 11/17/2020    Procedure: COLONOSCOPY;  Surgeon: Kendy De Leon MD;  Location: [facility];  Service: Endoscopy;  Laterality: N/A;    ESOPHAGOGASTRODUODENOSCOPY N/A 12/22/2020    Procedure: ESOPHAGOGASTRODUODENOSCOPY (EGD);  Surgeon: Goyo Kapadia MD;  Location: [facility];  Service: Endoscopy;  Laterality: N/A;    ESOPHAGOGASTRODUODENOSCOPY N/A 05/09/2022    Procedure: EGD (ESOPHAGOGASTRODUODENOSCOPY);  Surgeon: Mias Crocker MD;  Location: [facility];  Service: Gastroenterology;  Laterality: N/A;    ESOPHAGOGASTRODUODENOSCOPY N/A 9/7/2022    Procedure: EGD (ESOPHAGOGASTRODUODENOSCOPY);  Surgeon: Misa Crocker MD;  Location: [facility];  Service: Gastroenterology;  Laterality: N/A;    HERNIA REPAIR      TONSILLECTOMY           Chemistry        Component  Value Date/Time     12/05/2022 1118    K 4.5 12/05/2022 1118     12/05/2022 1118    CO2 22 (L) 12/05/2022 1118    BUN 23 12/05/2022 1118    CREATININE 1.2 12/05/2022 1118    GLU 91 12/05/2022 1118        Component Value Date/Time    CALCIUM 10.0 12/05/2022 1118    ALKPHOS 101 12/05/2022 1118    AST 19 12/05/2022 1118    ALT 9 (L) 12/05/2022 1118    BILITOT 0.3 12/05/2022 1118    ESTGFRAFRICA 54.0 (A) 05/30/2022 0912    EGFRNONAA 46.9 (A) 05/30/2022 0912        Lab Results   Component Value Date    WBC 6.59 12/05/2022    HGB 13.0 12/05/2022    HCT 40.3 12/05/2022    MCV 91 12/05/2022     12/05/2022           Pre-op Assessment    I have reviewed the Patient Summary Reports.    I have reviewed the NPO Status.   I have reviewed the Medications.     Review of Systems  Anesthesia Hx:  No problems with previous Anesthesia  History of prior surgery of interest to airway management or planning: Personal Hx of Anesthesia complications Pulmonary/Ventilatory Issues (post hiatal-hernia repair - no post-anethetic issues after other procedure), patient describes breathing difficulties after anesthesia, not specified   Social:  Non-Smoker    Hematology/Oncology:  Hematology Normal   Oncology Normal    -- Denies Anemia:    Cardiovascular:  Cardiovascular Normal Exercise tolerance: good  Denies MI.  Denies CAD.     Denies BONILLA. ECG has been reviewed. EKG:  Normal sinus rhythm   Low voltage QRS   Nonspecific T wave abnormality   Abnormal ECG   No previous ECGs available   Confirmed by NITIN HATHAWAY MD ([id number]) on 12/12/2022 2:09:37 PM    Pulmonary:  Pulmonary Normal  Denies Recent URI.    Renal/:   Chronic Renal Disease, CKD BUN/Cre WNL   Hepatic/GI:   Denies Hiatal Hernia. GERD, well controlled Takes PPI BID - did not take this AM    Musculoskeletal:  Musculoskeletal Normal    Neurological:  Neurology Normal    Endocrine:  Endocrine Normal bmi 23       Physical Exam  General: Well nourished, Cooperative, Alert and  Oriented    Airway:  Mallampati: II   Mouth Opening: Normal  TM Distance: Normal  Tongue: Normal  Neck ROM: Normal ROM    Dental:  Intact  Patient denies any currently loose or chipped teeth; Patient denies any removable dental appliances  Chest/Lungs:  Normal Respiratory Rate        Anesthesia Plan  Type of Anesthesia, risks & benefits discussed:    Anesthesia Type: Gen ETT, MAC  Intra-op Monitoring Plan: Standard ASA Monitors  Post Op Pain Control Plan: multimodal analgesia and IV/PO Opioids PRN  Induction:  IV  Airway Plan: Direct  Informed Consent: Informed consent signed with the Patient and all parties understand the risks and agree with anesthesia plan.  All questions answered.   ASA Score: 2  Day of Surgery Review of History & Physical: H&P Update referred to the surgeon/provider.    Ready For Surgery From Anesthesia Perspective.     .

## 2023-01-12 NOTE — OP NOTE
Date of Procedure: 01/12/2023     PREOPERATIVE DIAGNOSIS:  Fecal incontinence     POSTOPERATIVE DIAGNOSIS:  Fecal incontinence     PROCEDURES:  Stage I InterStim     SURGEON:  Andra Painting MD     Assistants: None     Specimen:  None     ANESTHESIA:  General endotracheal.     BLOOD LOSS:  5cc     FINDINGS:  Placement of lead in right S3 foramen with good kirsten and toe reflex noted.      PROCEDURE IN DETAIL:  Patient was brought to the operating suite where they were placed under general anesthesia.  Patient was then positioned in the prone position, sterilely prepped and draped.  The Medial aspect of the S3 foramen was identified on fluoroscopy. Approximately 9 cm superior to the coccyx, 2 cm lateral we placed a right sided needle into the S3 foramen. This was confirmed clinically and through fluoroscopic imaging. The position of the needle was slightly adjusted, and in good position within the S3 foramen on both the AP and lateral positions. We noted both kirsten and toe flexion. We then placed our guide through the needle in the S3 foramen, backloading the needle.  The skin was incised to allow the dilator.  The trocar was introduced over the guide.  We then placed our lead in an inferior and lateral position.  We  noted positive Kirsten and toe flexion.  Lead was in appropriate position in an inferior lateral location both on AP and lateral fluoroscopic images.  We then made approximately a 3 cm incision, 2 finger breaths below the right iliac crest.  The pocket was developed and hemostasis was meticulous maintained.  We then used the tunneling device to pass the lead into the site. We then used the tunneling device to create a tunnel to the contralateral buttock for the extension wire exit site. The extension wire was then passed back through to the pocket developed and connected to the lead with the screwdriver. A 3-0 Vicryl stitch was placed around the extension wire to prevent migration.  Deep layer was  closed with a running 3-0 Vicryl, skin with a 4 0 Monocryl in a subcuticular fashion.  Dermabond was applied to both sites, followed by 4x4s and tegaderm. The patient was awakened and taken the PACU in stable condition.  They will keep a voiding diary over the next week and are scheduled for stage 2 vs lead removal in 2 weeks.     COMPLICATIONS:  None

## 2023-01-12 NOTE — H&P
"CC: FI    History of Present Illness:       1/12/23-Here for Stage I interstim. No interval change in history.     12/12/22- Pt has been seeing pelvic floor PT and has seen improvement, especially with CHICHI. Rectocele is less symptomatic and not splinting. She did see Dr. Choi. She does have FI a couple times a week still and Interstim was recommended.   Has had 2 E. Coli UTIs, 1st treated with fosfomycin, 2nd treated with cipro x 5 days last week. Currently without UTI symptoms, but prior was having urethral pain.      8/8/22- Patient reports that she has fecal urgency and FI and urinary incontinence. Changed a diaper 3 times yesterday. She reports having CHICHI with cough/lifting and also has UUI. Urinary issues 10-12 years. She reports having anal cancer, and during the treatment, noticed rectocele. She had chemo and radiation for the anal cancer. No surgery. She reports having insensate fecal incontinence. Has difficulty discerning between gas and solid. Has never taken any medications for urinary complaints. Taking metamucil does seem to help with GI symptoms.      Per Amanda Leyva NP:   "Patient is a 66-year-old female that is presenting per gyn MD ( WILY Salmeron MD). Patient states that she was seen by gyn physician and instructed to see urologist secondary to rectocele and cystocele.  Patient has a history of anal cancer and is concerned that rectocele is secondary to anal cancer treatment.  Patient states that she has not had sexual intercourse in 10 years and is wanting to proceed with sexual intercourse.  Reports that she has had an increase in urge incontinence and is now wearing a pad.  Does not want to consider PT pelvic floor training or medication, wants to have a surgical option. No abd/pelvic pain and no exac/rel factors.  No hematuria."      Review of Systems   Constitutional:  Negative for chills and fever.   Respiratory:  Negative for shortness of breath.    Cardiovascular:  Negative for chest " pain.   All other systems reviewed and are negative.      Past Medical History:   Diagnosis Date    General anesthetics causing adverse effect in therapeutic use     difficulty breathing when waking    GERD (gastroesophageal reflux disease)     History of cervical cancer     History of rectal or anal cancer        Past Surgical History:   Procedure Laterality Date    ADENOIDECTOMY      carpal tunel Bilateral     CATARACT EXTRACTION Left 05/06/2021    NEAR    CATARACT EXTRACTION W/ INTRAOCULAR LENS IMPLANT Right 05/20/2021    CERVICAL CONIZATION   W/ LASER      COLONOSCOPY N/A 11/17/2020    Procedure: COLONOSCOPY;  Surgeon: Kendy De Leon MD;  Location: [facility];  Service: Endoscopy;  Laterality: N/A;    ESOPHAGOGASTRODUODENOSCOPY N/A 12/22/2020    Procedure: ESOPHAGOGASTRODUODENOSCOPY (EGD);  Surgeon: Goyo Kapadia MD;  Location: [facility];  Service: Endoscopy;  Laterality: N/A;    ESOPHAGOGASTRODUODENOSCOPY N/A 05/09/2022    Procedure: EGD (ESOPHAGOGASTRODUODENOSCOPY);  Surgeon: Misa Crocker MD;  Location: [facility];  Service: Gastroenterology;  Laterality: N/A;    ESOPHAGOGASTRODUODENOSCOPY N/A 9/7/2022    Procedure: EGD (ESOPHAGOGASTRODUODENOSCOPY);  Surgeon: Misa Crocker MD;  Location: [facility];  Service: Gastroenterology;  Laterality: N/A;    HERNIA REPAIR      TONSILLECTOMY         Family History   Problem Relation Age of Onset    Diabetes Mother         type II    Hyperlipidemia Mother     Heart disease Father     Hypertension Father     Cataracts Father     Hypertension Sister     Diabetes Maternal Grandmother     Diabetes Maternal Grandfather        Social History     Tobacco Use    Smoking status: Never    Smokeless tobacco: Never   Substance Use Topics    Alcohol use: Yes     Comment: Social , hold told prior to sx    Drug use: Never       No current facility-administered medications for this encounter.       Review of patient's allergies indicates:   Allergen Reactions    Sulfa  (sulfonamide antibiotics) Nausea And Vomiting    Nitrofuran analogues Nausea And Vomiting     Microdantin        Physical Exam  Vitals:    01/12/23 1030   BP: 106/60   Pulse: 98   Resp: 18   Temp: 97.5 °F (36.4 °C)     General: Well-developed, well-nourished, in no acute distress  HEENT: Normocephalic, atraumatic, extraocular movements intact  Neck: Supple, no supraclavicular or cervical lymphadenopathy, trachea midline  Respirations: even and unlabored  Extremities: moves all equally, no clubbing, cyanosis or edema  Skin: Warm and dry. No lesions  Psych: normal affect  Neuro: Alert and oriented x 3. Cranial nerves II-XII intact      Urinalysis  pH, UA   Date Value Ref Range Status   12/05/2022 5  Final     Nitrite, UA   Date Value Ref Range Status   12/05/2022 negative  Final       Assessment:  Fecal incontinence      Plan:   Risks/benefits discussed. Will proceed with Stage I interstim today.

## 2023-01-13 NOTE — ANESTHESIA POSTPROCEDURE EVALUATION
Anesthesia Post Evaluation    Patient: Rita Dejesus    Procedure(s) Performed: Procedure(s) (LRB):  INSERTION, NEUROSTIMULATOR, TEMPORARY, SACRAL (N/A)    Final Anesthesia Type: general      Patient location during evaluation: PACU  Patient participation: Yes- Able to Participate  Level of consciousness: awake  Post-procedure vital signs: reviewed and stable  Pain management: adequate  Airway patency: patent    PONV status at discharge: No PONV  Anesthetic complications: no      Cardiovascular status: hemodynamically stable  Respiratory status: unassisted  Hydration status: euvolemic  Follow-up not needed.          Vitals Value Taken Time   /62 01/12/23 1412   Temp 36.2 °C (97.1 °F) 01/12/23 1405   Pulse 96 01/12/23 1414   Resp 16 01/12/23 1413   SpO2 97 % 01/12/23 1413   Vitals shown include unvalidated device data.      Event Time   Out of Recovery 14:19:06         Pain/Mamadou Score: Pain Rating Prior to Med Admin: 6 (1/12/2023  2:08 PM)  Mamadou Score: 10 (1/12/2023  2:19 PM)

## 2023-01-17 VITALS
OXYGEN SATURATION: 97 % | SYSTOLIC BLOOD PRESSURE: 113 MMHG | DIASTOLIC BLOOD PRESSURE: 62 MMHG | HEIGHT: 62 IN | RESPIRATION RATE: 16 BRPM | TEMPERATURE: 97 F | WEIGHT: 127.75 LBS | BODY MASS INDEX: 23.51 KG/M2 | HEART RATE: 85 BPM

## 2023-01-24 ENCOUNTER — OFFICE VISIT (OUTPATIENT)
Dept: GASTROENTEROLOGY | Facility: CLINIC | Age: 68
End: 2023-01-24
Payer: MEDICARE

## 2023-01-24 VITALS
WEIGHT: 129.44 LBS | BODY MASS INDEX: 23.82 KG/M2 | HEIGHT: 62 IN | DIASTOLIC BLOOD PRESSURE: 64 MMHG | HEART RATE: 96 BPM | SYSTOLIC BLOOD PRESSURE: 110 MMHG

## 2023-01-24 DIAGNOSIS — K21.9 GASTROESOPHAGEAL REFLUX DISEASE, UNSPECIFIED WHETHER ESOPHAGITIS PRESENT: Primary | ICD-10-CM

## 2023-01-24 PROCEDURE — 99213 PR OFFICE/OUTPT VISIT, EST, LEVL III, 20-29 MIN: ICD-10-PCS | Mod: S$PBB,,, | Performed by: INTERNAL MEDICINE

## 2023-01-24 PROCEDURE — 99213 OFFICE O/P EST LOW 20 MIN: CPT | Mod: S$PBB,,, | Performed by: INTERNAL MEDICINE

## 2023-01-24 PROCEDURE — 99214 OFFICE O/P EST MOD 30 MIN: CPT | Mod: PBBFAC | Performed by: INTERNAL MEDICINE

## 2023-01-24 PROCEDURE — 99999 PR PBB SHADOW E&M-EST. PATIENT-LVL IV: ICD-10-PCS | Mod: PBBFAC,,, | Performed by: INTERNAL MEDICINE

## 2023-01-24 PROCEDURE — 99999 PR PBB SHADOW E&M-EST. PATIENT-LVL IV: CPT | Mod: PBBFAC,,, | Performed by: INTERNAL MEDICINE

## 2023-01-24 NOTE — PROGRESS NOTES
Clinic Progress Note:  [facility] Progress Note    Reason for Visit:  There were no encounter diagnoses.    PCP: Tripp Heredia       HPI:  This is a 67 y.o. female here for follow-up for dysphagia and GERD.      S/p EGD in 5/2022 with me that revealed Normal duodenal bulb and second portion of the duodenum.                          - Multiple gastric polyps (HP polyps)                         - Large hiatal hernia.                          - Z-line regular, 31 cm from the incisors.                          - LA Grade C esophagitis with no bleeding    S/p EGD in 9/2022 with me that revealed                         - Normal duodenal bulb and second portion of the                          duodenum.                          - Multiple gastric polyps. Resected and retrieved.                          - Z-line regular, 30 cm from the incisors.                          - Large hiatal hernia.                          - Widely patent Schatzki ring.                          - Normal esophagus. (Path: Consistent with GERD)      Her GERD symptoms are controlled on PPI PO BID  Her dysphagia symptoms have resolved.   She is followed by Dr Choi for fecal incontinence and is s/p stimulator placement by urology   This is helping with her symptoms.       ROS:  As per HPI       Allergies: Reviewed    Home Medications:   Medication List with Changes/Refills   Current Medications    BRAN/GUM/FIB/SOLANGE/PSYL/KELP/PEC (FIBER 6 ORAL)    Take 1 tablet by mouth 2 (two) times a day. 12 mg collin    CALCIUM CARBONATE 650 MG CALCIUM (1,625 MG) TABLET    Take 1 tablet by mouth once daily.    COLLAGEN MISC    by Misc.(Non-Drug; Combo Route) route.    CYANOCOBALAMIN (VITAMIN B-12) 250 MCG TABLET    Take 250 mcg by mouth once daily.    FLUOCINONIDE (LIDEX) 0.05 % EXTERNAL SOLUTION    Apply topically 2 (two) times daily.    FLUTICASONE PROPIONATE (FLONASE) 50 MCG/ACTUATION NASAL SPRAY    1 spray (50 mcg total) by Each Nostril route  "once daily.    HYDROCODONE-ACETAMINOPHEN (NORCO)  MG PER TABLET    Take 1 tablet by mouth every 6 (six) hours as needed for Pain.    LACTOBACILLUS RHAMNOSUS GG (CULTURELLE) 10 BILLION CELL CAPSULE    Take 1 capsule by mouth once daily.    LEVOCETIRIZINE (XYZAL) 5 MG TABLET    Take 5 mg by mouth every evening.    MIRTAZAPINE (REMERON) 15 MG TABLET    TAKE 1 TABLET BY MOUTH EVERY EVENING    MULTIVITAMIN CAPSULE    Take 1 capsule by mouth once daily.    OMEPRAZOLE (PRILOSEC) 40 MG CAPSULE    Take 1 capsule (40 mg total) by mouth 2 (two) times a day.    OXYBUTYNIN (DITROPAN-XL) 10 MG 24 HR TABLET    Take 1 tablet (10 mg total) by mouth once daily.    SIMVASTATIN (ZOCOR) 40 MG TABLET    Take 1 tablet (40 mg total) by mouth every evening.    VENLAFAXINE (EFFEXOR-XR) 75 MG 24 HR CAPSULE    TAKE 1 CAPSULE BY MOUTH ONCE DAILY    VITAMIN D (VITAMIN D3) 1000 UNITS TAB    Take 185 mg by mouth.         Physical Exam:  Vital Signs:  /64   Pulse 96   Ht 5' 2" (1.575 m)   Wt 58.7 kg (129 lb 6.6 oz)   BMI 23.67 kg/m²   Body mass index is 23.67 kg/m².    Physical Exam  Constitutional:       Appearance: Normal appearance.   HENT:      Head: Normocephalic.   Pulmonary:      Effort: Pulmonary effort is normal.   Abdominal:      General: There is no distension.      Palpations: Abdomen is soft.      Tenderness: There is no abdominal tenderness. There is no guarding.   Neurological:      Mental Status: She is alert.        Labs: Pertinent labs reviewed.          Assessment:    Problem List Items Addressed This Visit      Esophagitis   - Resolved                        GERD (gastroesophageal reflux disease)       Current Assessment & Plan         Plan:   -Continue PPI PO BID    -GERD lifestyle modifications   -At next visit, we will reduce PPI to once daily dosing and see if this controls her symptoms                 Other dysphagia       Current Assessment & Plan         Plan:   -Improved                     Follow up in " about 3 months (around 4/24/2023).    Order summary:  No orders of the defined types were placed in this encounter.      Thank you so much for allowing me to participate in the care of Rita Crocker MD  Gastroenterology and Hepatology  [facility]

## 2023-01-25 ENCOUNTER — TELEPHONE (OUTPATIENT)
Dept: PREADMISSION TESTING | Facility: HOSPITAL | Age: 68
End: 2023-01-25
Payer: MEDICARE

## 2023-01-25 NOTE — TELEPHONE ENCOUNTER
Called and spoke with Pt about the following:     Please arrive to [facility] at 10:30 am on 1/26/23 for your scheduled procedure.  Address: [address] [place] [address] [place] [address] ([address] [place] [address], [address] Floor Lobby)  >>>NO eating or drinking after midnight unless instructed otherwise by your Surgeon<<<

## 2023-01-26 ENCOUNTER — ANESTHESIA EVENT (OUTPATIENT)
Dept: SURGERY | Facility: HOSPITAL | Age: 68
End: 2023-01-26
Payer: MEDICARE

## 2023-01-26 ENCOUNTER — ANESTHESIA (OUTPATIENT)
Dept: SURGERY | Facility: HOSPITAL | Age: 68
End: 2023-01-26
Payer: MEDICARE

## 2023-01-26 ENCOUNTER — HOSPITAL ENCOUNTER (OUTPATIENT)
Facility: HOSPITAL | Age: 68
Discharge: HOME | End: 2023-01-26
Attending: UROLOGY | Admitting: UROLOGY
Payer: MEDICARE

## 2023-01-26 DIAGNOSIS — R15.9 FREQUENT FECAL INCONTINENCE: ICD-10-CM

## 2023-01-26 DIAGNOSIS — R15.1 FECAL SMEARING: ICD-10-CM

## 2023-01-26 DIAGNOSIS — N39.46 MIXED STRESS AND URGE URINARY INCONTINENCE: Primary | ICD-10-CM

## 2023-01-26 PROCEDURE — 95971 PR ANALYZE NEUROSTIM,SIMPLE/PROG: ICD-10-PCS | Mod: 59,,, | Performed by: UROLOGY

## 2023-01-26 PROCEDURE — 71000033 HC RECOVERY, INTIAL HOUR: Performed by: UROLOGY

## 2023-01-26 PROCEDURE — 36000706: Performed by: UROLOGY

## 2023-01-26 PROCEDURE — C1889 IMPLANT/INSERT DEVICE, NOC: HCPCS | Performed by: UROLOGY

## 2023-01-26 PROCEDURE — 64590 PR IMPLANT PERIPH/GASTRIC NEUROSTIM/RECEIVER: ICD-10-PCS | Mod: ,,, | Performed by: UROLOGY

## 2023-01-26 PROCEDURE — 64590 INS/RPL PRPH SAC/GSTR NPG/R: CPT | Mod: ,,, | Performed by: UROLOGY

## 2023-01-26 PROCEDURE — 36000707: Performed by: UROLOGY

## 2023-01-26 PROCEDURE — C1787 PATIENT PROGR, NEUROSTIM: HCPCS | Performed by: UROLOGY

## 2023-01-26 PROCEDURE — 71000015 HC POSTOP RECOV 1ST HR: Performed by: UROLOGY

## 2023-01-26 PROCEDURE — C1767 GENERATOR, NEURO NON-RECHARG: HCPCS | Performed by: UROLOGY

## 2023-01-26 PROCEDURE — 63600175 PHARM REV CODE 636 W HCPCS: Performed by: NURSE ANESTHETIST, CERTIFIED REGISTERED

## 2023-01-26 PROCEDURE — 63600175 PHARM REV CODE 636 W HCPCS: Performed by: STUDENT IN AN ORGANIZED HEALTH CARE EDUCATION/TRAINING PROGRAM

## 2023-01-26 PROCEDURE — 25000003 PHARM REV CODE 250: Performed by: NURSE ANESTHETIST, CERTIFIED REGISTERED

## 2023-01-26 PROCEDURE — 37000008 HC ANESTHESIA 1ST 15 MINUTES: Performed by: UROLOGY

## 2023-01-26 PROCEDURE — 37000009 HC ANESTHESIA EA ADD 15 MINS: Performed by: UROLOGY

## 2023-01-26 PROCEDURE — 95971 ALYS SMPL SP/PN NPGT W/PRGRM: CPT | Mod: 59,,, | Performed by: UROLOGY

## 2023-01-26 PROCEDURE — 25000003 PHARM REV CODE 250: Performed by: STUDENT IN AN ORGANIZED HEALTH CARE EDUCATION/TRAINING PROGRAM

## 2023-01-26 DEVICE — ENVELOPE TYRX ANTIBACT MED: Type: IMPLANTABLE DEVICE | Site: BACK | Status: FUNCTIONAL

## 2023-01-26 DEVICE — SYS INTERSTIM X RECHARGE FREE: Type: IMPLANTABLE DEVICE | Site: BACK | Status: FUNCTIONAL

## 2023-01-26 RX ORDER — ONDANSETRON 2 MG/ML
INJECTION INTRAMUSCULAR; INTRAVENOUS
Status: DISCONTINUED | OUTPATIENT
Start: 2023-01-26 | End: 2023-01-26

## 2023-01-26 RX ORDER — HYDROMORPHONE HYDROCHLORIDE 2 MG/ML
0.2 INJECTION, SOLUTION INTRAMUSCULAR; INTRAVENOUS; SUBCUTANEOUS EVERY 5 MIN PRN
Status: DISCONTINUED | OUTPATIENT
Start: 2023-01-26 | End: 2023-01-26 | Stop reason: HOSPADM

## 2023-01-26 RX ORDER — ONDANSETRON 2 MG/ML
4 INJECTION INTRAMUSCULAR; INTRAVENOUS DAILY PRN
Status: DISCONTINUED | OUTPATIENT
Start: 2023-01-26 | End: 2023-01-26 | Stop reason: HOSPADM

## 2023-01-26 RX ORDER — FENTANYL CITRATE 50 UG/ML
INJECTION, SOLUTION INTRAMUSCULAR; INTRAVENOUS
Status: DISCONTINUED | OUTPATIENT
Start: 2023-01-26 | End: 2023-01-26

## 2023-01-26 RX ORDER — PHENYLEPHRINE HYDROCHLORIDE 10 MG/ML
INJECTION INTRAVENOUS
Status: DISCONTINUED | OUTPATIENT
Start: 2023-01-26 | End: 2023-01-26

## 2023-01-26 RX ORDER — CEFAZOLIN SODIUM 1 G/3ML
INJECTION, POWDER, FOR SOLUTION INTRAMUSCULAR; INTRAVENOUS
Status: DISCONTINUED | OUTPATIENT
Start: 2023-01-26 | End: 2023-01-26

## 2023-01-26 RX ORDER — ROCURONIUM BROMIDE 10 MG/ML
INJECTION, SOLUTION INTRAVENOUS
Status: DISCONTINUED | OUTPATIENT
Start: 2023-01-26 | End: 2023-01-26

## 2023-01-26 RX ORDER — OXYCODONE AND ACETAMINOPHEN 5; 325 MG/1; MG/1
1 TABLET ORAL
Status: DISCONTINUED | OUTPATIENT
Start: 2023-01-26 | End: 2023-01-26 | Stop reason: HOSPADM

## 2023-01-26 RX ORDER — LIDOCAINE HYDROCHLORIDE 20 MG/ML
INJECTION INTRAVENOUS
Status: DISCONTINUED | OUTPATIENT
Start: 2023-01-26 | End: 2023-01-26

## 2023-01-26 RX ORDER — PROPOFOL 10 MG/ML
VIAL (ML) INTRAVENOUS
Status: DISCONTINUED | OUTPATIENT
Start: 2023-01-26 | End: 2023-01-26

## 2023-01-26 RX ORDER — MIDAZOLAM HYDROCHLORIDE 1 MG/ML
INJECTION, SOLUTION INTRAMUSCULAR; INTRAVENOUS
Status: DISCONTINUED | OUTPATIENT
Start: 2023-01-26 | End: 2023-01-26

## 2023-01-26 RX ADMIN — SUGAMMADEX 200 MG: 100 INJECTION, SOLUTION INTRAVENOUS at 12:01

## 2023-01-26 RX ADMIN — SODIUM CHLORIDE, SODIUM LACTATE, POTASSIUM CHLORIDE, AND CALCIUM CHLORIDE: .6; .31; .03; .02 INJECTION, SOLUTION INTRAVENOUS at 11:01

## 2023-01-26 RX ADMIN — HYDROMORPHONE HYDROCHLORIDE 0.2 MG: 2 INJECTION INTRAMUSCULAR; INTRAVENOUS; SUBCUTANEOUS at 12:01

## 2023-01-26 RX ADMIN — LIDOCAINE HYDROCHLORIDE 100 MG: 20 INJECTION, SOLUTION INTRAVENOUS at 11:01

## 2023-01-26 RX ADMIN — ONDANSETRON 4 MG: 2 INJECTION, SOLUTION INTRAMUSCULAR; INTRAVENOUS at 12:01

## 2023-01-26 RX ADMIN — FENTANYL CITRATE 100 MCG: 50 INJECTION, SOLUTION INTRAMUSCULAR; INTRAVENOUS at 11:01

## 2023-01-26 RX ADMIN — OXYCODONE HYDROCHLORIDE AND ACETAMINOPHEN 1 TABLET: 5; 325 TABLET ORAL at 01:01

## 2023-01-26 RX ADMIN — ROCURONIUM BROMIDE 50 MG: 10 INJECTION, SOLUTION INTRAVENOUS at 11:01

## 2023-01-26 RX ADMIN — ONDANSETRON 4 MG: 2 INJECTION INTRAMUSCULAR; INTRAVENOUS at 01:01

## 2023-01-26 RX ADMIN — PROPOFOL 70 MG: 10 INJECTION, EMULSION INTRAVENOUS at 11:01

## 2023-01-26 RX ADMIN — PHENYLEPHRINE HYDROCHLORIDE 200 MCG: 10 INJECTION INTRAVENOUS at 11:01

## 2023-01-26 RX ADMIN — CEFAZOLIN 2 G: 1 INJECTION, POWDER, FOR SOLUTION INTRAMUSCULAR; INTRAVENOUS at 11:01

## 2023-01-26 RX ADMIN — MIDAZOLAM 2 MG: 1 INJECTION INTRAMUSCULAR; INTRAVENOUS at 11:01

## 2023-01-26 NOTE — OP NOTE
Date of Procedure: 01/26/2023     PREOPERATIVE DIAGNOSIS:  Urge incontinence, Fecal incontinence     POSTOPERATIVE DIAGNOSIS:  Urge incontinence, Fecal incontinence     PROCEDURES:  Stage 2 InterStim     SURGEON:  Andra Painting M.D.     Assistants: None     Specimen:  None     ANESTHESIA:  General     BLOOD LOSS:  2cc     FINDINGS:  none.     PROCEDURE IN DETAIL:  Patient was brought to the operating suite. Patient was then positioned in the prone position and underwent monitored anesthesia care, sterilely prepped and draped.  The pocket in the right buttock was sharply opened with a 15 blade scalpel and the lead and lead extender were exposed. The pocket was further developed and hemostasis was meticulously maintained.  The lead was disconnected from the lead extender and the lead extender was removed. It was then connected to the Interstim battery and placed into an antibiotic impregnated envelope and assessed by the [employer] representative, once placed appropriately within the pocket.  It tested successful, we then began closure of the pocket.  Deep layer was closed with a running 3-0 Vicryl, skin with a 4 0 Monocryl in a subcuticular fashion.  Dermabond was applied to the site, the patient was awakened and taken the PACU in stable condition.  They will be seen in 6 weeks for evaluation.     COMPLICATIONS:  None

## 2023-01-26 NOTE — TRANSFER OF CARE
"Anesthesia Transfer of Care Note    Patient: Rita Dejesus    Procedure(s) Performed: Procedure(s) (LRB):  INSERTION, NEUROSTIMULATOR, PERMANENT, SACRAL (N/A)    Patient location: PACU    Anesthesia Type: general    Transport from OR: Transported from OR on room air with adequate spontaneous ventilation    Post pain: adequate analgesia    Post assessment: no apparent anesthetic complications    Post vital signs: stable    Level of consciousness: awake    Nausea/Vomiting: no nausea/vomiting    Complications: none    Transfer of care protocol was followed      Last vitals:   Visit Vitals  BP (!) 103/57 (BP Location: Right arm, Patient Position: Sitting)   Pulse 81   Temp 36.6 °C (97.9 °F) (Temporal)   Resp 18   Ht 5' 2" (1.575 m)   Wt 57.6 kg (127 lb)   SpO2 100%   Breastfeeding No   BMI 23.23 kg/m²     "

## 2023-01-26 NOTE — DISCHARGE SUMMARY
[facility] (Hospital)  Discharge Note  Short Stay    Procedure(s) (LRB):  INSERTION, NEUROSTIMULATOR, PERMANENT, SACRAL (N/A)      OUTCOME: Patient tolerated treatment/procedure well without complication and is now ready for discharge.    DISPOSITION: Home or Self Care    FINAL DIAGNOSIS:  Mixed stress and urge urinary incontinence    FOLLOWUP: In clinic    DISCHARGE INSTRUCTIONS:    Discharge Procedure Orders   Diet Adult Regular     Notify your health care provider if you experience any of the following:  temperature >100.4     Notify your health care provider if you experience any of the following:  persistent nausea and vomiting or diarrhea     Notify your health care provider if you experience any of the following:  severe uncontrolled pain     Notify your health care provider if you experience any of the following:  redness, tenderness, or signs of infection (pain, swelling, redness, odor or green/yellow discharge around incision site)     No dressing needed     Activity as tolerated        TIME SPENT ON DISCHARGE: 10 minutes

## 2023-01-26 NOTE — INTERVAL H&P NOTE
The patient has been examined and the H&P has been reviewed:    I concur with the findings and changes have been noted since the H&P was written: Patient has had >50% improvement in urinary urgency and almost no UUI. FI has improved, but still present. Pt would like to proceed with Stage II Interstim today.     Surgery risks, benefits and alternative options discussed and understood by patient/family.          There are no hospital problems to display for this patient.

## 2023-01-26 NOTE — ANESTHESIA PREPROCEDURE EVALUATION
01/26/2023  Rita Dejesus is a 67 y.o., female     *No post-operative or anesthesia issues after previous Neurostim insertion under GETA on 1/12/23    Patient Active Problem List   Diagnosis    GERD (gastroesophageal reflux disease)    History of cervical cancer    History of rectal or anal cancer    Hyperlipidemia    Chronic superficial gastritis without bleeding    Benign fundic gland polyps of stomach    Hiatal hernia    Chronic kidney disease, stage 3a    Major depression in partial remission    Other dysphagia    Frequent fecal incontinence    Mixed stress and urge urinary incontinence    Fecal smearing     Past Medical History:   Diagnosis Date    General anesthetics causing adverse effect in therapeutic use     difficulty breathing when waking    GERD (gastroesophageal reflux disease)     History of cervical cancer     History of rectal or anal cancer      Past Surgical History:   Procedure Laterality Date    ADENOIDECTOMY      carpal tunel Bilateral     CATARACT EXTRACTION Left 05/06/2021    NEAR    CATARACT EXTRACTION W/ INTRAOCULAR LENS IMPLANT Right 05/20/2021    CERVICAL CONIZATION   W/ LASER      COLONOSCOPY N/A 11/17/2020    Procedure: COLONOSCOPY;  Surgeon: Kendy De Leon MD;  Location: [facility];  Service: Endoscopy;  Laterality: N/A;    ESOPHAGOGASTRODUODENOSCOPY N/A 12/22/2020    Procedure: ESOPHAGOGASTRODUODENOSCOPY (EGD);  Surgeon: Goyo Kapadia MD;  Location: [facility];  Service: Endoscopy;  Laterality: N/A;    ESOPHAGOGASTRODUODENOSCOPY N/A 05/09/2022    Procedure: EGD (ESOPHAGOGASTRODUODENOSCOPY);  Surgeon: Misa Crocker MD;  Location: [facility];  Service: Gastroenterology;  Laterality: N/A;    ESOPHAGOGASTRODUODENOSCOPY N/A 9/7/2022    Procedure: EGD (ESOPHAGOGASTRODUODENOSCOPY);  Surgeon: Misa Crocker MD;  Location: [facility];   Service: Gastroenterology;  Laterality: N/A;    HERNIA REPAIR      INSERTION, NEUROSTIMULATOR, TEMPORARY, SACRAL N/A 1/12/2023    Procedure: INSERTION, NEUROSTIMULATOR, TEMPORARY, SACRAL;  Surgeon: Andra Painting MD;  [facility];  Service: Urology;  Laterality: N/A;  stage 1    TONSILLECTOMY           Chemistry        Component Value Date/Time     12/05/2022 1118    K 4.5 12/05/2022 1118     12/05/2022 1118    CO2 22 (L) 12/05/2022 1118    BUN 23 12/05/2022 1118    CREATININE 1.2 12/05/2022 1118    GLU 91 12/05/2022 1118        Component Value Date/Time    CALCIUM 10.0 12/05/2022 1118    ALKPHOS 101 12/05/2022 1118    AST 19 12/05/2022 1118    ALT 9 (L) 12/05/2022 1118    BILITOT 0.3 12/05/2022 1118    ESTGFRAFRICA 54.0 (A) 05/30/2022 0912    EGFRNONAA 46.9 (A) 05/30/2022 0912        Lab Results   Component Value Date    WBC 6.59 12/05/2022    HGB 13.0 12/05/2022    HCT 40.3 12/05/2022    MCV 91 12/05/2022     12/05/2022           Pre-op Assessment    I have reviewed the Patient Summary Reports.    I have reviewed the NPO Status.   I have reviewed the Medications.     Review of Systems  Anesthesia Hx:  No problems with previous Anesthesia  History of prior surgery of interest to airway management or planning: Previous anesthesia: General Personal Hx of Anesthesia complications Pulmonary/Ventilatory Issues (post hiatal-hernia repair - no post-anethetic issues after other procedure), patient describes breathing difficulties after anesthesia, not specified   Social:  Non-Smoker    Hematology/Oncology:  Hematology Normal   Oncology Normal    -- Denies Anemia:    Cardiovascular:  Cardiovascular Normal Exercise tolerance: good  Denies MI.  Denies CAD.     Denies BONILLA. ECG has been reviewed. EKG:  Normal sinus rhythm   Low voltage QRS   Nonspecific T wave abnormality   Abnormal ECG   No previous ECGs available   Confirmed by NITIN HATHAWAY MD ([id number]) on 12/12/2022 2:09:37 PM     Pulmonary:  Pulmonary Normal  Denies Recent URI.    Renal/:   Chronic Renal Disease, CKD BUN/Cre WNL   Hepatic/GI:   Denies Hiatal Hernia. GERD, well controlled Takes PPI BID - did take this AM    Musculoskeletal:  Musculoskeletal Normal    Neurological:  Neurology Normal    Endocrine:  Endocrine Normal bmi 23       Physical Exam  General: Well nourished, Cooperative, Alert and Oriented    Airway:  Mallampati: II   Mouth Opening: Normal  TM Distance: Normal  Tongue: Normal  Neck ROM: Normal ROM    Dental:  Intact  Patient denies any currently loose or chipped teeth; Patient denies any removable dental appliances  Chest/Lungs:  Normal Respiratory Rate        Anesthesia Plan  Type of Anesthesia, risks & benefits discussed:    Anesthesia Type: Gen ETT, MAC, Gen Supraglottic Airway  Intra-op Monitoring Plan: Standard ASA Monitors  Post Op Pain Control Plan: multimodal analgesia and IV/PO Opioids PRN  Induction:  IV  Airway Plan: Direct  Informed Consent: Informed consent signed with the Patient and all parties understand the risks and agree with anesthesia plan.  All questions answered.   ASA Score: 2  Day of Surgery Review of History & Physical: H&P Update referred to the surgeon/provider.  Anesthesia Plan Notes: Intubation:     Induction:  Intravenous    Intubated:  Postinduction    Mask Ventilation:  Easy mask    Attempts:  1    Attempted By:  CRNA    Method of Intubation:  Direct    Blade:  Cruz 2    Laryngeal View Grade: Grade I - full view of cords      Difficult Airway Encountered?: No      Complications:  None    Airway Device:  Oral endotracheal tube    Airway Device Size:  7.0    Style/Cuff Inflation:  Cuffed (inflated to minimal occlusive pressure)    Inflation Amount (mL):  5    Tube secured:  20    Secured at:  The lips    Placement Verified By:  Capnometry    Complicating Factors:  None    Findings Post-Intubation:  BS equal bilateral and atraumatic/condition of teeth unchanged    Ready For Surgery  From Anesthesia Perspective.     .

## 2023-01-26 NOTE — ANESTHESIA PROCEDURE NOTES
Intubation    Date/Time: 1/26/2023 11:37 AM  Performed by: Robin Miller CRNA  Authorized by: John Valentine II, MD     Intubation:     Induction:  Intravenous    Intubated:  Postinduction    Mask Ventilation:  Easy mask    Attempts:  1    Attempted By:  CRNA    Method of Intubation:  Direct    Blade:  Yumiko 3    Laryngeal View Grade: Grade I - full view of cords      Difficult Airway Encountered?: No      Complications:  None    Airway Device:  Oral endotracheal tube    Airway Device Size:  7.5    Style/Cuff Inflation:  Cuffed (inflated to minimal occlusive pressure)    Inflation Amount (mL):  8    Tube secured:  20    Secured at:  The lips    Placement Verified By:  Capnometry    Complicating Factors:  None    Findings Post-Intubation:  BS equal bilateral

## 2023-01-26 NOTE — ANESTHESIA RELEASE NOTE
"Anesthesia Release from PACU Note    Patient: Rita Dejesus    Procedure(s) Performed: Procedure(s) (LRB):  INSERTION, NEUROSTIMULATOR, PERMANENT, SACRAL (N/A)    Anesthesia type: general    Post pain: Adequate analgesia    Post assessment: no apparent anesthetic complications    Last Vitals:   Visit Vitals  BP (!) 103/57 (BP Location: Right arm, Patient Position: Sitting)   Pulse 81   Temp 36.6 °C (97.9 °F) (Temporal)   Resp 18   Ht 5' 2" (1.575 m)   Wt 57.6 kg (127 lb)   SpO2 100%   Breastfeeding No   BMI 23.23 kg/m²       Post vital signs: stable    Level of consciousness: awake    Nausea/Vomiting: no nausea/no vomiting    Complications: none    Airway Patency: patent    Respiratory: unassisted    Cardiovascular: stable and blood pressure at baseline    Hydration: euvolemic  "

## 2023-01-27 ENCOUNTER — PATIENT MESSAGE (OUTPATIENT)
Dept: UROLOGY | Facility: CLINIC | Age: 68
End: 2023-01-27
Payer: MEDICARE

## 2023-01-27 NOTE — ANESTHESIA POSTPROCEDURE EVALUATION
Anesthesia Post Evaluation    Patient: Rita Dejesus    Procedure(s) Performed: Procedure(s) (LRB):  INSERTION, NEUROSTIMULATOR, PERMANENT, SACRAL (N/A)    Final Anesthesia Type: general      Patient location during evaluation: PACU  Patient participation: Yes- Able to Participate  Level of consciousness: awake and alert  Post-procedure vital signs: reviewed and stable  Pain management: adequate  Airway patency: patent  PETRA mitigation strategies: Verification of full reversal of neuromuscular block  PONV status at discharge: No PONV  Anesthetic complications: no      Cardiovascular status: hemodynamically stable  Respiratory status: spontaneous ventilation  Hydration status: euvolemic  Follow-up not needed.          Vitals Value Taken Time   /67 01/26/23 1330   Temp 36.6 °C (97.8 °F) 01/26/23 1235   Pulse 71 01/26/23 1330   Resp 15 01/26/23 1330   SpO2 86 % 01/26/23 1330         Event Time   Out of Recovery 13:33:27         Pain/Mamadou Score: Pain Rating Prior to Med Admin: 4 (1/26/2023  1:12 PM)  Pain Rating Post Med Admin: 2 (1/26/2023  1:30 PM)  Mamadou Score: 10 (1/26/2023  1:30 PM)

## 2023-01-30 VITALS
OXYGEN SATURATION: 86 % | RESPIRATION RATE: 15 BRPM | WEIGHT: 127 LBS | SYSTOLIC BLOOD PRESSURE: 105 MMHG | DIASTOLIC BLOOD PRESSURE: 67 MMHG | TEMPERATURE: 98 F | HEART RATE: 71 BPM | HEIGHT: 62 IN | BODY MASS INDEX: 23.37 KG/M2

## 2023-02-14 RX ORDER — OMEPRAZOLE 40 MG/1
40 CAPSULE, DELAYED RELEASE ORAL 2 TIMES DAILY
Qty: 180 CAPSULE | Refills: 0 | Status: SHIPPED | OUTPATIENT
Start: 2023-02-14 | End: 2023-03-24 | Stop reason: SDUPTHER

## 2023-03-06 ENCOUNTER — OFFICE VISIT (OUTPATIENT)
Dept: UROLOGY | Facility: CLINIC | Age: 68
End: 2023-03-06
Payer: MEDICARE

## 2023-03-06 ENCOUNTER — OFFICE VISIT (OUTPATIENT)
Dept: SURGERY | Facility: CLINIC | Age: 68
End: 2023-03-06
Payer: MEDICARE

## 2023-03-06 VITALS
HEIGHT: 62 IN | DIASTOLIC BLOOD PRESSURE: 65 MMHG | HEART RATE: 93 BPM | BODY MASS INDEX: 23.53 KG/M2 | WEIGHT: 127.88 LBS | RESPIRATION RATE: 18 BRPM | TEMPERATURE: 98 F | SYSTOLIC BLOOD PRESSURE: 103 MMHG

## 2023-03-06 VITALS — HEART RATE: 92 BPM | DIASTOLIC BLOOD PRESSURE: 60 MMHG | SYSTOLIC BLOOD PRESSURE: 94 MMHG | TEMPERATURE: 97 F

## 2023-03-06 DIAGNOSIS — Z85.048 HISTORY OF RECTAL OR ANAL CANCER: ICD-10-CM

## 2023-03-06 DIAGNOSIS — N39.46 MIXED STRESS AND URGE URINARY INCONTINENCE: Primary | ICD-10-CM

## 2023-03-06 DIAGNOSIS — R15.1 FECAL SMEARING: ICD-10-CM

## 2023-03-06 DIAGNOSIS — N39.0 RECURRENT UTI: ICD-10-CM

## 2023-03-06 DIAGNOSIS — R15.9 INCONTINENCE OF FECES, UNSPECIFIED FECAL INCONTINENCE TYPE: Primary | ICD-10-CM

## 2023-03-06 LAB
BILIRUB SERPL-MCNC: NORMAL MG/DL
BLOOD URINE, POC: NORMAL
CLARITY, POC UA: CLEAR
COLOR, POC UA: NORMAL
GLUCOSE UR QL STRIP: NORMAL
KETONES UR QL STRIP: NORMAL
LEUKOCYTE ESTERASE URINE, POC: NORMAL
NITRITE, POC UA: NORMAL
PH, POC UA: 5
PROTEIN, POC: NORMAL
SPECIFIC GRAVITY, POC UA: 1.01
UROBILINOGEN, POC UA: NORMAL

## 2023-03-06 PROCEDURE — 99214 PR OFFICE/OUTPT VISIT, EST, LEVL IV, 30-39 MIN: ICD-10-PCS | Mod: S$PBB,,, | Performed by: COLON & RECTAL SURGERY

## 2023-03-06 PROCEDURE — 99999 PR PBB SHADOW E&M-EST. PATIENT-LVL III: CPT | Mod: PBBFAC,,, | Performed by: COLON & RECTAL SURGERY

## 2023-03-06 PROCEDURE — 99999 PR PBB SHADOW E&M-EST. PATIENT-LVL III: ICD-10-PCS | Mod: PBBFAC,,, | Performed by: COLON & RECTAL SURGERY

## 2023-03-06 PROCEDURE — 99024 PR POST-OP FOLLOW-UP VISIT: ICD-10-PCS | Mod: POP,,, | Performed by: UROLOGY

## 2023-03-06 PROCEDURE — 81002 URINALYSIS NONAUTO W/O SCOPE: CPT | Mod: PBBFAC

## 2023-03-06 PROCEDURE — 81002 URINALYSIS NONAUTO W/O SCOPE: CPT | Mod: PBBFAC | Performed by: UROLOGY

## 2023-03-06 PROCEDURE — 99999 PR PBB SHADOW E&M-EST. PATIENT-LVL IV: CPT | Mod: PBBFAC,,, | Performed by: UROLOGY

## 2023-03-06 PROCEDURE — 99214 OFFICE O/P EST MOD 30 MIN: CPT | Mod: PBBFAC,27 | Performed by: UROLOGY

## 2023-03-06 PROCEDURE — 99213 OFFICE O/P EST LOW 20 MIN: CPT | Mod: PBBFAC,27 | Performed by: COLON & RECTAL SURGERY

## 2023-03-06 PROCEDURE — 99999 PR PBB SHADOW E&M-EST. PATIENT-LVL IV: ICD-10-PCS | Mod: PBBFAC,,, | Performed by: UROLOGY

## 2023-03-06 PROCEDURE — 99214 OFFICE O/P EST MOD 30 MIN: CPT | Mod: S$PBB,,, | Performed by: COLON & RECTAL SURGERY

## 2023-03-06 PROCEDURE — 99024 POSTOP FOLLOW-UP VISIT: CPT | Mod: POP,,, | Performed by: UROLOGY

## 2023-03-06 NOTE — PROGRESS NOTES
History & Physical    SUBJECTIVE:     Chief Complaint   Patient presents with    Follow-up     3 month follow up// incontinence// last appt 11/28   Ref: Andra Painting MD    History of Present Illness:  Patient is a 67 y.o. female presents for evaluation of fecal incontinence and rectocele.  Patient states that she had anal cancer treated with chemo radiation in 2014 and said no evidence of recurrence since that time.  She also had previous cervical cancer in 1983 treated with surgical excision.  She has suffered from a rectocele for multiple years now.  She states she has intermittently had to digitize her vagina to expel stool but has had decreasing frequency requiring this with both MiraLax and Metamucil administration.  She has suffered from some fecal incontinence over the past year that has occurred progressively.  This occurs most days and is to be all 3, liquid, solid and gas.  She has started taking Metamucil which she states does help the consistency and decrease the frequency of the incontinence although distal occurring most days.  She denies any blood per rectum.  She denies any fever, chills, nausea, vomiting.  If she has noticed improvement from keel exercises previously when she did these after her radiation treatment but these have become less effective and she is pending starting pelvic floor physical therapy currently.  Last colonoscopy performed in 2020 with 1 adenoma removed.  She is also suffering from urinary incontinence.    Interval History:  Since the last clinic visit, the patient had InterStim placed by urology.  Improvement in urinary and fecal incontinence although still present intermittently.  Reports around 75% improvement.  Continues to have intermittent fecal incontinence especially to loose stool.  Denies any fever, chills, nausea, vomiting, hematochezia.    Review of patient's allergies indicates:   Allergen Reactions    Sulfa (sulfonamide antibiotics) Nausea And Vomiting     Nitrofuran analogues Nausea And Vomiting     Microdantin        Current Outpatient Medications   Medication Sig Dispense Refill    bran/gum/fib/eliecer/psyl/kelp/pec (FIBER 6 ORAL) Take 1 tablet by mouth 2 (two) times a day. 12 mg collin      calcium carbonate 650 mg calcium (1,625 mg) tablet Take 1 tablet by mouth once daily.      COLLAGEN MISC by Misc.(Non-Drug; Combo Route) route.      cyanocobalamin (VITAMIN B-12) 250 MCG tablet Take 250 mcg by mouth once daily.      fluocinonide (LIDEX) 0.05 % external solution Apply topically 2 (two) times daily. 60 mL 1    fluticasone propionate (FLONASE) 50 mcg/actuation nasal spray 1 spray (50 mcg total) by Each Nostril route once daily. 48 g 2    Lactobacillus rhamnosus GG (CULTURELLE) 10 billion cell capsule Take 1 capsule by mouth once daily.      levocetirizine (XYZAL) 5 MG tablet Take 5 mg by mouth every evening.      mirtazapine (REMERON) 15 MG tablet TAKE 1 TABLET BY MOUTH EVERY EVENING (Patient taking differently: Take 15 mg by mouth every evening.) 90 tablet 3    multivitamin capsule Take 1 capsule by mouth once daily.      omeprazole (PRILOSEC) 40 MG capsule Take 1 capsule (40 mg total) by mouth 2 (two) times a day. 180 capsule 0    oxybutynin (DITROPAN-XL) 10 MG 24 hr tablet Take 1 tablet (10 mg total) by mouth once daily. (Patient taking differently: Take 10 mg by mouth every evening.) 30 tablet 11    simvastatin (ZOCOR) 40 MG tablet Take 1 tablet (40 mg total) by mouth every evening. 90 tablet 3    venlafaxine (EFFEXOR-XR) 75 MG 24 hr capsule TAKE 1 CAPSULE BY MOUTH ONCE DAILY 90 capsule 3    vitamin D (VITAMIN D3) 1000 units Tab Take 185 mg by mouth.      HYDROcodone-acetaminophen (NORCO)  mg per tablet Take 1 tablet by mouth every 6 (six) hours as needed for Pain. (Patient not taking: Reported on 3/6/2023) 15 tablet 0     No current facility-administered medications for this visit.       Past Medical History:   Diagnosis Date    General anesthetics causing  adverse effect in therapeutic use     difficulty breathing when waking    GERD (gastroesophageal reflux disease)     History of cervical cancer     History of rectal or anal cancer      Past Surgical History:   Procedure Laterality Date    ADENOIDECTOMY      carpal tunel Bilateral     CATARACT EXTRACTION Left 05/06/2021    NEAR    CATARACT EXTRACTION W/ INTRAOCULAR LENS IMPLANT Right 05/20/2021    CERVICAL CONIZATION   W/ LASER      COLONOSCOPY N/A 11/17/2020    Procedure: COLONOSCOPY;  Surgeon: Kendy De Leon MD;  Location: [facility] ENDO;  Service: Endoscopy;  Laterality: N/A;    ESOPHAGOGASTRODUODENOSCOPY N/A 12/22/2020    Procedure: ESOPHAGOGASTRODUODENOSCOPY (EGD);  Surgeon: Goyo Kapadia MD;  Location: [facility] ENDO;  Service: Endoscopy;  Laterality: N/A;    ESOPHAGOGASTRODUODENOSCOPY N/A 05/09/2022    Procedure: EGD (ESOPHAGOGASTRODUODENOSCOPY);  Surgeon: Misa Crocker MD;  Location: [facility];  Service: Gastroenterology;  Laterality: N/A;    ESOPHAGOGASTRODUODENOSCOPY N/A 9/7/2022    Procedure: EGD (ESOPHAGOGASTRODUODENOSCOPY);  Surgeon: Misa Crocker MD;  Location: [facility];  Service: Gastroenterology;  Laterality: N/A;    HERNIA REPAIR      IMPLANTATION OF PERMANENT SACRAL NERVE STIMULATOR N/A 1/26/2023    Procedure: INSERTION, NEUROSTIMULATOR, PERMANENT, SACRAL;  Surgeon: Andra Painting MD;  Location: [facility];  Service: Urology;  Laterality: N/A;    INSERTION, NEUROSTIMULATOR, TEMPORARY, SACRAL N/A 1/12/2023    Procedure: INSERTION, NEUROSTIMULATOR, TEMPORARY, SACRAL;  Surgeon: Andra Painting MD;  Location: [facility] OR;  Service: Urology;  Laterality: N/A;  stage 1    TONSILLECTOMY       Family History   Problem Relation Age of Onset    Diabetes Mother         type II    Hyperlipidemia Mother     Heart disease Father     Hypertension Father     Cataracts Father     Hypertension Sister     Diabetes Maternal Grandmother     Diabetes Maternal Grandfather      Social History     Tobacco Use     Smoking status: Never    Smokeless tobacco: Never   Substance Use Topics    Alcohol use: Yes     Comment: Social , hold told prior to sx    Drug use: Never        Review of Systems:  Review of Systems   Constitutional:  Negative for activity change, appetite change, chills, fatigue, fever and unexpected weight change.   HENT:  Negative for congestion, ear pain, sore throat and trouble swallowing.    Eyes:  Negative for pain, redness and itching.   Respiratory:  Negative for cough, shortness of breath and wheezing.    Cardiovascular:  Negative for chest pain, palpitations and leg swelling.   Gastrointestinal:  Negative for abdominal distention, abdominal pain, anal bleeding, blood in stool, nausea, rectal pain and vomiting.        +fecal incontinence   Endocrine: Negative for cold intolerance, heat intolerance and polyuria.   Genitourinary:  Negative for dysuria, flank pain, frequency and hematuria.        + urinary incontinence   Musculoskeletal:  Negative for gait problem, joint swelling and neck pain.   Skin:  Negative for color change, rash and wound.   Allergic/Immunologic: Negative for environmental allergies and immunocompromised state.   Neurological:  Negative for dizziness, speech difficulty, weakness and numbness.   Psychiatric/Behavioral:  Negative for agitation, confusion and hallucinations.      OBJECTIVE:     Vitals:    03/06/23 0846   BP: 94/60   Pulse: 92   Temp: 97.2 °F (36.2 °C)       Physical Exam:  Physical Exam  Constitutional:       Appearance: She is well-developed.   HENT:      Head: Normocephalic and atraumatic.      Right Ear: External ear normal.      Left Ear: External ear normal.   Eyes:      Extraocular Movements: Extraocular movements intact.      Conjunctiva/sclera: Conjunctivae normal.   Neck:      Thyroid: No thyromegaly.   Cardiovascular:      Rate and Rhythm: Normal rate.   Pulmonary:      Effort: Pulmonary effort is normal. No respiratory distress.   Abdominal:      General:  There is no distension.      Palpations: Abdomen is soft. There is no mass.      Tenderness: There is no abdominal tenderness.   Genitourinary:     Comments: Anorectal: deferred  Musculoskeletal:         General: No tenderness. Normal range of motion.      Cervical back: Normal range of motion.   Skin:     General: Skin is warm and dry.      Capillary Refill: Capillary refill takes less than 2 seconds.      Findings: No rash.   Neurological:      Mental Status: She is alert and oriented to person, place, and time.   Psychiatric:         Behavior: Behavior normal.       Laboratory  Lab Results   Component Value Date    WBC 6.59 12/05/2022    HGB 13.0 12/05/2022    HCT 40.3 12/05/2022     12/05/2022    CHOL 152 12/05/2022    TRIG 111 12/05/2022    HDL 48 12/05/2022    ALT 9 (L) 12/05/2022    AST 19 12/05/2022     12/05/2022    K 4.5 12/05/2022     12/05/2022    CREATININE 1.2 12/05/2022    BUN 23 12/05/2022    CO2 22 (L) 12/05/2022    TSH 2.733 12/02/2019       Diagnostic Results:  Colonsocopy 2020: reviewed      ASSESSMENT/PLAN:     66yo F with large rectocele and fecal incontinence after chemoXRT for anal SCC in 2014 who is now s/p InterStim by urology in Jan '23    - Encouraged continued fiber supplementation  - Seeing Urology today for postop management of InterStim  - Discussed colostomy if InterStim fails or results are unacceptable however she has improved much with InterStim and encouraged her to continue pelvic floor PT, fiber and to monitor progress over next few months  - RTC 3 months    Juwan Choi MD  Colon and Rectal Surgery  [facility]

## 2023-03-06 NOTE — LETTER
March 6, 2023      [facility]  [address]  [place] [address]  Phone: [phone]  Fax: [phone]       Patient: Rita Dejesus   YOB: 1955  Date of Visit: 03/06/2023    To Whom It May Concern:    Annemarie Dejesus  was at [facility] on 03/06/2023. The patient may return to work without restrictions. If you have any questions or concerns, or if I can be of further assistance, please do not hesitate to contact me.    Sincerely,  Dr Elena Campbell Loraine Milanes Flores, RN

## 2023-03-06 NOTE — PROGRESS NOTES
"  Chief Complaint:   F/u interstim    HPI:     3/6/23- Pt s/p Interstim a month ago. She has seen significant improvement, though not perfect. Sometimes the interstim works well, but sometimes it doesn't. Still wearing pads. Having UUI 3 times a week. FI is still present, though better. No recent UTIs.     12/12/22- Pt has been seeing pelvic floor PT and has seen improvement, especially with CHICHI. Rectocele is less symptomatic and not splinting. She did see Dr. Choi. She does have FI a couple times a week still and Interstim was recommended.   Has had 2 E. Coli UTIs, 1st treated with fosfomycin, 2nd treated with cipro x 5 days last week. Currently without UTI symptoms, but prior was having urethral pain.     8/8/22- Patient reports that she has fecal urgency and FI and urinary incontinence. Changed a diaper 3 times yesterday. She reports having CHICHI with cough/lifting and also has UUI. Urinary issues 10-12 years. She reports having anal cancer, and during the treatment, noticed rectocele. She had chemo and radiation for the anal cancer. No surgery. She reports having insensate fecal incontinence. Has difficulty discerning between gas and solid. Has never taken any medications for urinary complaints. Taking metamucil does seem to help with GI symptoms.     Per Amanda Leyva NP:   "Patient is a 66-year-old female that is presenting per gyn MD ( WILY Salmeron MD). Patient states that she was seen by gyn physician and instructed to see urologist secondary to rectocele and cystocele.  Patient has a history of anal cancer and is concerned that rectocele is secondary to anal cancer treatment.  Patient states that she has not had sexual intercourse in 10 years and is wanting to proceed with sexual intercourse.  Reports that she has had an increase in urge incontinence and is now wearing a pad.  Does not want to consider PT pelvic floor training or medication, wants to have a surgical option. No abd/pelvic pain and no exac/rel " "factors.  No hematuria."    Allergies:  Sulfa (sulfonamide antibiotics) and Nitrofuran analogues    Medications:  has a current medication list which includes the following prescription(s): bran/gum/fib/eliecer/psyl/kelp/pec, calcium carbonate, collagen, cyanocobalamin, fluocinonide, fluticasone propionate, lactobacillus rhamnosus gg, levocetirizine, mirtazapine, multivitamin, omeprazole, oxybutynin, simvastatin, venlafaxine, vitamin d, and hydrocodone-acetaminophen.    Review of Systems:  General: No fever, chills, fatigability, or weight loss.  Skin: No rashes, itching, or changes in color or texture of skin.  Chest: Denies BONILLA, cyanosis, wheezing, cough, and sputum production.  Abdomen: Appetite fine. No weight loss. Denies diarrhea, abdominal pain, hematemesis, or blood in stool.  Musculoskeletal: No joint stiffness or swelling. Denies back pain.  : As above.  All other review of systems negative.    PMH:   has a past medical history of General anesthetics causing adverse effect in therapeutic use, GERD (gastroesophageal reflux disease), History of cervical cancer, and History of rectal or anal cancer.    PSH:   has a past surgical history that includes Tonsillectomy; Adenoidectomy; Hernia repair; carpal tunel (Bilateral); Colonoscopy (N/A, 11/17/2020); Esophagogastroduodenoscopy (N/A, 12/22/2020); Cataract extraction w/ intraocular lens implant (Right, 05/20/2021); Cataract extraction (Left, 05/06/2021); Esophagogastroduodenoscopy (N/A, 05/09/2022); Cervical conization w/ laser; Esophagogastroduodenoscopy (N/A, 9/7/2022); insertion, neurostimulator, temporary, sacral (N/A, 1/12/2023); and Implantation of permanent sacral nerve stimulator (N/A, 1/26/2023).    FamHx: family history includes Cataracts in her father; Diabetes in her maternal grandfather, maternal grandmother, and mother; Heart disease in her father; Hyperlipidemia in her mother; Hypertension in her father and sister.    SocHx:  reports that she has " never smoked. She has never used smokeless tobacco. She reports current alcohol use. She reports that she does not use drugs.      Physical Exam:    Vitals:    03/06/23 1116   BP: 103/65   Pulse: 93   Resp: 18   Temp: 98 °F (36.7 °C)       General: A&Ox3, no apparent distress, no deformities  Neck: No masses, normal thyroid  Lungs: normal inspiration, no use of accessory muscles  Heart: normal pulse, no arrhythmias  Abdomen: Soft, NT, ND, no masses, no hernias, no hepatosplenomegaly  Lymphatic: Neck and groin nodes negative  Skin: The skin is warm and dry. No jaundice.  Ext: No c/c/e.  :  8/8/22-Normal female external genitalia, orthotopic urethral meatus, atrophic vaginal mucosa, Grade 2 cystocele, grade 2 rectocele  Buttocks: R buttocks incision well healed, no sign of infection.   Impedance check performed with no impedance.   Currently on program 1. Increased energy to 1.2.     Labs/Studies:   PVR= 6 ml  6/15/22  Urinalysis: negative for blood, LE, nit      Recurrent UTI  -     POCT URINE DIPSTICK WITHOUT MICROSCOPE    Mixed stress and urge urinary incontinence    Discussed that we can adjust programs as needed  Continue behavioral modifications    Follow up in about 2 months (around 5/6/2023).

## 2023-03-13 ENCOUNTER — OFFICE VISIT (OUTPATIENT)
Dept: INTERNAL MEDICINE | Facility: CLINIC | Age: 68
End: 2023-03-13
Payer: MEDICARE

## 2023-03-13 VITALS
HEIGHT: 63 IN | SYSTOLIC BLOOD PRESSURE: 110 MMHG | HEART RATE: 88 BPM | RESPIRATION RATE: 20 BRPM | DIASTOLIC BLOOD PRESSURE: 60 MMHG | OXYGEN SATURATION: 98 % | BODY MASS INDEX: 20.62 KG/M2 | TEMPERATURE: 98 F | WEIGHT: 116.38 LBS

## 2023-03-13 DIAGNOSIS — R35.0 URINARY FREQUENCY: ICD-10-CM

## 2023-03-13 DIAGNOSIS — R39.89 BLADDER PAIN: Primary | ICD-10-CM

## 2023-03-13 LAB
BILIRUB SERPL-MCNC: NEGATIVE MG/DL
BLOOD URINE, POC: 50
CLARITY, POC UA: CLEAR
COLOR, POC UA: NORMAL
GLUCOSE UR QL STRIP: NORMAL
KETONES UR QL STRIP: NEGATIVE
LEUKOCYTE ESTERASE URINE, POC: NORMAL
NITRITE, POC UA: NEGATIVE
PH, POC UA: 5
PROTEIN, POC: 30
SPECIFIC GRAVITY, POC UA: 1.01
UROBILINOGEN, POC UA: NORMAL

## 2023-03-13 PROCEDURE — 81002 URINALYSIS NONAUTO W/O SCOPE: CPT | Mod: PBBFAC,PO | Performed by: FAMILY MEDICINE

## 2023-03-13 PROCEDURE — 99214 OFFICE O/P EST MOD 30 MIN: CPT | Mod: PBBFAC,PO | Performed by: FAMILY MEDICINE

## 2023-03-13 PROCEDURE — 99999 PR PBB SHADOW E&M-EST. PATIENT-LVL IV: CPT | Mod: PBBFAC,,, | Performed by: FAMILY MEDICINE

## 2023-03-13 PROCEDURE — 87086 URINE CULTURE/COLONY COUNT: CPT | Performed by: FAMILY MEDICINE

## 2023-03-13 PROCEDURE — 99999 PR PBB SHADOW E&M-EST. PATIENT-LVL IV: ICD-10-PCS | Mod: PBBFAC,,, | Performed by: FAMILY MEDICINE

## 2023-03-13 PROCEDURE — 99213 PR OFFICE/OUTPT VISIT, EST, LEVL III, 20-29 MIN: ICD-10-PCS | Mod: S$PBB,,, | Performed by: FAMILY MEDICINE

## 2023-03-13 PROCEDURE — 99213 OFFICE O/P EST LOW 20 MIN: CPT | Mod: S$PBB,,, | Performed by: FAMILY MEDICINE

## 2023-03-13 RX ORDER — CIPROFLOXACIN 500 MG/1
500 TABLET ORAL 2 TIMES DAILY
Qty: 10 TABLET | Refills: 0 | Status: SHIPPED | OUTPATIENT
Start: 2023-03-13 | End: 2023-05-18

## 2023-03-14 LAB — BACTERIA UR CULT: NORMAL

## 2023-03-14 NOTE — PROGRESS NOTES
Subjective:      Patient ID: Rita Dejesus is a 67 y.o. female.    Chief Complaint: Urinary Tract Infection      Patient reports the past few days feeling pressure in her bladder which is worsening, urinary frequency.    Urinary Tract Infection   Associated symptoms include frequency and urgency. Pertinent negatives include no flank pain or hematuria.   Review of Systems   Constitutional:  Negative for activity change, appetite change, fatigue and fever.   Gastrointestinal:  Positive for abdominal pain (Bladder).   Genitourinary:  Positive for frequency and urgency. Negative for decreased urine volume, difficulty urinating, dysuria, flank pain and hematuria.   Musculoskeletal:  Negative for back pain.   Past Medical History:   Diagnosis Date    General anesthetics causing adverse effect in therapeutic use     difficulty breathing when waking    GERD (gastroesophageal reflux disease)     History of cervical cancer     History of rectal or anal cancer           Past Surgical History:   Procedure Laterality Date    ADENOIDECTOMY      carpal tunel Bilateral     CATARACT EXTRACTION Left 05/06/2021    NEAR    CATARACT EXTRACTION W/ INTRAOCULAR LENS IMPLANT Right 05/20/2021    CERVICAL CONIZATION   W/ LASER      COLONOSCOPY N/A 11/17/2020    Procedure: COLONOSCOPY;  Surgeon: Kendy De Leon MD;  Location: [facility];  Service: Endoscopy;  Laterality: N/A;    ESOPHAGOGASTRODUODENOSCOPY N/A 12/22/2020    Procedure: ESOPHAGOGASTRODUODENOSCOPY (EGD);  Surgeon: Goyo Kapadia MD;  Location: [facility];  Service: Endoscopy;  Laterality: N/A;    ESOPHAGOGASTRODUODENOSCOPY N/A 05/09/2022    Procedure: EGD (ESOPHAGOGASTRODUODENOSCOPY);  Surgeon: Misa Crocker MD;  Location: [facility];  Service: Gastroenterology;  Laterality: N/A;    ESOPHAGOGASTRODUODENOSCOPY N/A 9/7/2022    Procedure: EGD (ESOPHAGOGASTRODUODENOSCOPY);  Surgeon: Misa Crocker MD;  Location: [facility];  Service: Gastroenterology;  Laterality:  N/A;    HERNIA REPAIR      IMPLANTATION OF PERMANENT SACRAL NERVE STIMULATOR N/A 1/26/2023    Procedure: INSERTION, NEUROSTIMULATOR, PERMANENT, SACRAL;  Surgeon: Andra Painting MD;  Location: [facility] OR;  Service: Urology;  Laterality: N/A;    INSERTION, NEUROSTIMULATOR, TEMPORARY, SACRAL N/A 1/12/2023    Procedure: INSERTION, NEUROSTIMULATOR, TEMPORARY, SACRAL;  Surgeon: Andar Painting MD;  Location: [facility] OR;  Service: Urology;  Laterality: N/A;  stage 1    TONSILLECTOMY       Family History   Problem Relation Age of Onset    Diabetes Mother         type II    Hyperlipidemia Mother     Heart disease Father     Hypertension Father     Cataracts Father     Hypertension Sister     Diabetes Maternal Grandmother     Diabetes Maternal Grandfather      Social History     Socioeconomic History    Marital status:    Tobacco Use    Smoking status: Never    Smokeless tobacco: Never   Substance and Sexual Activity    Alcohol use: Yes     Comment: Social , hold told prior to sx    Drug use: Never    Sexual activity: Not Currently     Partners: Male     Social Determinants of Health     Financial Resource Strain: Low Risk     Difficulty of Paying Living Expenses: Not hard at all   Food Insecurity: No Food Insecurity    Worried About Running Out of Food in the Last Year: Never true    Ran Out of Food in the Last Year: Never true   Transportation Needs: No Transportation Needs    Lack of Transportation (Medical): No    Lack of Transportation (Non-Medical): No   Physical Activity: Inactive    Days of Exercise per Week: 0 days    Minutes of Exercise per Session: 0 min   Stress: No Stress Concern Present    Feeling of Stress : Only a little   Social Connections: Moderately Isolated    Frequency of Communication with Friends and Family: More than three times a week    Frequency of Social Gatherings with Friends and Family: More than three times a week    Attends Anabaptist Services: Never    Active Member of Clubs or  "Organizations: No    Attends Club or Organization Meetings: Never    Marital Status:    Housing Stability: Unknown    Unable to Pay for Housing in the Last Year: No    Unstable Housing in the Last Year: No     Review of patient's allergies indicates:   Allergen Reactions    Sulfa (sulfonamide antibiotics) Nausea And Vomiting    Nitrofuran analogues Nausea And Vomiting     Microdantin        Objective:       /60 (BP Location: Right arm, Patient Position: Sitting, BP Method: Medium (Manual))   Pulse 88   Temp 98.1 °F (36.7 °C) (Tympanic)   Resp 20   Ht 5' 2.75" (1.594 m)   Wt 52.8 kg (116 lb 6.5 oz)   SpO2 98%   BMI 20.78 kg/m²   Physical Exam  Vitals and nursing note reviewed.   Constitutional:       General: She is not in acute distress.     Appearance: She is well-developed. She is not diaphoretic.   Cardiovascular:      Rate and Rhythm: Normal rate and regular rhythm.      Heart sounds: Normal heart sounds.   Pulmonary:      Effort: Pulmonary effort is normal. No respiratory distress.      Breath sounds: Normal breath sounds.   Abdominal:      General: Bowel sounds are normal.      Palpations: Abdomen is soft.      Tenderness: There is no abdominal tenderness. There is no right CVA tenderness or left CVA tenderness.   Psychiatric:         Behavior: Behavior normal.     Assessment:     1. Bladder pain    2. Urinary frequency      Plan:   Bladder pain  -     POCT urine dipstick without microscope  -     Urine culture; Future; Expected date: 03/13/2023    Urinary frequency  -     POCT urine dipstick without microscope  -     Urine culture; Future; Expected date: 03/13/2023    Other orders  -     ciprofloxacin HCl (CIPRO) 500 MG tablet; Take 1 tablet (500 mg total) by mouth 2 (two) times daily.  Dispense: 10 tablet; Refill: 0      Medication List with Changes/Refills   New Medications    CIPROFLOXACIN HCL (CIPRO) 500 MG TABLET    Take 1 tablet (500 mg total) by mouth 2 (two) times daily.   Current " Medications    BRAN/GUM/FIB/SOLANGE/PSYL/KELP/PEC (FIBER 6 ORAL)    Take 1 tablet by mouth 2 (two) times a day. 12 mg collin    CALCIUM CARBONATE 650 MG CALCIUM (1,625 MG) TABLET    Take 1 tablet by mouth once daily.    COLLAGEN MISC    by Misc.(Non-Drug; Combo Route) route.    CYANOCOBALAMIN (VITAMIN B-12) 250 MCG TABLET    Take 250 mcg by mouth once daily.    FLUOCINONIDE (LIDEX) 0.05 % EXTERNAL SOLUTION    Apply topically 2 (two) times daily.    FLUTICASONE PROPIONATE (FLONASE) 50 MCG/ACTUATION NASAL SPRAY    1 spray (50 mcg total) by Each Nostril route once daily.    HYDROCODONE-ACETAMINOPHEN (NORCO)  MG PER TABLET    Take 1 tablet by mouth every 6 (six) hours as needed for Pain.    LACTOBACILLUS RHAMNOSUS GG (CULTURELLE) 10 BILLION CELL CAPSULE    Take 1 capsule by mouth once daily.    LEVOCETIRIZINE (XYZAL) 5 MG TABLET    Take 5 mg by mouth every evening.    MIRTAZAPINE (REMERON) 15 MG TABLET    TAKE 1 TABLET BY MOUTH EVERY EVENING    MULTIVITAMIN CAPSULE    Take 1 capsule by mouth once daily.    OMEPRAZOLE (PRILOSEC) 40 MG CAPSULE    Take 1 capsule (40 mg total) by mouth 2 (two) times a day.    OXYBUTYNIN (DITROPAN-XL) 10 MG 24 HR TABLET    Take 1 tablet (10 mg total) by mouth once daily.    SIMVASTATIN (ZOCOR) 40 MG TABLET    Take 1 tablet (40 mg total) by mouth every evening.    VENLAFAXINE (EFFEXOR-XR) 75 MG 24 HR CAPSULE    TAKE 1 CAPSULE BY MOUTH ONCE DAILY    VITAMIN D (VITAMIN D3) 1000 UNITS TAB    Take 185 mg by mouth.

## 2023-03-27 RX ORDER — OMEPRAZOLE 40 MG/1
40 CAPSULE, DELAYED RELEASE ORAL 2 TIMES DAILY
Qty: 180 CAPSULE | Refills: 0 | Status: SHIPPED | OUTPATIENT
Start: 2023-03-27 | End: 2023-07-26

## 2023-04-10 ENCOUNTER — TELEPHONE (OUTPATIENT)
Dept: ADMINISTRATIVE | Facility: HOSPITAL | Age: 68
End: 2023-04-10
Payer: MEDICARE

## 2023-04-10 ENCOUNTER — PATIENT MESSAGE (OUTPATIENT)
Dept: ADMINISTRATIVE | Facility: HOSPITAL | Age: 68
End: 2023-04-10
Payer: MEDICARE

## 2023-05-04 ENCOUNTER — TELEPHONE (OUTPATIENT)
Dept: UROLOGY | Facility: CLINIC | Age: 68
End: 2023-05-04
Payer: MEDICARE

## 2023-05-04 ENCOUNTER — TELEPHONE (OUTPATIENT)
Dept: ADMINISTRATIVE | Facility: HOSPITAL | Age: 68
End: 2023-05-04
Payer: MEDICARE

## 2023-05-04 ENCOUNTER — PATIENT MESSAGE (OUTPATIENT)
Dept: ADMINISTRATIVE | Facility: HOSPITAL | Age: 68
End: 2023-05-04
Payer: MEDICARE

## 2023-05-04 NOTE — TELEPHONE ENCOUNTER
----- Message from Shelly Parker sent at 5/4/2023  1:04 PM CDT -----  Contact: pt  Pt is calling in regard to kathie her 5/8 appt (booked to September)  pt states she needs to get in sooner than that due to device check.  Pt states she can come in anything in the month of June.Please call her to kathie at [phone] thanks/mpd

## 2023-05-18 ENCOUNTER — CLINICAL SUPPORT (OUTPATIENT)
Dept: CARDIOLOGY | Facility: CLINIC | Age: 68
End: 2023-05-18
Payer: MEDICARE

## 2023-05-18 ENCOUNTER — OFFICE VISIT (OUTPATIENT)
Dept: INTERNAL MEDICINE | Facility: CLINIC | Age: 68
End: 2023-05-18
Payer: MEDICARE

## 2023-05-18 VITALS
DIASTOLIC BLOOD PRESSURE: 60 MMHG | TEMPERATURE: 99 F | WEIGHT: 125.25 LBS | BODY MASS INDEX: 22.19 KG/M2 | SYSTOLIC BLOOD PRESSURE: 100 MMHG | HEART RATE: 85 BPM | OXYGEN SATURATION: 98 % | HEIGHT: 63 IN

## 2023-05-18 DIAGNOSIS — R07.9 CHEST PAIN, UNSPECIFIED TYPE: ICD-10-CM

## 2023-05-18 DIAGNOSIS — J06.9 UPPER RESPIRATORY TRACT INFECTION, UNSPECIFIED TYPE: ICD-10-CM

## 2023-05-18 DIAGNOSIS — F41.9 ANXIETY: Primary | ICD-10-CM

## 2023-05-18 PROCEDURE — 99999 PR PBB SHADOW E&M-EST. PATIENT-LVL IV: ICD-10-PCS | Mod: PBBFAC,,, | Performed by: FAMILY MEDICINE

## 2023-05-18 PROCEDURE — 96372 THER/PROPH/DIAG INJ SC/IM: CPT | Mod: PBBFAC,PO

## 2023-05-18 PROCEDURE — 99214 PR OFFICE/OUTPT VISIT, EST, LEVL IV, 30-39 MIN: ICD-10-PCS | Mod: S$PBB,,, | Performed by: FAMILY MEDICINE

## 2023-05-18 PROCEDURE — 93005 ELECTROCARDIOGRAM TRACING: CPT

## 2023-05-18 PROCEDURE — 99214 OFFICE O/P EST MOD 30 MIN: CPT | Mod: S$PBB,,, | Performed by: FAMILY MEDICINE

## 2023-05-18 PROCEDURE — 99999 PR PBB SHADOW E&M-EST. PATIENT-LVL IV: CPT | Mod: PBBFAC,,, | Performed by: FAMILY MEDICINE

## 2023-05-18 PROCEDURE — 93010 ELECTROCARDIOGRAM REPORT: CPT | Mod: ,,, | Performed by: STUDENT IN AN ORGANIZED HEALTH CARE EDUCATION/TRAINING PROGRAM

## 2023-05-18 PROCEDURE — 93010 EKG 12-LEAD: ICD-10-PCS | Mod: ,,, | Performed by: STUDENT IN AN ORGANIZED HEALTH CARE EDUCATION/TRAINING PROGRAM

## 2023-05-18 PROCEDURE — 99214 OFFICE O/P EST MOD 30 MIN: CPT | Mod: PBBFAC,25,PO | Performed by: FAMILY MEDICINE

## 2023-05-18 RX ORDER — VENLAFAXINE HYDROCHLORIDE 150 MG/1
150 CAPSULE, EXTENDED RELEASE ORAL DAILY
Qty: 30 CAPSULE | Refills: 11 | Status: SHIPPED | OUTPATIENT
Start: 2023-05-18 | End: 2023-12-08 | Stop reason: SDUPTHER

## 2023-05-18 RX ORDER — AZELASTINE 1 MG/ML
1 SPRAY, METERED NASAL 2 TIMES DAILY
Qty: 30 ML | Refills: 1 | Status: SHIPPED | OUTPATIENT
Start: 2023-05-18 | End: 2024-05-17

## 2023-05-18 RX ORDER — METHYLPREDNISOLONE ACETATE 80 MG/ML
80 INJECTION, SUSPENSION INTRA-ARTICULAR; INTRALESIONAL; INTRAMUSCULAR; SOFT TISSUE ONCE
Status: COMPLETED | OUTPATIENT
Start: 2023-05-18 | End: 2023-05-18

## 2023-05-18 RX ADMIN — METHYLPREDNISOLONE ACETATE 80 MG: 80 INJECTION, SUSPENSION INTRALESIONAL; INTRAMUSCULAR; INTRASYNOVIAL; SOFT TISSUE at 03:05

## 2023-05-21 ENCOUNTER — PATIENT MESSAGE (OUTPATIENT)
Dept: INTERNAL MEDICINE | Facility: CLINIC | Age: 68
End: 2023-05-21
Payer: MEDICARE

## 2023-05-21 DIAGNOSIS — R05.9 COUGH, UNSPECIFIED TYPE: Primary | ICD-10-CM

## 2023-05-21 NOTE — PROGRESS NOTES
Subjective:      Patient ID: Rita Dejesus is a 67 y.o. female.    Chief Complaint: Sore Throat, Chest Pain, and Shortness of Breath      Patient reports recent episode earlier this week where she had CP, sob with cold sweats, vomited and symptoms immediately resolved. Reports she used to have similar episodes years ago when she had panic attacks.   Also reports recent sore throat, mucus build up, cough    Sore Throat   Associated symptoms include congestion and shortness of breath.   Chest Pain   Associated symptoms include shortness of breath. Pertinent negatives include no fever.   Shortness of Breath  Associated symptoms include chest pain and a sore throat. Pertinent negatives include no fever.   Review of Systems   Constitutional:  Negative for fever.   HENT:  Positive for congestion and sore throat.    Respiratory:  Positive for shortness of breath.    Cardiovascular:  Positive for chest pain.   Psychiatric/Behavioral:  Negative for dysphoric mood and sleep disturbance.    Past Medical History:   Diagnosis Date    General anesthetics causing adverse effect in therapeutic use     difficulty breathing when waking    GERD (gastroesophageal reflux disease)     History of cervical cancer     History of rectal or anal cancer           Past Surgical History:   Procedure Laterality Date    ADENOIDECTOMY      carpal tunel Bilateral     CATARACT EXTRACTION Left 05/06/2021    NEAR    CATARACT EXTRACTION W/ INTRAOCULAR LENS IMPLANT Right 05/20/2021    CERVICAL CONIZATION   W/ LASER      COLONOSCOPY N/A 11/17/2020    Procedure: COLONOSCOPY;  Surgeon: Kendy De Leon MD;  Location: [facility];  Service: Endoscopy;  Laterality: N/A;    ESOPHAGOGASTRODUODENOSCOPY N/A 12/22/2020    Procedure: ESOPHAGOGASTRODUODENOSCOPY (EGD);  Surgeon: Goyo Kapadia MD;  Location: [facility];  Service: Endoscopy;  Laterality: N/A;    ESOPHAGOGASTRODUODENOSCOPY N/A 05/09/2022    Procedure: EGD (ESOPHAGOGASTRODUODENOSCOPY);  Surgeon:  "Misa Crocker MD;  Location: [facility] ENDO;  Service: Gastroenterology;  Laterality: N/A;    ESOPHAGOGASTRODUODENOSCOPY N/A 9/7/2022    Procedure: EGD (ESOPHAGOGASTRODUODENOSCOPY);  Surgeon: Misa Crocker MD;  Location: [facility] ENDO;  Service: Gastroenterology;  Laterality: N/A;    HERNIA REPAIR      IMPLANTATION OF PERMANENT SACRAL NERVE STIMULATOR N/A 1/26/2023    Procedure: INSERTION, NEUROSTIMULATOR, PERMANENT, SACRAL;  Surgeon: Andra Painting MD;  Location: [facility] OR;  Service: Urology;  Laterality: N/A;    INSERTION, NEUROSTIMULATOR, TEMPORARY, SACRAL N/A 1/12/2023    Procedure: INSERTION, NEUROSTIMULATOR, TEMPORARY, SACRAL;  Surgeon: Andra Painting MD;  Location: [facility] OR;  Service: Urology;  Laterality: N/A;  stage 1    TONSILLECTOMY       Family History   Problem Relation Age of Onset    Diabetes Mother         type II    Hyperlipidemia Mother     Heart disease Father     Hypertension Father     Cataracts Father     Hypertension Sister     Diabetes Maternal Grandmother     Diabetes Maternal Grandfather      Social History     Socioeconomic History    Marital status:    Tobacco Use    Smoking status: Never    Smokeless tobacco: Never   Substance and Sexual Activity    Alcohol use: Yes     Comment: Social , hold told prior to sx    Drug use: Never    Sexual activity: Not Currently     Partners: Male     Review of patient's allergies indicates:   Allergen Reactions    Sulfa (sulfonamide antibiotics) Nausea And Vomiting    Nitrofuran analogues Nausea And Vomiting     Microdantin        Objective:       /60 (BP Location: Right arm, Patient Position: Sitting, BP Method: Medium (Manual))   Pulse 85   Temp 99.2 °F (37.3 °C) (Tympanic)   Ht 5' 2.75" (1.594 m)   Wt 56.8 kg (125 lb 3.5 oz)   SpO2 98%   BMI 22.36 kg/m²   Physical Exam  Vitals reviewed.   Constitutional:       General: She is not in acute distress.     Appearance: Normal appearance. She is well-developed. She is not " diaphoretic.   HENT:      Head: Normocephalic.      Right Ear: Hearing, tympanic membrane, ear canal and external ear normal.      Left Ear: Hearing, tympanic membrane, ear canal and external ear normal.      Nose: Mucosal edema present.      Right Sinus: No maxillary sinus tenderness or frontal sinus tenderness.      Left Sinus: No maxillary sinus tenderness or frontal sinus tenderness.      Mouth/Throat:      Pharynx: Uvula midline. Posterior oropharyngeal erythema present.   Eyes:      Conjunctiva/sclera: Conjunctivae normal.      Pupils: Pupils are equal, round, and reactive to light.   Cardiovascular:      Rate and Rhythm: Normal rate and regular rhythm.      Heart sounds: Normal heart sounds.   Pulmonary:      Effort: Pulmonary effort is normal. No respiratory distress.      Breath sounds: Normal breath sounds.   Abdominal:      Palpations: Abdomen is soft.   Lymphadenopathy:      Cervical: No cervical adenopathy.   Skin:     General: Skin is warm and dry.   Neurological:      Mental Status: She is alert.   Psychiatric:         Mood and Affect: Mood normal.         Behavior: Behavior normal.     Assessment:     1. Anxiety    2. Upper respiratory tract infection, unspecified type    3. Chest pain, unspecified type      Plan:   Anxiety    Upper respiratory tract infection, unspecified type    Chest pain, unspecified type  -     IN OFFICE EKG 12-LEAD (to [place])    Other orders  -     venlafaxine (EFFEXOR-XR) 150 MG Cp24; Take 1 capsule (150 mg total) by mouth once daily.  Dispense: 30 capsule; Refill: 11  -     methylPREDNISolone acetate injection 80 mg  -     azelastine (ASTELIN) 137 mcg (0.1 %) nasal spray; 1 spray (137 mcg total) by Nasal route 2 (two) times daily.  Dispense: 30 mL; Refill: 1    Ekg today: no change from previous  Medication List with Changes/Refills   New Medications    AZELASTINE (ASTELIN) 137 MCG (0.1 %) NASAL SPRAY    1 spray (137 mcg total) by Nasal route 2 (two) times daily.     VENLAFAXINE (EFFEXOR-XR) 150 MG CP24    Take 1 capsule (150 mg total) by mouth once daily.   Current Medications    BRAN/GUM/FIB/SOLANGE/PSYL/KELP/PEC (FIBER 6 ORAL)    Take 1 tablet by mouth once daily. 12 mg collin    CALCIUM CARBONATE 650 MG CALCIUM (1,625 MG) TABLET    Take 1 tablet by mouth once daily.    COLLAGEN MISC    by Misc.(Non-Drug; Combo Route) route.    CYANOCOBALAMIN (VITAMIN B-12) 250 MCG TABLET    Take 250 mcg by mouth once daily.    FLUOCINONIDE (LIDEX) 0.05 % EXTERNAL SOLUTION    Apply topically 2 (two) times daily.    FLUTICASONE PROPIONATE (FLONASE) 50 MCG/ACTUATION NASAL SPRAY    1 spray (50 mcg total) by Each Nostril route once daily.    LEVOCETIRIZINE (XYZAL) 5 MG TABLET    Take 5 mg by mouth every evening.    MIRTAZAPINE (REMERON) 15 MG TABLET    TAKE 1 TABLET BY MOUTH EVERY EVENING    MULTIVITAMIN CAPSULE    Take 1 capsule by mouth once daily.    OMEPRAZOLE (PRILOSEC) 40 MG CAPSULE    Take 1 capsule (40 mg total) by mouth 2 (two) times a day.    OXYBUTYNIN (DITROPAN-XL) 10 MG 24 HR TABLET    Take 1 tablet (10 mg total) by mouth once daily.    SIMVASTATIN (ZOCOR) 40 MG TABLET    Take 1 tablet (40 mg total) by mouth every evening.    VITAMIN D (VITAMIN D3) 1000 UNITS TAB    Take 185 mg by mouth.   Discontinued Medications    CIPROFLOXACIN HCL (CIPRO) 500 MG TABLET    Take 1 tablet (500 mg total) by mouth 2 (two) times daily.    HYDROCODONE-ACETAMINOPHEN (NORCO)  MG PER TABLET    Take 1 tablet by mouth every 6 (six) hours as needed for Pain.    LACTOBACILLUS RHAMNOSUS GG (CULTURELLE) 10 BILLION CELL CAPSULE    Take 1 capsule by mouth once daily.    VENLAFAXINE (EFFEXOR-XR) 75 MG 24 HR CAPSULE    TAKE 1 CAPSULE BY MOUTH ONCE DAILY

## 2023-05-22 ENCOUNTER — PATIENT MESSAGE (OUTPATIENT)
Dept: INTERNAL MEDICINE | Facility: CLINIC | Age: 68
End: 2023-05-22
Payer: MEDICARE

## 2023-05-22 NOTE — TELEPHONE ENCOUNTER
Please see patient's mychart message and advise. Pt seen on 05/18/2023    Dr Heredia,  Im not getting any better as far as the respiratory infection. Im feeling worse. I feel like I have pleurisy.  Especially when I cough.  Do I need to come back in?  Rita Dejesus

## 2023-05-23 ENCOUNTER — PATIENT MESSAGE (OUTPATIENT)
Dept: INTERNAL MEDICINE | Facility: CLINIC | Age: 68
End: 2023-05-23
Payer: MEDICARE

## 2023-05-23 ENCOUNTER — HOSPITAL ENCOUNTER (OUTPATIENT)
Dept: RADIOLOGY | Facility: HOSPITAL | Age: 68
Discharge: HOME | End: 2023-05-23
Attending: FAMILY MEDICINE
Payer: MEDICARE

## 2023-05-23 DIAGNOSIS — R05.9 COUGH, UNSPECIFIED TYPE: ICD-10-CM

## 2023-05-23 PROCEDURE — 71046 XR CHEST PA AND LATERAL: ICD-10-PCS | Mod: 26,,, | Performed by: RADIOLOGY

## 2023-05-23 PROCEDURE — 71046 X-RAY EXAM CHEST 2 VIEWS: CPT | Mod: 26,,, | Performed by: RADIOLOGY

## 2023-05-23 PROCEDURE — 71046 X-RAY EXAM CHEST 2 VIEWS: CPT | Mod: TC,PO

## 2023-05-23 RX ORDER — AMOXICILLIN AND CLAVULANATE POTASSIUM 875; 125 MG/1; MG/1
1 TABLET, FILM COATED ORAL EVERY 12 HOURS
Qty: 20 TABLET | Refills: 0 | Status: SHIPPED | OUTPATIENT
Start: 2023-05-23 | End: 2023-07-26

## 2023-05-23 RX ORDER — PROMETHAZINE HYDROCHLORIDE AND DEXTROMETHORPHAN HYDROBROMIDE 6.25; 15 MG/5ML; MG/5ML
5 SYRUP ORAL EVERY 4 HOURS PRN
Qty: 200 ML | Refills: 0 | Status: SHIPPED | OUTPATIENT
Start: 2023-05-23 | End: 2023-06-02

## 2023-05-24 ENCOUNTER — PATIENT MESSAGE (OUTPATIENT)
Dept: INTERNAL MEDICINE | Facility: CLINIC | Age: 68
End: 2023-05-24
Payer: MEDICARE

## 2023-06-05 ENCOUNTER — OFFICE VISIT (OUTPATIENT)
Dept: UROLOGY | Facility: CLINIC | Age: 68
End: 2023-06-05
Payer: MEDICARE

## 2023-06-05 VITALS
HEART RATE: 81 BPM | BODY MASS INDEX: 24.59 KG/M2 | DIASTOLIC BLOOD PRESSURE: 61 MMHG | WEIGHT: 125.25 LBS | SYSTOLIC BLOOD PRESSURE: 110 MMHG | HEIGHT: 60 IN

## 2023-06-05 DIAGNOSIS — N39.46 MIXED INCONTINENCE: Primary | ICD-10-CM

## 2023-06-05 DIAGNOSIS — R15.1 FECAL SMEARING: ICD-10-CM

## 2023-06-05 PROCEDURE — 99213 OFFICE O/P EST LOW 20 MIN: CPT | Mod: S$PBB,,, | Performed by: UROLOGY

## 2023-06-05 PROCEDURE — 99213 PR OFFICE/OUTPT VISIT, EST, LEVL III, 20-29 MIN: ICD-10-PCS | Mod: S$PBB,,, | Performed by: UROLOGY

## 2023-06-05 PROCEDURE — 99999 PR PBB SHADOW E&M-EST. PATIENT-LVL IV: CPT | Mod: PBBFAC,,, | Performed by: UROLOGY

## 2023-06-05 PROCEDURE — 99999 PR PBB SHADOW E&M-EST. PATIENT-LVL IV: ICD-10-PCS | Mod: PBBFAC,,, | Performed by: UROLOGY

## 2023-06-05 PROCEDURE — 99214 OFFICE O/P EST MOD 30 MIN: CPT | Mod: PBBFAC | Performed by: UROLOGY

## 2023-06-05 NOTE — PROGRESS NOTES
"  Chief Complaint:   F/u interstim    HPI:     6/5/23-she wears 3 pads per day but doesn't usually wet them. Bladder has been much better with fine tuning of Interstim. No dysuria or recent UTIs. Still with some FI, but better. Nocturia x 0-1. On program 1 at 1.2. No impedance    3/6/23- Pt s/p Interstim a month ago. She has seen significant improvement, though not perfect. Sometimes the interstim works well, but sometimes it doesn't. Still wearing pads. Having UUI 3 times a week. FI is still present, though better. No recent UTIs.     12/12/22- Pt has been seeing pelvic floor PT and has seen improvement, especially with CHICHI. Rectocele is less symptomatic and not splinting. She did see Dr. Choi. She does have FI a couple times a week still and Interstim was recommended.   Has had 2 E. Coli UTIs, 1st treated with fosfomycin, 2nd treated with cipro x 5 days last week. Currently without UTI symptoms, but prior was having urethral pain.     8/8/22- Patient reports that she has fecal urgency and FI and urinary incontinence. Changed a diaper 3 times yesterday. She reports having CHICHI with cough/lifting and also has UUI. Urinary issues 10-12 years. She reports having anal cancer, and during the treatment, noticed rectocele. She had chemo and radiation for the anal cancer. No surgery. She reports having insensate fecal incontinence. Has difficulty discerning between gas and solid. Has never taken any medications for urinary complaints. Taking metamucil does seem to help with GI symptoms.     Per Amanda Leyva NP:   "Patient is a 66-year-old female that is presenting per gyn MD ( WILY Salmeron MD). Patient states that she was seen by gyn physician and instructed to see urologist secondary to rectocele and cystocele.  Patient has a history of anal cancer and is concerned that rectocele is secondary to anal cancer treatment.  Patient states that she has not had sexual intercourse in 10 years and is wanting to proceed with sexual " "intercourse.  Reports that she has had an increase in urge incontinence and is now wearing a pad.  Does not want to consider PT pelvic floor training or medication, wants to have a surgical option. No abd/pelvic pain and no exac/rel factors.  No hematuria."    Allergies:  Sulfa (sulfonamide antibiotics) and Nitrofuran analogues    Medications:  has a current medication list which includes the following prescription(s): amoxicillin-clavulanate 875-125mg, azelastine, bran/gum/fib/eliecer/psyl/kelp/pec, calcium carbonate, collagen, cyanocobalamin, fluocinonide, fluticasone propionate, levocetirizine, mirtazapine, multivitamin, omeprazole, oxybutynin, simvastatin, venlafaxine, and vitamin d.    Review of Systems:  General: No fever, chills, fatigability, or weight loss.  Skin: No rashes, itching, or changes in color or texture of skin.  Chest: Denies BONILLA, cyanosis, wheezing, cough, and sputum production.  Abdomen: Appetite fine. No weight loss. Denies diarrhea, abdominal pain, hematemesis, or blood in stool.  Musculoskeletal: No joint stiffness or swelling. Denies back pain.  : As above.  All other review of systems negative.    PMH:   has a past medical history of General anesthetics causing adverse effect in therapeutic use, GERD (gastroesophageal reflux disease), History of cervical cancer, and History of rectal or anal cancer.    PSH:   has a past surgical history that includes Tonsillectomy; Adenoidectomy; Hernia repair; carpal tunel (Bilateral); Colonoscopy (N/A, 11/17/2020); Esophagogastroduodenoscopy (N/A, 12/22/2020); Cataract extraction w/ intraocular lens implant (Right, 05/20/2021); Cataract extraction (Left, 05/06/2021); Esophagogastroduodenoscopy (N/A, 05/09/2022); Cervical conization w/ laser; Esophagogastroduodenoscopy (N/A, 9/7/2022); insertion, neurostimulator, temporary, sacral (N/A, 1/12/2023); and Implantation of permanent sacral nerve stimulator (N/A, 1/26/2023).    FamHx: family history includes " Cataracts in her father; Diabetes in her maternal grandfather, maternal grandmother, and mother; Heart disease in her father; Hyperlipidemia in her mother; Hypertension in her father and sister.    SocHx:  reports that she has never smoked. She has never used smokeless tobacco. She reports current alcohol use. She reports that she does not use drugs.      Physical Exam:    Vitals:    06/05/23 1005   BP: 110/61   Pulse: 81       General: A&Ox3, no apparent distress, no deformities  Neck: No masses, normal thyroid  Lungs: normal inspiration, no use of accessory muscles  Heart: normal pulse, no arrhythmias  Abdomen: Soft, NT, ND, no masses, no hernias, no hepatosplenomegaly  Lymphatic: Neck and groin nodes negative  Skin: The skin is warm and dry. No jaundice.  Ext: No c/c/e.  :  8/8/22-Normal female external genitalia, orthotopic urethral meatus, atrophic vaginal mucosa, Grade 2 cystocele, grade 2 rectocele  Buttocks: R buttocks incision well healed, no sign of infection.   Impedance check performed with no impedance.       Labs/Studies:   PVR= 6 ml  6/15/22  Urinalysis: negative for blood, nit, small LE      Mixed incontinence    Fecal smearing      Discussed that we can adjust programs as needed  Continue behavioral modifications    Follow up in about 6 months (around 12/5/2023).

## 2023-06-09 ENCOUNTER — PES CALL (OUTPATIENT)
Dept: ADMINISTRATIVE | Facility: CLINIC | Age: 68
End: 2023-06-09
Payer: MEDICARE

## 2023-06-12 ENCOUNTER — OFFICE VISIT (OUTPATIENT)
Dept: SURGERY | Facility: CLINIC | Age: 68
End: 2023-06-12
Payer: MEDICARE

## 2023-06-12 VITALS — OXYGEN SATURATION: 98 % | HEART RATE: 76 BPM | DIASTOLIC BLOOD PRESSURE: 70 MMHG | SYSTOLIC BLOOD PRESSURE: 129 MMHG

## 2023-06-12 DIAGNOSIS — R15.9 INCONTINENCE OF FECES, UNSPECIFIED FECAL INCONTINENCE TYPE: Primary | ICD-10-CM

## 2023-06-12 DIAGNOSIS — Z85.048 HISTORY OF RECTAL OR ANAL CANCER: ICD-10-CM

## 2023-06-12 PROCEDURE — 99214 OFFICE O/P EST MOD 30 MIN: CPT | Mod: S$PBB,,, | Performed by: COLON & RECTAL SURGERY

## 2023-06-12 PROCEDURE — 99213 OFFICE O/P EST LOW 20 MIN: CPT | Mod: PBBFAC | Performed by: COLON & RECTAL SURGERY

## 2023-06-12 PROCEDURE — 99214 PR OFFICE/OUTPT VISIT, EST, LEVL IV, 30-39 MIN: ICD-10-PCS | Mod: S$PBB,,, | Performed by: COLON & RECTAL SURGERY

## 2023-06-12 PROCEDURE — 99999 PR PBB SHADOW E&M-EST. PATIENT-LVL III: ICD-10-PCS | Mod: PBBFAC,,, | Performed by: COLON & RECTAL SURGERY

## 2023-06-12 PROCEDURE — 99999 PR PBB SHADOW E&M-EST. PATIENT-LVL III: CPT | Mod: PBBFAC,,, | Performed by: COLON & RECTAL SURGERY

## 2023-06-12 NOTE — PROGRESS NOTES
History & Physical    SUBJECTIVE:     Chief Complaint   Patient presents with    Follow-up     Incontinence   Ref: Andra Painting MD    History of Present Illness:  Patient is a 67 y.o. female presents for evaluation of fecal incontinence and rectocele.  Patient states that she had anal cancer treated with chemo radiation in 2014 and said no evidence of recurrence since that time.  She also had previous cervical cancer in 1983 treated with surgical excision.  She has suffered from a rectocele for multiple years now.  She states she has intermittently had to digitize her vagina to expel stool but has had decreasing frequency requiring this with both MiraLax and Metamucil administration.  She has suffered from some fecal incontinence over the past year that has occurred progressively.  This occurs most days and is to be all 3, liquid, solid and gas.  She has started taking Metamucil which she states does help the consistency and decrease the frequency of the incontinence although distal occurring most days.  She denies any blood per rectum.  She denies any fever, chills, nausea, vomiting.  If she has noticed improvement from keel exercises previously when she did these after her radiation treatment but these have become less effective and she is pending starting pelvic floor physical therapy currently.  Last colonoscopy performed in 2020 with 1 adenoma removed.  She is also suffering from urinary incontinence.    Interval History:  Since the last clinic visit, the patient has had improvement of symptoms following InterStim placement by urology in January 2023. Improvement in urinary and fecal incontinence although still present intermittently. States that she has fecal incontinence to soft stools/liquid 1-2 times per week (previously multiple times a day); not a full BM. Has had a significant improvement since setting changes on Interstim compared to before having the device placed. Taking fiber gummies daily. Denies  any fever, chills, nausea, vomiting, hematochezia.    Review of patient's allergies indicates:   Allergen Reactions    Sulfa (sulfonamide antibiotics) Nausea And Vomiting    Nitrofuran analogues Nausea And Vomiting     Microdantin        Current Outpatient Medications   Medication Sig Dispense Refill    amoxicillin-clavulanate 875-125mg (AUGMENTIN) 875-125 mg per tablet Take 1 tablet by mouth every 12 (twelve) hours. 20 tablet 0    azelastine (ASTELIN) 137 mcg (0.1 %) nasal spray 1 spray (137 mcg total) by Nasal route 2 (two) times daily. 30 mL 1    bran/gum/fib/eliecer/psyl/kelp/pec (FIBER 6 ORAL) Take 1 tablet by mouth once daily. 12 mg collin      calcium carbonate 650 mg calcium (1,625 mg) tablet Take 1 tablet by mouth once daily.      COLLAGEN MISC by Misc.(Non-Drug; Combo Route) route.      cyanocobalamin (VITAMIN B-12) 250 MCG tablet Take 250 mcg by mouth once daily.      fluocinonide (LIDEX) 0.05 % external solution Apply topically 2 (two) times daily. 60 mL 1    fluticasone propionate (FLONASE) 50 mcg/actuation nasal spray 1 spray (50 mcg total) by Each Nostril route once daily. 48 g 2    levocetirizine (XYZAL) 5 MG tablet Take 5 mg by mouth every evening.      mirtazapine (REMERON) 15 MG tablet TAKE 1 TABLET BY MOUTH EVERY EVENING (Patient taking differently: Take 15 mg by mouth every evening.) 90 tablet 3    multivitamin capsule Take 1 capsule by mouth once daily.      omeprazole (PRILOSEC) 40 MG capsule Take 1 capsule (40 mg total) by mouth 2 (two) times a day. 180 capsule 0    oxybutynin (DITROPAN-XL) 10 MG 24 hr tablet Take 1 tablet (10 mg total) by mouth once daily. (Patient taking differently: Take 10 mg by mouth every evening.) 30 tablet 11    simvastatin (ZOCOR) 40 MG tablet Take 1 tablet (40 mg total) by mouth every evening. 90 tablet 3    venlafaxine (EFFEXOR-XR) 150 MG Cp24 Take 1 capsule (150 mg total) by mouth once daily. 30 capsule 11    vitamin D (VITAMIN D3) 1000 units Tab Take 185 mg by mouth.        No current facility-administered medications for this visit.       Past Medical History:   Diagnosis Date    General anesthetics causing adverse effect in therapeutic use     difficulty breathing when waking    GERD (gastroesophageal reflux disease)     History of cervical cancer     History of rectal or anal cancer      Past Surgical History:   Procedure Laterality Date    ADENOIDECTOMY      carpal tunel Bilateral     CATARACT EXTRACTION Left 05/06/2021    NEAR    CATARACT EXTRACTION W/ INTRAOCULAR LENS IMPLANT Right 05/20/2021    CERVICAL CONIZATION   W/ LASER      COLONOSCOPY N/A 11/17/2020    Procedure: COLONOSCOPY;  Surgeon: Kendy De Leon MD;  Location: [facility];  Service: Endoscopy;  Laterality: N/A;    ESOPHAGOGASTRODUODENOSCOPY N/A 12/22/2020    Procedure: ESOPHAGOGASTRODUODENOSCOPY (EGD);  Surgeon: Goyo Kapadia MD;  Location: [facility];  Service: Endoscopy;  Laterality: N/A;    ESOPHAGOGASTRODUODENOSCOPY N/A 05/09/2022    Procedure: EGD (ESOPHAGOGASTRODUODENOSCOPY);  Surgeon: Misa Crocker MD;  Location: [facility];  Service: Gastroenterology;  Laterality: N/A;    ESOPHAGOGASTRODUODENOSCOPY N/A 9/7/2022    Procedure: EGD (ESOPHAGOGASTRODUODENOSCOPY);  Surgeon: Misa Crocker MD;  Location: [facility];  Service: Gastroenterology;  Laterality: N/A;    HERNIA REPAIR      IMPLANTATION OF PERMANENT SACRAL NERVE STIMULATOR N/A 1/26/2023    Procedure: INSERTION, NEUROSTIMULATOR, PERMANENT, SACRAL;  Surgeon: Andra Painting MD;  Location: [facility];  Service: Urology;  Laterality: N/A;    INSERTION, NEUROSTIMULATOR, TEMPORARY, SACRAL N/A 1/12/2023    Procedure: INSERTION, NEUROSTIMULATOR, TEMPORARY, SACRAL;  Surgeon: Andra Painting MD;  Location: [facility];  Service: Urology;  Laterality: N/A;  stage 1    TONSILLECTOMY       Family History   Problem Relation Age of Onset    Diabetes Mother         type II    Hyperlipidemia Mother     Heart disease Father     Hypertension Father      Cataracts Father     Hypertension Sister     Diabetes Maternal Grandmother     Diabetes Maternal Grandfather      Social History     Tobacco Use    Smoking status: Never    Smokeless tobacco: Never   Substance Use Topics    Alcohol use: Yes     Comment: Social , hold told prior to sx    Drug use: Never        Review of Systems:  Review of Systems   Constitutional:  Negative for activity change, appetite change, chills, fatigue, fever and unexpected weight change.   HENT:  Negative for congestion, ear pain, sore throat and trouble swallowing.    Eyes:  Negative for pain, redness and itching.   Respiratory:  Negative for cough, shortness of breath and wheezing.    Cardiovascular:  Negative for chest pain, palpitations and leg swelling.   Gastrointestinal:  Negative for abdominal distention, abdominal pain, anal bleeding, blood in stool, nausea, rectal pain and vomiting.   Endocrine: Negative for cold intolerance, heat intolerance and polyuria.   Genitourinary:  Negative for dysuria, flank pain, frequency and hematuria.   Musculoskeletal:  Negative for gait problem, joint swelling and neck pain.   Skin:  Negative for color change, rash and wound.   Allergic/Immunologic: Negative for environmental allergies and immunocompromised state.   Neurological:  Negative for dizziness, speech difficulty, weakness and numbness.   Psychiatric/Behavioral:  Negative for agitation, confusion and hallucinations.      OBJECTIVE:     Vitals:    06/12/23 0947   BP: 129/70   Pulse: 76       Physical Exam:  Physical Exam  Constitutional:       Appearance: She is well-developed.   HENT:      Head: Normocephalic and atraumatic.      Right Ear: External ear normal.      Left Ear: External ear normal.   Eyes:      Extraocular Movements: Extraocular movements intact.      Conjunctiva/sclera: Conjunctivae normal.   Neck:      Thyroid: No thyromegaly.   Cardiovascular:      Rate and Rhythm: Normal rate.   Pulmonary:      Effort: Pulmonary effort  is normal. No respiratory distress.   Abdominal:      General: There is no distension.      Palpations: Abdomen is soft. There is no mass.      Tenderness: There is no abdominal tenderness.   Genitourinary:     Comments: Anorectal: deferred  Musculoskeletal:         General: No tenderness. Normal range of motion.      Cervical back: Normal range of motion.   Skin:     General: Skin is warm and dry.      Capillary Refill: Capillary refill takes less than 2 seconds.      Findings: No rash.   Neurological:      Mental Status: She is alert and oriented to person, place, and time.   Psychiatric:         Behavior: Behavior normal.       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

## 2023-06-15 DIAGNOSIS — E78.2 MIXED HYPERLIPIDEMIA: ICD-10-CM

## 2023-06-15 RX ORDER — SIMVASTATIN 40 MG/1
40 TABLET, FILM COATED ORAL NIGHTLY
Qty: 90 TABLET | Refills: 3 | Status: SHIPPED | OUTPATIENT
Start: 2023-06-15

## 2023-06-15 NOTE — TELEPHONE ENCOUNTER
No care due was identified.  [facility] Embedded Care Due Messages. Reference number: [id number].   6/15/2023 11:36:18 AM CDT

## 2023-06-21 ENCOUNTER — TELEPHONE (OUTPATIENT)
Dept: ADMINISTRATIVE | Facility: HOSPITAL | Age: 68
End: 2023-06-21
Payer: MEDICARE

## 2023-06-22 ENCOUNTER — OFFICE VISIT (OUTPATIENT)
Dept: URGENT CARE | Facility: CLINIC | Age: 68
End: 2023-06-22
Payer: MEDICARE

## 2023-06-22 VITALS
SYSTOLIC BLOOD PRESSURE: 123 MMHG | RESPIRATION RATE: 18 BRPM | DIASTOLIC BLOOD PRESSURE: 59 MMHG | WEIGHT: 125 LBS | TEMPERATURE: 98 F | BODY MASS INDEX: 24.54 KG/M2 | HEIGHT: 60 IN | HEART RATE: 78 BPM | OXYGEN SATURATION: 97 %

## 2023-06-22 DIAGNOSIS — L23.7 CONTACT DERMATITIS DUE TO POISON IVY: Primary | ICD-10-CM

## 2023-06-22 PROCEDURE — 99213 OFFICE O/P EST LOW 20 MIN: CPT | Mod: S$GLB,,, | Performed by: PHYSICIAN ASSISTANT

## 2023-06-22 PROCEDURE — 99213 PR OFFICE/OUTPT VISIT, EST, LEVL III, 20-29 MIN: ICD-10-PCS | Mod: S$GLB,,, | Performed by: PHYSICIAN ASSISTANT

## 2023-06-22 RX ORDER — METHYLPREDNISOLONE 4 MG/1
TABLET ORAL
Qty: 21 EACH | Refills: 0 | Status: SHIPPED | OUTPATIENT
Start: 2023-06-22 | End: 2023-07-26

## 2023-06-22 NOTE — PROGRESS NOTES
Subjective:      Patient ID: Rita Dejesus is a 67 y.o. female.    Vitals:  height is 5' (1.524 m) and weight is 56.7 kg (125 lb). Her tympanic temperature is 98.3 °F (36.8 °C). Her blood pressure is 123/59 (abnormal) and her pulse is 78. Her respiration is 18 and oxygen saturation is 97%.     Chief Complaint: Rash    67 y.o female presenting with skin concerns. Pt thinks she was possibly exposed to poison ivy. On Tuesday she was outdoors doing yard work, and yesterday she noticed a rash on both arms. Other associated sxs include itchiness, blistering, and slight pain. Pt has been applying cortisone cream, and taking benadryl.     Rash  This is a new problem. The current episode started yesterday. The affected locations include the left arm and right arm. The rash is characterized by blistering, pain, redness and itchiness. She was exposed to plant contact. Pertinent negatives include no congestion, eye pain, rhinorrhea or shortness of breath. Past treatments include anti-itch cream and analgesics. The treatment provided no relief.     HENT:  Negative for congestion.    Eyes:  Negative for eye pain.   Respiratory:  Negative for shortness of breath.    Skin:  Positive for rash. Negative for erythema.   Allergic/Immunologic: Positive for itching.    Objective:     Physical Exam   Constitutional: She is oriented to person, place, and time. She appears well-developed.   HENT:   Head: Normocephalic and atraumatic. Head is without abrasion, without contusion and without laceration.   Ears:   Right Ear: External ear normal.   Left Ear: External ear normal.   Nose: Nose normal.   Mouth/Throat: Oropharynx is clear and moist and mucous membranes are normal.   Eyes: Conjunctivae, EOM and lids are normal. Pupils are equal, round, and reactive to light.   Neck: Trachea normal and phonation normal. Neck supple.   Cardiovascular: Normal rate, regular rhythm and normal heart sounds.   Pulmonary/Chest: Effort normal and  breath sounds normal. No stridor. No respiratory distress.   Musculoskeletal: Normal range of motion.         General: Normal range of motion.   Neurological: She is alert and oriented to person, place, and time.   Skin: Skin is warm, dry, intact and no rash. Capillary refill takes less than 2 seconds. No abrasion, No burn, No bruising, No erythema and No ecchymosis        Psychiatric: Her speech is normal and behavior is normal. Judgment and thought content normal.   Nursing note and vitals reviewed.    Assessment:     1. Contact dermatitis due to poison ivy        Plan:       Contact dermatitis due to poison ivy  -     methylPREDNISolone (MEDROL DOSEPACK) 4 mg tablet; use as directed  Dispense: 21 each; Refill: 0      Medrol Dose gladys sent to pharmacy.  Benadryl as needed for itching.  Calamine lotion and/or hydrocortisone cream for symptomatic improvement.  Oatmeal bath recommended.  RTC or go to the ER with new or worsening symptoms.

## 2023-06-25 ENCOUNTER — TELEPHONE (OUTPATIENT)
Dept: URGENT CARE | Facility: CLINIC | Age: 68
End: 2023-06-25
Payer: MEDICARE

## 2023-07-06 ENCOUNTER — OFFICE VISIT (OUTPATIENT)
Dept: DERMATOLOGY | Facility: CLINIC | Age: 68
End: 2023-07-06
Payer: MEDICARE

## 2023-07-06 DIAGNOSIS — Z01.82 ENCOUNTER FOR ALLERGY TESTING: ICD-10-CM

## 2023-07-06 DIAGNOSIS — L21.9 SEBORRHEIC DERMATITIS: ICD-10-CM

## 2023-07-06 DIAGNOSIS — L98.9 DISEASE OF SKIN AND SUBCUTANEOUS TISSUE: Primary | ICD-10-CM

## 2023-07-06 DIAGNOSIS — L30.9 ECZEMA, UNSPECIFIED TYPE: ICD-10-CM

## 2023-07-06 PROCEDURE — 99203 OFFICE O/P NEW LOW 30 MIN: CPT | Mod: S$PBB,,, | Performed by: DERMATOLOGY

## 2023-07-06 PROCEDURE — 99203 PR OFFICE/OUTPT VISIT, NEW, LEVL III, 30-44 MIN: ICD-10-PCS | Mod: S$PBB,,, | Performed by: DERMATOLOGY

## 2023-07-06 PROCEDURE — 99213 OFFICE O/P EST LOW 20 MIN: CPT | Mod: PBBFAC,PO | Performed by: DERMATOLOGY

## 2023-07-06 PROCEDURE — 99999 PR PBB SHADOW E&M-EST. PATIENT-LVL III: ICD-10-PCS | Mod: PBBFAC,,, | Performed by: DERMATOLOGY

## 2023-07-06 PROCEDURE — 99999 PR PBB SHADOW E&M-EST. PATIENT-LVL III: CPT | Mod: PBBFAC,,, | Performed by: DERMATOLOGY

## 2023-07-06 RX ORDER — BETAMETHASONE DIPROPIONATE 0.5 MG/G
CREAM TOPICAL 2 TIMES DAILY
Qty: 45 G | Refills: 0 | Status: SHIPPED | OUTPATIENT
Start: 2023-07-06

## 2023-07-06 RX ORDER — CLOBETASOL PROPIONATE 0.46 MG/ML
SOLUTION TOPICAL
Qty: 50 ML | Refills: 3 | Status: SHIPPED | OUTPATIENT
Start: 2023-07-06

## 2023-07-06 RX ORDER — KETOCONAZOLE 20 MG/ML
SHAMPOO, SUSPENSION TOPICAL
Qty: 120 ML | Refills: 5 | Status: SHIPPED | OUTPATIENT
Start: 2023-07-06

## 2023-07-06 NOTE — PROGRESS NOTES
Subjective:      Patient ID:  Rita Dejesus is a 67 y.o. female who presents for   Chief Complaint   Patient presents with    Rash       66 yo F here requesting allergy testing and shots.  She reports that about 20 years ago she had the same type of itchy rash she currently has, and had allergy testing done and given allergy shots to take at home and it cleared it for 20 years.   Then about 2 weeks ago she started breaking out with a similar rash.  She says urgent care though it may be from poison ivy exposure.  It started on the arms in the only exposed area after working outside, then spread up arms, onto her back, trunk, and legs.  Saw urgent care 6/22 and was given medrol dose gladys, benadryl and calamine lotion / OTC hct cream recommended.  Reports it is overall better but still has itchy rash on the legs mostly.  No current tongue/lip swelling/ SOB currently.  Reports she has had coughing/SOB 2/2 malt.    She requests allergy testing and shots again.    Denies h/o HTN, diabetes, glaucoma.    Denies h/o childhood eczema, asthma. + seasonal allergies    Also would like a spot on the back of her head checked, present x years, comes and goes.  Sometimes itchy.  Dad had skin cancer.        Review of Systems   Constitutional:  Negative for fever.   HENT:  Negative for lip swelling and tongue swelling.    Skin:  Positive for itching and rash.     Objective:   Physical Exam   Constitutional: She appears well-developed and well-nourished. No distress.   Neurological: She is alert and oriented to person, place, and time. She is not disoriented.   Psychiatric: She has a normal mood and affect.   Skin:   Areas Examined (abnormalities noted in diagram):   Scalp / Hair Palpated and Inspected  Head / Face Inspection Performed  Neck Inspection Performed  Chest / Axilla Inspection Performed  Abdomen Inspection Performed  Back Inspection Performed  RUE Inspected  LUE Inspection Performed  RLE Inspected  LLE Inspection  Performed  Nails and Digits Inspection Performed               Diagram Legend     Erythematous scaling macule/papule c/w actinic keratosis       Vascular papule c/w angioma      Pigmented verrucoid papule/plaque c/w seborrheic keratosis      Yellow umbilicated papule c/w sebaceous hyperplasia      Irregularly shaped tan macule c/w lentigo     1-2 mm smooth white papules consistent with Milia      Movable subcutaneous cyst with punctum c/w epidermal inclusion cyst      Subcutaneous movable cyst c/w pilar cyst      Firm pink to brown papule c/w dermatofibroma      Pedunculated fleshy papule(s) c/w skin tag(s)      Evenly pigmented macule c/w junctional nevus     Mildly variegated pigmented, slightly irregular-bordered macule c/w mildly atypical nevus      Flesh colored to evenly pigmented papule c/w intradermal nevus       Pink pearly papule/plaque c/w basal cell carcinoma      Erythematous hyperkeratotic cursted plaque c/w SCC      Surgical scar with no sign of skin cancer recurrence      Open and closed comedones      Inflammatory papules and pustules      Verrucoid papule consistent consistent with wart     Erythematous eczematous patches and plaques     Dystrophic onycholytic nail with subungual debris c/w onychomycosis     Umbilicated papule    Erythematous-base heme-crusted tan verrucoid plaque consistent with inflamed seborrheic keratosis     Erythematous Silvery Scaling Plaque c/w Psoriasis     See annotation      Assessment / Plan:        Dermatitis  - potentially ACD 2/2 poison ivy exposure vs other  - discussed patch testing for ACD if rash becomes recurrent or refractory to treatment  - she requests referral to Allergy/Immunology as she reports she needed allergy shots in the past for a similar rash.   - discussed systemic and topical steroid and that ACD from poison ivy sometimes needs a longer course- she would like to try topical steroid for now. She will message me if she wants a longer pred taper.  -      betamethasone dipropionate 0.05 % cream; Apply topically 2 (two) times daily. For up to 2-4 weeks  Dispense: 45 g; Refill: 0    Side effect profile reviewed for topical steroids including atrophy, telangiectasia and striae development with prolonged usage.  Patient acknowledged understanding.    Side effects of prednisone including osteoporosis/osteopenia, hyperglycemia, weight gain, bloating and hypertension reviewed with the patient. The patient acknowledged understanding.      Seborrheic dermatitis- scalp  -discussed etiology and nature of condition, management options  -     clobetasoL (TEMOVATE) 0.05 % external solution; Use on scalp one - two times daily as needed for scaling or itching for up to 4 weeks per course  Dispense: 50 mL; Refill: 3  -     ketoconazole (NIZORAL) 2 % shampoo; Wash hair with medicated shampoo at least 2x/week - let sit on scalp at least 5 minutes prior to rinsing  Dispense: 120 mL; Refill: 5    Side effect profile reviewed for topical steroids including atrophy, telangiectasia and striae development with prolonged usage.  Patient acknowledged understanding.      Encounter for allergy testing  -     Ambulatory referral/consult to Allergy; Future; Expected date: 07/13/2023             Follow up if symptoms worsen or fail to improve.        LOS NUMBER AND COMPLEXITY OF PROBLEMS    COMPLEXITY OF DATA RISK TOTAL TIME (m)   04227  85523 [] 1 self-limited or minor problem [x] Minimal to none [] No treatment recommended or patient to monitor. Reassurance.  15-29  10-19   75988  10951 Low  [] 2 or more self limited or minor problems  [] 1 stable chronic illness  [x] 1 acute, uncomplicated illness or injury Limited (2)  [] Prior external notes from each unique source  [] Review result of each unique test  [] Order each unique test  OR [] Independent historian Low  []  OTC medications   []  Discussed/Decision for minor skin surgery (no risk factors) 30-44  20-29   16235  21272 Moderate  []  1  or more chronic unstable illness (not at goal or progression or exacerbation) or SE of treatment  []  2 or more stable chronic illnesses  []  1 acute illness with systemic symptoms  []  1 acute complicated injury  []  1 undiagnosed new problem with uncertain prognosis Moderate (1/3 below)  []  3 or more data items        *Now includes independent historian  []  Independent interpretation of a test  []  Discuss management/test with another provider Moderate  [x]  Prescription drug mgmt  []  Discussed/Decision for Minor surgery with risk factors  []  Mgmt limited by social determinates 45-59  30-39   21308  44893 High  []  1 or more chronic illness with severe exacerbation, progression or SE of treatment  []  1 acute or chronic illness/injury that poses a threat to life or bodily function Extensive (2/3 below)  []  3 or more data items        *Now includes independent historian.  []  Independent interpretation of a test  []  Discuss management/test with another provider High  []  Major surgery with risk discussed  []  Drug therapy requiring intensive monitoring for toxicity  []  Hospitalization  []  Decision for DNR 60-74  40-54

## 2023-07-25 ENCOUNTER — OFFICE VISIT (OUTPATIENT)
Dept: GASTROENTEROLOGY | Facility: CLINIC | Age: 68
End: 2023-07-25
Payer: MEDICARE

## 2023-07-25 VITALS
BODY MASS INDEX: 24.06 KG/M2 | WEIGHT: 122.56 LBS | DIASTOLIC BLOOD PRESSURE: 66 MMHG | HEART RATE: 73 BPM | HEIGHT: 60 IN | SYSTOLIC BLOOD PRESSURE: 115 MMHG

## 2023-07-25 DIAGNOSIS — K21.9 GASTROESOPHAGEAL REFLUX DISEASE, UNSPECIFIED WHETHER ESOPHAGITIS PRESENT: Primary | ICD-10-CM

## 2023-07-25 PROCEDURE — 99214 PR OFFICE/OUTPT VISIT, EST, LEVL IV, 30-39 MIN: ICD-10-PCS | Mod: S$PBB,,, | Performed by: INTERNAL MEDICINE

## 2023-07-25 PROCEDURE — 99214 OFFICE O/P EST MOD 30 MIN: CPT | Mod: S$PBB,,, | Performed by: INTERNAL MEDICINE

## 2023-07-25 PROCEDURE — 99999 PR PBB SHADOW E&M-EST. PATIENT-LVL IV: ICD-10-PCS | Mod: PBBFAC,,, | Performed by: INTERNAL MEDICINE

## 2023-07-25 PROCEDURE — 99214 OFFICE O/P EST MOD 30 MIN: CPT | Mod: PBBFAC | Performed by: INTERNAL MEDICINE

## 2023-07-25 PROCEDURE — 99999 PR PBB SHADOW E&M-EST. PATIENT-LVL IV: CPT | Mod: PBBFAC,,, | Performed by: INTERNAL MEDICINE

## 2023-07-25 RX ORDER — OMEPRAZOLE 40 MG/1
40 CAPSULE, DELAYED RELEASE ORAL
Qty: 180 CAPSULE | Refills: 0 | Status: SHIPPED | OUTPATIENT
Start: 2023-07-25 | End: 2023-10-08 | Stop reason: SDUPTHER

## 2023-07-25 NOTE — PROGRESS NOTES
Clinic Progress Note:  [facility] Progress Note    Reason for Visit:  The encounter diagnosis was Gastroesophageal reflux disease, unspecified whether esophagitis present.    PCP: Tripp Heredia       HPI:  This is a 67 y.o. female here for follow-up for dysphagia and GERD.      S/p EGD in 5/2022 with me that revealed Normal duodenal bulb and second portion of the duodenum.                          - Multiple gastric polyps (HP polyps)                         - Large hiatal hernia.                          - Z-line regular, 31 cm from the incisors.                          - LA Grade C esophagitis with no bleeding     S/p EGD in 9/2022 with me that revealed                         - Normal duodenal bulb and second portion of the                          duodenum.                          - Multiple gastric polyps. Resected and retrieved.                          - Z-line regular, 30 cm from the incisors.                          - Large hiatal hernia.                          - Widely patent Schatzki ring.                          - Normal esophagus. (Path: Consistent with GERD)        Her GERD symptoms are controlled on PPI PO BID  Her dysphagia symptoms have resolved.   She is followed by Dr Choi for fecal incontinence and is s/p stimulator placement by urology   This is helping with her symptoms.   We were going to discuss weaning PPI today but she is not ready and asking for more time.             Allergies: Reviewed    Home Medications:   Medication List with Changes/Refills   New Medications    OMEPRAZOLE (PRILOSEC) 40 MG CAPSULE    Take 1 capsule (40 mg total) by mouth 2 (two) times daily before meals.   Current Medications    AMOXICILLIN-CLAVULANATE 875-125MG (AUGMENTIN) 875-125 MG PER TABLET    Take 1 tablet by mouth every 12 (twelve) hours.    AZELASTINE (ASTELIN) 137 MCG (0.1 %) NASAL SPRAY    1 spray (137 mcg total) by Nasal route 2 (two) times daily.    BETAMETHASONE DIPROPIONATE  0.05 % CREAM    Apply topically 2 (two) times daily. For up to 2-4 weeks    BRAN/GUM/FIB/SOLANGE/PSYL/KELP/PEC (FIBER 6 ORAL)    Take 1 tablet by mouth once daily. 12 mg collin    CALCIUM CARBONATE 650 MG CALCIUM (1,625 MG) TABLET    Take 1 tablet by mouth once daily.    CLOBETASOL (TEMOVATE) 0.05 % EXTERNAL SOLUTION    Use on scalp one - two times daily as needed for scaling or itching for up to 4 weeks per course    COLLAGEN MISC    by Misc.(Non-Drug; Combo Route) route.    CYANOCOBALAMIN (VITAMIN B-12) 250 MCG TABLET    Take 250 mcg by mouth once daily.    FLUOCINONIDE (LIDEX) 0.05 % EXTERNAL SOLUTION    Apply topically 2 (two) times daily.    FLUTICASONE PROPIONATE (FLONASE) 50 MCG/ACTUATION NASAL SPRAY    1 spray (50 mcg total) by Each Nostril route once daily.    KETOCONAZOLE (NIZORAL) 2 % SHAMPOO    Wash hair with medicated shampoo at least 2x/week - let sit on scalp at least 5 minutes prior to rinsing    LEVOCETIRIZINE (XYZAL) 5 MG TABLET    Take 5 mg by mouth every evening.    METHYLPREDNISOLONE (MEDROL DOSEPACK) 4 MG TABLET    use as directed    MIRTAZAPINE (REMERON) 15 MG TABLET    TAKE 1 TABLET BY MOUTH EVERY EVENING    MULTIVITAMIN CAPSULE    Take 1 capsule by mouth once daily.    OMEPRAZOLE (PRILOSEC) 40 MG CAPSULE    Take 1 capsule (40 mg total) by mouth 2 (two) times a day.    OXYBUTYNIN (DITROPAN-XL) 10 MG 24 HR TABLET    Take 1 tablet (10 mg total) by mouth once daily.    SIMVASTATIN (ZOCOR) 40 MG TABLET    Take 1 tablet (40 mg total) by mouth every evening.    VENLAFAXINE (EFFEXOR-XR) 150 MG CP24    Take 1 capsule (150 mg total) by mouth once daily.    VITAMIN D (VITAMIN D3) 1000 UNITS TAB    Take 185 mg by mouth.         Physical Exam:  Vital Signs:  /66   Pulse 73   Ht 5' (1.524 m)   Wt 55.6 kg (122 lb 9.2 oz)   BMI 23.94 kg/m²   Body mass index is 23.94 kg/m².    Physical Exam  Constitutional:       Appearance: Normal appearance.   HENT:      Head: Normocephalic.   Eyes:       Conjunctiva/sclera: Conjunctivae normal.   Pulmonary:      Effort: Pulmonary effort is normal.   Abdominal:      General: There is no distension.      Palpations: Abdomen is soft.      Tenderness: There is no abdominal tenderness. There is no guarding.   Neurological:      Mental Status: She is alert.        Labs: Pertinent labs reviewed.      Assessment:  Problem List Items Addressed This Visit      Esophagitis   - Resolved                        GERD (gastroesophageal reflux disease)       Current Assessment & Plan         Plan:   -Continue PPI PO BID    -GERD lifestyle modifications   -At next visit, we will reduce PPI to once daily dosing and see if this controls her symptoms (she asked for more time at this visit).  -Refilled PPI today                 Other dysphagia       Current Assessment & Plan         Plan:   -Improved                Gastroesophageal reflux disease, unspecified whether esophagitis present  -     omeprazole (PRILOSEC) 40 MG capsule; Take 1 capsule (40 mg total) by mouth 2 (two) times daily before meals.  Dispense: 180 capsule; Refill: 0                No follow-ups on file.    Order summary:  No orders of the defined types were placed in this encounter.      Thank you so much for allowing me to participate in the care of Rita Crocker MD  Gastroenterology and Hepatology  [facility]

## 2023-07-26 ENCOUNTER — OFFICE VISIT (OUTPATIENT)
Dept: INTERNAL MEDICINE | Facility: CLINIC | Age: 68
End: 2023-07-26
Payer: MEDICARE

## 2023-07-26 VITALS
HEIGHT: 61 IN | HEART RATE: 79 BPM | RESPIRATION RATE: 18 BRPM | BODY MASS INDEX: 23.19 KG/M2 | OXYGEN SATURATION: 100 % | DIASTOLIC BLOOD PRESSURE: 70 MMHG | WEIGHT: 122.81 LBS | SYSTOLIC BLOOD PRESSURE: 110 MMHG

## 2023-07-26 DIAGNOSIS — Z85.41 HISTORY OF CERVICAL CANCER: ICD-10-CM

## 2023-07-26 DIAGNOSIS — Z85.048 HISTORY OF RECTAL OR ANAL CANCER: ICD-10-CM

## 2023-07-26 DIAGNOSIS — Z00.00 ENCOUNTER FOR PREVENTIVE HEALTH EXAMINATION: Primary | ICD-10-CM

## 2023-07-26 DIAGNOSIS — N18.31 CHRONIC KIDNEY DISEASE, STAGE 3A: ICD-10-CM

## 2023-07-26 DIAGNOSIS — K21.9 GASTROESOPHAGEAL REFLUX DISEASE, UNSPECIFIED WHETHER ESOPHAGITIS PRESENT: ICD-10-CM

## 2023-07-26 DIAGNOSIS — F32.4 MAJOR DEPRESSIVE DISORDER IN PARTIAL REMISSION, UNSPECIFIED WHETHER RECURRENT: ICD-10-CM

## 2023-07-26 DIAGNOSIS — K44.9 HIATAL HERNIA: ICD-10-CM

## 2023-07-26 DIAGNOSIS — E78.5 HYPERLIPIDEMIA, UNSPECIFIED HYPERLIPIDEMIA TYPE: ICD-10-CM

## 2023-07-26 PROBLEM — C53.1 MALIGNANT NEOPLASM OF EXOCERVIX: Status: ACTIVE | Noted: 2023-07-26

## 2023-07-26 PROCEDURE — G0439 PPPS, SUBSEQ VISIT: HCPCS | Mod: ,,, | Performed by: NURSE PRACTITIONER

## 2023-07-26 PROCEDURE — G0439 PR MEDICARE ANNUAL WELLNESS SUBSEQUENT VISIT: ICD-10-PCS | Mod: ,,, | Performed by: NURSE PRACTITIONER

## 2023-07-26 PROCEDURE — 99999 PR PBB SHADOW E&M-EST. PATIENT-LVL V: CPT | Mod: PBBFAC,,, | Performed by: NURSE PRACTITIONER

## 2023-07-26 PROCEDURE — 99215 OFFICE O/P EST HI 40 MIN: CPT | Mod: PBBFAC,PO | Performed by: NURSE PRACTITIONER

## 2023-07-26 PROCEDURE — 99999 PR PBB SHADOW E&M-EST. PATIENT-LVL V: ICD-10-PCS | Mod: PBBFAC,,, | Performed by: NURSE PRACTITIONER

## 2023-07-26 NOTE — PATIENT INSTRUCTIONS
Counseling and Referral of Other Preventative  (Italic type indicates deductible and co-insurance are waived)    Patient Name: Rita Dejesus  Today's Date: 7/26/2023    Health Maintenance       Date Due Completion Date    Hepatitis C Screening Never done ---    COVID-19 Vaccine (6 - Pfizer series) 03/08/2023 11/8/2022    Influenza Vaccine (1) 09/01/2023 11/1/2022    Lipid Panel 12/05/2023 12/5/2022    Mammogram 03/10/2024 3/10/2023    DEXA Scan 03/08/2025 3/8/2022    Colorectal Cancer Screening 11/17/2025 11/17/2020    TETANUS VACCINE 01/03/2033 1/3/2023        No orders of the defined types were placed in this encounter.    The following information is provided to all patients.  This information is to help you find resources for any of the problems found today that may be affecting your health:                Living healthy guide: www.[facility]      Understanding Diabetes: www.diabetes.org      Eating healthy: www.cdc.gov/healthyweight      CDC home safety checklist: www.cdc.gov/steadi/patient.html      Agency on Aging: www.goea.[place].[facility]      Alcoholics anonymous (AA): www.aa.org      Physical Activity: www.ariel.nih.gov/wg9vkfz      Tobacco use: www.quitwithusla.org

## 2023-07-26 NOTE — PROGRESS NOTES
"  Rita Dejesus presented for a  Medicare AWV and comprehensive Health Risk Assessment today. The following components were reviewed and updated:    Medical history  Family History  Social history  Allergies and Current Medications  Health Risk Assessment  Health Maintenance  Care Team         ** See Completed Assessments for Annual Wellness Visit within the encounter summary.**         The following assessments were completed:  Living Situation  CAGE  Depression Screening  Timed Get Up and Go  Whisper Test  Cognitive Function Screening    Nutrition Screening  ADL Screening  PAQ Screening        Vitals:    07/26/23 0806   BP: 110/70   Pulse: 79   Resp: 18   SpO2: 100%   Weight: 55.7 kg (122 lb 12.7 oz)   Height: 5' 1" (1.549 m)     Body mass index is 23.2 kg/m².  Physical Exam  Constitutional:       General: She is not in acute distress.     Appearance: Normal appearance. She is well-developed. She is not ill-appearing, toxic-appearing or diaphoretic.      Comments: anxious   HENT:      Head: Normocephalic and atraumatic.      Right Ear: External ear normal.      Left Ear: External ear normal.      Nose: Nose normal.   Eyes:      General: No scleral icterus.        Right eye: No discharge.         Left eye: No discharge.      Conjunctiva/sclera: Conjunctivae normal.   Neck:      Thyroid: No thyromegaly.      Vascular: No carotid bruit.      Trachea: No tracheal deviation.   Cardiovascular:      Rate and Rhythm: Normal rate and regular rhythm.      Pulses: Normal pulses.      Heart sounds: Normal heart sounds. No murmur heard.    No friction rub. No gallop.   Pulmonary:      Effort: Pulmonary effort is normal. No respiratory distress.      Breath sounds: Normal breath sounds. No stridor. No wheezing, rhonchi or rales.   Chest:      Chest wall: No tenderness.   Abdominal:      General: Bowel sounds are normal. There is no distension.      Palpations: Abdomen is soft. There is no mass.      Tenderness: There is no " abdominal tenderness. There is no guarding or rebound.      Hernia: No hernia is present.   Musculoskeletal:         General: No tenderness. Normal range of motion.      Cervical back: Normal range of motion and neck supple. No edema or tenderness. Normal range of motion.   Lymphadenopathy:      Head:      Right side of head: No tonsillar adenopathy.      Left side of head: No tonsillar adenopathy.      Cervical: No cervical adenopathy.      Upper Body:      Right upper body: No supraclavicular adenopathy.      Left upper body: No supraclavicular adenopathy.   Skin:     General: Skin is warm and dry.      Findings: No lesion or rash.   Neurological:      Mental Status: She is alert and oriented to person, place, and time.      Deep Tendon Reflexes: Reflexes are normal and symmetric.   Psychiatric:         Mood and Affect: Mood normal.         Behavior: Behavior normal.             Diagnoses and health risks identified today and associated recommendations/orders:    1. Encounter for preventive health examination  Screenings performed, as noted above.  Personal preventative testing needs reviewed.      2. Major depressive disorder in partial remission, unspecified whether recurrent  Treated with effexor, stable, cont tx    3. Chronic kidney disease, stage 3a  Monitored, stable, last GFR 49.3, follow up with pcp    4. Hyperlipidemia, unspecified hyperlipidemia type  Treated with simvastatin, stable, cont tx    5. History of cervical cancer  Monitored by Dr Salmeron, sacral nerve stimulator in place, states is working well    6. History of rectal or anal cancer  Monitored by Dr Salmeron, sacral nerve stimulator in place, states is working well    7. Gastroesophageal reflux disease, unspecified whether esophagitis present  Treated with omeprazole, stable cont tx    8. Hiatal hernia  Monitored, stable, follow up with pcp    Review for Substance Use Disorders: patient does not use substances per chart    Review for opioid  screening: Patient is not prescribed opioids       Provided Ginger with a 5-10 year written screening schedule and personal prevention plan. Recommendations were developed using the USPSTF age appropriate recommendations. Education, counseling, and referrals were provided as needed. After Visit Summary printed and given to patient which includes a list of additional screenings\tests needed.    No follow-ups on file.    Son Lujan, NP    I offered to discuss advanced care planning, including how to pick a person who would make decisions for you if you were unable to make them for yourself, called a health care power of , and what kind of decisions you might make such as use of life sustaining treatments such as ventilators and tube feeding when faced with a life limiting illness recorded on a living will that they will need to know. (How you want to be cared for as you near the end of your natural life)     X Patient is interested in learning more about how to make advanced directives.  I provided them paperwork and offered to discuss this with them.

## 2023-08-01 ENCOUNTER — PATIENT MESSAGE (OUTPATIENT)
Dept: OPHTHALMOLOGY | Facility: CLINIC | Age: 68
End: 2023-08-01

## 2023-08-01 ENCOUNTER — OFFICE VISIT (OUTPATIENT)
Dept: OPHTHALMOLOGY | Facility: CLINIC | Age: 68
End: 2023-08-01
Payer: MEDICARE

## 2023-08-01 DIAGNOSIS — Z96.1 PSEUDOPHAKIA OF BOTH EYES: Primary | ICD-10-CM

## 2023-08-01 DIAGNOSIS — H52.7 REFRACTION DISORDER: ICD-10-CM

## 2023-08-01 PROCEDURE — 92015 DETERMINE REFRACTIVE STATE: CPT | Mod: ,,, | Performed by: OPTOMETRIST

## 2023-08-01 PROCEDURE — 99999 PR PBB SHADOW E&M-EST. PATIENT-LVL III: CPT | Mod: PBBFAC,,, | Performed by: OPTOMETRIST

## 2023-08-01 PROCEDURE — 92014 COMPRE OPH EXAM EST PT 1/>: CPT | Mod: S$PBB,,, | Performed by: OPTOMETRIST

## 2023-08-01 PROCEDURE — 92014 PR EYE EXAM, EST PATIENT,COMPREHESV: ICD-10-PCS | Mod: S$PBB,,, | Performed by: OPTOMETRIST

## 2023-08-01 PROCEDURE — 92015 PR REFRACTION: ICD-10-PCS | Mod: ,,, | Performed by: OPTOMETRIST

## 2023-08-01 PROCEDURE — 99213 OFFICE O/P EST LOW 20 MIN: CPT | Mod: PBBFAC | Performed by: OPTOMETRIST

## 2023-08-01 PROCEDURE — 99999 PR PBB SHADOW E&M-EST. PATIENT-LVL III: ICD-10-PCS | Mod: PBBFAC,,, | Performed by: OPTOMETRIST

## 2023-08-01 NOTE — PROGRESS NOTES
HPI    Last visit with TRF on 01/07/2022.    Patient states decrease with overall vision  Trouble with dry eyes  Using otc drop 2-3 times daily  Wear otc readers  Last edited by Apurva Varghese on 8/1/2023 10:02 AM.            Assessment /Plan     For exam results, see Encounter Report.    Pseudophakia of both eyes    Refraction disorder      Stable IOL OU.    Continue copious AT use.    Dispense Final Rx for glasses or may use OTC glasses.  RTC 1 year  Discussed above and answered questions.

## 2023-08-11 RX ORDER — OXYBUTYNIN CHLORIDE 10 MG/1
10 TABLET, EXTENDED RELEASE ORAL DAILY
Qty: 30 TABLET | Refills: 11 | Status: SHIPPED | OUTPATIENT
Start: 2023-08-11 | End: 2024-08-10

## 2023-08-28 ENCOUNTER — OFFICE VISIT (OUTPATIENT)
Dept: URGENT CARE | Facility: CLINIC | Age: 68
End: 2023-08-28
Payer: MEDICARE

## 2023-08-28 VITALS
HEIGHT: 61 IN | HEART RATE: 85 BPM | RESPIRATION RATE: 20 BRPM | SYSTOLIC BLOOD PRESSURE: 104 MMHG | DIASTOLIC BLOOD PRESSURE: 58 MMHG | BODY MASS INDEX: 23.03 KG/M2 | OXYGEN SATURATION: 97 % | WEIGHT: 122 LBS | TEMPERATURE: 99 F

## 2023-08-28 DIAGNOSIS — U07.1 COVID-19: Primary | ICD-10-CM

## 2023-08-28 DIAGNOSIS — R09.81 NASAL CONGESTION: ICD-10-CM

## 2023-08-28 LAB
CTP QC/QA: YES
SARS-COV-2 AG RESP QL IA.RAPID: POSITIVE

## 2023-08-28 PROCEDURE — 99213 PR OFFICE/OUTPT VISIT, EST, LEVL III, 20-29 MIN: ICD-10-PCS | Mod: S$GLB,,, | Performed by: PHYSICIAN ASSISTANT

## 2023-08-28 PROCEDURE — 99213 OFFICE O/P EST LOW 20 MIN: CPT | Mod: S$GLB,,, | Performed by: PHYSICIAN ASSISTANT

## 2023-08-28 PROCEDURE — 87811 SARS CORONAVIRUS 2 ANTIGEN POCT, MANUAL READ: ICD-10-PCS | Mod: QW,S$GLB,, | Performed by: PHYSICIAN ASSISTANT

## 2023-08-28 PROCEDURE — 87811 SARS-COV-2 COVID19 W/OPTIC: CPT | Mod: QW,S$GLB,, | Performed by: PHYSICIAN ASSISTANT

## 2023-08-28 NOTE — PATIENT INSTRUCTIONS
Your test was POSITIVE for COVID-19 (coronavirus).       Please isolate yourself at home.  You may leave home and/or return to work once the following conditions are met:    If you were not hospitalized and are not moderately to severely immunocompromised:   More than 5 days since symptoms first appeared AND  More than 24 hours fever free without medications AND  Symptoms are improving  Continue to wear a mask around others for 5 additional days.    If you were hospitalized OR are moderately to severely immunocompromised:  More than 20 days since symptoms first appeared  More than 24 hours fever free without medications  Symptoms have improved    If you had no symptoms but tested positive:  More than 5 days since the date of the first positive test (20 days if moderately to severely immunocompromised). If you develop symptoms, then use the guidelines above.  Continue to wear a mask around others for 5 additional days.      Contact Tracing    As one of the next steps, you will receive a call or text from the [place] Department of Health ([facility]) COVID-19 Tracing Team. See the contact information below so you know not to ignore the health departments call. It is important that you contact them back immediately so they can help.      Contact Tracer Number:  [phone]  Caller ID for most carriers: [facility]     What is contact tracing?  Contact tracing is a process that helps identify everyone who has been in close contact with an infected person. Contact tracers let those people know they may have been exposed and guide them on next steps. Confidentiality is important for everyone; no one will be told who may have exposed them to the virus.  Your involvement is important. The more we know about where and how this virus is spreading, the better chance we have at stopping it from spreading further.  What does exposure mean?  Exposure means you have been within 6 feet for more than 15 minutes with a person who  has or had COVID-19.  What kind of questions do the contact tracers ask?  A contact tracer will confirm your basic contact information including name, address, phone number, and next of kin, as well as asking about any symptoms you may have had. Theyll also ask you how you think you may have gotten sick, such as places where you may have been exposed to the virus, and people you were with. Those names will never be shared with anyone outside of that call, and will only be used to help trace and stop the spread of the virus.   I have privacy concerns. How will the state use my information?  Your privacy about your health is important. All calls are completed using call centers that use the appropriate health privacy protection measures (HIPAA compliance), meaning that your patient information is safe. No one will ever ask you any questions related to immigration status. Your health comes first.   Do I have to participate?  You do not have to participate, but we strongly encourage you to. Contact tracing can help us catch and control new outbreaks as theyre developing to keep your friends and family safe.   What if I dont hear from anyone?  If you dont receive a call within 24 hours, you can call the number above right away to inquire about your status. That line is open from 8:00 am - 8:00 p.m., 7 days a week.  Contact tracing saves lives! Together, we have the power to beat this virus and keep our loved ones and neighbors safe.    For more information see CDC link below.      https://www.cdc.gov/coronavirus/2019-ncov/hcp/guidance-prevent-spread.html#precautions        Sources:  [facility], [place] [facility] and [facility]           Sincerely,     Jolie Miles PA-C

## 2023-08-28 NOTE — LETTER
[id number] , [address]  [place] [address]  Phone: [phone]  Fax: [phone]          Return to Work/School    Patient: Rita Dejesus  YOB: 1955   Date: 08/28/2023     To Whom It May Concern:     Rita Dejesus was in contact with/seen in my office on 08/28/2023. COVID-19 is present in our communities across the [facility]. There is limited testing for COVID at this time, so not all patients can be tested. In this situation, your employee meets the following criteria:     Rita Dejesus has met the criteria for COVID-19 testing and has a POSITIVE result. She can return to work once they are asymptomatic for 24 hours without the use of fever reducing medications AND at least five days from the start of symptoms (or from the first positive result if they have no symptoms).      If you have any questions or concerns, or if I can be of further assistance, please do not hesitate to contact me.     Sincerely,    Jolie Miles PA-C

## 2023-08-28 NOTE — PROGRESS NOTES
"Subjective:      Patient ID: Rita Dejesus is a 67 y.o. female.    Vitals:  height is 5' 1" (1.549 m) and weight is 55.3 kg (122 lb). Her temperature is 98.7 °F (37.1 °C). Her blood pressure is 104/58 (abnormal) and her pulse is 85. Her respiration is 20 and oxygen saturation is 97%.     Chief Complaint: Nasal Congestion    Patient presents with complaint of nasal congestion body aches cough.  Symptoms started yesterday.  She also has a rash on her chest since this am. No new meds or products    URI   This is a new problem. The current episode started yesterday. The problem has been unchanged. There has been no fever. Associated symptoms include congestion, coughing, joint pain and rhinorrhea. Pertinent negatives include no abdominal pain, chest pain, diarrhea, dysuria, ear pain, headaches, joint swelling, nausea, neck pain, plugged ear sensation, rash, sinus pain, sneezing, sore throat, swollen glands, vomiting or wheezing. Treatments tried: mucinex. The treatment provided no relief.       HENT:  Positive for congestion. Negative for ear pain, sinus pain and sore throat.    Neck: Negative for neck pain.   Cardiovascular:  Negative for chest pain.   Respiratory:  Positive for cough. Negative for wheezing.    Gastrointestinal:  Negative for abdominal pain, nausea, vomiting and diarrhea.   Genitourinary:  Negative for dysuria.   Skin:  Negative for rash.   Allergic/Immunologic: Negative for sneezing.   Neurological:  Negative for headaches.      Objective:     Physical Exam   Constitutional: She does not appear ill. No distress.   HENT:   Head: Normocephalic.   Ears:   Right Ear: Tympanic membrane and ear canal normal.   Left Ear: Tympanic membrane and ear canal normal.   Nose: Rhinorrhea present. No congestion.   Mouth/Throat: No posterior oropharyngeal erythema.   Eyes: Conjunctivae are normal. Pupils are equal, round, and reactive to light.   Cardiovascular: Normal rate and regular rhythm.   Pulmonary/Chest: " Effort normal and breath sounds normal. No stridor. No respiratory distress. She has no wheezes. She has no rhonchi.   Abdominal: Normal appearance. She exhibits no distension. There is no abdominal tenderness.   Neurological: She is alert.   Nursing note and vitals reviewed.      Assessment:     1. COVID-19    2. Nasal congestion      Here with fatigue, fever and congestion. She teaches high schoolers. She tested positive for covid today. She denies shortness of breath or chest pain. She was instructed to quarantine for the next 5 days. She was instructed to hydrate and return to the clinic if new or worsening symptoms occur.      Plan:       COVID-19    Nasal congestion  -     SARS Coronavirus 2 Antigen, POCT Manual Read

## 2023-08-31 ENCOUNTER — TELEPHONE (OUTPATIENT)
Dept: URGENT CARE | Facility: CLINIC | Age: 68
End: 2023-08-31
Payer: MEDICARE

## 2023-10-23 ENCOUNTER — PATIENT MESSAGE (OUTPATIENT)
Dept: INTERNAL MEDICINE | Facility: CLINIC | Age: 68
End: 2023-10-23
Payer: MEDICARE

## 2023-10-23 DIAGNOSIS — N18.31 CHRONIC KIDNEY DISEASE, STAGE 3A: Primary | ICD-10-CM

## 2023-10-23 DIAGNOSIS — R73.9 HYPERGLYCEMIA: ICD-10-CM

## 2023-10-23 DIAGNOSIS — E78.5 HYPERLIPIDEMIA, UNSPECIFIED HYPERLIPIDEMIA TYPE: ICD-10-CM

## 2023-10-24 ENCOUNTER — TELEPHONE (OUTPATIENT)
Dept: GASTROENTEROLOGY | Facility: CLINIC | Age: 68
End: 2023-10-24
Payer: MEDICARE

## 2023-10-24 ENCOUNTER — PATIENT MESSAGE (OUTPATIENT)
Dept: GASTROENTEROLOGY | Facility: CLINIC | Age: 68
End: 2023-10-24
Payer: MEDICARE

## 2023-10-24 NOTE — TELEPHONE ENCOUNTER
Please advise if it is time for blood work. Patient is requesting for labs to be completed prior to 11/7/23 appt. To be including is A1c

## 2023-10-24 NOTE — TELEPHONE ENCOUNTER
Called pt to reschedule her appt on 10/30/23 with Ms Cerda due to book out. No answer, message left.

## 2023-10-25 ENCOUNTER — PATIENT MESSAGE (OUTPATIENT)
Dept: GASTROENTEROLOGY | Facility: CLINIC | Age: 68
End: 2023-10-25
Payer: MEDICARE

## 2023-10-26 ENCOUNTER — LAB VISIT (OUTPATIENT)
Dept: LAB | Facility: HOSPITAL | Age: 68
End: 2023-10-26
Attending: FAMILY MEDICINE
Payer: MEDICARE

## 2023-10-26 ENCOUNTER — IMMUNIZATION (OUTPATIENT)
Dept: INTERNAL MEDICINE | Facility: CLINIC | Age: 68
End: 2023-10-26
Payer: MEDICARE

## 2023-10-26 DIAGNOSIS — R73.9 HYPERGLYCEMIA: ICD-10-CM

## 2023-10-26 DIAGNOSIS — E78.5 HYPERLIPIDEMIA, UNSPECIFIED HYPERLIPIDEMIA TYPE: ICD-10-CM

## 2023-10-26 DIAGNOSIS — N18.31 CHRONIC KIDNEY DISEASE, STAGE 3A: ICD-10-CM

## 2023-10-26 LAB
ALBUMIN SERPL BCP-MCNC: 3.9 G/DL (ref 3.5–5.2)
ALP SERPL-CCNC: 81 U/L (ref 55–135)
ALT SERPL W/O P-5'-P-CCNC: 11 U/L (ref 10–44)
ANION GAP SERPL CALC-SCNC: 11 MMOL/L (ref 8–16)
AST SERPL-CCNC: 24 U/L (ref 10–40)
BASOPHILS # BLD AUTO: 0.05 K/UL (ref 0–0.2)
BASOPHILS NFR BLD: 0.8 % (ref 0–1.9)
BILIRUB SERPL-MCNC: 0.4 MG/DL (ref 0.1–1)
BUN SERPL-MCNC: 16 MG/DL (ref 8–23)
CALCIUM SERPL-MCNC: 9.7 MG/DL (ref 8.7–10.5)
CHLORIDE SERPL-SCNC: 108 MMOL/L (ref 95–110)
CHOLEST SERPL-MCNC: 139 MG/DL (ref 120–199)
CHOLEST/HDLC SERPL: 2.5 {RATIO} (ref 2–5)
CO2 SERPL-SCNC: 21 MMOL/L (ref 23–29)
CREAT SERPL-MCNC: 1.1 MG/DL (ref 0.5–1.4)
DIFFERENTIAL METHOD: ABNORMAL
EOSINOPHIL # BLD AUTO: 0.3 K/UL (ref 0–0.5)
EOSINOPHIL NFR BLD: 4.2 % (ref 0–8)
ERYTHROCYTE [DISTWIDTH] IN BLOOD BY AUTOMATED COUNT: 14 % (ref 11.5–14.5)
EST. GFR  (NO RACE VARIABLE): 54.7 ML/MIN/1.73 M^2
ESTIMATED AVG GLUCOSE: 103 MG/DL (ref 68–131)
GLUCOSE SERPL-MCNC: 90 MG/DL (ref 70–110)
HBA1C MFR BLD: 5.2 % (ref 4–5.6)
HCT VFR BLD AUTO: 40.1 % (ref 37–48.5)
HDLC SERPL-MCNC: 56 MG/DL (ref 40–75)
HDLC SERPL: 40.3 % (ref 20–50)
HGB BLD-MCNC: 12.8 G/DL (ref 12–16)
IMM GRANULOCYTES # BLD AUTO: 0.02 K/UL (ref 0–0.04)
IMM GRANULOCYTES NFR BLD AUTO: 0.3 % (ref 0–0.5)
LDLC SERPL CALC-MCNC: 62.4 MG/DL (ref 63–159)
LYMPHOCYTES # BLD AUTO: 1.8 K/UL (ref 1–4.8)
LYMPHOCYTES NFR BLD: 29.6 % (ref 18–48)
MCH RBC QN AUTO: 29.2 PG (ref 27–31)
MCHC RBC AUTO-ENTMCNC: 31.9 G/DL (ref 32–36)
MCV RBC AUTO: 92 FL (ref 82–98)
MONOCYTES # BLD AUTO: 0.5 K/UL (ref 0.3–1)
MONOCYTES NFR BLD: 8.6 % (ref 4–15)
NEUTROPHILS # BLD AUTO: 3.4 K/UL (ref 1.8–7.7)
NEUTROPHILS NFR BLD: 56.5 % (ref 38–73)
NONHDLC SERPL-MCNC: 83 MG/DL
NRBC BLD-RTO: 0 /100 WBC
PLATELET # BLD AUTO: 173 K/UL (ref 150–450)
PLATELET BLD QL SMEAR: ABNORMAL
PMV BLD AUTO: 15 FL (ref 9.2–12.9)
POTASSIUM SERPL-SCNC: 4.1 MMOL/L (ref 3.5–5.1)
PROT SERPL-MCNC: 7 G/DL (ref 6–8.4)
RBC # BLD AUTO: 4.38 M/UL (ref 4–5.4)
SODIUM SERPL-SCNC: 140 MMOL/L (ref 136–145)
TRIGL SERPL-MCNC: 103 MG/DL (ref 30–150)
WBC # BLD AUTO: 5.94 K/UL (ref 3.9–12.7)

## 2023-10-26 PROCEDURE — 80061 LIPID PANEL: CPT | Performed by: FAMILY MEDICINE

## 2023-10-26 PROCEDURE — 85025 COMPLETE CBC W/AUTO DIFF WBC: CPT | Performed by: FAMILY MEDICINE

## 2023-10-26 PROCEDURE — 36415 COLL VENOUS BLD VENIPUNCTURE: CPT | Mod: PO | Performed by: FAMILY MEDICINE

## 2023-10-26 PROCEDURE — 99999PBSHW FLU VACCINE - QUADRIVALENT - ADJUVANTED: Mod: PBBFAC,,,

## 2023-10-26 PROCEDURE — 80053 COMPREHEN METABOLIC PANEL: CPT | Performed by: FAMILY MEDICINE

## 2023-10-26 PROCEDURE — G0008 ADMIN INFLUENZA VIRUS VAC: HCPCS | Mod: PBBFAC,PO

## 2023-10-26 PROCEDURE — 99999PBSHW FLU VACCINE - QUADRIVALENT - ADJUVANTED: ICD-10-PCS | Mod: PBBFAC,,,

## 2023-10-26 PROCEDURE — 83036 HEMOGLOBIN GLYCOSYLATED A1C: CPT | Performed by: FAMILY MEDICINE

## 2023-11-03 ENCOUNTER — LAB VISIT (OUTPATIENT)
Dept: LAB | Facility: HOSPITAL | Age: 68
End: 2023-11-03
Attending: FAMILY MEDICINE
Payer: MEDICARE

## 2023-11-03 ENCOUNTER — OFFICE VISIT (OUTPATIENT)
Dept: INTERNAL MEDICINE | Facility: CLINIC | Age: 68
End: 2023-11-03
Payer: MEDICARE

## 2023-11-03 VITALS
SYSTOLIC BLOOD PRESSURE: 106 MMHG | DIASTOLIC BLOOD PRESSURE: 60 MMHG | TEMPERATURE: 98 F | HEIGHT: 63 IN | HEART RATE: 90 BPM | WEIGHT: 124.75 LBS | OXYGEN SATURATION: 100 % | BODY MASS INDEX: 22.11 KG/M2 | RESPIRATION RATE: 20 BRPM

## 2023-11-03 DIAGNOSIS — F32.4 MAJOR DEPRESSIVE DISORDER IN PARTIAL REMISSION, UNSPECIFIED WHETHER RECURRENT: ICD-10-CM

## 2023-11-03 DIAGNOSIS — L73.9 FOLLICULITIS: ICD-10-CM

## 2023-11-03 DIAGNOSIS — N18.31 CHRONIC KIDNEY DISEASE, STAGE 3A: Primary | ICD-10-CM

## 2023-11-03 DIAGNOSIS — E78.5 HYPERLIPIDEMIA, UNSPECIFIED HYPERLIPIDEMIA TYPE: ICD-10-CM

## 2023-11-03 DIAGNOSIS — R53.83 FATIGUE, UNSPECIFIED TYPE: ICD-10-CM

## 2023-11-03 DIAGNOSIS — R73.9 HYPERGLYCEMIA: ICD-10-CM

## 2023-11-03 DIAGNOSIS — L21.9 SEBORRHEIC DERMATITIS: ICD-10-CM

## 2023-11-03 PROBLEM — C53.1 MALIGNANT NEOPLASM OF EXOCERVIX: Status: RESOLVED | Noted: 2023-07-26 | Resolved: 2023-11-03

## 2023-11-03 LAB
T4 FREE SERPL-MCNC: 0.93 NG/DL (ref 0.71–1.51)
TSH SERPL DL<=0.005 MIU/L-ACNC: 3.1 UIU/ML (ref 0.4–4)

## 2023-11-03 PROCEDURE — 99999 PR PBB SHADOW E&M-EST. PATIENT-LVL IV: ICD-10-PCS | Mod: PBBFAC,,, | Performed by: FAMILY MEDICINE

## 2023-11-03 PROCEDURE — 99214 OFFICE O/P EST MOD 30 MIN: CPT | Mod: PBBFAC,PO | Performed by: FAMILY MEDICINE

## 2023-11-03 PROCEDURE — 99999 PR PBB SHADOW E&M-EST. PATIENT-LVL IV: CPT | Mod: PBBFAC,,, | Performed by: FAMILY MEDICINE

## 2023-11-03 PROCEDURE — 84443 ASSAY THYROID STIM HORMONE: CPT | Performed by: FAMILY MEDICINE

## 2023-11-03 PROCEDURE — 99214 OFFICE O/P EST MOD 30 MIN: CPT | Mod: S$PBB,,, | Performed by: FAMILY MEDICINE

## 2023-11-03 PROCEDURE — 84439 ASSAY OF FREE THYROXINE: CPT | Performed by: FAMILY MEDICINE

## 2023-11-03 PROCEDURE — 99214 PR OFFICE/OUTPT VISIT, EST, LEVL IV, 30-39 MIN: ICD-10-PCS | Mod: S$PBB,,, | Performed by: FAMILY MEDICINE

## 2023-11-03 PROCEDURE — 36415 COLL VENOUS BLD VENIPUNCTURE: CPT | Mod: PO | Performed by: FAMILY MEDICINE

## 2023-11-03 RX ORDER — CEFDINIR 300 MG/1
300 CAPSULE ORAL 2 TIMES DAILY
Qty: 20 CAPSULE | Refills: 0 | Status: SHIPPED | OUTPATIENT
Start: 2023-11-03 | End: 2023-11-13

## 2023-11-04 RX ORDER — BUPROPION HYDROCHLORIDE 150 MG/1
150 TABLET ORAL DAILY
Qty: 30 TABLET | Refills: 3 | Status: SHIPPED | OUTPATIENT
Start: 2023-11-04 | End: 2023-12-08 | Stop reason: SDUPTHER

## 2023-11-04 NOTE — PROGRESS NOTES
Subjective:      Patient ID: Rita Dejesus is a 67 y.o. female.    Chief Complaint: Rash (Body ( abdomen, leg and scalp and shoulder ) ) and Hypertension      The patient is here today for routine follow up. Labs drawn earlier discussed today with the patient.  A1c stable, renal function slightly improved. She is concerned about increased MPV - has been elevated for several year, no thrombocytopenia.  Patient reports rash on upper back and scalp - has tried shampoo given by dermatologist which hasn't helped much.   Also has rash on lower left leg - started with what appeared to be bite.  Does report increased fatigue, not wanting to do things    Rash  Associated symptoms include fatigue. Pertinent negatives include no shortness of breath.   Hypertension  Pertinent negatives include no chest pain or shortness of breath.     Review of Systems   Constitutional:  Positive for fatigue. Negative for activity change and appetite change.   Respiratory:  Negative for shortness of breath.    Cardiovascular:  Negative for chest pain.   Skin:  Positive for rash.     Past Medical History:   Diagnosis Date    General anesthetics causing adverse effect in therapeutic use     difficulty breathing when waking    GERD (gastroesophageal reflux disease)     History of cervical cancer     History of rectal or anal cancer           Past Surgical History:   Procedure Laterality Date    ADENOIDECTOMY      carpal tunel Bilateral     CATARACT EXTRACTION Left 05/06/2021    NEAR    CATARACT EXTRACTION W/ INTRAOCULAR LENS IMPLANT Right 05/20/2021    CERVICAL CONIZATION   W/ LASER      COLONOSCOPY N/A 11/17/2020    Procedure: COLONOSCOPY;  Surgeon: Kendy De Leon MD;  Location: [facility];  Service: Endoscopy;  Laterality: N/A;    ESOPHAGOGASTRODUODENOSCOPY N/A 12/22/2020    Procedure: ESOPHAGOGASTRODUODENOSCOPY (EGD);  Surgeon: Goyo Kapadia MD;  Location: [facility];  Service: Endoscopy;  Laterality: N/A;     ESOPHAGOGASTRODUODENOSCOPY N/A 05/09/2022    Procedure: EGD (ESOPHAGOGASTRODUODENOSCOPY);  Surgeon: Misa Crocker MD;  Location: [facility] ENDO;  Service: Gastroenterology;  Laterality: N/A;    ESOPHAGOGASTRODUODENOSCOPY N/A 9/7/2022    Procedure: EGD (ESOPHAGOGASTRODUODENOSCOPY);  Surgeon: Misa Crocker MD;  Location: [facility] ENDO;  Service: Gastroenterology;  Laterality: N/A;    HERNIA REPAIR      IMPLANTATION OF PERMANENT SACRAL NERVE STIMULATOR N/A 1/26/2023    Procedure: INSERTION, NEUROSTIMULATOR, PERMANENT, SACRAL;  Surgeon: Andra Painting MD;  Location: [facility] OR;  Service: Urology;  Laterality: N/A;    INSERTION, NEUROSTIMULATOR, TEMPORARY, SACRAL N/A 1/12/2023    Procedure: INSERTION, NEUROSTIMULATOR, TEMPORARY, SACRAL;  Surgeon: Andra Painting MD;  Location: [facility] OR;  Service: Urology;  Laterality: N/A;  stage 1    TONSILLECTOMY       Family History   Problem Relation Age of Onset    Diabetes Mother         type II    Hyperlipidemia Mother     Heart disease Father     Hypertension Father     Cataracts Father     Hypertension Sister     Diabetes Maternal Grandmother     Diabetes Maternal Grandfather      Social History     Socioeconomic History    Marital status:    Tobacco Use    Smoking status: Never    Smokeless tobacco: Never   Substance and Sexual Activity    Alcohol use: Yes     Comment: Social , hold told prior to sx    Drug use: Never    Sexual activity: Not Currently     Partners: Male     Social Determinants of Health     Financial Resource Strain: Low Risk  (7/26/2023)    Overall Financial Resource Strain (CARDIA)     Difficulty of Paying Living Expenses: Not hard at all   Food Insecurity: No Food Insecurity (7/26/2023)    Hunger Vital Sign     Worried About Running Out of Food in the Last Year: Never true     Ran Out of Food in the Last Year: Never true   Transportation Needs: No Transportation Needs (7/26/2023)    PRAPARE - Transportation     Lack of Transportation (Medical):  "No     Lack of Transportation (Non-Medical): No   Physical Activity: Inactive (7/26/2023)    Exercise Vital Sign     Days of Exercise per Week: 0 days     Minutes of Exercise per Session: 0 min   Stress: No Stress Concern Present (7/26/2023)    [employer] - Occupational Stress Questionnaire     Feeling of Stress : Only a little   Social Connections: Moderately Isolated (7/26/2023)    Social Connection and Isolation Panel [NHANES]     Frequency of Communication with Friends and Family: More than three times a week     Frequency of Social Gatherings with Friends and Family: More than three times a week     Attends Alevism Services: More than 4 times per year     Active Member of Clubs or Organizations: No     Attends Club or Organization Meetings: Never     Marital Status:    Housing Stability: Low Risk  (7/26/2023)    Housing Stability Vital Sign     Unable to Pay for Housing in the Last Year: No     Number of Places Lived in the Last Year: 1     Unstable Housing in the Last Year: No     Review of patient's allergies indicates:   Allergen Reactions    Sulfa (sulfonamide antibiotics) Nausea And Vomiting    Nitrofuran analogues Nausea And Vomiting     Microdantin     Zoloft [sertraline] Rash       Objective:       /60 (BP Location: Left arm, Patient Position: Sitting, BP Method: Medium (Manual))   Pulse 90   Temp 97.6 °F (36.4 °C) (Tympanic)   Resp 20   Ht 5' 2.75" (1.594 m)   Wt 56.6 kg (124 lb 12.5 oz)   SpO2 100%   BMI 22.28 kg/m²   Physical Exam  Vitals reviewed.   Constitutional:       General: She is not in acute distress.     Appearance: Normal appearance. She is well-developed. She is not ill-appearing or diaphoretic.   HENT:      Head: Normocephalic and atraumatic.      Right Ear: Hearing, tympanic membrane, ear canal and external ear normal.      Left Ear: Hearing, tympanic membrane, ear canal and external ear normal.      Nose: Nose normal.      " Mouth/Throat:      Pharynx: Uvula midline. No oropharyngeal exudate.   Eyes:      Conjunctiva/sclera: Conjunctivae normal.      Pupils: Pupils are equal, round, and reactive to light.   Neck:      Thyroid: No thyromegaly.      Trachea: No tracheal deviation.   Cardiovascular:      Rate and Rhythm: Normal rate and regular rhythm.      Heart sounds: Normal heart sounds. No murmur heard.  Pulmonary:      Effort: Pulmonary effort is normal. No respiratory distress.      Breath sounds: Normal breath sounds.   Abdominal:      General: Bowel sounds are normal.      Palpations: Abdomen is soft.      Tenderness: There is no abdominal tenderness. There is no guarding.      Hernia: No hernia is present.   Musculoskeletal:         General: Normal range of motion.      Cervical back: Normal range of motion and neck supple.      Right lower leg: No edema.      Left lower leg: No edema.   Lymphadenopathy:      Cervical: No cervical adenopathy.   Skin:     General: Skin is warm and dry.      Capillary Refill: Capillary refill takes less than 2 seconds.      Comments: Multiple small macules and papules on anterior left lower leg. Few pustules and scabs noted.  Multiple patches on scalp, upper back with scabbing and crusting   Neurological:      General: No focal deficit present.      Mental Status: She is alert and oriented to person, place, and time.   Psychiatric:         Mood and Affect: Mood normal.         Behavior: Behavior normal.         Thought Content: Thought content normal.         Judgment: Judgment normal.       Assessment:     1. Chronic kidney disease, stage 3a    2. Hyperlipidemia, unspecified hyperlipidemia type    3. Hyperglycemia    4. Major depressive disorder in partial remission, unspecified whether recurrent    5. Fatigue, unspecified type    6. Folliculitis    7. Seborrheic dermatitis      Plan:   Chronic kidney disease, stage 3a  -     Comprehensive Metabolic Panel; Future; Expected date: 05/02/2024  -      Hemoglobin A1C; Future; Expected date: 05/02/2024  -     Lipid Panel; Future; Expected date: 05/02/2024  -     TSH; Future; Expected date: 05/02/2024  -     CBC Auto Differential; Future; Expected date: 05/02/2024    Hyperlipidemia, unspecified hyperlipidemia type  -     Comprehensive Metabolic Panel; Future; Expected date: 05/02/2024  -     Hemoglobin A1C; Future; Expected date: 05/02/2024  -     Lipid Panel; Future; Expected date: 05/02/2024  -     TSH; Future; Expected date: 05/02/2024  -     CBC Auto Differential; Future; Expected date: 05/02/2024    Hyperglycemia  -     Comprehensive Metabolic Panel; Future; Expected date: 05/02/2024  -     Hemoglobin A1C; Future; Expected date: 05/02/2024  -     Lipid Panel; Future; Expected date: 05/02/2024  -     TSH; Future; Expected date: 05/02/2024  -     CBC Auto Differential; Future; Expected date: 05/02/2024    Major depressive disorder in partial remission, unspecified whether recurrent  -     TSH; Future; Expected date: 11/03/2023  -     T4, Free; Future; Expected date: 02/03/2024    Fatigue, unspecified type  -     TSH; Future; Expected date: 11/03/2023  -     T4, Free; Future; Expected date: 02/03/2024    Folliculitis    Seborrheic dermatitis    Other orders  -     cefdinir (OMNICEF) 300 MG capsule; Take 1 capsule (300 mg total) by mouth 2 (two) times daily. for 10 days  Dispense: 20 capsule; Refill: 0  -     buPROPion (WELLBUTRIN XL) 150 MG TB24 tablet; Take 1 tablet (150 mg total) by mouth once daily.  Dispense: 30 tablet; Refill: 3    Continue all other current medications.   Will check thyroid levels, consider wellbutrin prescription if levels are normal  Other labs 6 months prior to visit with me.  Medication List with Changes/Refills   New Medications    BUPROPION (WELLBUTRIN XL) 150 MG TB24 TABLET    Take 1 tablet (150 mg total) by mouth once daily.    CEFDINIR (OMNICEF) 300 MG CAPSULE    Take 1 capsule (300 mg total) by mouth 2 (two) times daily. for 10  days   Current Medications    AZELASTINE (ASTELIN) 137 MCG (0.1 %) NASAL SPRAY    1 spray (137 mcg total) by Nasal route 2 (two) times daily.    BETAMETHASONE DIPROPIONATE 0.05 % CREAM    Apply topically 2 (two) times daily. For up to 2-4 weeks    BRAN/GUM/FIB/SOLANGE/PSYL/KELP/PEC (FIBER 6 ORAL)    Take 1 tablet by mouth once daily. 12 mg collin    CALCIUM CARBONATE 650 MG CALCIUM (1,625 MG) TABLET    Take 1 tablet by mouth once daily.    CLOBETASOL (TEMOVATE) 0.05 % EXTERNAL SOLUTION    Use on scalp one - two times daily as needed for scaling or itching for up to 4 weeks per course    COLLAGEN MISC    by Misc.(Non-Drug; Combo Route) route.    CYANOCOBALAMIN (VITAMIN B-12) 250 MCG TABLET    Take 250 mcg by mouth once daily.    FLUTICASONE PROPIONATE (FLONASE) 50 MCG/ACTUATION NASAL SPRAY    1 spray (50 mcg total) by Each Nostril route once daily.    KETOCONAZOLE (NIZORAL) 2 % SHAMPOO    Wash hair with medicated shampoo at least 2x/week - let sit on scalp at least 5 minutes prior to rinsing    LEVOCETIRIZINE (XYZAL) 5 MG TABLET    Take 5 mg by mouth every evening.    MULTIVITAMIN CAPSULE    Take 1 capsule by mouth once daily.    OMEPRAZOLE (PRILOSEC) 40 MG CAPSULE    Take 1 capsule (40 mg total) by mouth 2 (two) times daily before meals.    OXYBUTYNIN (DITROPAN-XL) 10 MG 24 HR TABLET    Take 1 tablet (10 mg total) by mouth once daily.    SIMVASTATIN (ZOCOR) 40 MG TABLET    Take 1 tablet (40 mg total) by mouth every evening.    VENLAFAXINE (EFFEXOR-XR) 150 MG CP24    Take 1 capsule (150 mg total) by mouth once daily.    VITAMIN D (VITAMIN D3) 1000 UNITS TAB    Take 185 mg by mouth.

## 2023-12-04 ENCOUNTER — OFFICE VISIT (OUTPATIENT)
Dept: UROLOGY | Facility: CLINIC | Age: 68
End: 2023-12-04
Payer: MEDICARE

## 2023-12-04 VITALS
SYSTOLIC BLOOD PRESSURE: 130 MMHG | BODY MASS INDEX: 22.14 KG/M2 | HEART RATE: 85 BPM | DIASTOLIC BLOOD PRESSURE: 80 MMHG | WEIGHT: 124 LBS

## 2023-12-04 DIAGNOSIS — R31.0 GROSS HEMATURIA: Primary | ICD-10-CM

## 2023-12-04 DIAGNOSIS — N39.46 MIXED INCONTINENCE: ICD-10-CM

## 2023-12-04 LAB
BILIRUB UR QL STRIP: NEGATIVE
GLUCOSE UR QL STRIP: NEGATIVE
KETONES UR QL STRIP: NEGATIVE
LEUKOCYTE ESTERASE UR QL STRIP: NEGATIVE
PH, POC UA: 6
POC BLOOD, URINE: POSITIVE
POC NITRATES, URINE: NEGATIVE
PROT UR QL STRIP: NEGATIVE
SP GR UR STRIP: 1.01 (ref 1–1.03)
UROBILINOGEN UR STRIP-ACNC: 0.2 (ref 0.1–1.1)

## 2023-12-04 PROCEDURE — 81003 URINALYSIS AUTO W/O SCOPE: CPT | Mod: PBBFAC | Performed by: UROLOGY

## 2023-12-04 PROCEDURE — 99214 PR OFFICE/OUTPT VISIT, EST, LEVL IV, 30-39 MIN: ICD-10-PCS | Mod: S$PBB,,, | Performed by: UROLOGY

## 2023-12-04 PROCEDURE — 99999PBSHW POCT URINALYSIS, DIPSTICK, AUTOMATED, W/O SCOPE: Mod: PBBFAC,,,

## 2023-12-04 PROCEDURE — 99214 OFFICE O/P EST MOD 30 MIN: CPT | Mod: S$PBB,,, | Performed by: UROLOGY

## 2023-12-04 PROCEDURE — 99214 OFFICE O/P EST MOD 30 MIN: CPT | Mod: PBBFAC | Performed by: UROLOGY

## 2023-12-04 PROCEDURE — 87086 URINE CULTURE/COLONY COUNT: CPT | Performed by: UROLOGY

## 2023-12-04 PROCEDURE — 99999 PR PBB SHADOW E&M-EST. PATIENT-LVL IV: CPT | Mod: PBBFAC,,, | Performed by: UROLOGY

## 2023-12-04 PROCEDURE — 99999PBSHW POCT URINALYSIS, DIPSTICK, AUTOMATED, W/O SCOPE: ICD-10-PCS | Mod: PBBFAC,,,

## 2023-12-04 PROCEDURE — 99999 PR PBB SHADOW E&M-EST. PATIENT-LVL IV: ICD-10-PCS | Mod: PBBFAC,,, | Performed by: UROLOGY

## 2023-12-04 NOTE — PROGRESS NOTES
"  Chief Complaint:   F/u interstim    HPI:     12/4/23-she saw gross hematuria a few days ago. Had a little dysuria. Drank a lot of water and it seemed to resolve. She has had gross hematuria a few other times. Sometimes associated with slight dysuria and then it goes away. Cut back on oxybutynin due to mental fogginess. Interstim seems to be doing well overall.     6/5/23-she wears 3 pads per day but doesn't usually wet them. Bladder has been much better with fine tuning of Interstim. No dysuria or recent UTIs. Still with some FI, but better. Nocturia x 0-1. On program 1 at 1.2. No impedance    3/6/23- Pt s/p Interstim a month ago. She has seen significant improvement, though not perfect. Sometimes the interstim works well, but sometimes it doesn't. Still wearing pads. Having UUI 3 times a week. FI is still present, though better. No recent UTIs.     12/12/22- Pt has been seeing pelvic floor PT and has seen improvement, especially with CHICHI. Rectocele is less symptomatic and not splinting. She did see Dr. Choi. She does have FI a couple times a week still and Interstim was recommended.   Has had 2 E. Coli UTIs, 1st treated with fosfomycin, 2nd treated with cipro x 5 days last week. Currently without UTI symptoms, but prior was having urethral pain.     8/8/22- Patient reports that she has fecal urgency and FI and urinary incontinence. Changed a diaper 3 times yesterday. She reports having CHICHI with cough/lifting and also has UUI. Urinary issues 10-12 years. She reports having anal cancer, and during the treatment, noticed rectocele. She had chemo and radiation for the anal cancer. No surgery. She reports having insensate fecal incontinence. Has difficulty discerning between gas and solid. Has never taken any medications for urinary complaints. Taking metamucil does seem to help with GI symptoms.     Per Amanda Leyva NP:   "Patient is a 66-year-old female that is presenting per gyn MD ( WILY Salmeron MD). Patient " "states that she was seen by gyn physician and instructed to see urologist secondary to rectocele and cystocele.  Patient has a history of anal cancer and is concerned that rectocele is secondary to anal cancer treatment.  Patient states that she has not had sexual intercourse in 10 years and is wanting to proceed with sexual intercourse.  Reports that she has had an increase in urge incontinence and is now wearing a pad.  Does not want to consider PT pelvic floor training or medication, wants to have a surgical option. No abd/pelvic pain and no exac/rel factors.  No hematuria."    Allergies:  Sulfa (sulfonamide antibiotics), Nitrofuran analogues, and Zoloft [sertraline]    Medications:  has a current medication list which includes the following prescription(s): azelastine, betamethasone dipropionate, bran/gum/fib/eliecer/psyl/kelp/pec, bupropion, calcium carbonate, clobetasol, collagen, cyanocobalamin, fluticasone propionate, ketoconazole, levocetirizine, multivitamin, omeprazole, oxybutynin, simvastatin, venlafaxine, and vitamin d.    Review of Systems:  General: No fever, chills, fatigability, or weight loss.  Skin: No rashes, itching, or changes in color or texture of skin.  Chest: Denies BONILLA, cyanosis, wheezing, cough, and sputum production.  Abdomen: Appetite fine. No weight loss. Denies diarrhea, abdominal pain, hematemesis, or blood in stool.  Musculoskeletal: No joint stiffness or swelling. Denies back pain.  : As above.  All other review of systems negative.    PMH:   has a past medical history of General anesthetics causing adverse effect in therapeutic use, GERD (gastroesophageal reflux disease), History of cervical cancer, and History of rectal or anal cancer.    PSH:   has a past surgical history that includes Tonsillectomy; Adenoidectomy; Hernia repair; carpal tunel (Bilateral); Colonoscopy (N/A, 11/17/2020); Esophagogastroduodenoscopy (N/A, 12/22/2020); Cataract extraction w/ intraocular lens implant " (Right, 05/20/2021); Cataract extraction (Left, 05/06/2021); Esophagogastroduodenoscopy (N/A, 05/09/2022); Cervical conization w/ laser; Esophagogastroduodenoscopy (N/A, 9/7/2022); insertion, neurostimulator, temporary, sacral (N/A, 1/12/2023); and Implantation of permanent sacral nerve stimulator (N/A, 1/26/2023).    FamHx: family history includes Cataracts in her father; Diabetes in her maternal grandfather, maternal grandmother, and mother; Heart disease in her father; Hyperlipidemia in her mother; Hypertension in her father and sister.    SocHx:  reports that she has never smoked. She has never used smokeless tobacco. She reports current alcohol use. She reports that she does not use drugs.      Physical Exam:    Vitals:    12/04/23 1218   BP: 130/80   Pulse: 85       General: A&Ox3, no apparent distress, no deformities  Neck: No masses, normal thyroid  Lungs: normal inspiration, no use of accessory muscles  Heart: normal pulse, no arrhythmias  Abdomen: Soft, NT, ND, no masses, no hernias, no hepatosplenomegaly  Lymphatic: Neck and groin nodes negative  Skin: The skin is warm and dry. No jaundice.  Ext: No c/c/e.  :  8/8/22-Normal female external genitalia, orthotopic urethral meatus, atrophic vaginal mucosa, Grade 2 cystocele, grade 2 rectocele        Labs/Studies:   PVR= 6 ml  6/15/22  Urinalysis: trace blood, otherwise negative      Gross hematuria  -     Urine culture  -     BUN; Future; Expected date: 12/04/2023  -     CT Urogram Abd Pelvis W WO; Future; Expected date: 12/04/2023  -     Creatinine, Serum; Future; Expected date: 12/04/2023    Mixed incontinence      Okay to do oxybutynin 10mg QOD. Also discussed switching to 5mg, but she would like to stick with the same dose for now.     Follow up for Cystoscopy.

## 2023-12-06 LAB — BACTERIA UR CULT: NORMAL

## 2023-12-08 DIAGNOSIS — K21.9 GASTROESOPHAGEAL REFLUX DISEASE, UNSPECIFIED WHETHER ESOPHAGITIS PRESENT: ICD-10-CM

## 2023-12-09 NOTE — TELEPHONE ENCOUNTER
No care due was identified.  [facility] Embedded Care Due Messages. Reference number: [id number].   12/08/2023 9:34:52 PM CST

## 2023-12-11 RX ORDER — BUPROPION HYDROCHLORIDE 150 MG/1
150 TABLET ORAL DAILY
Qty: 30 TABLET | Refills: 3 | Status: SHIPPED | OUTPATIENT
Start: 2023-12-11 | End: 2024-12-10

## 2023-12-11 RX ORDER — VENLAFAXINE HYDROCHLORIDE 150 MG/1
150 CAPSULE, EXTENDED RELEASE ORAL DAILY
Qty: 30 CAPSULE | Refills: 11 | Status: SHIPPED | OUTPATIENT
Start: 2023-12-11 | End: 2024-12-10

## 2023-12-13 RX ORDER — OMEPRAZOLE 40 MG/1
40 CAPSULE, DELAYED RELEASE ORAL
Qty: 180 CAPSULE | Refills: 0 | Status: SHIPPED | OUTPATIENT
Start: 2023-12-13 | End: 2024-03-12

## 2023-12-20 ENCOUNTER — HOSPITAL ENCOUNTER (OUTPATIENT)
Dept: RADIOLOGY | Facility: HOSPITAL | Age: 68
Discharge: HOME | End: 2023-12-20
Attending: UROLOGY
Payer: MEDICARE

## 2023-12-20 DIAGNOSIS — R31.0 GROSS HEMATURIA: ICD-10-CM

## 2023-12-20 PROCEDURE — 74178 CT ABD&PLV WO CNTR FLWD CNTR: CPT | Mod: TC

## 2023-12-20 PROCEDURE — 74178 CT UROGRAM ABD PELVIS W WO: ICD-10-PCS | Mod: 26,,, | Performed by: RADIOLOGY

## 2023-12-20 PROCEDURE — 74178 CT ABD&PLV WO CNTR FLWD CNTR: CPT | Mod: 26,,, | Performed by: RADIOLOGY

## 2023-12-20 PROCEDURE — 25500020 PHARM REV CODE 255: Performed by: UROLOGY

## 2023-12-20 RX ADMIN — IOHEXOL 75 ML: 350 INJECTION, SOLUTION INTRAVENOUS at 01:12

## 2023-12-21 ENCOUNTER — PATIENT MESSAGE (OUTPATIENT)
Dept: UROLOGY | Facility: CLINIC | Age: 68
End: 2023-12-21
Payer: MEDICARE

## 2023-12-21 ENCOUNTER — TELEPHONE (OUTPATIENT)
Dept: UROLOGY | Facility: CLINIC | Age: 68
End: 2023-12-21
Payer: MEDICARE

## 2023-12-21 ENCOUNTER — LAB VISIT (OUTPATIENT)
Dept: LAB | Facility: HOSPITAL | Age: 68
End: 2023-12-21
Attending: UROLOGY
Payer: MEDICARE

## 2023-12-21 DIAGNOSIS — N39.0 RECURRENT UTI: ICD-10-CM

## 2023-12-21 DIAGNOSIS — N39.0 RECURRENT UTI: Primary | ICD-10-CM

## 2023-12-21 PROCEDURE — 87086 URINE CULTURE/COLONY COUNT: CPT | Performed by: UROLOGY

## 2023-12-23 LAB — BACTERIA UR CULT: NORMAL

## 2024-01-12 ENCOUNTER — PROCEDURE VISIT (OUTPATIENT)
Dept: UROLOGY | Facility: CLINIC | Age: 69
End: 2024-01-12
Payer: MEDICARE

## 2024-01-12 VITALS — HEIGHT: 62 IN | BODY MASS INDEX: 22.68 KG/M2 | RESPIRATION RATE: 18 BRPM

## 2024-01-12 DIAGNOSIS — N39.0 RECURRENT UTI: Primary | ICD-10-CM

## 2024-01-12 DIAGNOSIS — R31.0 GROSS HEMATURIA: ICD-10-CM

## 2024-01-12 LAB
BILIRUB UR QL STRIP: NEGATIVE
GLUCOSE UR QL STRIP: NEGATIVE
KETONES UR QL STRIP: NEGATIVE
LEUKOCYTE ESTERASE UR QL STRIP: NEGATIVE
PH, POC UA: 7
POC BLOOD, URINE: NEGATIVE
POC NITRATES, URINE: NEGATIVE
PROT UR QL STRIP: NEGATIVE
SP GR UR STRIP: 1.01 (ref 1–1.03)
UROBILINOGEN UR STRIP-ACNC: 0.2 (ref 0.1–1.1)

## 2024-01-12 PROCEDURE — 99999PBSHW POCT URINALYSIS, DIPSTICK, AUTOMATED, W/O SCOPE: Mod: PBBFAC,,,

## 2024-01-12 PROCEDURE — 88112 CYTOPATH CELL ENHANCE TECH: CPT | Mod: 26,,, | Performed by: PATHOLOGY

## 2024-01-12 PROCEDURE — 88112 CYTOPATH CELL ENHANCE TECH: CPT | Performed by: PATHOLOGY

## 2024-01-12 PROCEDURE — 81003 URINALYSIS AUTO W/O SCOPE: CPT | Mod: PBBFAC,PN | Performed by: UROLOGY

## 2024-01-12 PROCEDURE — 52000 CYSTOURETHROSCOPY: CPT | Mod: S$PBB,,, | Performed by: UROLOGY

## 2024-01-12 PROCEDURE — 52000 CYSTOURETHROSCOPY: CPT | Mod: PBBFAC,PN | Performed by: UROLOGY

## 2024-01-12 RX ORDER — LIDOCAINE HYDROCHLORIDE 20 MG/ML
JELLY TOPICAL
Status: COMPLETED | OUTPATIENT
Start: 2024-01-12 | End: 2024-01-12

## 2024-01-12 RX ORDER — CIPROFLOXACIN 500 MG/1
500 TABLET ORAL
Status: COMPLETED | OUTPATIENT
Start: 2024-01-12 | End: 2024-01-12

## 2024-01-12 RX ADMIN — CIPROFLOXACIN 500 MG: 500 TABLET ORAL at 10:01

## 2024-01-12 RX ADMIN — LIDOCAINE HYDROCHLORIDE 10 ML: 20 JELLY TOPICAL at 10:01

## 2024-01-12 NOTE — PROGRESS NOTES
Patient came in for scheduled cystoscopy, Dr. Painting ordered Ciprofloxacin 500 mg tab to be administered orally. Confirmed patient's allergies, Ciprofloxacin 500 mg tab administered as ordered. Pt tolerated medication administration well. Patient agreed to wait 15 minutes after medication administration in the clinic and to report any adverse reactions.        Theodore Gunn RN

## 2024-01-12 NOTE — PROCEDURES
Chief Complaint:   Gross hematuria    HPI:     1/12/24-here for cysto. CT showed mild bladder wall thickening. Also wall thickening of proximal esophagus. Had cloudy urine a few weeks ago, but culture was negative. Started drinking more water and adding lemon and taking azo and symptoms have resolved.     12/4/23-she saw gross hematuria a few days ago. Had a little dysuria. Drank a lot of water and it seemed to resolve. She has had gross hematuria a few other times. Sometimes associated with slight dysuria and then it goes away. Cut back on oxybutynin due to mental fogginess. Interstim seems to be doing well overall.     6/5/23-she wears 3 pads per day but doesn't usually wet them. Bladder has been much better with fine tuning of Interstim. No dysuria or recent UTIs. Still with some FI, but better. Nocturia x 0-1. On program 1 at 1.2. No impedance    3/6/23- Pt s/p Interstim a month ago. She has seen significant improvement, though not perfect. Sometimes the interstim works well, but sometimes it doesn't. Still wearing pads. Having UUI 3 times a week. FI is still present, though better. No recent UTIs.     12/12/22- Pt has been seeing pelvic floor PT and has seen improvement, especially with CHICHI. Rectocele is less symptomatic and not splinting. She did see Dr. Choi. She does have FI a couple times a week still and Interstim was recommended.   Has had 2 E. Coli UTIs, 1st treated with fosfomycin, 2nd treated with cipro x 5 days last week. Currently without UTI symptoms, but prior was having urethral pain.     8/8/22- Patient reports that she has fecal urgency and FI and urinary incontinence. Changed a diaper 3 times yesterday. She reports having CHICHI with cough/lifting and also has UUI. Urinary issues 10-12 years. She reports having anal cancer, and during the treatment, noticed rectocele. She had chemo and radiation for the anal cancer. No surgery. She reports having insensate fecal incontinence. Has difficulty  "discerning between gas and solid. Has never taken any medications for urinary complaints. Taking metamucil does seem to help with GI symptoms.     Per Amanda Leyva NP:   "Patient is a 66-year-old female that is presenting per gyn MD ( WILY Salmeron MD). Patient states that she was seen by gyn physician and instructed to see urologist secondary to rectocele and cystocele.  Patient has a history of anal cancer and is concerned that rectocele is secondary to anal cancer treatment.  Patient states that she has not had sexual intercourse in 10 years and is wanting to proceed with sexual intercourse.  Reports that she has had an increase in urge incontinence and is now wearing a pad.  Does not want to consider PT pelvic floor training or medication, wants to have a surgical option. No abd/pelvic pain and no exac/rel factors.  No hematuria."    Allergies:  Sulfa (sulfonamide antibiotics), Nitrofuran analogues, and Zoloft [sertraline]    Medications:  has a current medication list which includes the following prescription(s): azelastine, betamethasone dipropionate, bran/gum/fib/eliecer/psyl/kelp/pec, bupropion, calcium carbonate, clobetasol, collagen, cyanocobalamin, fluticasone propionate, ketoconazole, levocetirizine, multivitamin, omeprazole, oxybutynin, simvastatin, venlafaxine, and vitamin d, and the following Facility-Administered Medications: ciprofloxacin hcl and lidocaine hcl 2%.    Review of Systems:  General: No fever, chills, fatigability, or weight loss.  Skin: No rashes, itching, or changes in color or texture of skin.  Chest: Denies BONILLA, cyanosis, wheezing, cough, and sputum production.  Abdomen: Appetite fine. No weight loss. Denies diarrhea, abdominal pain, hematemesis, or blood in stool.  Musculoskeletal: No joint stiffness or swelling. Denies back pain.  : As above.  All other review of systems negative.    PMH:   has a past medical history of General anesthetics causing adverse effect in therapeutic use, " GERD (gastroesophageal reflux disease), History of cervical cancer, and History of rectal or anal cancer.    PSH:   has a past surgical history that includes Tonsillectomy; Adenoidectomy; Hernia repair; carpal tunel (Bilateral); Colonoscopy (N/A, 11/17/2020); Esophagogastroduodenoscopy (N/A, 12/22/2020); Cataract extraction w/ intraocular lens implant (Right, 05/20/2021); Cataract extraction (Left, 05/06/2021); Esophagogastroduodenoscopy (N/A, 05/09/2022); Cervical conization w/ laser; Esophagogastroduodenoscopy (N/A, 9/7/2022); insertion, neurostimulator, temporary, sacral (N/A, 1/12/2023); and Implantation of permanent sacral nerve stimulator (N/A, 1/26/2023).    FamHx: family history includes Cataracts in her father; Diabetes in her maternal grandfather, maternal grandmother, and mother; Heart disease in her father; Hyperlipidemia in her mother; Hypertension in her father and sister.    SocHx:  reports that she has never smoked. She has never used smokeless tobacco. She reports current alcohol use. She reports that she does not use drugs.      Physical Exam:    Vitals:    01/12/24 0825   Resp: 18       General: A&Ox3, no apparent distress, no deformities  Neck: No masses, normal thyroid  Lungs: normal inspiration, no use of accessory muscles  Heart: normal pulse, no arrhythmias  Abdomen: Soft, NT, ND, no masses, no hernias, no hepatosplenomegaly  Lymphatic: Neck and groin nodes negative  Skin: The skin is warm and dry. No jaundice.  Ext: No c/c/e.  :  8/8/22-Normal female external genitalia, orthotopic urethral meatus, atrophic vaginal mucosa, Grade 2 cystocele, grade 2 rectocele        Labs/Studies:   PVR= 6 ml  6/15/22  Urinalysis: trace blood, otherwise negative      Procedure: Cystoscopy      Procedure in detail:   Informed consent was obtained. The patient's genitalia was prepped and draped in the usual sterile fashion. Lidocaine jelly was administered per urethra. I utilized the flexible cystoscope and  advanced it into the urethral meatus. Bladder access was obtained. Systematic review of the bladder was performed, noting no stones, foreign bodies or masses. Bilateral ureteral orifices were visualized and noted to be effluxing clear urine. Retroflexion was utilized to visualize the bladder neck, noting no lesions. The scope was slowly backed out of the urethra, noting no urethral lesions. The patient tolerated the procedure well. Estimated blood loss was 0cc.         Recurrent UTI  -     POCT Urinalysis, Dipstick, Automated, W/O Scope  -     Cytology, Urine    Gross hematuria  -     POCT Urinalysis, Dipstick, Automated, W/O Scope    Other orders  -     ciprofloxacin HCl tablet 500 mg  -     LIDOcaine HCl 2% urojet      Follow up in about 6 months (around 7/12/2024).

## 2024-01-16 LAB
FINAL PATHOLOGIC DIAGNOSIS: NORMAL
Lab: NORMAL

## 2024-01-26 ENCOUNTER — LAB VISIT (OUTPATIENT)
Dept: LAB | Facility: HOSPITAL | Age: 69
End: 2024-01-26
Attending: ALLERGY & IMMUNOLOGY
Payer: MEDICARE

## 2024-01-26 ENCOUNTER — OFFICE VISIT (OUTPATIENT)
Dept: ALLERGY | Facility: CLINIC | Age: 69
End: 2024-01-26
Payer: MEDICARE

## 2024-01-26 VITALS
TEMPERATURE: 98 F | DIASTOLIC BLOOD PRESSURE: 69 MMHG | WEIGHT: 123.88 LBS | HEART RATE: 89 BPM | SYSTOLIC BLOOD PRESSURE: 112 MMHG | BODY MASS INDEX: 22.66 KG/M2

## 2024-01-26 DIAGNOSIS — J31.0 RHINITIS, UNSPECIFIED TYPE: ICD-10-CM

## 2024-01-26 DIAGNOSIS — L50.9 URTICARIA: Primary | ICD-10-CM

## 2024-01-26 DIAGNOSIS — Z01.82 ENCOUNTER FOR ALLERGY TESTING: ICD-10-CM

## 2024-01-26 LAB — IGE SERPL-ACNC: 82 IU/ML (ref 0–100)

## 2024-01-26 PROCEDURE — 99999 PR PBB SHADOW E&M-EST. PATIENT-LVL IV: CPT | Mod: PBBFAC,,, | Performed by: ALLERGY & IMMUNOLOGY

## 2024-01-26 PROCEDURE — 99214 OFFICE O/P EST MOD 30 MIN: CPT | Mod: PBBFAC | Performed by: ALLERGY & IMMUNOLOGY

## 2024-01-26 PROCEDURE — 99204 OFFICE O/P NEW MOD 45 MIN: CPT | Mod: S$PBB,,, | Performed by: ALLERGY & IMMUNOLOGY

## 2024-01-26 PROCEDURE — 82785 ASSAY OF IGE: CPT | Performed by: ALLERGY & IMMUNOLOGY

## 2024-01-26 PROCEDURE — 36415 COLL VENOUS BLD VENIPUNCTURE: CPT | Performed by: ALLERGY & IMMUNOLOGY

## 2024-01-26 PROCEDURE — 86003 ALLG SPEC IGE CRUDE XTRC EA: CPT | Performed by: ALLERGY & IMMUNOLOGY

## 2024-01-26 PROCEDURE — 86003 ALLG SPEC IGE CRUDE XTRC EA: CPT | Mod: 59 | Performed by: ALLERGY & IMMUNOLOGY

## 2024-01-26 NOTE — PROGRESS NOTES
Subjective:      Patient ID: Rita Dejesus is a 68 y.o. female.    Referred by Dr. Makenzie Moreno for an allergy evaluation      Chief Complaint:  Allergies      HPI: 2024: 68 year old female complaining of rash in July and August.  Rashes lasted 2- 3 months  She treated symptoms with benadryl, steroid creams- OTC.  Seen by Dermatology.  Rash-hives, small papules, itchy, burned, red      She reports having the same rash 30 years ago. 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 She was seen by an Allergist and had allergy immunotherapy.- grasses, tree, dog and cat. She received allergy immunotherapy, which alleviated her symptoms.      in March    Xyzal on Monday  5 indoor dogs    Not avoiding any foods  No food allergies/anaphylaxis    Sulfa drug allergy      She denies asthma.  She denies atopic dermatitis.  She denies insect sting allergy.    Malt- tickle in her throat        Past Medical History:   Diagnosis Date    General anesthetics causing adverse effect in therapeutic use     difficulty breathing when waking    GERD (gastroesophageal reflux disease)     History of cervical cancer     History of rectal or anal cancer         Family History   Problem Relation Age of Onset    Diabetes Mother         type II    Hyperlipidemia Mother     Heart disease Father     Hypertension Father     Cataracts Father     Hypertension Sister     Diabetes Maternal Grandmother     Diabetes Maternal Grandfather         Current Outpatient Medications on File Prior to Visit   Medication Sig Dispense Refill    azelastine (ASTELIN) 137 mcg (0.1 %) nasal spray 1 spray (137 mcg total) by Nasal route 2 (two) times daily. 30 mL 1    betamethasone dipropionate 0.05 % cream Apply topically 2 (two) times daily. For up to 2-4 weeks 45 g 0    bran/gum/fib/eliecer/psyl/kelp/pec (FIBER 6 ORAL) Take 1 tablet by mouth once daily. 12 mg collin      buPROPion (WELLBUTRIN XL) 150 MG TB24 tablet  Take 1 tablet (150 mg total) by mouth once daily. 30 tablet 3    calcium carbonate 650 mg calcium (1,625 mg) tablet Take 1 tablet by mouth once daily.      clobetasoL (TEMOVATE) 0.05 % external solution Use on scalp one - two times daily as needed for scaling or itching for up to 4 weeks per course 50 mL 3    COLLAGEN MISC by Misc.(Non-Drug; Combo Route) route.      cyanocobalamin (VITAMIN B-12) 250 MCG tablet Take 250 mcg by mouth once daily.      fluticasone propionate (FLONASE) 50 mcg/actuation nasal spray 1 spray (50 mcg total) by Each Nostril route once daily. 48 g 2    ketoconazole (NIZORAL) 2 % shampoo Wash hair with medicated shampoo at least 2x/week - let sit on scalp at least 5 minutes prior to rinsing 120 mL 5    levocetirizine (XYZAL) 5 MG tablet Take 5 mg by mouth every evening.      multivitamin capsule Take 1 capsule by mouth once daily.      omeprazole (PRILOSEC) 40 MG capsule Take 1 capsule (40 mg total) by mouth 2 (two) times daily before meals. 180 capsule 0    oxybutynin (DITROPAN-XL) 10 MG 24 hr tablet Take 1 tablet (10 mg total) by mouth once daily. 30 tablet 11    simvastatin (ZOCOR) 40 MG tablet Take 1 tablet (40 mg total) by mouth every evening. 90 tablet 3    venlafaxine (EFFEXOR-XR) 150 MG Cp24 Take 1 capsule (150 mg total) by mouth once daily. 30 capsule 11    vitamin D (VITAMIN D3) 1000 units Tab Take 185 mg by mouth.       No current facility-administered medications on file prior to visit.        Review of patient's allergies indicates:   Allergen Reactions    Sulfa (sulfonamide antibiotics) Nausea And Vomiting    Nitrofuran analogues Nausea And Vomiting     Microdantin     Zoloft [sertraline] Rash        Environmental History: Pets in the home: dogs (5).  Tobacco Smoke in Home: no  Review of Systems   Constitutional:  Negative for chills and fever.   HENT:  Positive for postnasal drip. Negative for congestion and rhinorrhea.    Eyes:  Negative for discharge and itching.   Respiratory:   Positive for cough. Negative for shortness of breath and wheezing.    Gastrointestinal:         GERD   Skin:  Positive for rash.   Allergic/Immunologic: Positive for environmental allergies. Negative for food allergies and immunocompromised state.   Neurological:  Negative for facial asymmetry and speech difficulty.   Psychiatric/Behavioral:  Negative for behavioral problems and suicidal ideas.        Objective:   Physical Exam  Vitals reviewed.   Constitutional:       General: She is not in acute distress.     Appearance: Normal appearance. She is well-developed. She is not ill-appearing, toxic-appearing or diaphoretic.   HENT:      Head: Normocephalic and atraumatic.      Right Ear: Tympanic membrane, ear canal and external ear normal. There is no impacted cerumen.      Left Ear: Tympanic membrane, ear canal and external ear normal. There is no impacted cerumen.      Nose: Nose normal. No congestion or rhinorrhea.      Mouth/Throat:      Pharynx: No oropharyngeal exudate or posterior oropharyngeal erythema.   Eyes:      General: No scleral icterus.        Right eye: No discharge.         Left eye: No discharge.      Pupils: Pupils are equal, round, and reactive to light.   Neck:      Thyroid: No thyromegaly.   Cardiovascular:      Rate and Rhythm: Normal rate and regular rhythm.      Heart sounds: Normal heart sounds. No murmur heard.     No friction rub. No gallop.   Pulmonary:      Effort: Pulmonary effort is normal. No respiratory distress.      Breath sounds: Normal breath sounds. No stridor. No wheezing, rhonchi or rales.   Chest:      Chest wall: No tenderness.   Musculoskeletal:         General: No swelling, tenderness, deformity or signs of injury. Normal range of motion.      Cervical back: Normal range of motion and neck supple. No rigidity. No muscular tenderness.      Right lower leg: No edema.      Left lower leg: No edema.   Lymphadenopathy:      Cervical: No cervical adenopathy.   Skin:      General: Skin is warm.      Coloration: Skin is not jaundiced or pale.      Findings: No bruising, erythema or rash.   Neurological:      General: No focal deficit present.      Mental Status: She is alert and oriented to person, place, and time.      Gait: Gait normal.   Psychiatric:         Mood and Affect: Mood normal.         Behavior: Behavior normal.         Thought Content: Thought content normal.         Judgment: Judgment normal.         Unable to skin prick test, as she is taking antihistamines.  Assessment:      1. Urticaria    2. Encounter for allergy testing    3. Rhinitis, unspecified type        Plan:     Urticaria    Encounter for allergy testing  -     Ambulatory referral/consult to Allergy    Rhinitis, unspecified type  -     Allergen, Pecan Tree IgE; Future; Expected date: 01/26/2024  -     Carmichaels, black IgE; Future; Expected date: 01/26/2024  -     [place], bald IgE; Future; Expected date: 01/26/2024  -     Oak, white IgE; Future; Expected date: 01/26/2024  -     Allergen, Cocklebur; Future; Expected date: 01/26/2024  -     Cat epithelium IgE; Future; Expected date: 01/26/2024  -     RAST Allergen for [place]; Future; Expected date: 01/26/2024  -     RAST Allergen Maple ([place]); Future; Expected date: 01/26/2024  -     Allergen, Meadow Grass ([place]); Future; Expected date: 01/26/2024  -     Allergen-Silver Birch; Future; Expected date: 01/26/2024  -     RAST Allergen [place]; Future; Expected date: 01/26/2024  -     RAST Allergen, Sheep Pasatiempo([place]); Future; Expected date: 01/26/2024  -     Allergen, Hackberry Celtis; Future; Expected date: 01/26/2024  -     Allergen, Elm Cedar; Future; Expected date: 01/26/2024  -     Allergen-[place]; Future; Expected date: 01/26/2024  -     Allergen-Alternaria Alternata; Future; Expected date: 01/26/2024  -     Allergen-Maple [place]; Future; Expected date: 01/26/2024  -     Allergen, White [place]; Future; Expected date:  01/26/2024  -     IgE; Future; Expected date: 01/26/2024  -     Bahia grass IgE; Future; Expected date: 01/26/2024  -     Aspergillus fumagatus IgE; Future; Expected date: 01/26/2024  -     Chaetomium globosum IgE; Future; Expected date: 01/26/2024  -     Cockroach, American IgE; Future; Expected date: 01/26/2024  -     Cladosporium IgE; Future; Expected date: 01/26/2024  -     Curvularia lunata IgE; Future; Expected date: 01/26/2024  -     D. farinae IgE; Future; Expected date: 01/26/2024  -     D. pteronyssinus IgE; Future; Expected date: 01/26/2024  -     Dog dander IgE; Future; Expected date: 01/26/2024  -     Plantain, English IgE; Future; Expected date: 01/26/2024  -     Eucalyptus IgE; Future; Expected date: 01/26/2024  -     Wang elder, rough IgE; Future; Expected date: 01/26/2024  -     Mugwort IgE; Future; Expected date: 01/26/2024  -     Nettle IgE; Future; Expected date: 01/26/2024  -     Orchard grass IgE; Future; Expected date: 01/26/2024  -     Josephine, western white IgE; Future; Expected date: 01/26/2024  -     Privet, common IgE; Future; Expected date: 01/26/2024  -     Ragweed, short, common IgE; Future; Expected date: 01/26/2024  -     Red top grass IgE; Future; Expected date: 01/26/2024  -     Rye grass, cultivated IgE; Future; Expected date: 01/26/2024  -     Thistle, Russian IgE; Future; Expected date: 01/26/2024  -     Stemphyllium IgE; Future; Expected date: 01/26/2024  -     Randal IgE; Future; Expected date: 01/26/2024  -     Rahul grass IgE; Future; Expected date: 01/26/2024    Labs ordered, as she is taking antihistamines.  No symptoms currently  Xyzal prn    RTC 3-4 weeks, if she would like skin prick testing, advised her to avoid antihistamines for seven days.     HERNESTO RIVERA                  Problems Address                                                 Amount and/or Complexity                                                                      Risk       3           [] 2 or more  self-limited or minor problems                      [] Limited                                                                        [] Low                  [] 1 stable chronic illness                                                  Any combination of the two                                               OTC drugs                  []Acute, uncomplicated illness or injury                            Review of prior external notes from unique source           Minor surgery with no risk factors                                                                                                               [] 1 []2  []3+                                                                                                              Review of results from each unique test                                                                                                               [] 1 []2  [] 3+                                                                                                              Order of each unique test                                                                                                               [] 1 []2  [] 3+                                                                                                              Or                                                                                                             [] Assessment requiring an independent historian      4            [] One or more chronic illness with exacerbation,              [] Moderate                                                                      [x] Moderate                 Progression, or side effects of treatment                            -test documents or independent historians                        Prescription drug management                [x]  2 or more stable chronic illnesses                                    [] Independent interpretation of tests                              Minor  surgery with identifiable risk                [] 1 undiagnosed new problem with uncertain prognosis    [x] Discussion or management of test results                    elective major surgery                [] 1 acute illness with                systemic symptoms                                                                                                                                                              [] 1 acute complicated injury                                                                                                                                          Elective major surgery                                                                                                                                                                                                                                                                                                                                                                                                  5            [] 1 or more chronic illnesses with severe exacerbation,     [] Extensive(two from below)                                         [] High                                                                                                               [] Independent interpretation of results                         Drug therapy requiring intensive                                                                                                               []Discussion of management or test interpretation           monitoring                                                                                                                                                                                                       Decision to de-escalate care                 [] 1 acute or chronic illness or injury that poses a threat                                                                                               Decision regarding  hospitalization                                                                                                                                                                                                            CC: Dr. Moreno

## 2024-01-29 LAB — AMER SYCAMORE IGE QN: 0.46 KU/L

## 2024-01-30 LAB
A ALTERNATA IGE QN: <0.1 KU/L
A FUMIGATUS IGE QN: <0.1 KU/L
ALLERGEN BOXELDER MAPLE TREE IGE: 0.45 KU/L
ALLERGEN CHAETOMIUM GLOBOSUM IGE: <0.1 KU/L
ALLERGEN MAPLE (BOX ELDER) CLASS: ABNORMAL
ALLERGEN MULBERRY CLASS: ABNORMAL
ALLERGEN MULBERRY TREE IGE: 0.17 KU/L
ALLERGEN WHITE ASH TREE IGE: 0.48 KU/L
ALLERGEN WHITE PINE TREE IGE: 0.18 KU/L
BAHIA GRASS IGE QN: 2.49 KU/L
BALD CYPRESS IGE QN: 0.25 KU/L
C HERBARUM IGE QN: <0.1 KU/L
C LUNATA IGE QN: <0.1 KU/L
CAT DANDER IGE QN: 0.7 KU/L
CHAETOMIUM GLOB. CLASS: NORMAL
COCKLEBUR IGE QN: 0.37 KU/L
COCKSFOOT IGE QN: 2.3 KU/L
COMMON RAGWEED IGE QN: 0.4 KU/L
COTTONWOOD IGE QN: 0.51 KU/L
D FARINAE IGE QN: <0.1 KU/L
D PTERONYSS IGE QN: <0.1 KU/L
DEPRECATED A ALTERNATA IGE RAST QL: NORMAL
DEPRECATED A FUMIGATUS IGE RAST QL: NORMAL
DEPRECATED BAHIA GRASS IGE RAST QL: ABNORMAL
DEPRECATED BALD CYPRESS IGE RAST QL: ABNORMAL
DEPRECATED C HERBARUM IGE RAST QL: NORMAL
DEPRECATED C LUNATA IGE RAST QL: NORMAL
DEPRECATED CAT DANDER IGE RAST QL: ABNORMAL
DEPRECATED COCKLEBUR IGE RAST QL: ABNORMAL
DEPRECATED COCKSFOOT IGE RAST QL: ABNORMAL
DEPRECATED COMMON RAGWEED IGE RAST QL: ABNORMAL
DEPRECATED COTTONWOOD IGE RAST QL: ABNORMAL
DEPRECATED D FARINAE IGE RAST QL: NORMAL
DEPRECATED D PTERONYSS IGE RAST QL: NORMAL
DEPRECATED DOG DANDER IGE RAST QL: ABNORMAL
DEPRECATED ELDER IGE RAST QL: ABNORMAL
DEPRECATED ENGL PLANTAIN IGE RAST QL: ABNORMAL
DEPRECATED GUM-TREE IGE RAST QL: ABNORMAL
DEPRECATED HACKBERRY TREE IGE RAST QL: ABNORMAL
DEPRECATED JOHNSON GRASS IGE RAST QL: ABNORMAL
DEPRECATED KENT BLUE GRASS IGE RAST QL: ABNORMAL
DEPRECATED LONDON PLANE IGE RAST QL: ABNORMAL
DEPRECATED MUGWORT IGE RAST QL: ABNORMAL
DEPRECATED NETTLE IGE RAST QL: ABNORMAL
DEPRECATED PECAN/HICK TREE IGE RAST QL: ABNORMAL
DEPRECATED PER RYE GRASS IGE RAST QL: ABNORMAL
DEPRECATED PRIVET IGE RAST QL: ABNORMAL
DEPRECATED RED TOP GRASS IGE RAST QL: ABNORMAL
DEPRECATED ROACH IGE RAST QL: NORMAL
DEPRECATED SALTWORT IGE RAST QL: ABNORMAL
DEPRECATED SHEEP SORREL IGE RAST QL: ABNORMAL
DEPRECATED SILVER BIRCH IGE RAST QL: ABNORMAL
DEPRECATED TIMOTHY IGE RAST QL: ABNORMAL
DEPRECATED WHITE OAK IGE RAST QL: ABNORMAL
DEPRECATED WILLOW IGE RAST QL: ABNORMAL
DOG DANDER IGE QN: 1.33 KU/L
ELDER IGE QN: 0.3 KU/L
ELM CEDAR CLASS: ABNORMAL
ELM CEDAR, IGE: 0.27 KU/L
ENGL PLANTAIN IGE QN: 0.2 KU/L
GUM-TREE IGE QN: 1.37 KU/L
HACKBERRY TREE IGE QN: 0.55 KU/L
JOHNSON GRASS IGE QN: 1.87 KU/L
KENT BLUE GRASS IGE QN: 3.05 KU/L
LONDON PLANE IGE QN: 0.51 KU/L
MUGWORT IGE QN: 0.18 KU/L
NETTLE IGE QN: 0.41 KU/L
PECAN/HICK TREE IGE QN: 1.11 KU/L
PER RYE GRASS IGE QN: 2.76 KU/L
PRIVET IGE QN: 0.24 KU/L
RED TOP GRASS IGE QN: 2.19 KU/L
ROACH IGE QN: <0.1 KU/L
SALTWORT IGE QN: 0.28 KU/L
SHEEP SORREL IGE QN: 0.2 KU/L
SILVER BIRCH IGE QN: 7.31 KU/L
STEMPHYLIUM HERBARUM CLASS: NORMAL
STEMPHYLLIUM, IGE: <0.1 KU/L
TIMOTHY IGE QN: 2.23 KU/L
WHITE ASH CLASS: ABNORMAL
WHITE OAK IGE QN: 10 KU/L
WHITE PINE CLASS: ABNORMAL
WILLOW IGE QN: 0.43 KU/L

## 2024-02-15 ENCOUNTER — TELEPHONE (OUTPATIENT)
Dept: ALLERGY | Facility: CLINIC | Age: 69
End: 2024-02-15
Payer: MEDICARE

## 2024-02-15 ENCOUNTER — PATIENT MESSAGE (OUTPATIENT)
Dept: ALLERGY | Facility: CLINIC | Age: 69
End: 2024-02-15
Payer: MEDICARE

## 2024-02-15 NOTE — TELEPHONE ENCOUNTER
Patient made aware of 's recommendation from recent lab results.      ----- Message from Harleen Devi MD sent at 2/15/2024  1:04 PM CST -----  Please advise:  Pollen, dog, cat- were positive.   Hepa air filter in your bedroom.  Pollen is the highest early morning. Avoid early morning activities, if possible.  Keep windows up and cars clean during pollen season.  Remove all clothing and shower at the end of the day to remove pollen.

## 2024-02-18 ENCOUNTER — PATIENT MESSAGE (OUTPATIENT)
Dept: URGENT CARE | Facility: CLINIC | Age: 69
End: 2024-02-18
Payer: MEDICARE

## 2024-02-19 ENCOUNTER — OFFICE VISIT (OUTPATIENT)
Dept: ALLERGY | Facility: CLINIC | Age: 69
End: 2024-02-19
Payer: MEDICARE

## 2024-02-19 VITALS
WEIGHT: 122.56 LBS | SYSTOLIC BLOOD PRESSURE: 121 MMHG | HEART RATE: 90 BPM | TEMPERATURE: 97 F | DIASTOLIC BLOOD PRESSURE: 72 MMHG | BODY MASS INDEX: 22.42 KG/M2

## 2024-02-19 DIAGNOSIS — J30.1 SEASONAL ALLERGIC RHINITIS DUE TO POLLEN: ICD-10-CM

## 2024-02-19 DIAGNOSIS — Z88.2 ALLERGY TO SULFA DRUGS: ICD-10-CM

## 2024-02-19 DIAGNOSIS — J30.81 ALLERGIC RHINITIS DUE TO ANIMAL (CAT) (DOG) HAIR AND DANDER: Primary | ICD-10-CM

## 2024-02-19 PROCEDURE — 99214 OFFICE O/P EST MOD 30 MIN: CPT | Mod: S$PBB,,, | Performed by: ALLERGY & IMMUNOLOGY

## 2024-02-19 PROCEDURE — 99214 OFFICE O/P EST MOD 30 MIN: CPT | Mod: PBBFAC | Performed by: ALLERGY & IMMUNOLOGY

## 2024-02-19 PROCEDURE — 99999 PR PBB SHADOW E&M-EST. PATIENT-LVL IV: CPT | Mod: PBBFAC,,, | Performed by: ALLERGY & IMMUNOLOGY

## 2024-02-19 RX ORDER — MONTELUKAST SODIUM 10 MG/1
10 TABLET ORAL NIGHTLY
Qty: 30 TABLET | Refills: 11 | Status: SHIPPED | OUTPATIENT
Start: 2024-02-19 | End: 2024-03-20

## 2024-02-19 RX ORDER — AZELASTINE 1 MG/ML
1 SPRAY, METERED NASAL 2 TIMES DAILY
Qty: 20 ML | Refills: 5 | Status: SHIPPED | OUTPATIENT
Start: 2024-02-19 | End: 2025-02-18

## 2024-02-19 NOTE — PROGRESS NOTES
Subjective:      Patient ID: Rita Dejesus is a 68 y.o. female.        Chief Complaint:  Follow-up (Review labs from 2024)      HPI today: 2024: 68 year old female with a history of allergic rhinitis- dog, cat, pollen here for follow up    Stopped Zyrtec, benadryl and flonase- significant runny nose and cough  Relief with benadryl, zyrtec and flonase    5 dogs at home- indoor  No wheezing or shortness of breath    Allergy Immunotherapy in the past          HPI: 2024: 68 year old female complaining of rash in July and August.  Rashes lasted 2- 3 months  She treated symptoms with benadryl, steroid creams- OTC.  Seen by Dermatology.  Rash-hives, small papules, itchy, burned, red      She reports having the same rash 30 years ago. 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 She was seen by an Allergist and had allergy immunotherapy.- grasses, tree, dog and cat. She received allergy immunotherapy, which alleviated her symptoms.      in March    Xyzal on Monday  5 indoor dogs    Not avoiding any foods  No food allergies/anaphylaxis    Sulfa drug allergy      She denies asthma.  She denies atopic dermatitis.  She denies insect sting allergy.    Malt- tickle in her throat        Past Medical History:   Diagnosis Date    General anesthetics causing adverse effect in therapeutic use     difficulty breathing when waking    GERD (gastroesophageal reflux disease)     History of cervical cancer     History of rectal or anal cancer         Family History   Problem Relation Age of Onset    Diabetes Mother         type II    Hyperlipidemia Mother     Heart disease Father     Hypertension Father     Cataracts Father     Hypertension Sister     Diabetes Maternal Grandmother     Diabetes Maternal Grandfather         Current Outpatient Medications on File Prior to Visit   Medication Sig Dispense Refill    betamethasone dipropionate 0.05 % cream Apply topically 2  (two) times daily. For up to 2-4 weeks 45 g 0    bran/gum/fib/eliecer/psyl/kelp/pec (FIBER 6 ORAL) Take 1 tablet by mouth once daily. 12 mg collin      buPROPion (WELLBUTRIN XL) 150 MG TB24 tablet Take 1 tablet (150 mg total) by mouth once daily. 30 tablet 3    calcium carbonate 650 mg calcium (1,625 mg) tablet Take 1 tablet by mouth once daily.      clobetasoL (TEMOVATE) 0.05 % external solution Use on scalp one - two times daily as needed for scaling or itching for up to 4 weeks per course 50 mL 3    cyanocobalamin (VITAMIN B-12) 250 MCG tablet Take 250 mcg by mouth once daily.      multivitamin capsule Take 1 capsule by mouth once daily.      omeprazole (PRILOSEC) 40 MG capsule Take 1 capsule (40 mg total) by mouth 2 (two) times daily before meals. 180 capsule 0    oxybutynin (DITROPAN-XL) 10 MG 24 hr tablet Take 1 tablet (10 mg total) by mouth once daily. 30 tablet 11    simvastatin (ZOCOR) 40 MG tablet Take 1 tablet (40 mg total) by mouth every evening. 90 tablet 3    venlafaxine (EFFEXOR-XR) 150 MG Cp24 Take 1 capsule (150 mg total) by mouth once daily. 30 capsule 11    vitamin D (VITAMIN D3) 1000 units Tab Take 185 mg by mouth.      azelastine (ASTELIN) 137 mcg (0.1 %) nasal spray 1 spray (137 mcg total) by Nasal route 2 (two) times daily. (Patient not taking: Reported on 2/19/2024) 30 mL 1    COLLAGEN MISC by Misc.(Non-Drug; Combo Route) route.      fluticasone propionate (FLONASE) 50 mcg/actuation nasal spray 1 spray (50 mcg total) by Each Nostril route once daily. (Patient not taking: Reported on 2/19/2024) 48 g 2    ketoconazole (NIZORAL) 2 % shampoo Wash hair with medicated shampoo at least 2x/week - let sit on scalp at least 5 minutes prior to rinsing (Patient not taking: Reported on 2/19/2024) 120 mL 5    levocetirizine (XYZAL) 5 MG tablet Take 5 mg by mouth every evening.       No current facility-administered medications on file prior to visit.        Review of patient's allergies indicates:   Allergen  Reactions    Sulfa (sulfonamide antibiotics) Nausea And Vomiting    Nitrofuran analogues Nausea And Vomiting     Microdantin     Zoloft [sertraline] Rash        Environmental History: Pets in the home: dogs (5).  Tobacco Smoke in Home: no  Review of Systems   Constitutional:  Negative for chills and fever.   HENT:  Positive for postnasal drip. Negative for congestion and rhinorrhea.    Eyes:  Negative for discharge and itching.   Respiratory:  Positive for cough. Negative for shortness of breath and wheezing.    Gastrointestinal:         GERD   Skin:  Negative for rash.   Allergic/Immunologic: Positive for environmental allergies. Negative for food allergies and immunocompromised state.   Neurological:  Negative for facial asymmetry and speech difficulty.   Psychiatric/Behavioral:  Negative for behavioral problems and suicidal ideas.        Objective:   Physical Exam  Vitals reviewed.   Constitutional:       General: She is not in acute distress.     Appearance: Normal appearance. She is well-developed. She is not ill-appearing, toxic-appearing or diaphoretic.   HENT:      Head: Normocephalic and atraumatic.      Right Ear: Tympanic membrane, ear canal and external ear normal. There is no impacted cerumen.      Left Ear: Tympanic membrane, ear canal and external ear normal. There is no impacted cerumen.      Nose: Nose normal. No congestion or rhinorrhea.      Mouth/Throat:      Pharynx: No oropharyngeal exudate or posterior oropharyngeal erythema.   Eyes:      General: No scleral icterus.        Right eye: No discharge.         Left eye: No discharge.      Pupils: Pupils are equal, round, and reactive to light.   Neck:      Thyroid: No thyromegaly.   Cardiovascular:      Rate and Rhythm: Normal rate and regular rhythm.      Heart sounds: Normal heart sounds. No murmur heard.     No friction rub. No gallop.   Pulmonary:      Effort: Pulmonary effort is normal. No respiratory distress.      Breath sounds: Normal  breath sounds. No stridor. No wheezing, rhonchi or rales.   Chest:      Chest wall: No tenderness.   Musculoskeletal:         General: No swelling, tenderness, deformity or signs of injury. Normal range of motion.      Cervical back: Normal range of motion and neck supple. No rigidity. No muscular tenderness.      Right lower leg: No edema.      Left lower leg: No edema.   Lymphadenopathy:      Cervical: No cervical adenopathy.   Skin:     General: Skin is warm.      Coloration: Skin is not jaundiced or pale.      Findings: No bruising, erythema or rash.   Neurological:      General: No focal deficit present.      Mental Status: She is alert and oriented to person, place, and time.      Gait: Gait normal.   Psychiatric:         Mood and Affect: Mood normal.         Behavior: Behavior normal.         Thought Content: Thought content normal.         Judgment: Judgment normal.         Unable to skin prick test, as she is taking antihistamines.  Assessment:      1. Allergic rhinitis due to animal (cat) (dog) hair and dander    2. Seasonal allergic rhinitis due to pollen    3. Allergy to sulfa drugs        Plan:     Allergic rhinitis due to animal (cat) (dog) hair and dander    Seasonal allergic rhinitis due to pollen    Allergy to sulfa drugs      Discussed labs    Continue Xyzal and Flonase  Adding Astelin and Singulair  Discussed Black Box warning associated with Singulair.    Discussed sublingual and subcutaneous immunotherapy. She would like to try medical management at this time.      Recommend avoidance of sulf drugs, and other medications that have caused symptoms.    RTC 3-4 months   HERNESTO RIVERA                  Problems Address                                                 Amount and/or Complexity                                                                      Risk       3           [] 2 or more self-limited or minor problems                      [] Limited                                                                         [] Low                  [] 1 stable chronic illness                                                  Any combination of the two                                               OTC drugs                  []Acute, uncomplicated illness or injury                            Review of prior external notes from unique source           Minor surgery with no risk factors                                                                                                               [] 1 []2  []3+                                                                                                              Review of results from each unique test                                                                                                               [] 1 []2  [] 3+                                                                                                              Order of each unique test                                                                                                               [] 1 []2  [] 3+                                                                                                              Or                                                                                                             [] Assessment requiring an independent historian      4            [] One or more chronic illness with exacerbation,              [] Moderate                                                                      [x] Moderate                 Progression, or side effects of treatment                            -test documents or independent historians                        Prescription drug management                [x]  2 or more stable chronic illnesses                                    [] Independent interpretation of tests                              Minor surgery with identifiable risk                [] 1 undiagnosed new problem with uncertain prognosis    [x]  Discussion or management of test results                    elective major surgery                [] 1 acute illness with                systemic symptoms                                                                                                                                                              [] 1 acute complicated injury                                                                                                                                          Elective major surgery                                                                                                                                                                                                                                                                                                                                                                                                  5            [] 1 or more chronic illnesses with severe exacerbation,     [] Extensive(two from below)                                         [] High                                                                                                               [] Independent interpretation of results                         Drug therapy requiring intensive                                                                                                               []Discussion of management or test interpretation           monitoring                                                                                                                                                                                                       Decision to de-escalate care                 [] 1 acute or chronic illness or injury that poses a threat                                                                                               Decision regarding hospitalization

## 2024-03-21 RX ORDER — VENLAFAXINE HYDROCHLORIDE 75 MG/1
CAPSULE, EXTENDED RELEASE ORAL
Qty: 90 CAPSULE | Refills: 3 | OUTPATIENT
Start: 2024-03-21

## 2024-03-21 NOTE — TELEPHONE ENCOUNTER
No care due was identified.  [facility] Embedded Care Due Messages. Reference number: [id number].   3/21/2024 8:15:45 AM CDT

## 2024-03-21 NOTE — TELEPHONE ENCOUNTER
Refill Decision Note   Rita Dejesus  is requesting a refill authorization.  Brief Assessment and Rationale for Refill:  Quick Discontinue     Medication Therapy Plan: Venlafaxine increased to 150 mg on 5/18/23      Comments:     Note composed:3:34 PM 03/21/2024

## 2024-03-26 ENCOUNTER — OFFICE VISIT (OUTPATIENT)
Dept: GASTROENTEROLOGY | Facility: CLINIC | Age: 69
End: 2024-03-26
Payer: MEDICARE

## 2024-03-26 DIAGNOSIS — R13.19 OTHER DYSPHAGIA: ICD-10-CM

## 2024-03-26 DIAGNOSIS — R93.3 ABNORMAL FINDINGS ON DIAGNOSTIC IMAGING OF DIGESTIVE SYSTEM: Primary | ICD-10-CM

## 2024-03-26 PROCEDURE — 99214 OFFICE O/P EST MOD 30 MIN: CPT | Mod: 95,,, | Performed by: INTERNAL MEDICINE

## 2024-03-26 NOTE — PROGRESS NOTES
Clinic Progress Note:  [facility] Progress Note    Reason for Visit:  The primary encounter diagnosis was Abnormal findings on diagnostic imaging of digestive system. A diagnosis of Other dysphagia was also pertinent to this visit.    PCP: Tripp Heredia       20 minutes of total time spent on the encounter, which includes face to face time and non-face to face time preparing to see the patient (eg, review of tests), Obtaining and/or reviewing separately obtained history, Documenting clinical information in the electronic or other health record, Independently interpreting results (not separately reported) and communicating results to the patient/family/caregiver, or Care coordination (not separately reported).   Each patient to whom he or she provides medical services by telemedicine is: (1) informed of the relationship between the physician and patient and the respective role of any other health care provider with respect to management of the patient; and (2) notified that he or she may decline to receive medical services by telemedicine and may withdraw from such care at any time      HPI:  This is a 68 y.o. female here for evaluation of abnormal imaging.   Had a CT urogram on 12/2023.   It revealed a hiatal hernia with wall thickening and fluid filled proximal esophagus. Recommended endoscopy.   She has previously been followed by me for GERD and dysphagia.   EGD with schatzki's ring.   GERD symptoms are controlled on a PPI PO daily.   Since 8/2023, when she had COVID 19, she has been having issues with swallowing her multivitamin             ROS:  As above HPI       Allergies: Reviewed    Home Medications:   Medication List with Changes/Refills   Current Medications    AZELASTINE (ASTELIN) 137 MCG (0.1 %) NASAL SPRAY    1 spray (137 mcg total) by Nasal route 2 (two) times daily.    AZELASTINE (ASTELIN) 137 MCG (0.1 %) NASAL SPRAY    1 spray (137 mcg total) by Nasal route 2 (two) times daily.     BETAMETHASONE DIPROPIONATE 0.05 % CREAM    Apply topically 2 (two) times daily. For up to 2-4 weeks    BRAN/GUM/FIB/SOLANGE/PSYL/KELP/PEC (FIBER 6 ORAL)    Take 1 tablet by mouth once daily. 12 mg collin    BUPROPION (WELLBUTRIN XL) 150 MG TB24 TABLET    Take 1 tablet (150 mg total) by mouth once daily.    CALCIUM CARBONATE 650 MG CALCIUM (1,625 MG) TABLET    Take 1 tablet by mouth once daily.    CLOBETASOL (TEMOVATE) 0.05 % EXTERNAL SOLUTION    Use on scalp one - two times daily as needed for scaling or itching for up to 4 weeks per course    COLLAGEN MISC    by Misc.(Non-Drug; Combo Route) route.    CYANOCOBALAMIN (VITAMIN B-12) 250 MCG TABLET    Take 250 mcg by mouth once daily.    FLUTICASONE PROPIONATE (FLONASE) 50 MCG/ACTUATION NASAL SPRAY    1 spray (50 mcg total) by Each Nostril route once daily.    KETOCONAZOLE (NIZORAL) 2 % SHAMPOO    Wash hair with medicated shampoo at least 2x/week - let sit on scalp at least 5 minutes prior to rinsing    LEVOCETIRIZINE (XYZAL) 5 MG TABLET    Take 5 mg by mouth every evening.    MULTIVITAMIN CAPSULE    Take 1 capsule by mouth once daily.    OMEPRAZOLE (PRILOSEC) 40 MG CAPSULE    Take 1 capsule (40 mg total) by mouth 2 (two) times daily before meals.    OXYBUTYNIN (DITROPAN-XL) 10 MG 24 HR TABLET    Take 1 tablet (10 mg total) by mouth once daily.    SIMVASTATIN (ZOCOR) 40 MG TABLET    Take 1 tablet (40 mg total) by mouth every evening.    VENLAFAXINE (EFFEXOR-XR) 150 MG CP24    Take 1 capsule (150 mg total) by mouth once daily.    VITAMIN D (VITAMIN D3) 1000 UNITS TAB    Take 185 mg by mouth.         Physical Exam:  Vital Signs:  There were no vitals taken for this visit.  There is no height or weight on file to calculate BMI.    Physical Exam  Vitals reviewed: virtual visit.          Labs: Pertinent labs reviewed.        Assessment:  Problem List Items Addressed This Visit          GI    Other dysphagia    Current Assessment & Plan     Plan as below       Abnormal  findings on diagnostic imaging of digestive system - Primary    Current Assessment & Plan     Plan:   -Will order EGD at this time  -Continue PPI           Abnormal findings on diagnostic imaging of digestive system    Other dysphagia         Follow-up after diagnostic evaluation.     Order summary:  No orders of the defined types were placed in this encounter.      Thank you so much for allowing me to participate in the care of Rita Crocker MD  Gastroenterology and Hepatology  [facility]

## 2024-03-27 ENCOUNTER — HOSPITAL ENCOUNTER (OUTPATIENT)
Dept: PREADMISSION TESTING | Facility: HOSPITAL | Age: 69
Discharge: HOME | End: 2024-03-27
Attending: INTERNAL MEDICINE
Payer: MEDICARE

## 2024-03-27 DIAGNOSIS — R93.3 ABNORMAL FINDINGS ON DIAGNOSTIC IMAGING OF DIGESTIVE SYSTEM: Primary | ICD-10-CM

## 2024-04-03 ENCOUNTER — PATIENT MESSAGE (OUTPATIENT)
Dept: OPHTHALMOLOGY | Facility: CLINIC | Age: 69
End: 2024-04-03

## 2024-04-03 ENCOUNTER — PATIENT MESSAGE (OUTPATIENT)
Dept: GASTROENTEROLOGY | Facility: CLINIC | Age: 69
End: 2024-04-03
Payer: MEDICARE

## 2024-04-03 ENCOUNTER — OFFICE VISIT (OUTPATIENT)
Dept: OPHTHALMOLOGY | Facility: CLINIC | Age: 69
End: 2024-04-03
Payer: MEDICARE

## 2024-04-03 DIAGNOSIS — H52.7 REFRACTION DISORDER: ICD-10-CM

## 2024-04-03 DIAGNOSIS — Z96.1 PSEUDOPHAKIA OF BOTH EYES: ICD-10-CM

## 2024-04-03 DIAGNOSIS — H43.811 PVD (POSTERIOR VITREOUS DETACHMENT), RIGHT: Primary | ICD-10-CM

## 2024-04-03 PROCEDURE — 92014 COMPRE OPH EXAM EST PT 1/>: CPT | Mod: S$PBB,,, | Performed by: OPTOMETRIST

## 2024-04-03 PROCEDURE — 99999 PR PBB SHADOW E&M-EST. PATIENT-LVL III: CPT | Mod: PBBFAC,,, | Performed by: OPTOMETRIST

## 2024-04-03 PROCEDURE — 99213 OFFICE O/P EST LOW 20 MIN: CPT | Mod: PBBFAC | Performed by: OPTOMETRIST

## 2024-04-03 PROCEDURE — 92015 DETERMINE REFRACTIVE STATE: CPT | Mod: ,,, | Performed by: OPTOMETRIST

## 2024-04-03 NOTE — PROGRESS NOTES
SUBJECTIVE  Rita Dejesus is 68 y.o. female  Uncorrected distance visual acuity was 20/25 in the right eye and 20/25 in the left eye. Uncorrected near visual acuity was not recorded in the right eye and J6 in the left eye.   Chief Complaint   Patient presents with    Spots and/or Floaters     Right eye only    Blurred Vision     OU          HPI     Spots and/or Floaters     Additional comments: Right eye only           Blurred Vision     Additional comments: OU           Comments    Patient states 2 weeks after her last visit here, she notice a   spot/floater with the right eye only and some days they are big, other   days small. Blurred vision started about 3-4 months ago but no ocular   pain/discomfort.     1. PCIOL OD 5/20/2021 (SET DVA) / CDE: 8.99/ SN60WF 23.0   2. PCIOL OS 5/6/2021 (Aim for Near)   3. Macula Cyst OD     Systane Ultra PRN OU  Refresh [facility] 3 BID OU          Last edited by Apurva Varghese on 4/3/2024 10:28 AM.         Assessment /Plan :  1. PVD (posterior vitreous detachment), right   Discussed signs and symptoms of retinal detachment.  Informed patient to return to clinic if any worsening of symptoms or decreased vision.     2. Pseudophakia of both eyes   Well-centered, stable IOL OU. Monitor annually.        3. Refraction disorder   Dispense Final Rx for glasses or may use OTC glasses.  RTC 1 year  Discussed above and answered questions.

## 2024-04-07 RX ORDER — BUPROPION HYDROCHLORIDE 150 MG/1
150 TABLET ORAL
Qty: 90 TABLET | Refills: 1 | Status: SHIPPED | OUTPATIENT
Start: 2024-04-07

## 2024-04-07 NOTE — TELEPHONE ENCOUNTER
No care due was identified.  [facility] Embedded Care Due Messages. Reference number: [id number].   4/07/2024 8:03:03 AM CDT

## 2024-04-30 NOTE — TELEPHONE ENCOUNTER
No care due was identified.  [facility] Embedded Care Due Messages. Reference number: [id number].   4/30/2024 8:52:44 AM CDT

## 2024-05-01 RX ORDER — FLUTICASONE PROPIONATE 50 MCG
1 SPRAY, SUSPENSION (ML) NASAL DAILY
Qty: 32 G | Refills: 0 | Status: SHIPPED | OUTPATIENT
Start: 2024-05-01

## 2024-05-01 NOTE — TELEPHONE ENCOUNTER
Refill Routing Note   Medication(s) are not appropriate for processing by [facility] for the following reason(s):        Due for refill >6 months ago    [facility] action(s):  Defer        Medication Therapy Plan: last ordered March 2022      Appointments  past 12m or future 3m with PCP    Date Provider   Last Visit   11/3/2023 Tripp Heredia MD   Next Visit   Visit date not found Tripp Heredia MD   ED visits in past 90 days: 0        Note composed:10:07 AM 05/01/2024

## 2024-05-14 ENCOUNTER — OFFICE VISIT (OUTPATIENT)
Dept: INTERNAL MEDICINE | Facility: CLINIC | Age: 69
End: 2024-05-14
Payer: MEDICARE

## 2024-05-14 ENCOUNTER — HOSPITAL ENCOUNTER (OUTPATIENT)
Dept: RADIOLOGY | Facility: HOSPITAL | Age: 69
Discharge: HOME | End: 2024-05-14
Attending: FAMILY MEDICINE
Payer: MEDICARE

## 2024-05-14 VITALS
OXYGEN SATURATION: 97 % | WEIGHT: 123.25 LBS | TEMPERATURE: 98 F | BODY MASS INDEX: 22.68 KG/M2 | HEART RATE: 86 BPM | DIASTOLIC BLOOD PRESSURE: 62 MMHG | HEIGHT: 62 IN | SYSTOLIC BLOOD PRESSURE: 122 MMHG

## 2024-05-14 DIAGNOSIS — M25.532 LEFT WRIST PAIN: ICD-10-CM

## 2024-05-14 DIAGNOSIS — M25.532 LEFT WRIST PAIN: Primary | ICD-10-CM

## 2024-05-14 DIAGNOSIS — N18.31 CHRONIC KIDNEY DISEASE, STAGE 3A: ICD-10-CM

## 2024-05-14 PROCEDURE — 99214 OFFICE O/P EST MOD 30 MIN: CPT | Mod: S$PBB,,, | Performed by: FAMILY MEDICINE

## 2024-05-14 PROCEDURE — 99999 PR PBB SHADOW E&M-EST. PATIENT-LVL III: CPT | Mod: PBBFAC,,, | Performed by: FAMILY MEDICINE

## 2024-05-14 PROCEDURE — 99213 OFFICE O/P EST LOW 20 MIN: CPT | Mod: PBBFAC,25,PO | Performed by: FAMILY MEDICINE

## 2024-05-14 PROCEDURE — 73110 X-RAY EXAM OF WRIST: CPT | Mod: 26,LT,, | Performed by: RADIOLOGY

## 2024-05-14 PROCEDURE — 73110 X-RAY EXAM OF WRIST: CPT | Mod: TC,PO,LT

## 2024-05-14 RX ORDER — DICLOFENAC SODIUM 30 MG/G
GEL TOPICAL
Qty: 100 G | Refills: 2 | Status: SHIPPED | OUTPATIENT
Start: 2024-05-14

## 2024-05-14 RX ORDER — MONTELUKAST SODIUM 10 MG/1
10 TABLET ORAL NIGHTLY
COMMUNITY

## 2024-05-14 RX ORDER — METHYLPREDNISOLONE 4 MG/1
TABLET ORAL
Qty: 1 EACH | Refills: 0 | Status: SHIPPED | OUTPATIENT
Start: 2024-05-14 | End: 2024-06-03

## 2024-05-14 NOTE — PROGRESS NOTES
Subjective:      Patient ID: Rita Dejesus is a 68 y.o. female.    Chief Complaint: Wrist Pain      Patient reports pain in left wrist at base of thumb for about 3 weeks now.  She is wearing neoprene sleeve which helped somewhat.  No known injury or trauma to the area    Wrist Pain       Review of Systems   Musculoskeletal:  Positive for arthralgias.     Past Medical History:   Diagnosis Date    General anesthetics causing adverse effect in therapeutic use     difficulty breathing when waking    GERD (gastroesophageal reflux disease)     History of cervical cancer     History of rectal or anal cancer           Past Surgical History:   Procedure Laterality Date    ADENOIDECTOMY      carpal tunel Bilateral     CATARACT EXTRACTION Left 05/06/2021    NEAR    CATARACT EXTRACTION W/ INTRAOCULAR LENS IMPLANT Right 05/20/2021    CERVICAL CONIZATION   W/ LASER      COLONOSCOPY N/A 11/17/2020    Procedure: COLONOSCOPY;  Surgeon: Kendy De Leon MD;  Location: [facility];  Service: Endoscopy;  Laterality: N/A;    ESOPHAGOGASTRODUODENOSCOPY N/A 12/22/2020    Procedure: ESOPHAGOGASTRODUODENOSCOPY (EGD);  Surgeon: Goyo Kapadia MD;  Location: [facility];  Service: Endoscopy;  Laterality: N/A;    ESOPHAGOGASTRODUODENOSCOPY N/A 05/09/2022    Procedure: EGD (ESOPHAGOGASTRODUODENOSCOPY);  Surgeon: Misa Crocker MD;  Location: [facility];  Service: Gastroenterology;  Laterality: N/A;    ESOPHAGOGASTRODUODENOSCOPY N/A 9/7/2022    Procedure: EGD (ESOPHAGOGASTRODUODENOSCOPY);  Surgeon: Misa Crocker MD;  Location: [facility];  Service: Gastroenterology;  Laterality: N/A;    HERNIA REPAIR      IMPLANTATION OF PERMANENT SACRAL NERVE STIMULATOR N/A 1/26/2023    Procedure: INSERTION, NEUROSTIMULATOR, PERMANENT, SACRAL;  Surgeon: Andra Painting MD;  Location: [facility];  Service: Urology;  Laterality: N/A;    INSERTION, NEUROSTIMULATOR, TEMPORARY, SACRAL N/A 1/12/2023    Procedure: INSERTION, NEUROSTIMULATOR, TEMPORARY,  SACRAL;  Surgeon: Andra Painting MD;  Location: [facility];  Service: Urology;  Laterality: N/A;  stage 1    TONSILLECTOMY       Family History   Problem Relation Name Age of Onset    Diabetes Mother          type II    Hyperlipidemia Mother      Heart disease Father      Hypertension Father      Cataracts Father      Hypertension Sister      Diabetes Maternal Grandmother      Diabetes Maternal Grandfather       Social History     Socioeconomic History    Marital status:    Tobacco Use    Smoking status: Never    Smokeless tobacco: Never   Substance and Sexual Activity    Alcohol use: Yes     Comment: Social , hold told prior to sx    Drug use: Never    Sexual activity: Not Currently     Partners: Male     Social Determinants of Health     Financial Resource Strain: Low Risk  (7/26/2023)    Overall Financial Resource Strain (CARDIA)     Difficulty of Paying Living Expenses: Not hard at all   Food Insecurity: No Food Insecurity (7/26/2023)    Hunger Vital Sign     Worried About Running Out of Food in the Last Year: Never true     Ran Out of Food in the Last Year: Never true   Transportation Needs: No Transportation Needs (7/26/2023)    PRAPARE - Transportation     Lack of Transportation (Medical): No     Lack of Transportation (Non-Medical): No   Physical Activity: Inactive (7/26/2023)    Exercise Vital Sign     Days of Exercise per Week: 0 days     Minutes of Exercise per Session: 0 min   Stress: No Stress Concern Present (7/26/2023)    [employer] - Occupational Stress Questionnaire     Feeling of Stress : Only a little   Housing Stability: Low Risk  (7/26/2023)    Housing Stability Vital Sign     Unable to Pay for Housing in the Last Year: No     Number of Places Lived in the Last Year: 1     Unstable Housing in the Last Year: No     Review of patient's allergies indicates:   Allergen Reactions    Sulfa (sulfonamide antibiotics) Nausea And Vomiting    Nitrofuran analogues  "Nausea And Vomiting     Microdantin     Zoloft [sertraline] Rash       Objective:       /62 (BP Location: Right arm, Patient Position: Sitting, BP Method: Medium (Manual))   Pulse 86   Temp 98.4 °F (36.9 °C) (Tympanic)   Ht 5' 2" (1.575 m)   Wt 55.9 kg (123 lb 3.8 oz)   SpO2 97%   BMI 22.54 kg/m²   Physical Exam  Musculoskeletal:         General: Tenderness (First MCP, radial wrist) present. No swelling or deformity. Normal range of motion.       Assessment:     1. Left wrist pain    2. Chronic kidney disease, stage 3a      Plan:   Left wrist pain  -     X-Ray Wrist Complete 3 views Left; Future; Expected date: 05/14/2024    Chronic kidney disease, stage 3a    Other orders  -     diclofenac sodium (SOLARAZE) 3 % gel; AAA tid  Dispense: 100 g; Refill: 2  -     methylPREDNISolone (MEDROL DOSEPACK) 4 mg tablet; use as directed  Dispense: 1 each; Refill: 0    X-ray today does show osteoarthritis changes  She has CKD, unable to take p.o. NSAIDs, will try diclofenac gel, steroid pack  Patient let me know if pain worsens or does not resolve  Medication List with Changes/Refills   New Medications    DICLOFENAC SODIUM (SOLARAZE) 3 % GEL    AAA tid    METHYLPREDNISOLONE (MEDROL DOSEPACK) 4 MG TABLET    use as directed   Current Medications    AZELASTINE (ASTELIN) 137 MCG (0.1 %) NASAL SPRAY    1 spray (137 mcg total) by Nasal route 2 (two) times daily.    AZELASTINE (ASTELIN) 137 MCG (0.1 %) NASAL SPRAY    1 spray (137 mcg total) by Nasal route 2 (two) times daily.    BETAMETHASONE DIPROPIONATE 0.05 % CREAM    Apply topically 2 (two) times daily. For up to 2-4 weeks    BRAN/GUM/FIB/SOLANGE/PSYL/KELP/PEC (FIBER 6 ORAL)    Take 1 tablet by mouth once daily. 12 mg collin    BUPROPION (WELLBUTRIN XL) 150 MG TB24 TABLET    TAKE 1 TABLET BY MOUTH ONCE DAILY    CALCIUM CARBONATE 650 MG CALCIUM (1,625 MG) TABLET    Take 1 tablet by mouth once daily.    CLOBETASOL (TEMOVATE) 0.05 % EXTERNAL SOLUTION    Use on scalp one - two " times daily as needed for scaling or itching for up to 4 weeks per course    COLLAGEN MISC    by Misc.(Non-Drug; Combo Route) route.    CYANOCOBALAMIN (VITAMIN B-12) 250 MCG TABLET    Take 250 mcg by mouth once daily.    FLUTICASONE PROPIONATE (FLONASE) 50 MCG/ACTUATION NASAL SPRAY    1 spray (50 mcg total) by Each Nostril route once daily.    KETOCONAZOLE (NIZORAL) 2 % SHAMPOO    Wash hair with medicated shampoo at least 2x/week - let sit on scalp at least 5 minutes prior to rinsing    LEVOCETIRIZINE (XYZAL) 5 MG TABLET    Take 5 mg by mouth every evening.    MONTELUKAST (SINGULAIR) 10 MG TABLET    Take 10 mg by mouth every evening.    MULTIVITAMIN CAPSULE    Take 1 capsule by mouth once daily.    OMEPRAZOLE (PRILOSEC) 40 MG CAPSULE    Take 1 capsule (40 mg total) by mouth 2 (two) times daily before meals.    OXYBUTYNIN (DITROPAN-XL) 10 MG 24 HR TABLET    Take 1 tablet (10 mg total) by mouth once daily.    SIMVASTATIN (ZOCOR) 40 MG TABLET    Take 1 tablet (40 mg total) by mouth every evening.    VENLAFAXINE (EFFEXOR-XR) 150 MG CP24    Take 1 capsule (150 mg total) by mouth once daily.    VITAMIN D (VITAMIN D3) 1000 UNITS TAB    Take 185 mg by mouth.

## 2024-05-15 ENCOUNTER — LAB VISIT (OUTPATIENT)
Dept: LAB | Facility: HOSPITAL | Age: 69
End: 2024-05-15
Attending: FAMILY MEDICINE
Payer: MEDICARE

## 2024-05-15 DIAGNOSIS — R73.9 HYPERGLYCEMIA: ICD-10-CM

## 2024-05-15 DIAGNOSIS — N18.31 CHRONIC KIDNEY DISEASE, STAGE 3A: ICD-10-CM

## 2024-05-15 DIAGNOSIS — E78.5 HYPERLIPIDEMIA, UNSPECIFIED HYPERLIPIDEMIA TYPE: ICD-10-CM

## 2024-05-15 LAB
ALBUMIN SERPL BCP-MCNC: 4.1 G/DL (ref 3.5–5.2)
ALP SERPL-CCNC: 93 U/L (ref 55–135)
ALT SERPL W/O P-5'-P-CCNC: 8 U/L (ref 10–44)
ANION GAP SERPL CALC-SCNC: 10 MMOL/L (ref 8–16)
AST SERPL-CCNC: 21 U/L (ref 10–40)
BASOPHILS # BLD AUTO: 0.05 K/UL (ref 0–0.2)
BASOPHILS NFR BLD: 0.8 % (ref 0–1.9)
BILIRUB SERPL-MCNC: 0.5 MG/DL (ref 0.1–1)
BUN SERPL-MCNC: 17 MG/DL (ref 8–23)
CALCIUM SERPL-MCNC: 10 MG/DL (ref 8.7–10.5)
CHLORIDE SERPL-SCNC: 106 MMOL/L (ref 95–110)
CHOLEST SERPL-MCNC: 158 MG/DL (ref 120–199)
CHOLEST/HDLC SERPL: 2.7 {RATIO} (ref 2–5)
CO2 SERPL-SCNC: 22 MMOL/L (ref 23–29)
CREAT SERPL-MCNC: 1.2 MG/DL (ref 0.5–1.4)
DIFFERENTIAL METHOD BLD: ABNORMAL
EOSINOPHIL # BLD AUTO: 0.1 K/UL (ref 0–0.5)
EOSINOPHIL NFR BLD: 2.2 % (ref 0–8)
ERYTHROCYTE [DISTWIDTH] IN BLOOD BY AUTOMATED COUNT: 14.2 % (ref 11.5–14.5)
EST. GFR  (NO RACE VARIABLE): 49 ML/MIN/1.73 M^2
ESTIMATED AVG GLUCOSE: 105 MG/DL (ref 68–131)
GLUCOSE SERPL-MCNC: 95 MG/DL (ref 70–110)
HBA1C MFR BLD: 5.3 % (ref 4–5.6)
HCT VFR BLD AUTO: 45.2 % (ref 37–48.5)
HDLC SERPL-MCNC: 58 MG/DL (ref 40–75)
HDLC SERPL: 36.7 % (ref 20–50)
HGB BLD-MCNC: 14.4 G/DL (ref 12–16)
IMM GRANULOCYTES # BLD AUTO: 0.02 K/UL (ref 0–0.04)
IMM GRANULOCYTES NFR BLD AUTO: 0.3 % (ref 0–0.5)
LDLC SERPL CALC-MCNC: 80.8 MG/DL (ref 63–159)
LYMPHOCYTES # BLD AUTO: 1.8 K/UL (ref 1–4.8)
LYMPHOCYTES NFR BLD: 29.9 % (ref 18–48)
MCH RBC QN AUTO: 29.3 PG (ref 27–31)
MCHC RBC AUTO-ENTMCNC: 31.9 G/DL (ref 32–36)
MCV RBC AUTO: 92 FL (ref 82–98)
MONOCYTES # BLD AUTO: 0.6 K/UL (ref 0.3–1)
MONOCYTES NFR BLD: 9.7 % (ref 4–15)
NEUTROPHILS # BLD AUTO: 3.4 K/UL (ref 1.8–7.7)
NEUTROPHILS NFR BLD: 57.1 % (ref 38–73)
NONHDLC SERPL-MCNC: 100 MG/DL
NRBC BLD-RTO: 0 /100 WBC
PLATELET # BLD AUTO: 224 K/UL (ref 150–450)
PMV BLD AUTO: 14 FL (ref 9.2–12.9)
POTASSIUM SERPL-SCNC: 4.9 MMOL/L (ref 3.5–5.1)
PROT SERPL-MCNC: 7.1 G/DL (ref 6–8.4)
RBC # BLD AUTO: 4.92 M/UL (ref 4–5.4)
SODIUM SERPL-SCNC: 138 MMOL/L (ref 136–145)
TRIGL SERPL-MCNC: 96 MG/DL (ref 30–150)
TSH SERPL DL<=0.005 MIU/L-ACNC: 3.25 UIU/ML (ref 0.4–4)
WBC # BLD AUTO: 5.89 K/UL (ref 3.9–12.7)

## 2024-05-15 PROCEDURE — 85025 COMPLETE CBC W/AUTO DIFF WBC: CPT | Performed by: FAMILY MEDICINE

## 2024-05-15 PROCEDURE — 80053 COMPREHEN METABOLIC PANEL: CPT | Performed by: FAMILY MEDICINE

## 2024-05-15 PROCEDURE — 84443 ASSAY THYROID STIM HORMONE: CPT | Performed by: FAMILY MEDICINE

## 2024-05-15 PROCEDURE — 36415 COLL VENOUS BLD VENIPUNCTURE: CPT | Mod: PO | Performed by: FAMILY MEDICINE

## 2024-05-15 PROCEDURE — 80061 LIPID PANEL: CPT | Performed by: FAMILY MEDICINE

## 2024-05-15 PROCEDURE — 83036 HEMOGLOBIN GLYCOSYLATED A1C: CPT | Performed by: FAMILY MEDICINE

## 2024-06-03 ENCOUNTER — OFFICE VISIT (OUTPATIENT)
Dept: ALLERGY | Facility: CLINIC | Age: 69
End: 2024-06-03
Payer: MEDICARE

## 2024-06-03 VITALS
DIASTOLIC BLOOD PRESSURE: 63 MMHG | BODY MASS INDEX: 22.5 KG/M2 | WEIGHT: 123 LBS | TEMPERATURE: 98 F | HEART RATE: 77 BPM | SYSTOLIC BLOOD PRESSURE: 101 MMHG

## 2024-06-03 DIAGNOSIS — J30.81 ALLERGIC RHINITIS DUE TO ANIMAL (CAT) (DOG) HAIR AND DANDER: Primary | ICD-10-CM

## 2024-06-03 DIAGNOSIS — J30.1 SEASONAL ALLERGIC RHINITIS DUE TO POLLEN: ICD-10-CM

## 2024-06-03 DIAGNOSIS — Z88.2 ALLERGY TO SULFA DRUGS: ICD-10-CM

## 2024-06-03 PROCEDURE — 99215 OFFICE O/P EST HI 40 MIN: CPT | Mod: S$PBB,,, | Performed by: ALLERGY & IMMUNOLOGY

## 2024-06-03 PROCEDURE — 99214 OFFICE O/P EST MOD 30 MIN: CPT | Mod: PBBFAC | Performed by: ALLERGY & IMMUNOLOGY

## 2024-06-03 PROCEDURE — 99999 PR PBB SHADOW E&M-EST. PATIENT-LVL IV: CPT | Mod: PBBFAC,,, | Performed by: ALLERGY & IMMUNOLOGY

## 2024-06-03 RX ORDER — EPINEPHRINE 0.3 MG/.3ML
1 INJECTION SUBCUTANEOUS
Qty: 2 EACH | Refills: 3 | Status: SHIPPED | OUTPATIENT
Start: 2024-06-03

## 2024-06-03 NOTE — PROGRESS NOTES
"Subjective:      Patient ID: Rita Dejesus is a 68 y.o. female.        Chief Complaint:  Follow-up        HPI 6/3/2024: 68 year old female with a history of allergic rhinitis- dog, cat, pollen here for follow up  She reports doing better, but she would like to have allergy immunotherapy.    Xzyal, Flonase, Astelin and Singulair- helping, " I have more energy, then I had before."            HPI 2/19/2024: 68 year old female with a history of allergic rhinitis- dog, cat, pollen here for follow up    Stopped Zyrtec, benadryl and flonase- significant runny nose and cough  Relief with benadryl, zyrtec and flonase    5 dogs at home- indoor  No wheezing or shortness of breath    Allergy Immunotherapy in the past-declined during previous visit.        Not avoiding any foods  No food allergies/anaphylaxis    Sulfa drug allergy      She denies asthma.  She denies atopic dermatitis.  She denies insect sting allergy.    Malt- tickle in her throat        Past Medical History:   Diagnosis Date    General anesthetics causing adverse effect in therapeutic use     difficulty breathing when waking    GERD (gastroesophageal reflux disease)     History of cervical cancer     History of rectal or anal cancer         Family History   Problem Relation Name Age of Onset    Diabetes Mother          type II    Hyperlipidemia Mother      Heart disease Father      Hypertension Father      Cataracts Father      Hypertension Sister      Diabetes Maternal Grandmother      Diabetes Maternal Grandfather          Current Outpatient Medications on File Prior to Visit   Medication Sig Dispense Refill    azelastine (ASTELIN) 137 mcg (0.1 %) nasal spray 1 spray (137 mcg total) by Nasal route 2 (two) times daily. 20 mL 5    buPROPion (WELLBUTRIN XL) 150 MG TB24 tablet TAKE 1 TABLET BY MOUTH ONCE DAILY 90 tablet 1    calcium carbonate 650 mg calcium (1,625 mg) tablet Take 1 tablet by mouth once daily.      clobetasoL (TEMOVATE) 0.05 % external " solution Use on scalp one - two times daily as needed for scaling or itching for up to 4 weeks per course 50 mL 3    COLLAGEN MISC by Misc.(Non-Drug; Combo Route) route.      cyanocobalamin (VITAMIN B-12) 250 MCG tablet Take 250 mcg by mouth once daily.      diclofenac sodium (SOLARAZE) 3 % gel AAA tid 100 g 2    fluticasone propionate (FLONASE) 50 mcg/actuation nasal spray 1 spray (50 mcg total) by Each Nostril route once daily. 32 g 0    levocetirizine (XYZAL) 5 MG tablet Take 5 mg by mouth every evening.      montelukast (SINGULAIR) 10 mg tablet Take 10 mg by mouth every evening.      multivitamin capsule Take 1 capsule by mouth once daily.      oxybutynin (DITROPAN-XL) 10 MG 24 hr tablet Take 1 tablet (10 mg total) by mouth once daily. 30 tablet 11    simvastatin (ZOCOR) 40 MG tablet Take 1 tablet (40 mg total) by mouth every evening. 90 tablet 3    venlafaxine (EFFEXOR-XR) 150 MG Cp24 Take 1 capsule (150 mg total) by mouth once daily. 30 capsule 11    vitamin D (VITAMIN D3) 1000 units Tab Take 185 mg by mouth.      betamethasone dipropionate 0.05 % cream Apply topically 2 (two) times daily. For up to 2-4 weeks (Patient not taking: Reported on 5/14/2024) 45 g 0    bran/gum/fib/eliecer/psyl/kelp/pec (FIBER 6 ORAL) Take 1 tablet by mouth once daily. 12 mg collin (Patient not taking: Reported on 5/14/2024)      ketoconazole (NIZORAL) 2 % shampoo Wash hair with medicated shampoo at least 2x/week - let sit on scalp at least 5 minutes prior to rinsing (Patient not taking: Reported on 2/19/2024) 120 mL 5    omeprazole (PRILOSEC) 40 MG capsule Take 1 capsule (40 mg total) by mouth 2 (two) times daily before meals. 180 capsule 0    [DISCONTINUED] methylPREDNISolone (MEDROL DOSEPACK) 4 mg tablet use as directed 1 each 0     No current facility-administered medications on file prior to visit.        Review of patient's allergies indicates:   Allergen Reactions    Sulfa (sulfonamide antibiotics) Nausea And Vomiting    Nitrofuran  analogues Nausea And Vomiting     Microdantin     Zoloft [sertraline] Rash        Environmental History: Pets in the home: dogs (5).  Tobacco Smoke in Home: no  Review of Systems   Constitutional:  Negative for chills and fever.   HENT:  Positive for postnasal drip. Negative for congestion and rhinorrhea.    Eyes:  Negative for discharge and itching.   Respiratory:  Positive for cough. Negative for shortness of breath and wheezing.    Gastrointestinal:         GERD   Skin:  Negative for rash.   Allergic/Immunologic: Positive for environmental allergies. Negative for food allergies and immunocompromised state.   Neurological:  Negative for facial asymmetry and speech difficulty.   Psychiatric/Behavioral:  Negative for behavioral problems and suicidal ideas.        Objective:   Physical Exam  Vitals reviewed.   Constitutional:       General: She is not in acute distress.     Appearance: Normal appearance. She is well-developed. She is not ill-appearing, toxic-appearing or diaphoretic.   HENT:      Head: Normocephalic and atraumatic.      Right Ear: Tympanic membrane, ear canal and external ear normal. There is no impacted cerumen.      Left Ear: Tympanic membrane, ear canal and external ear normal. There is no impacted cerumen.      Nose: Nose normal. No congestion or rhinorrhea.      Mouth/Throat:      Pharynx: No oropharyngeal exudate or posterior oropharyngeal erythema.   Eyes:      General: No scleral icterus.        Right eye: No discharge.         Left eye: No discharge.      Pupils: Pupils are equal, round, and reactive to light.   Neck:      Thyroid: No thyromegaly.   Cardiovascular:      Rate and Rhythm: Normal rate and regular rhythm.      Heart sounds: Normal heart sounds. No murmur heard.     No friction rub. No gallop.   Pulmonary:      Effort: Pulmonary effort is normal. No respiratory distress.      Breath sounds: Normal breath sounds. No stridor. No wheezing, rhonchi or rales.   Chest:      Chest wall:  No tenderness.   Musculoskeletal:         General: No swelling, tenderness, deformity or signs of injury. Normal range of motion.      Cervical back: Normal range of motion and neck supple. No rigidity. No muscular tenderness.      Right lower leg: No edema.      Left lower leg: No edema.   Lymphadenopathy:      Cervical: No cervical adenopathy.   Skin:     General: Skin is warm.      Coloration: Skin is not jaundiced or pale.      Findings: No bruising, erythema or rash.   Neurological:      General: No focal deficit present.      Mental Status: She is alert and oriented to person, place, and time.      Gait: Gait normal.   Psychiatric:         Mood and Affect: Mood normal.         Behavior: Behavior normal.         Thought Content: Thought content normal.         Judgment: Judgment normal.         Assessment:      1. Allergic rhinitis due to animal (cat) (dog) hair and dander    2. Seasonal allergic rhinitis due to pollen    3. Allergy to sulfa drugs          Plan:     Allergic rhinitis due to animal (cat) (dog) hair and dander    Seasonal allergic rhinitis due to pollen    Allergy to sulfa drugs    Other orders  -     EPINEPHrine (EPIPEN) 0.3 mg/0.3 mL AtIn; Inject 0.3 mLs (0.3 mg total) into the muscle as needed (anaphylaxis).  Dispense: 2 each; Refill: 3  -     Allergy Mix; Inject into the skin once. Allergy mix  1 of 2  Unfiltered Dog- 0.5ml  Cat- 0.5ml  Allergens 1 ml  Diluent 4ml  Total: 5ml  Exract 2 of 2  Weed mix- 0.75ml  Bahia- 0.25ml  Rahul- 0.25ml  7 grass mix-0.5ml  Oak- 0.1ml  [place]- 0.1ml  Pecan tree- 0.1ml  [place]- 0.1ml  Privit-0.1ml  Hackberry-0.1ml  Elm- 0.1ml  Sycamore-0.1ml  Ida-0.1ml  [place]le[place]-0.1ml  Allergen 2.75ml  Diluent 2.25ml  Total 5ml    Please dilute from 1:1 to 1:10,000 in 10 fold dilutions  Consent has been signed  Injections will be given: [facility]  Patient must have Epipen 2 pack on day of injection  Patient must wait 30 minutes in the clinic post injection  Starting  dose -0.1ml of 1:10,000  Maintenance dose- 0.5ml of 1:1 for 1 dose  Dispense: 10 mL; Refill: 5          Continue Xyzal,Flonase,  Astelin and Singulair      Allergy Immunotherapy risk and benefits discussed-including death, anaphylaxis. She was advised that she must wait 30 minutes after each injection before leaving. She  must carry her Epipen 2 pack with her as well.      Recommend avoidance of sulf drugs, and other medications that have caused symptoms.    RTC  6 months   HERNESTO RIVERA spent a total of 42 minutes on the day of the visit.This includes face to face time and non-face to face time preparing to see the patient (eg, review of tests), obtaining and/or reviewing separately obtained history, documenting clinical information in the electronic or other health record, independently interpreting results and communicating results to the patient/family/caregiver, or care coordinator.

## 2024-06-10 ENCOUNTER — OFFICE VISIT (OUTPATIENT)
Dept: INTERNAL MEDICINE | Facility: CLINIC | Age: 69
End: 2024-06-10
Payer: MEDICARE

## 2024-06-10 VITALS
HEIGHT: 62 IN | TEMPERATURE: 98 F | BODY MASS INDEX: 22.31 KG/M2 | OXYGEN SATURATION: 96 % | WEIGHT: 121.25 LBS | SYSTOLIC BLOOD PRESSURE: 96 MMHG | DIASTOLIC BLOOD PRESSURE: 56 MMHG | HEART RATE: 84 BPM

## 2024-06-10 DIAGNOSIS — N18.31 CHRONIC KIDNEY DISEASE, STAGE 3A: Primary | ICD-10-CM

## 2024-06-10 DIAGNOSIS — R73.9 HYPERGLYCEMIA: ICD-10-CM

## 2024-06-10 DIAGNOSIS — E78.2 MIXED HYPERLIPIDEMIA: ICD-10-CM

## 2024-06-10 DIAGNOSIS — E78.5 HYPERLIPIDEMIA, UNSPECIFIED HYPERLIPIDEMIA TYPE: ICD-10-CM

## 2024-06-10 DIAGNOSIS — F32.4 MAJOR DEPRESSIVE DISORDER IN PARTIAL REMISSION, UNSPECIFIED WHETHER RECURRENT: ICD-10-CM

## 2024-06-10 PROCEDURE — 99214 OFFICE O/P EST MOD 30 MIN: CPT | Mod: PBBFAC,PO | Performed by: FAMILY MEDICINE

## 2024-06-10 PROCEDURE — G2211 COMPLEX E/M VISIT ADD ON: HCPCS | Mod: S$PBB,,, | Performed by: FAMILY MEDICINE

## 2024-06-10 PROCEDURE — 99999 PR PBB SHADOW E&M-EST. PATIENT-LVL IV: CPT | Mod: PBBFAC,,, | Performed by: FAMILY MEDICINE

## 2024-06-10 PROCEDURE — 99213 OFFICE O/P EST LOW 20 MIN: CPT | Mod: S$PBB,,, | Performed by: FAMILY MEDICINE

## 2024-06-10 RX ORDER — SIMVASTATIN 40 MG/1
40 TABLET, FILM COATED ORAL NIGHTLY
Qty: 90 TABLET | Refills: 3 | Status: SHIPPED | OUTPATIENT
Start: 2024-06-10

## 2024-06-10 NOTE — TELEPHONE ENCOUNTER
Rita Dejesus  is requesting a refill authorization.  Brief Assessment and Rationale for Refill:  Approve     Medication Therapy Plan:         Comments:     Note composed:9:58 AM 06/10/2024

## 2024-06-10 NOTE — PROGRESS NOTES
Subjective:      Patient ID: Rita Dejesus is a 68 y.o. female.    Chief Complaint: Follow-up      The patient is here today for routine follow up. Labs drawn earlier discussed today with the patient - all at goal.  Reports pain in left wrist improved  She reports her depression is much improved, wanting to try to stop Wellbutrin as she does not think she needs it any longer  No acute concerns today.      Follow-up  Associated symptoms include arthralgias. Pertinent negatives include no abdominal pain.     Review of Systems   Constitutional:  Negative for activity change, appetite change and unexpected weight change.   Respiratory:  Negative for shortness of breath.    Cardiovascular:  Negative for leg swelling.   Gastrointestinal:  Negative for abdominal pain.   Musculoskeletal:  Positive for arthralgias.     Past Medical History:   Diagnosis Date    General anesthetics causing adverse effect in therapeutic use     difficulty breathing when waking    GERD (gastroesophageal reflux disease)     History of cervical cancer     History of rectal or anal cancer           Past Surgical History:   Procedure Laterality Date    ADENOIDECTOMY      carpal tunel Bilateral     CATARACT EXTRACTION Left 05/06/2021    NEAR    CATARACT EXTRACTION W/ INTRAOCULAR LENS IMPLANT Right 05/20/2021    CERVICAL CONIZATION   W/ LASER      COLONOSCOPY N/A 11/17/2020    Procedure: COLONOSCOPY;  Surgeon: Kendy De Leon MD;  Location: [facility];  Service: Endoscopy;  Laterality: N/A;    ESOPHAGOGASTRODUODENOSCOPY N/A 12/22/2020    Procedure: ESOPHAGOGASTRODUODENOSCOPY (EGD);  Surgeon: Goyo Kapadia MD;  Location: [facility];  Service: Endoscopy;  Laterality: N/A;    ESOPHAGOGASTRODUODENOSCOPY N/A 05/09/2022    Procedure: EGD (ESOPHAGOGASTRODUODENOSCOPY);  Surgeon: Misa Crocker MD;  Location: [facility];  Service: Gastroenterology;  Laterality: N/A;    ESOPHAGOGASTRODUODENOSCOPY N/A 9/7/2022    Procedure: EGD  (ESOPHAGOGASTRODUODENOSCOPY);  Surgeon: Misa Crocker MD;  Location: [facility] ENDO;  Service: Gastroenterology;  Laterality: N/A;    HERNIA REPAIR      IMPLANTATION OF PERMANENT SACRAL NERVE STIMULATOR N/A 1/26/2023    Procedure: INSERTION, NEUROSTIMULATOR, PERMANENT, SACRAL;  Surgeon: Andra Painting MD;  Location: [facility] OR;  Service: Urology;  Laterality: N/A;    INSERTION, NEUROSTIMULATOR, TEMPORARY, SACRAL N/A 1/12/2023    Procedure: INSERTION, NEUROSTIMULATOR, TEMPORARY, SACRAL;  Surgeon: Andra Painting MD;  Location: [facility] OR;  Service: Urology;  Laterality: N/A;  stage 1    TONSILLECTOMY       Family History   Problem Relation Name Age of Onset    Diabetes Mother          type II    Hyperlipidemia Mother      Heart disease Father      Hypertension Father      Cataracts Father      Hypertension Sister      Diabetes Maternal Grandmother      Diabetes Maternal Grandfather       Social History     Socioeconomic History    Marital status:    Tobacco Use    Smoking status: Never    Smokeless tobacco: Never   Substance and Sexual Activity    Alcohol use: Yes     Comment: Social , hold told prior to sx    Drug use: Never    Sexual activity: Not Currently     Partners: Male     Social Determinants of Health     Financial Resource Strain: Low Risk  (7/26/2023)    Overall Financial Resource Strain (CARDIA)     Difficulty of Paying Living Expenses: Not hard at all   Food Insecurity: No Food Insecurity (7/26/2023)    Hunger Vital Sign     Worried About Running Out of Food in the Last Year: Never true     Ran Out of Food in the Last Year: Never true   Transportation Needs: No Transportation Needs (7/26/2023)    PRAPARE - Transportation     Lack of Transportation (Medical): No     Lack of Transportation (Non-Medical): No   Physical Activity: Inactive (7/26/2023)    Exercise Vital Sign     Days of Exercise per Week: 0 days     Minutes of Exercise per Session: 0 min   Stress: No Stress Concern Present  "(7/26/2023)    [employer] - Occupational Stress Questionnaire     Feeling of Stress : Only a little   Housing Stability: Low Risk  (7/26/2023)    Housing Stability Vital Sign     Unable to Pay for Housing in the Last Year: No     Number of Places Lived in the Last Year: 1     Unstable Housing in the Last Year: No     Review of patient's allergies indicates:   Allergen Reactions    Sulfa (sulfonamide antibiotics) Nausea And Vomiting    Nitrofuran analogues Nausea And Vomiting     Microdantin     Zoloft [sertraline] Rash       Objective:       BP (!) 96/56 (BP Location: Right arm, Patient Position: Sitting, BP Method: Medium (Manual))   Pulse 84   Temp 97.9 °F (36.6 °C) (Tympanic)   Ht 5' 1.5" (1.562 m)   Wt 55 kg (121 lb 4.1 oz)   SpO2 96%   BMI 22.54 kg/m²   Physical Exam  Vitals reviewed.   Constitutional:       General: She is not in acute distress.     Appearance: Normal appearance. She is well-developed. She is not ill-appearing or diaphoretic.   HENT:      Head: Normocephalic and atraumatic.      Right Ear: Hearing, tympanic membrane, ear canal and external ear normal.      Left Ear: Hearing, tympanic membrane, ear canal and external ear normal.      Nose: Nose normal.      Mouth/Throat:      Pharynx: Uvula midline. No oropharyngeal exudate.   Eyes:      Conjunctiva/sclera: Conjunctivae normal.      Pupils: Pupils are equal, round, and reactive to light.   Neck:      Thyroid: No thyromegaly.      Trachea: No tracheal deviation.   Cardiovascular:      Rate and Rhythm: Normal rate and regular rhythm.      Heart sounds: Normal heart sounds. No murmur heard.  Pulmonary:      Effort: Pulmonary effort is normal. No respiratory distress.      Breath sounds: Normal breath sounds.   Abdominal:      General: Bowel sounds are normal.      Palpations: Abdomen is soft.      Tenderness: There is no abdominal tenderness. There is no guarding.      Hernia: No hernia is present. "   Musculoskeletal:         General: Deformity (Some enlargement of left 1st MCP joint) present. Normal range of motion.      Cervical back: Normal range of motion and neck supple.      Right lower leg: No edema.      Left lower leg: No edema.   Lymphadenopathy:      Cervical: No cervical adenopathy.   Skin:     General: Skin is warm and dry.      Capillary Refill: Capillary refill takes less than 2 seconds.   Neurological:      General: No focal deficit present.      Mental Status: She is alert and oriented to person, place, and time.   Psychiatric:         Mood and Affect: Mood normal.         Behavior: Behavior normal.         Thought Content: Thought content normal.         Judgment: Judgment normal.       Assessment:     1. Chronic kidney disease, stage 3a    2. Hyperlipidemia, unspecified hyperlipidemia type    3. Hyperglycemia    4. Major depressive disorder in partial remission, unspecified whether recurrent      Plan:   Chronic kidney disease, stage 3a  -     Comprehensive Metabolic Panel; Future; Expected date: 12/07/2024  -     TSH; Future; Expected date: 12/07/2024    Hyperlipidemia, unspecified hyperlipidemia type  -     Comprehensive Metabolic Panel; Future; Expected date: 12/07/2024  -     TSH; Future; Expected date: 12/07/2024    Hyperglycemia    Major depressive disorder in partial remission, unspecified whether recurrent  Comments:  improved    Continue all current medications  Above labs in 6 months prior to visit with me.      I spent a total of 24 minutes face to face and non-face to face on the date of this visit.This includes time preparing to see the patient (eg, review of tests, notes), obtaining and/or reviewing additional history from an independent historian and/or outside medical records, documenting clinical information in the electronic health record, independently interpreting results and/or communicating results to the patient/family/caregiver, or care coordinator.  Visit today  included increased complexity associated with the care of the episodic problem addressed and managing the longitudinal care of the patient due to the serious and/or complex managed problem(s).        Medication List with Changes/Refills   Current Medications    AZELASTINE (ASTELIN) 137 MCG (0.1 %) NASAL SPRAY    1 spray (137 mcg total) by Nasal route 2 (two) times daily.    AZELASTINE (ASTELIN) 137 MCG (0.1 %) NASAL SPRAY    1 spray (137 mcg total) by Nasal route 2 (two) times daily.    BETAMETHASONE DIPROPIONATE 0.05 % CREAM    Apply topically 2 (two) times daily. For up to 2-4 weeks    BRAN/GUM/FIB/SOLANGE/PSYL/KELP/PEC (FIBER 6 ORAL)    Take 1 tablet by mouth once daily. 12 mg collin    BUPROPION (WELLBUTRIN XL) 150 MG TB24 TABLET    TAKE 1 TABLET BY MOUTH ONCE DAILY    CALCIUM CARBONATE 650 MG CALCIUM (1,625 MG) TABLET    Take 1 tablet by mouth once daily.    CLOBETASOL (TEMOVATE) 0.05 % EXTERNAL SOLUTION    Use on scalp one - two times daily as needed for scaling or itching for up to 4 weeks per course    COLLAGEN MISC    by Misc.(Non-Drug; Combo Route) route.    CYANOCOBALAMIN (VITAMIN B-12) 250 MCG TABLET    Take 250 mcg by mouth once daily.    DICLOFENAC SODIUM (SOLARAZE) 3 % GEL    AAA tid    EPINEPHRINE (EPIPEN) 0.3 MG/0.3 ML ATIN    Inject 0.3 mLs (0.3 mg total) into the muscle as needed (anaphylaxis).    FLUTICASONE PROPIONATE (FLONASE) 50 MCG/ACTUATION NASAL SPRAY    1 spray (50 mcg total) by Each Nostril route once daily.    KETOCONAZOLE (NIZORAL) 2 % SHAMPOO    Wash hair with medicated shampoo at least 2x/week - let sit on scalp at least 5 minutes prior to rinsing    LEVOCETIRIZINE (XYZAL) 5 MG TABLET    Take 5 mg by mouth every evening.    MONTELUKAST (SINGULAIR) 10 MG TABLET    Take 10 mg by mouth every evening.    MULTIVITAMIN CAPSULE    Take 1 capsule by mouth once daily.    OMEPRAZOLE (PRILOSEC) 40 MG CAPSULE    Take 1 capsule (40 mg total) by mouth 2 (two) times daily before meals.    OXYBUTYNIN  (DITROPAN-XL) 10 MG 24 HR TABLET    Take 1 tablet (10 mg total) by mouth once daily.    VENLAFAXINE (EFFEXOR-XR) 150 MG CP24    Take 1 capsule (150 mg total) by mouth once daily.    VITAMIN D (VITAMIN D3) 1000 UNITS TAB    Take 185 mg by mouth.   Changed and/or Refilled Medications    Modified Medication Previous Medication    SIMVASTATIN (ZOCOR) 40 MG TABLET simvastatin (ZOCOR) 40 MG tablet       TAKE 1 TABLET BY MOUTH EVERY EVENING    Take 1 tablet (40 mg total) by mouth every evening.

## 2024-06-10 NOTE — TELEPHONE ENCOUNTER
No care due was identified.  [facility] Embedded Care Due Messages. Reference number: [id number].   6/10/2024 8:04:06 AM CDT

## 2024-06-11 ENCOUNTER — OFFICE VISIT (OUTPATIENT)
Dept: GASTROENTEROLOGY | Facility: CLINIC | Age: 69
End: 2024-06-11
Payer: MEDICARE

## 2024-06-11 VITALS
WEIGHT: 121.69 LBS | SYSTOLIC BLOOD PRESSURE: 104 MMHG | HEART RATE: 81 BPM | DIASTOLIC BLOOD PRESSURE: 63 MMHG | BODY MASS INDEX: 22.39 KG/M2 | HEIGHT: 62 IN

## 2024-06-11 DIAGNOSIS — R93.5 ABNORMAL FINDINGS ON DIAGNOSTIC IMAGING OF ABDOMEN: Primary | ICD-10-CM

## 2024-06-11 PROCEDURE — 99215 OFFICE O/P EST HI 40 MIN: CPT | Mod: PBBFAC | Performed by: INTERNAL MEDICINE

## 2024-06-11 PROCEDURE — 99214 OFFICE O/P EST MOD 30 MIN: CPT | Mod: S$PBB,,, | Performed by: INTERNAL MEDICINE

## 2024-06-11 PROCEDURE — 99999 PR PBB SHADOW E&M-EST. PATIENT-LVL V: CPT | Mod: PBBFAC,,, | Performed by: INTERNAL MEDICINE

## 2024-06-11 NOTE — PROGRESS NOTES
Clinic Progress Note:  [facility] Progress Note    Reason for Visit:  The encounter diagnosis was Abnormal findings on diagnostic imaging of abdomen.    PCP: Tripp Heredia       HPI:  This is a 68 y.o. female here for follow-up.   Last seen by me in 3/2024 for abnormal imaging.   Had a CT urogram on 12/2023.   It revealed a hiatal hernia with wall thickening and fluid filled proximal esophagus. Recommended endoscopy.   She has previously been followed by me for GERD and dysphagia.   EGD with HH and schatzki's ring. Esophageal biopsies negative for EoE.   GERD symptoms are controlled on a PPI PO daily five days a week and PPPI PO BID for two days a week.   Since 8/2023, when she had COVID 19, she has been having issues with swallowing her multivitamin     EGD was ordered at last visit but canceled by facility due to weather.          ROS:  As above HPI       Allergies: Reviewed    Home Medications:   Medication List with Changes/Refills   Current Medications    AZELASTINE (ASTELIN) 137 MCG (0.1 %) NASAL SPRAY    1 spray (137 mcg total) by Nasal route 2 (two) times daily.    AZELASTINE (ASTELIN) 137 MCG (0.1 %) NASAL SPRAY    1 spray (137 mcg total) by Nasal route 2 (two) times daily.    BETAMETHASONE DIPROPIONATE 0.05 % CREAM    Apply topically 2 (two) times daily. For up to 2-4 weeks    BRAN/GUM/FIB/SOLANGE/PSYL/KELP/PEC (FIBER 6 ORAL)    Take 1 tablet by mouth once daily. 12 mg collin    BUPROPION (WELLBUTRIN XL) 150 MG TB24 TABLET    TAKE 1 TABLET BY MOUTH ONCE DAILY    CALCIUM CARBONATE 650 MG CALCIUM (1,625 MG) TABLET    Take 1 tablet by mouth once daily.    CLOBETASOL (TEMOVATE) 0.05 % EXTERNAL SOLUTION    Use on scalp one - two times daily as needed for scaling or itching for up to 4 weeks per course    COLLAGEN MISC    by Misc.(Non-Drug; Combo Route) route.    CYANOCOBALAMIN (VITAMIN B-12) 250 MCG TABLET    Take 250 mcg by mouth once daily.    DICLOFENAC SODIUM (SOLARAZE) 3 % GEL    AAA tid     "EPINEPHRINE (EPIPEN) 0.3 MG/0.3 ML ATIN    Inject 0.3 mLs (0.3 mg total) into the muscle as needed (anaphylaxis).    FLUTICASONE PROPIONATE (FLONASE) 50 MCG/ACTUATION NASAL SPRAY    1 spray (50 mcg total) by Each Nostril route once daily.    KETOCONAZOLE (NIZORAL) 2 % SHAMPOO    Wash hair with medicated shampoo at least 2x/week - let sit on scalp at least 5 minutes prior to rinsing    LEVOCETIRIZINE (XYZAL) 5 MG TABLET    Take 5 mg by mouth every evening.    MONTELUKAST (SINGULAIR) 10 MG TABLET    Take 10 mg by mouth every evening.    MULTIVITAMIN CAPSULE    Take 1 capsule by mouth once daily.    OMEPRAZOLE (PRILOSEC) 40 MG CAPSULE    Take 1 capsule (40 mg total) by mouth 2 (two) times daily before meals.    OXYBUTYNIN (DITROPAN-XL) 10 MG 24 HR TABLET    Take 1 tablet (10 mg total) by mouth once daily.    SIMVASTATIN (ZOCOR) 40 MG TABLET    TAKE 1 TABLET BY MOUTH EVERY EVENING    VENLAFAXINE (EFFEXOR-XR) 150 MG CP24    Take 1 capsule (150 mg total) by mouth once daily.    VITAMIN D (VITAMIN D3) 1000 UNITS TAB    Take 185 mg by mouth.         Physical Exam:  Vital Signs:  /63   Pulse 81   Ht 5' 1.5" (1.562 m)   Wt 55.2 kg (121 lb 11.1 oz)   BMI 22.62 kg/m²   Body mass index is 22.62 kg/m².    Physical Exam  Constitutional:       Appearance: Normal appearance.   HENT:      Head: Normocephalic.   Eyes:      Conjunctiva/sclera: Conjunctivae normal.   Pulmonary:      Effort: Pulmonary effort is normal.   Neurological:      Mental Status: She is alert.          Labs: Pertinent labs reviewed.        Assessment:    Problem List Items Addressed This Visit                  GI     Other dysphagia     Current Assessment & Plan       Plan as below        Abnormal findings on diagnostic imaging of digestive system - Primary     Current Assessment & Plan       Plan:   -Will order EGD at this time  -Continue PPI           Abnormal findings on diagnostic imaging of abdomen  -     Ambulatory referral/consult to Endo " Procedure ; Future; Expected date: 06/12/2024            Follow-up after diagnostic evaluation.     Order summary:  Orders Placed This Encounter   Procedures    Ambulatory referral/consult to Endo Procedure        Thank you so much for allowing me to participate in the care of Rita Crocker MD  Gastroenterology and Hepatology  [facility]

## 2024-06-12 ENCOUNTER — CLINICAL SUPPORT (OUTPATIENT)
Dept: ALLERGY | Facility: CLINIC | Age: 69
End: 2024-06-12
Payer: MEDICARE

## 2024-06-12 ENCOUNTER — HOSPITAL ENCOUNTER (OUTPATIENT)
Dept: PREADMISSION TESTING | Facility: HOSPITAL | Age: 69
Discharge: HOME | End: 2024-06-12
Attending: INTERNAL MEDICINE
Payer: MEDICARE

## 2024-06-12 DIAGNOSIS — J30.81 ALLERGIC RHINITIS DUE TO ANIMAL (CAT) (DOG) HAIR AND DANDER: ICD-10-CM

## 2024-06-12 DIAGNOSIS — R93.5 ABNORMAL FINDINGS ON DIAGNOSTIC IMAGING OF ABDOMEN: Primary | ICD-10-CM

## 2024-06-12 DIAGNOSIS — J30.1 SEASONAL ALLERGIC RHINITIS DUE TO POLLEN: Primary | ICD-10-CM

## 2024-06-12 PROCEDURE — 95165 ANTIGEN THERAPY SERVICES: CPT | Mod: PBBFAC

## 2024-06-19 ENCOUNTER — TELEPHONE (OUTPATIENT)
Dept: ALLERGY | Facility: CLINIC | Age: 69
End: 2024-06-19
Payer: MEDICARE

## 2024-06-27 ENCOUNTER — ANESTHESIA EVENT (OUTPATIENT)
Dept: ENDOSCOPY | Facility: HOSPITAL | Age: 69
End: 2024-06-27
Payer: MEDICARE

## 2024-06-27 NOTE — ANESTHESIA PREPROCEDURE EVALUATION
06/27/2024  Rita Dejesus is a 68 y.o., female.  Past Medical History:   Diagnosis Date    General anesthetics causing adverse effect in therapeutic use     difficulty breathing when waking    GERD (gastroesophageal reflux disease)     History of cervical cancer     History of rectal or anal cancer      Past Surgical History:   Procedure Laterality Date    ADENOIDECTOMY      carpal tunel Bilateral     CATARACT EXTRACTION Left 05/06/2021    NEAR    CATARACT EXTRACTION W/ INTRAOCULAR LENS IMPLANT Right 05/20/2021    CERVICAL CONIZATION   W/ LASER      COLONOSCOPY N/A 11/17/2020    Procedure: COLONOSCOPY;  Surgeon: Kendy De Leon MD;  Location: [facility];  Service: Endoscopy;  Laterality: N/A;    ESOPHAGOGASTRODUODENOSCOPY N/A 12/22/2020    Procedure: ESOPHAGOGASTRODUODENOSCOPY (EGD);  Surgeon: Goyo Kapadia MD;  Location: [facility];  Service: Endoscopy;  Laterality: N/A;    ESOPHAGOGASTRODUODENOSCOPY N/A 05/09/2022    Procedure: EGD (ESOPHAGOGASTRODUODENOSCOPY);  Surgeon: Misa Crocker MD;  Location: [facility];  Service: Gastroenterology;  Laterality: N/A;    ESOPHAGOGASTRODUODENOSCOPY N/A 9/7/2022    Procedure: EGD (ESOPHAGOGASTRODUODENOSCOPY);  Surgeon: Misa Crocker MD;  Location: [facility];  Service: Gastroenterology;  Laterality: N/A;    HERNIA REPAIR      IMPLANTATION OF PERMANENT SACRAL NERVE STIMULATOR N/A 1/26/2023    Procedure: INSERTION, NEUROSTIMULATOR, PERMANENT, SACRAL;  Surgeon: Andra Painting MD;  Location: [facility];  Service: Urology;  Laterality: N/A;    INSERTION, NEUROSTIMULATOR, TEMPORARY, SACRAL N/A 1/12/2023    Procedure: INSERTION, NEUROSTIMULATOR, TEMPORARY, SACRAL;  Surgeon: Andra Painting MD;  Location: [facility];  Service: Urology;  Laterality: N/A;  stage 1    TONSILLECTOMY       No current facility-administered medications for this  encounter.    Current Outpatient Medications:     azelastine (ASTELIN) 137 mcg (0.1 %) nasal spray, 1 spray (137 mcg total) by Nasal route 2 (two) times daily., Disp: 20 mL, Rfl: 5    bran/gum/fib/eliecer/psyl/kelp/pec (FIBER 6 ORAL), Take 1 tablet by mouth once daily. 12 mg collin, Disp: , Rfl:     buPROPion (WELLBUTRIN XL) 150 MG TB24 tablet, TAKE 1 TABLET BY MOUTH ONCE DAILY, Disp: 90 tablet, Rfl: 1    calcium carbonate 650 mg calcium (1,625 mg) tablet, Take 1 tablet by mouth once daily., Disp: , Rfl:     COLLAGEN MISC, by Misc.(Non-Drug; Combo Route) route., Disp: , Rfl:     cyanocobalamin (VITAMIN B-12) 250 MCG tablet, Take 250 mcg by mouth once daily., Disp: , Rfl:     diclofenac sodium (SOLARAZE) 3 % gel, AAA tid, Disp: 100 g, Rfl: 2    fluticasone propionate (FLONASE) 50 mcg/actuation nasal spray, 1 spray (50 mcg total) by Each Nostril route once daily., Disp: 32 g, Rfl: 0    levocetirizine (XYZAL) 5 MG tablet, Take 5 mg by mouth every evening., Disp: , Rfl:     montelukast (SINGULAIR) 10 mg tablet, Take 10 mg by mouth every evening., Disp: , Rfl:     multivitamin capsule, Take 1 capsule by mouth once daily., Disp: , Rfl:     omeprazole (PRILOSEC) 40 MG capsule, Take 1 capsule (40 mg total) by mouth 2 (two) times daily before meals., Disp: 180 capsule, Rfl: 0    oxybutynin (DITROPAN-XL) 10 MG 24 hr tablet, Take 1 tablet (10 mg total) by mouth once daily., Disp: 30 tablet, Rfl: 11    simvastatin (ZOCOR) 40 MG tablet, TAKE 1 TABLET BY MOUTH EVERY EVENING, Disp: 90 tablet, Rfl: 3    venlafaxine (EFFEXOR-XR) 150 MG Cp24, Take 1 capsule (150 mg total) by mouth once daily., Disp: 30 capsule, Rfl: 11    vitamin D (VITAMIN D3) 1000 units Tab, Take 185 mg by mouth., Disp: , Rfl:     betamethasone dipropionate 0.05 % cream, Apply topically 2 (two) times daily. For up to 2-4 weeks (Patient not taking: Reported on 6/11/2024), Disp: 45 g, Rfl: 0    clobetasoL (TEMOVATE) 0.05 % external solution, Use on scalp one - two times  daily as needed for scaling or itching for up to 4 weeks per course, Disp: 50 mL, Rfl: 3    EPINEPHrine (EPIPEN) 0.3 mg/0.3 mL AtIn, Inject 0.3 mLs (0.3 mg total) into the muscle as needed (anaphylaxis). (Patient not taking: Reported on 6/10/2024), Disp: 2 each, Rfl: 3    ketoconazole (NIZORAL) 2 % shampoo, Wash hair with medicated shampoo at least 2x/week - let sit on scalp at least 5 minutes prior to rinsing, Disp: 120 mL, Rfl: 5       Pre-op Assessment    I have reviewed the Patient Summary Reports.     I have reviewed the Nursing Notes. I have reviewed the NPO Status.   I have reviewed the Medications.     Review of Systems  Anesthesia Hx:  No problems with previous Anesthesia   Neg history of prior surgery.          Denies Family Hx of Anesthesia complications.    Denies Personal Hx of Anesthesia complications.                    Social:  Non-Smoker, Social Alcohol Use       Renal/:  Chronic Renal Disease                Hepatic/GI:    Hiatal Hernia, GERD             Neurological:    Neuromuscular Disease,                                   Psych:  Psychiatric History anxiety                 Physical Exam  General: Well nourished    Airway:  Mallampati: II   Mouth Opening: Normal  TM Distance: Normal  Tongue: Normal  Neck ROM: Normal ROM    Dental:  Intact        Anesthesia Plan  Type of Anesthesia, risks & benefits discussed:    Anesthesia Type: Gen Natural Airway  Intra-op Monitoring Plan: Standard ASA Monitors  Post Op Pain Control Plan: multimodal analgesia  Induction:  IV  Informed Consent: Informed consent signed with the Patient and all parties understand the risks and agree with anesthesia plan.  All questions answered. Patient consented to blood products? No  ASA Score: 2  Day of Surgery Review of History & Physical: H&P Update referred to the surgeon/provider.    Ready For Surgery From Anesthesia Perspective.     .

## 2024-06-28 ENCOUNTER — HOSPITAL ENCOUNTER (OUTPATIENT)
Facility: HOSPITAL | Age: 69
Discharge: HOME | End: 2024-06-28
Attending: INTERNAL MEDICINE | Admitting: INTERNAL MEDICINE
Payer: MEDICARE

## 2024-06-28 ENCOUNTER — ANESTHESIA (OUTPATIENT)
Dept: ENDOSCOPY | Facility: HOSPITAL | Age: 69
End: 2024-06-28
Payer: MEDICARE

## 2024-06-28 DIAGNOSIS — R93.5 ABNORMAL CT OF THE ABDOMEN: ICD-10-CM

## 2024-06-28 PROCEDURE — 63600175 PHARM REV CODE 636 W HCPCS: Performed by: INTERNAL MEDICINE

## 2024-06-28 PROCEDURE — 37000009 HC ANESTHESIA EA ADD 15 MINS: Performed by: INTERNAL MEDICINE

## 2024-06-28 PROCEDURE — 25000003 PHARM REV CODE 250: Performed by: NURSE ANESTHETIST, CERTIFIED REGISTERED

## 2024-06-28 PROCEDURE — 43235 EGD DIAGNOSTIC BRUSH WASH: CPT | Mod: ,,, | Performed by: INTERNAL MEDICINE

## 2024-06-28 PROCEDURE — 43235 EGD DIAGNOSTIC BRUSH WASH: CPT | Performed by: INTERNAL MEDICINE

## 2024-06-28 PROCEDURE — 37000008 HC ANESTHESIA 1ST 15 MINUTES: Performed by: INTERNAL MEDICINE

## 2024-06-28 RX ORDER — PROPOFOL 10 MG/ML
VIAL (ML) INTRAVENOUS
Status: DISCONTINUED | OUTPATIENT
Start: 2024-06-28 | End: 2024-06-28

## 2024-06-28 RX ORDER — LIDOCAINE HYDROCHLORIDE 20 MG/ML
INJECTION INTRAVENOUS
Status: DISCONTINUED | OUTPATIENT
Start: 2024-06-28 | End: 2024-06-28

## 2024-06-28 RX ORDER — SODIUM CHLORIDE, SODIUM LACTATE, POTASSIUM CHLORIDE, CALCIUM CHLORIDE 600; 310; 30; 20 MG/100ML; MG/100ML; MG/100ML; MG/100ML
INJECTION, SOLUTION INTRAVENOUS CONTINUOUS
Status: DISCONTINUED | OUTPATIENT
Start: 2024-06-28 | End: 2024-06-28 | Stop reason: HOSPADM

## 2024-06-28 RX ADMIN — Medication 50 MG: at 10:06

## 2024-06-28 RX ADMIN — LIDOCAINE HYDROCHLORIDE 50 MG: 20 INJECTION INTRAVENOUS at 09:06

## 2024-06-28 RX ADMIN — SODIUM CHLORIDE, POTASSIUM CHLORIDE, SODIUM LACTATE AND CALCIUM CHLORIDE: 600; 310; 30; 20 INJECTION, SOLUTION INTRAVENOUS at 09:06

## 2024-06-28 RX ADMIN — Medication 50 MG: at 09:06

## 2024-06-28 NOTE — TRANSFER OF CARE
Anesthesia Transfer of Care Note    Patient: Rita Dejesus    Procedure(s) Performed: Procedure(s) (LRB):  EGD (ESOPHAGOGASTRODUODENOSCOPY) (N/A)    Patient location: PACU    Anesthesia Type: general    Transport from OR: Transported from OR on room air with adequate spontaneous ventilation    Post pain: adequate analgesia    Post assessment: no apparent anesthetic complications    Post vital signs: stable    Level of consciousness: sedated    Nausea/Vomiting: no nausea/vomiting    Complications: none    Transfer of care protocol was followed      Last vitals: Visit Vitals  BP (!) 85/54 (BP Location: Right arm, Patient Position: Lying)   Pulse 86   Temp 36.4 °C (97.5 °F)   Resp 15   Wt 54.2 kg (119 lb 6.1 oz)   SpO2 95%   Breastfeeding No   BMI 22.19 kg/m²

## 2024-06-28 NOTE — PROVATION PATIENT INSTRUCTIONS
Discharge Summary/Instructions after an Endoscopic Procedure  Patient Name: Rita Dejesus  Patient MRN: [id number]  Patient YOB: 1955 Friday, June 28, 2024  Misa Crocker MD  Dear patient,  As a result of recent federal legislation (The Federal Cures Act), you may   receive lab or pathology results from your procedure in your [facility]   account before your physician is able to contact you. Your physician or   their representative will relay the results to you with their   recommendations at their soonest availability.  Thank you,  RESTRICTIONS:  During your procedure today, you received medications for sedation.  These   medications may affect your judgment, balance and coordination.  Therefore,   for 24 hours, you have the following restrictions:   - DO NOT drive a car, operate machinery, make legal/financial decisions,   sign important papers or drink alcohol.    ACTIVITY:  Today: no heavy lifting, straining or running due to procedural   sedation/anesthesia.  The following day: return to full activity including work.  DIET:  Eat and drink normally unless instructed otherwise.     TREATMENT FOR COMMON SIDE EFFECTS:  - Mild abdominal pain, nausea, belching, bloating or excessive gas:  rest,   eat lightly and use a heating pad.  - Sore Throat: treat with throat lozenges and/or gargle with warm salt   water.  - Because air was used during the procedure, expelling large amounts of air   from your rectum or belching is normal.  - If a bowel prep was taken, you may not have a bowel movement for 1-3 days.    This is normal.  SYMPTOMS TO WATCH FOR AND REPORT TO YOUR PHYSICIAN:  1. Abdominal pain or bloating, other than gas cramps.  2. Chest pain.  3. Back pain.  4. Signs of infection such as: chills or fever occurring within 24 hours   after the procedure.  5. Rectal bleeding, which would show as bright red, maroon, or black stools.   (A tablespoon of blood from the rectum is not serious, especially  if   hemorrhoids are present.)  6. Vomiting.  7. Weakness or dizziness.  GO DIRECTLY TO THE NEAREST EMERGENCY ROOM IF YOU HAVE ANY OF THE FOLLOWING:      Difficulty breathing              Chills and/or fever over 101 F   Persistent vomiting and/or vomiting blood   Severe abdominal pain   Severe chest pain   Black, tarry stools   Bleeding- more than one tablespoon   Any other symptom or condition that you feel may need urgent attention  Your doctor recommends these additional instructions:  If any biopsies were taken, your doctors clinic will contact you in 1 to 2   weeks with any results.  - Discharge patient to home.   - Resume previous diet.   - Continue present medications.   - Return to referring physician.  For questions, problems or results please call your physician Misa Crocker MD at Work:  [phone]  If you have any questions about the above instructions, call the GI   department at [phone] or call the endoscopy unit at [phone]   from 7am until 3 pm.  [facility] [place], EMERGENCY ROOM PHONE NUMBER:   [phone]  IF A COMPLICATION OR EMERGENCY SITUATION ARISES AND YOU ARE UNABLE TO REACH   YOUR PHYSICIAN - GO DIRECTLY TO THE EMERGENCY ROOM.  I have read or have had read to me these discharge instructions for my   procedure and have received a written copy.  I understand these   instructions and will follow-up with my physician if I have any questions.     __________________________________       _____________________________________  Nurse Signature                                          Patient/Designated   Responsible Party Signature  Misa Crocker MD  6/28/2024 10:11:02 AM  This report has been verified and signed electronically.  Dear patient,  As a result of recent federal legislation (The Federal Cures Act), you may   receive lab or pathology results from your procedure in your [facility]   account before your physician is able to contact you.  Your physician or   their representative will relay the results to you with their   recommendations at their soonest availability.  Thank you,  PROVATION

## 2024-06-28 NOTE — ANESTHESIA POSTPROCEDURE EVALUATION
Anesthesia Post Evaluation    Patient: Rita Dejesus    Procedure(s) Performed: Procedure(s) (LRB):  EGD (ESOPHAGOGASTRODUODENOSCOPY) (N/A)    Final Anesthesia Type: general      Patient location during evaluation: PACU  Patient participation: Yes- Able to Participate  Level of consciousness: awake  Post-procedure vital signs: reviewed and stable  Pain management: adequate  Airway patency: patent    PONV status at discharge: No PONV  Anesthetic complications: no      Cardiovascular status: stable  Respiratory status: unassisted  Hydration status: euvolemic  Follow-up not needed.              Vitals Value Taken Time   BP 92/52 06/28/24 1023        Pulse 79 06/28/24 1023   Resp 28 06/28/24 1023   SpO2 99 % 06/28/24 1023   Vitals shown include unfiled device data.      Event Time   Out of Recovery 10:41:00         Pain/Mamadou Score: Mamadou Score: 10 (6/28/2024 10:21 AM)

## 2024-06-28 NOTE — H&P
PRE PROCEDURE H&P    Patient Name: Rita Dejesus  MRN: [id number]  : 1955  Date of Procedure:  2024  Referring Physician: Misa Crocker MD  Primary Physician: Trpip Heredia MD  Procedure Physician: Misa Crocker MD       Planned Procedure: EGD  Diagnosis: abnormal CT  Chief Complaint: Same as above    HPI:     Last seen by me in 3/2024 for abnormal imaging.   Had a CT urogram on 2023.   It revealed a hiatal hernia with wall thickening and fluid filled proximal esophagus. Recommended endoscopy.   She has previously been followed by me for GERD and dysphagia.   EGD with HH and schatzki's ring. Esophageal biopsies negative for EoE.   GERD symptoms are controlled on a PPI PO daily five days a week and PPI PO BID for two days a week.       Patient is an 68 y.o. female is here for the above.     Anticoagulation: None    Past Medical History:   Past Medical History:   Diagnosis Date    General anesthetics causing adverse effect in therapeutic use     difficulty breathing when waking    GERD (gastroesophageal reflux disease)     History of cervical cancer     History of rectal or anal cancer         Past Surgical History:  Past Surgical History:   Procedure Laterality Date    ADENOIDECTOMY      carpal tunel Bilateral     CATARACT EXTRACTION Left 2021    NEAR    CATARACT EXTRACTION W/ INTRAOCULAR LENS IMPLANT Right 2021    CERVICAL CONIZATION   W/ LASER      COLONOSCOPY N/A 2020    Procedure: COLONOSCOPY;  Surgeon: Kendy De Leon MD;  Location: [facility];  Service: Endoscopy;  Laterality: N/A;    ESOPHAGOGASTRODUODENOSCOPY N/A 2020    Procedure: ESOPHAGOGASTRODUODENOSCOPY (EGD);  Surgeon: Goyo Kapadia MD;  Location: [facility];  Service: Endoscopy;  Laterality: N/A;    ESOPHAGOGASTRODUODENOSCOPY N/A 2022    Procedure: EGD (ESOPHAGOGASTRODUODENOSCOPY);  Surgeon: Misa Crocker MD;  Location: [facility];  Service: Gastroenterology;  Laterality:  N/A;    ESOPHAGOGASTRODUODENOSCOPY N/A 9/7/2022    Procedure: EGD (ESOPHAGOGASTRODUODENOSCOPY);  Surgeon: Misa Crocker MD;  Location: [facility] ENDO;  Service: Gastroenterology;  Laterality: N/A;    HERNIA REPAIR      IMPLANTATION OF PERMANENT SACRAL NERVE STIMULATOR N/A 1/26/2023    Procedure: INSERTION, NEUROSTIMULATOR, PERMANENT, SACRAL;  Surgeon: Andra Painting MD;  Location: [facility] OR;  Service: Urology;  Laterality: N/A;    INSERTION, NEUROSTIMULATOR, TEMPORARY, SACRAL N/A 1/12/2023    Procedure: INSERTION, NEUROSTIMULATOR, TEMPORARY, SACRAL;  Surgeon: Andra Painting MD;  Location: [facility] OR;  Service: Urology;  Laterality: N/A;  stage 1    TONSILLECTOMY          Home Medications:  Prior to Admission medications    Medication Sig Start Date End Date Taking? Authorizing Provider   azelastine (ASTELIN) 137 mcg (0.1 %) nasal spray 1 spray (137 mcg total) by Nasal route 2 (two) times daily. 2/19/24 2/18/25 Yes Harleen Devi MD   bran/gum/fib/eliecer/psyl/kelp/pec (FIBER 6 ORAL) Take 1 tablet by mouth once daily. 12 mg collin   Yes Provider, Historical   buPROPion (WELLBUTRIN XL) 150 MG TB24 tablet TAKE 1 TABLET BY MOUTH ONCE DAILY 4/7/24  Yes Tripp Heredia MD   calcium carbonate 650 mg calcium (1,625 mg) tablet Take 1 tablet by mouth once daily.   Yes Provider, Historical   COLLAGEN MISC by Misc.(Non-Drug; Combo Route) route.   Yes Provider, Historical   cyanocobalamin (VITAMIN B-12) 250 MCG tablet Take 250 mcg by mouth once daily. 8/24/18  Yes Provider, Historical   diclofenac sodium (SOLARAZE) 3 % gel AAA tid 5/14/24  Yes Tripp Heredia MD   fluticasone propionate (FLONASE) 50 mcg/actuation nasal spray 1 spray (50 mcg total) by Each Nostril route once daily. 5/1/24  Yes Tripp Heredia MD   levocetirizine (XYZAL) 5 MG tablet Take 5 mg by mouth every evening.   Yes Provider, Historical   montelukast (SINGULAIR) 10 mg tablet Take 10 mg by mouth every evening.   Yes Provider, Historical    multivitamin capsule Take 1 capsule by mouth once daily.   Yes Provider, Historical   omeprazole (PRILOSEC) 40 MG capsule Take 1 capsule (40 mg total) by mouth 2 (two) times daily before meals. 12/13/23 6/28/24 Yes Kari Dunlap MD   oxybutynin (DITROPAN-XL) 10 MG 24 hr tablet Take 1 tablet (10 mg total) by mouth once daily. 8/11/23 8/10/24 Yes Andra Painting MD   simvastatin (ZOCOR) 40 MG tablet TAKE 1 TABLET BY MOUTH EVERY EVENING 6/10/24  Yes Tripp Heredia MD   venlafaxine (EFFEXOR-XR) 150 MG Cp24 Take 1 capsule (150 mg total) by mouth once daily. 12/11/23 12/10/24 Yes Tripp Heredia MD   vitamin D (VITAMIN D3) 1000 units Tab Take 185 mg by mouth.   Yes Provider, Historical   betamethasone dipropionate 0.05 % cream Apply topically 2 (two) times daily. For up to 2-4 weeks  Patient not taking: Reported on 6/11/2024 7/6/23   Makenzie Moreno MD   clobetasoL (TEMOVATE) 0.05 % external solution Use on scalp one - two times daily as needed for scaling or itching for up to 4 weeks per course 7/6/23   Makenzie Moreno MD   EPINEPHrine (EPIPEN) 0.3 mg/0.3 mL AtIn Inject 0.3 mLs (0.3 mg total) into the muscle as needed (anaphylaxis).  Patient not taking: Reported on 6/10/2024 6/3/24   Harleen Devi MD   ketoconazole (NIZORAL) 2 % shampoo Wash hair with medicated shampoo at least 2x/week - let sit on scalp at least 5 minutes prior to rinsing 7/6/23   Makenzie Moreno MD        Allergies:  Review of patient's allergies indicates:   Allergen Reactions    Sulfa (sulfonamide antibiotics) Nausea And Vomiting    Nitrofuran analogues Nausea And Vomiting     Microdantin     Zoloft [sertraline] Rash        Social History:   Social History     Socioeconomic History    Marital status:    Tobacco Use    Smoking status: Never    Smokeless tobacco: Never   Substance and Sexual Activity    Alcohol use: Yes     Comment: Social , hold told prior to sx    Drug use: Never    Sexual activity: Not Currently      Partners: Male     Social Determinants of Health     Financial Resource Strain: Low Risk  (7/26/2023)    Overall Financial Resource Strain (CARDIA)     Difficulty of Paying Living Expenses: Not hard at all   Food Insecurity: No Food Insecurity (7/26/2023)    Hunger Vital Sign     Worried About Running Out of Food in the Last Year: Never true     Ran Out of Food in the Last Year: Never true   Transportation Needs: No Transportation Needs (7/26/2023)    PRAPARE - Transportation     Lack of Transportation (Medical): No     Lack of Transportation (Non-Medical): No   Physical Activity: Inactive (7/26/2023)    Exercise Vital Sign     Days of Exercise per Week: 0 days     Minutes of Exercise per Session: 0 min   Stress: No Stress Concern Present (7/26/2023)    [employer] [employer] - Occupational Stress Questionnaire     Feeling of Stress : Only a little   Housing Stability: Low Risk  (7/26/2023)    Housing Stability Vital Sign     Unable to Pay for Housing in the Last Year: No     Number of Places Lived in the Last Year: 1     Unstable Housing in the Last Year: No       Family History:  Family History   Problem Relation Name Age of Onset    Diabetes Mother          type II    Hyperlipidemia Mother      Heart disease Father      Hypertension Father      Cataracts Father      Hypertension Sister      Diabetes Maternal Grandmother      Diabetes Maternal Grandfather         ROS: No acute cardiac events, no acute respiratory complaints.     Physical Exam (all patients):    BP (!) 102/51 (Patient Position: Sitting)   Pulse 78   Temp 97.5 °F (36.4 °C)   Resp 16   Wt 54.2 kg (119 lb 6.1 oz)   SpO2 96%   Breastfeeding No   BMI 22.19 kg/m²   Lungs: Clear to auscultation bilaterally, respirations unlabored  Heart: Regular rate and rhythm, S1 and S2 normal, no obvious murmurs  Abdomen:         Soft, non-tender, bowel sounds normal, no masses, no organomegaly    Lab Results   Component Value Date    WBC  5.89 05/15/2024    MCV 92 05/15/2024    RDW 14.2 05/15/2024     05/15/2024    GLU 95 05/15/2024    HGBA1C 5.3 05/15/2024    BUN 17 05/15/2024     05/15/2024    K 4.9 05/15/2024     05/15/2024        SEDATION PLAN: per anesthesia      History reviewed, vital signs satisfactory, cardiopulmonary status satisfactory, sedation options, risks and plans have been discussed with the patient  All their questions were answered and the patient agrees to the sedation procedures as planned and the patient is deemed an appropriate candidate for the sedation as planned.    Procedure explained to patient, informed consent obtained and placed in chart.    Misa Crocker  6/28/2024  9:47 AM

## 2024-06-28 NOTE — PLAN OF CARE
Discharge instructions reviewed with pt, handouts given, verbalized understanding with no further questions at this time. Dr. Crocker spoke to pt at bedside, reviewed procedure and answered questions with MD telephone number provided per AVS sheet. VSS on RA, no pain or nausea noted, tolerating po fluids without difficulty, no other complaints noted. Fall precautions reviewed, consents in chart.

## 2024-07-01 VITALS
OXYGEN SATURATION: 99 % | RESPIRATION RATE: 18 BRPM | WEIGHT: 119.38 LBS | BODY MASS INDEX: 22.19 KG/M2 | SYSTOLIC BLOOD PRESSURE: 110 MMHG | HEART RATE: 70 BPM | TEMPERATURE: 98 F | DIASTOLIC BLOOD PRESSURE: 52 MMHG

## 2024-07-16 ENCOUNTER — OFFICE VISIT (OUTPATIENT)
Dept: UROLOGY | Facility: CLINIC | Age: 69
End: 2024-07-16
Payer: MEDICARE

## 2024-07-16 VITALS
WEIGHT: 121.94 LBS | DIASTOLIC BLOOD PRESSURE: 65 MMHG | BODY MASS INDEX: 20.32 KG/M2 | HEART RATE: 79 BPM | SYSTOLIC BLOOD PRESSURE: 112 MMHG | HEIGHT: 65 IN

## 2024-07-16 DIAGNOSIS — R31.29 MICROHEMATURIA: ICD-10-CM

## 2024-07-16 DIAGNOSIS — R15.1 FECAL SMEARING: ICD-10-CM

## 2024-07-16 DIAGNOSIS — N39.0 RECURRENT UTI: Primary | ICD-10-CM

## 2024-07-16 DIAGNOSIS — N39.46 MIXED STRESS AND URGE URINARY INCONTINENCE: ICD-10-CM

## 2024-07-16 LAB
BILIRUB UR QL STRIP: NEGATIVE
GLUCOSE UR QL STRIP: NEGATIVE
KETONES UR QL STRIP: NEGATIVE
LEUKOCYTE ESTERASE UR QL STRIP: NEGATIVE
PH, POC UA: 5.5
POC BLOOD, URINE: NEGATIVE
POC NITRATES, URINE: NEGATIVE
POC RESIDUAL URINE VOLUME: 30 ML (ref 0–100)
PROT UR QL STRIP: NEGATIVE
SP GR UR STRIP: <=1.005 (ref 1–1.03)
UROBILINOGEN UR STRIP-ACNC: 0.2 (ref 0.1–1.1)

## 2024-07-16 PROCEDURE — 99214 OFFICE O/P EST MOD 30 MIN: CPT | Mod: PBBFAC,PN,25 | Performed by: UROLOGY

## 2024-07-16 PROCEDURE — 51798 US URINE CAPACITY MEASURE: CPT | Mod: PBBFAC,PN | Performed by: UROLOGY

## 2024-07-16 PROCEDURE — 99999 PR PBB SHADOW E&M-EST. PATIENT-LVL IV: CPT | Mod: PBBFAC,,, | Performed by: UROLOGY

## 2024-07-16 PROCEDURE — 99999PBSHW POCT BLADDER SCAN: Mod: PBBFAC,,,

## 2024-07-16 PROCEDURE — 99999PBSHW POCT URINALYSIS, DIPSTICK, AUTOMATED, W/O SCOPE: Mod: PBBFAC,,,

## 2024-07-16 PROCEDURE — 81003 URINALYSIS AUTO W/O SCOPE: CPT | Mod: PBBFAC,PN | Performed by: UROLOGY

## 2024-07-16 PROCEDURE — 99213 OFFICE O/P EST LOW 20 MIN: CPT | Mod: S$PBB,,, | Performed by: UROLOGY

## 2024-07-16 NOTE — PROGRESS NOTES
Chief Complaint:   Follow up hematuria    HPI:     7/16/24-no recent UTI. She reports that sometimes she gets an electric shock in her right groin, which radiates down the anterior thigh. She isn't sure if it is related to the interstim. It sometimes just lasts for a few minutes, up to 30, but does hurt significantly. She reports that she occasionally has UUI if she tries to hold it for too long. Bowels are pretty good for the most part. Overall, satisfied with current voiding dynamics. No recent UTIs or hematuria.     1/12/24-here for cysto. CT showed mild bladder wall thickening. Also wall thickening of proximal esophagus. Had cloudy urine a few weeks ago, but culture was negative. Started drinking more water and adding lemon and taking azo and symptoms have resolved.     12/4/23-she saw gross hematuria a few days ago. Had a little dysuria. Drank a lot of water and it seemed to resolve. She has had gross hematuria a few other times. Sometimes associated with slight dysuria and then it goes away. Cut back on oxybutynin due to mental fogginess. Interstim seems to be doing well overall.     6/5/23-she wears 3 pads per day but doesn't usually wet them. Bladder has been much better with fine tuning of Interstim. No dysuria or recent UTIs. Still with some FI, but better. Nocturia x 0-1. On program 1 at 1.2. No impedance    3/6/23- Pt s/p Interstim a month ago. She has seen significant improvement, though not perfect. Sometimes the interstim works well, but sometimes it doesn't. Still wearing pads. Having UUI 3 times a week. FI is still present, though better. No recent UTIs.     12/12/22- Pt has been seeing pelvic floor PT and has seen improvement, especially with CHICHI. Rectocele is less symptomatic and not splinting. She did see Dr. Choi. She does have FI a couple times a week still and Interstim was recommended.   Has had 2 E. Coli UTIs, 1st treated with fosfomycin, 2nd treated with cipro x 5 days last week.  "Currently without UTI symptoms, but prior was having urethral pain.     8/8/22- Patient reports that she has fecal urgency and FI and urinary incontinence. Changed a diaper 3 times yesterday. She reports having CHICHI with cough/lifting and also has UUI. Urinary issues 10-12 years. She reports having anal cancer, and during the treatment, noticed rectocele. She had chemo and radiation for the anal cancer. No surgery. She reports having insensate fecal incontinence. Has difficulty discerning between gas and solid. Has never taken any medications for urinary complaints. Taking metamucil does seem to help with GI symptoms.     Per Amanda Leyva NP:   "Patient is a 66-year-old female that is presenting per gyn MD ( WILY Salmeron MD). Patient states that she was seen by gyn physician and instructed to see urologist secondary to rectocele and cystocele.  Patient has a history of anal cancer and is concerned that rectocele is secondary to anal cancer treatment.  Patient states that she has not had sexual intercourse in 10 years and is wanting to proceed with sexual intercourse.  Reports that she has had an increase in urge incontinence and is now wearing a pad.  Does not want to consider PT pelvic floor training or medication, wants to have a surgical option. No abd/pelvic pain and no exac/rel factors.  No hematuria."    Allergies:  Sulfa (sulfonamide antibiotics), Nitrofuran analogues, and Zoloft [sertraline]    Medications:  has a current medication list which includes the following prescription(s): azelastine, betamethasone dipropionate, bran/gum/fib/eliecer/psyl/kelp/pec, bupropion, calcium carbonate, clobetasol, collagen, cyanocobalamin, diclofenac sodium, epinephrine, fluticasone propionate, ketoconazole, levocetirizine, montelukast, multivitamin, omeprazole, oxybutynin, simvastatin, venlafaxine, and vitamin d.    Review of Systems:  General: No fever, chills, fatigability, or weight loss.  Skin: No rashes, itching, or changes " in color or texture of skin.  Chest: Denies BONILLA, cyanosis, wheezing, cough, and sputum production.  Abdomen: Appetite fine. No weight loss. Denies diarrhea, abdominal pain, hematemesis, or blood in stool.  Musculoskeletal: No joint stiffness or swelling. Denies back pain.  : As above.  All other review of systems negative.    PMH:   has a past medical history of General anesthetics causing adverse effect in therapeutic use, GERD (gastroesophageal reflux disease), History of cervical cancer, and History of rectal or anal cancer.    PSH:   has a past surgical history that includes Tonsillectomy; Adenoidectomy; Hernia repair; carpal tunel (Bilateral); Colonoscopy (N/A, 11/17/2020); Esophagogastroduodenoscopy (N/A, 12/22/2020); Cataract extraction w/ intraocular lens implant (Right, 05/20/2021); Cataract extraction (Left, 05/06/2021); Esophagogastroduodenoscopy (N/A, 05/09/2022); Cervical conization w/ laser; Esophagogastroduodenoscopy (N/A, 9/7/2022); insertion, neurostimulator, temporary, sacral (N/A, 1/12/2023); Implantation of permanent sacral nerve stimulator (N/A, 1/26/2023); and Esophagogastroduodenoscopy (N/A, 6/28/2024).    FamHx: family history includes Cataracts in her father; Diabetes in her maternal grandfather, maternal grandmother, and mother; Heart disease in her father; Hyperlipidemia in her mother; Hypertension in her father and sister.    SocHx:  reports that she has never smoked. She has never used smokeless tobacco. She reports current alcohol use. She reports that she does not use drugs.      Physical Exam:    Vitals:    07/16/24 0913   BP: 112/65   Pulse: 79         General: A&Ox3, no apparent distress, no deformities  Neck: No masses, normal thyroid  Lungs: normal inspiration, no use of accessory muscles  Heart: normal pulse, no arrhythmias  Abdomen: Soft, NT, ND, no masses, no hernias, no hepatosplenomegaly  Lymphatic: Neck and groin nodes negative  Skin: The skin is warm and dry. No  jaundice.  Ext: No c/c/e.  :  8/8/22-Normal female external genitalia, orthotopic urethral meatus, atrophic vaginal mucosa, Grade 2 cystocele, grade 2 rectocele        Labs/Studies:   PVR= 6 ml  6/15/22  Urinalysis: trace blood, otherwise negative      Recurrent UTI  -     POCT Urinalysis, Dipstick, Automated, W/O Scope  -     Cancel: POCT Bladder Scan  -     POCT Bladder Scan    Mixed stress and urge urinary incontinence    Fecal smearing    Microhematuria    Discussed trial of Interstim turned off to see if the pain resolves. We discussed that since it only happens every few days, I would suggest turning the device off for a week. She is going to [place] in a week, so she will do this after she returns. I attempted to review the procedure for turning the device off with the pt, but her [employer] device wouldn't turn on, despite being charged by her today. We discussed that she should try to use it while it is plugged in and if it still won't turn on, we may need to contact the rep to get a replacement.     Follow up in about 4 weeks (around 8/13/2024).

## 2024-07-30 ENCOUNTER — CLINICAL SUPPORT (OUTPATIENT)
Dept: ALLERGY | Facility: CLINIC | Age: 69
End: 2024-07-30
Payer: MEDICARE

## 2024-07-30 DIAGNOSIS — J30.1 SEASONAL ALLERGIC RHINITIS DUE TO POLLEN: Primary | ICD-10-CM

## 2024-07-30 DIAGNOSIS — Z00.00 ENCOUNTER FOR MEDICARE ANNUAL WELLNESS EXAM: ICD-10-CM

## 2024-07-30 DIAGNOSIS — J30.81 ALLERGIC RHINITIS DUE TO ANIMAL (CAT) (DOG) HAIR AND DANDER: ICD-10-CM

## 2024-07-30 PROCEDURE — 99999 PR PBB SHADOW E&M-EST. PATIENT-LVL III: CPT | Mod: PBBFAC,,,

## 2024-07-30 PROCEDURE — 95117 IMMUNOTHERAPY INJECTIONS: CPT | Mod: PBBFAC

## 2024-08-01 DIAGNOSIS — K21.9 GASTROESOPHAGEAL REFLUX DISEASE, UNSPECIFIED WHETHER ESOPHAGITIS PRESENT: ICD-10-CM

## 2024-08-01 RX ORDER — OMEPRAZOLE 40 MG/1
40 CAPSULE, DELAYED RELEASE ORAL
Qty: 180 CAPSULE | Refills: 0 | Status: SHIPPED | OUTPATIENT
Start: 2024-08-01 | End: 2024-10-30

## 2024-08-06 ENCOUNTER — CLINICAL SUPPORT (OUTPATIENT)
Dept: ALLERGY | Facility: CLINIC | Age: 69
End: 2024-08-06
Payer: MEDICARE

## 2024-08-06 DIAGNOSIS — J30.1 SEASONAL ALLERGIC RHINITIS DUE TO POLLEN: Primary | ICD-10-CM

## 2024-08-06 DIAGNOSIS — J30.81 ALLERGIC RHINITIS DUE TO ANIMAL (CAT) (DOG) HAIR AND DANDER: ICD-10-CM

## 2024-08-06 PROCEDURE — 95117 IMMUNOTHERAPY INJECTIONS: CPT | Mod: PBBFAC

## 2024-08-06 PROCEDURE — 99999 PR PBB SHADOW E&M-EST. PATIENT-LVL III: CPT | Mod: PBBFAC,,,

## 2024-08-12 ENCOUNTER — CLINICAL SUPPORT (OUTPATIENT)
Dept: ALLERGY | Facility: CLINIC | Age: 69
End: 2024-08-12
Payer: MEDICARE

## 2024-08-12 DIAGNOSIS — J30.1 SEASONAL ALLERGIC RHINITIS DUE TO POLLEN: Primary | ICD-10-CM

## 2024-08-12 DIAGNOSIS — J30.81 ALLERGIC RHINITIS DUE TO ANIMAL (CAT) (DOG) HAIR AND DANDER: ICD-10-CM

## 2024-08-12 PROCEDURE — 95117 IMMUNOTHERAPY INJECTIONS: CPT | Mod: PBBFAC

## 2024-08-12 PROCEDURE — 99999 PR PBB SHADOW E&M-EST. PATIENT-LVL III: CPT | Mod: PBBFAC,,,

## 2024-08-20 ENCOUNTER — CLINICAL SUPPORT (OUTPATIENT)
Dept: ALLERGY | Facility: CLINIC | Age: 69
End: 2024-08-20
Payer: MEDICARE

## 2024-08-20 ENCOUNTER — OFFICE VISIT (OUTPATIENT)
Dept: UROLOGY | Facility: CLINIC | Age: 69
End: 2024-08-20
Payer: MEDICARE

## 2024-08-20 ENCOUNTER — TELEPHONE (OUTPATIENT)
Dept: UROLOGY | Facility: CLINIC | Age: 69
End: 2024-08-20
Payer: MEDICARE

## 2024-08-20 VITALS
RESPIRATION RATE: 18 BRPM | DIASTOLIC BLOOD PRESSURE: 69 MMHG | HEART RATE: 86 BPM | BODY MASS INDEX: 19.91 KG/M2 | HEIGHT: 65 IN | SYSTOLIC BLOOD PRESSURE: 125 MMHG | WEIGHT: 119.5 LBS

## 2024-08-20 DIAGNOSIS — N39.0 RECURRENT UTI: Primary | ICD-10-CM

## 2024-08-20 DIAGNOSIS — Z88.2 ALLERGY TO SULFA DRUGS: ICD-10-CM

## 2024-08-20 DIAGNOSIS — R15.1 FECAL SMEARING: ICD-10-CM

## 2024-08-20 DIAGNOSIS — N39.46 MIXED STRESS AND URGE URINARY INCONTINENCE: ICD-10-CM

## 2024-08-20 DIAGNOSIS — R10.31 RIGHT INGUINAL PAIN: ICD-10-CM

## 2024-08-20 DIAGNOSIS — J30.81 ALLERGIC RHINITIS DUE TO ANIMAL (CAT) (DOG) HAIR AND DANDER: ICD-10-CM

## 2024-08-20 DIAGNOSIS — J30.1 SEASONAL ALLERGIC RHINITIS DUE TO POLLEN: Primary | ICD-10-CM

## 2024-08-20 PROCEDURE — 95971 ALYS SMPL SP/PN NPGT W/PRGRM: CPT | Mod: PBBFAC,PN | Performed by: UROLOGY

## 2024-08-20 PROCEDURE — 99214 OFFICE O/P EST MOD 30 MIN: CPT | Mod: PBBFAC,PN,25 | Performed by: UROLOGY

## 2024-08-20 PROCEDURE — 99999 PR PBB SHADOW E&M-EST. PATIENT-LVL IV: CPT | Mod: PBBFAC,,, | Performed by: UROLOGY

## 2024-08-20 NOTE — PROGRESS NOTES
Chief Complaint:   Follow up [facility]    HPI:     8/20/24- Pt has been unable to turn off her interstim because the remote won't charge. She continues to have an intermittent shock in her medial right groin, which radiates down her thigh.     7/16/24-no recent UTI. She reports that sometimes she gets an electric shock in her right groin, which radiates down the anterior thigh. She isn't sure if it is related to the interstim. It sometimes just lasts for a few minutes, up to 30, but does hurt significantly. She reports that she occasionally has UUI if she tries to hold it for too long. Bowels are pretty good for the most part. Overall, satisfied with current voiding dynamics. No recent UTIs or hematuria.     1/12/24-here for cysto. CT showed mild bladder wall thickening. Also wall thickening of proximal esophagus. Had cloudy urine a few weeks ago, but culture was negative. Started drinking more water and adding lemon and taking azo and symptoms have resolved.     12/4/23-she saw gross hematuria a few days ago. Had a little dysuria. Drank a lot of water and it seemed to resolve. She has had gross hematuria a few other times. Sometimes associated with slight dysuria and then it goes away. Cut back on oxybutynin due to mental fogginess. Interstim seems to be doing well overall.     6/5/23-she wears 3 pads per day but doesn't usually wet them. Bladder has been much better with fine tuning of Interstim. No dysuria or recent UTIs. Still with some FI, but better. Nocturia x 0-1. On program 1 at 1.2. No impedance    3/6/23- Pt s/p Interstim a month ago. She has seen significant improvement, though not perfect. Sometimes the interstim works well, but sometimes it doesn't. Still wearing pads. Having UUI 3 times a week. FI is still present, though better. No recent UTIs.     12/12/22- Pt has been seeing pelvic floor PT and has seen improvement, especially with CHICHI. Rectocele is less symptomatic and not splinting. She did see   "[facility]. She does have FI a couple times a week still and Interstim was recommended.   Has had 2 E. Coli UTIs, 1st treated with fosfomycin, 2nd treated with cipro x 5 days last week. Currently without UTI symptoms, but prior was having urethral pain.     8/8/22- Patient reports that she has fecal urgency and FI and urinary incontinence. Changed a diaper 3 times yesterday. She reports having CHICHI with cough/lifting and also has UUI. Urinary issues 10-12 years. She reports having anal cancer, and during the treatment, noticed rectocele. She had chemo and radiation for the anal cancer. No surgery. She reports having insensate fecal incontinence. Has difficulty discerning between gas and solid. Has never taken any medications for urinary complaints. Taking metamucil does seem to help with GI symptoms.     Per Amanda Leyva NP:   "Patient is a 66-year-old female that is presenting per gyn MD ( WILY Salmeron MD). Patient states that she was seen by gyn physician and instructed to see urologist secondary to rectocele and cystocele.  Patient has a history of anal cancer and is concerned that rectocele is secondary to anal cancer treatment.  Patient states that she has not had sexual intercourse in 10 years and is wanting to proceed with sexual intercourse.  Reports that she has had an increase in urge incontinence and is now wearing a pad.  Does not want to consider PT pelvic floor training or medication, wants to have a surgical option. No abd/pelvic pain and no exac/rel factors.  No hematuria."    Allergies:  Sulfa (sulfonamide antibiotics), Nitrofuran analogues, and Zoloft [sertraline]    Medications:  has a current medication list which includes the following prescription(s): azelastine, betamethasone dipropionate, bran/gum/fib/eliecer/psyl/kelp/pec, bupropion, calcium carbonate, clobetasol, collagen, cyanocobalamin, diclofenac sodium, epinephrine, fluticasone propionate, ketoconazole, levocetirizine, montelukast, multivitamin, " omeprazole, simvastatin, venlafaxine, vitamin d, and oxybutynin.    Review of Systems:  General: No fever, chills, fatigability, or weight loss.  Skin: No rashes, itching, or changes in color or texture of skin.  Chest: Denies BONILLA, cyanosis, wheezing, cough, and sputum production.  Abdomen: Appetite fine. No weight loss. Denies diarrhea, abdominal pain, hematemesis, or blood in stool.  Musculoskeletal: No joint stiffness or swelling. Denies back pain.  : As above.  All other review of systems negative.    PMH:   has a past medical history of General anesthetics causing adverse effect in therapeutic use, GERD (gastroesophageal reflux disease), History of cervical cancer, and History of rectal or anal cancer.    PSH:   has a past surgical history that includes Tonsillectomy; Adenoidectomy; Hernia repair; carpal tunel (Bilateral); Colonoscopy (N/A, 11/17/2020); Esophagogastroduodenoscopy (N/A, 12/22/2020); Cataract extraction w/ intraocular lens implant (Right, 05/20/2021); Cataract extraction (Left, 05/06/2021); Esophagogastroduodenoscopy (N/A, 05/09/2022); Cervical conization w/ laser; Esophagogastroduodenoscopy (N/A, 9/7/2022); insertion, neurostimulator, temporary, sacral (N/A, 1/12/2023); Implantation of permanent sacral nerve stimulator (N/A, 1/26/2023); and Esophagogastroduodenoscopy (N/A, 6/28/2024).    FamHx: family history includes Cataracts in her father; Diabetes in her maternal grandfather, maternal grandmother, and mother; Heart disease in her father; Hyperlipidemia in her mother; Hypertension in her father and sister.    SocHx:  reports that she has never smoked. She has never used smokeless tobacco. She reports current alcohol use. She reports that she does not use drugs.      Physical Exam:    Vitals:    08/20/24 1308   BP: 125/69   Pulse: 86   Resp: 18           General: A&Ox3, no apparent distress, no deformities  Neck: No masses, normal thyroid  Lungs: normal inspiration, no use of accessory  muscles  Heart: normal pulse, no arrhythmias  Abdomen: Soft, NT, ND, no masses, no hernias, no hepatosplenomegaly  Lymphatic: Neck and groin nodes negative  Skin: The skin is warm and dry. No jaundice.  Ext: No c/c/e.  :  8/8/22-Normal female external genitalia, orthotopic urethral meatus, atrophic vaginal mucosa, Grade 2 cystocele, grade 2 rectocele        Labs/Studies:   PVR= 6 ml  6/15/22  Urinalysis: negative for blood, LE, nit      Recurrent UTI  -     POCT Urinalysis, Dipstick, Automated, W/O Scope    Mixed stress and urge urinary incontinence    Fecal smearing    Right inguinal pain    I was able to charge the device here in the office. An impedance check was run and all leads were without impedance. The program that was on was Program 1 at 1.2 mA. This was turned off. I advised pt to leave device off for a week to determine if the pain changes. She will send me a message on [employer] to let me know how things go.

## 2024-08-20 NOTE — TELEPHONE ENCOUNTER
.Outgoing call placed to patient, patient verified name and date of birth, patient notified that she can come after one if she likes. She verbalized understanding and will be here around that time.      ----- Message from Mainor Ruiz sent at 8/20/2024 10:30 AM CDT -----  Contact: Rita  .Type:      Who Called:  Rita     Would the patient rather a call back or a response via [facility]?  Call back   Best Call Back Number:  [phone]    Additional Information:  Pt would like to know if it is still ok for her to coming in early to her appt scheduled on today     Thanks

## 2024-09-03 ENCOUNTER — CLINICAL SUPPORT (OUTPATIENT)
Dept: ALLERGY | Facility: CLINIC | Age: 69
End: 2024-09-03
Payer: MEDICARE

## 2024-09-03 DIAGNOSIS — J30.1 SEASONAL ALLERGIC RHINITIS DUE TO POLLEN: Primary | ICD-10-CM

## 2024-09-03 DIAGNOSIS — J30.81 ALLERGIC RHINITIS DUE TO ANIMAL (CAT) (DOG) HAIR AND DANDER: ICD-10-CM

## 2024-09-03 PROCEDURE — 99999 PR PBB SHADOW E&M-EST. PATIENT-LVL III: CPT | Mod: PBBFAC,,,

## 2024-09-03 PROCEDURE — 95117 IMMUNOTHERAPY INJECTIONS: CPT | Mod: PBBFAC

## 2024-09-10 ENCOUNTER — CLINICAL SUPPORT (OUTPATIENT)
Dept: ALLERGY | Facility: CLINIC | Age: 69
End: 2024-09-10
Payer: MEDICARE

## 2024-09-10 DIAGNOSIS — J30.81 ALLERGIC RHINITIS DUE TO ANIMAL (CAT) (DOG) HAIR AND DANDER: ICD-10-CM

## 2024-09-10 DIAGNOSIS — J30.1 SEASONAL ALLERGIC RHINITIS DUE TO POLLEN: Primary | ICD-10-CM

## 2024-09-17 ENCOUNTER — CLINICAL SUPPORT (OUTPATIENT)
Dept: ALLERGY | Facility: CLINIC | Age: 69
End: 2024-09-17
Payer: MEDICARE

## 2024-09-17 DIAGNOSIS — J30.81 ALLERGIC RHINITIS DUE TO ANIMAL (CAT) (DOG) HAIR AND DANDER: ICD-10-CM

## 2024-09-17 DIAGNOSIS — J30.1 SEASONAL ALLERGIC RHINITIS DUE TO POLLEN: Primary | ICD-10-CM

## 2024-09-17 RX ORDER — OXYBUTYNIN CHLORIDE 10 MG/1
10 TABLET, EXTENDED RELEASE ORAL
Qty: 30 TABLET | Refills: 11 | Status: SHIPPED | OUTPATIENT
Start: 2024-09-17

## 2024-09-24 ENCOUNTER — CLINICAL SUPPORT (OUTPATIENT)
Dept: ALLERGY | Facility: CLINIC | Age: 69
End: 2024-09-24
Payer: MEDICARE

## 2024-09-24 DIAGNOSIS — J30.1 SEASONAL ALLERGIC RHINITIS DUE TO POLLEN: Primary | ICD-10-CM

## 2024-10-01 ENCOUNTER — CLINICAL SUPPORT (OUTPATIENT)
Dept: ALLERGY | Facility: CLINIC | Age: 69
End: 2024-10-01
Payer: MEDICARE

## 2024-10-01 DIAGNOSIS — J30.1 SEASONAL ALLERGIC RHINITIS DUE TO POLLEN: Primary | ICD-10-CM

## 2024-10-01 DIAGNOSIS — J30.81 ALLERGY TO CATS: ICD-10-CM

## 2024-10-01 PROCEDURE — 99999 PR PBB SHADOW E&M-EST. PATIENT-LVL III: CPT | Mod: PBBFAC,,,

## 2024-10-01 PROCEDURE — 95117 IMMUNOTHERAPY INJECTIONS: CPT | Mod: PBBFAC

## 2024-10-08 ENCOUNTER — CLINICAL SUPPORT (OUTPATIENT)
Dept: ALLERGY | Facility: CLINIC | Age: 69
End: 2024-10-08
Payer: MEDICARE

## 2024-10-08 DIAGNOSIS — Z92.89 HISTORY OF IMMUNOTHERAPY: Primary | ICD-10-CM

## 2024-10-08 PROCEDURE — 95117 IMMUNOTHERAPY INJECTIONS: CPT | Mod: ,,, | Performed by: STUDENT IN AN ORGANIZED HEALTH CARE EDUCATION/TRAINING PROGRAM

## 2024-10-08 NOTE — PROGRESS NOTES
Allergy and Immunology  Procedure Note     - Presented with epinephrine autoinjector  - Administered 0.5 mL of green vial  - 30 minute observation prior to clinic discharge    Kesler Bourgoyne, MD [facility]  Allergy and Clinical Immunology

## 2024-10-15 ENCOUNTER — CLINICAL SUPPORT (OUTPATIENT)
Dept: ALLERGY | Facility: CLINIC | Age: 69
End: 2024-10-15
Payer: MEDICARE

## 2024-10-15 DIAGNOSIS — J30.1 SEASONAL ALLERGIC RHINITIS DUE TO POLLEN: ICD-10-CM

## 2024-10-15 DIAGNOSIS — J30.81 ALLERGY TO DOGS: ICD-10-CM

## 2024-10-15 DIAGNOSIS — J30.81 ALLERGY TO CATS: Primary | ICD-10-CM

## 2024-10-15 PROCEDURE — 95117 IMMUNOTHERAPY INJECTIONS: CPT | Mod: PBBFAC

## 2024-10-15 PROCEDURE — 99999 PR PBB SHADOW E&M-EST. PATIENT-LVL III: CPT | Mod: PBBFAC,,,

## 2024-10-22 ENCOUNTER — TELEPHONE (OUTPATIENT)
Dept: ALLERGY | Facility: CLINIC | Age: 69
End: 2024-10-22
Payer: MEDICARE

## 2024-10-22 NOTE — TELEPHONE ENCOUNTER
Spoke with pt. Received denial from  regarding her serum from June 2024. Would like to know who to speak with about this.  Pt given number for [employer].

## 2024-10-22 NOTE — TELEPHONE ENCOUNTER
Patient scheduled her allergy shot for next Tuesday.      ----- Message from Med Assistant Patel sent at 10/22/2024  2:17 PM CDT -----  Contact: Rita    ----- Message -----  From: Wesly Vazquez  Sent: 10/22/2024   2:05 PM CDT  To: On Allergy/Immunology Clinical Support    Patient is calling in regards to she states that she will like to schedule the appointment to have her allergy shot. She said that she saw an appointment this morning that was schedule and then it was gone. Call Back is [phone]

## 2024-10-22 NOTE — TELEPHONE ENCOUNTER
----- Message from Emanuel sent at 10/22/2024 11:50 AM CDT -----  Contact: [phone]  Type:  Patient Returning Call    Who Called:CORONA PONCE [[id number]]  Who Left Message for Patient:  Does the patient know what this is regarding?:need to talk to someone about her appointment  Would the patient rather a call back or a response via [facility]? Call back  Best Call Back Number:[phone]  Additional Information: n [phone]

## 2024-10-29 ENCOUNTER — CLINICAL SUPPORT (OUTPATIENT)
Dept: ALLERGY | Facility: CLINIC | Age: 69
End: 2024-10-29
Payer: MEDICARE

## 2024-10-29 DIAGNOSIS — J30.81 ALLERGY TO CATS: Primary | ICD-10-CM

## 2024-10-29 DIAGNOSIS — J30.1 SEASONAL ALLERGIC RHINITIS DUE TO POLLEN: ICD-10-CM

## 2024-10-29 DIAGNOSIS — J30.81 ALLERGY TO DOGS: ICD-10-CM

## 2024-10-29 PROCEDURE — 99999 PR PBB SHADOW E&M-EST. PATIENT-LVL III: CPT | Mod: PBBFAC,,,

## 2024-10-29 PROCEDURE — 95117 IMMUNOTHERAPY INJECTIONS: CPT | Mod: PBBFAC

## 2024-11-05 ENCOUNTER — CLINICAL SUPPORT (OUTPATIENT)
Dept: ALLERGY | Facility: CLINIC | Age: 69
End: 2024-11-05
Payer: MEDICARE

## 2024-11-05 DIAGNOSIS — J30.1 SEASONAL ALLERGIC RHINITIS DUE TO POLLEN: ICD-10-CM

## 2024-11-05 DIAGNOSIS — J30.81 ALLERGY TO DOGS: ICD-10-CM

## 2024-11-05 DIAGNOSIS — J30.81 ALLERGY TO CATS: Primary | ICD-10-CM

## 2024-11-05 PROCEDURE — 99999 PR PBB SHADOW E&M-EST. PATIENT-LVL III: CPT | Mod: PBBFAC,,,

## 2024-11-05 PROCEDURE — 95117 IMMUNOTHERAPY INJECTIONS: CPT | Mod: PBBFAC

## 2024-11-09 DIAGNOSIS — K21.9 GASTROESOPHAGEAL REFLUX DISEASE, UNSPECIFIED WHETHER ESOPHAGITIS PRESENT: ICD-10-CM

## 2024-11-12 ENCOUNTER — CLINICAL SUPPORT (OUTPATIENT)
Dept: ALLERGY | Facility: CLINIC | Age: 69
End: 2024-11-12
Payer: MEDICARE

## 2024-11-12 DIAGNOSIS — J30.81 ALLERGY TO CATS: Primary | ICD-10-CM

## 2024-11-12 DIAGNOSIS — J30.81 ALLERGY TO DOGS: ICD-10-CM

## 2024-11-12 DIAGNOSIS — J30.1 SEASONAL ALLERGIC RHINITIS DUE TO POLLEN: ICD-10-CM

## 2024-11-12 PROCEDURE — 95117 IMMUNOTHERAPY INJECTIONS: CPT | Mod: PBBFAC

## 2024-11-12 PROCEDURE — 99999 PR PBB SHADOW E&M-EST. PATIENT-LVL III: CPT | Mod: PBBFAC,,,

## 2024-11-12 RX ORDER — OMEPRAZOLE 40 MG/1
40 CAPSULE, DELAYED RELEASE ORAL
Qty: 180 CAPSULE | Refills: 0 | Status: SHIPPED | OUTPATIENT
Start: 2024-11-12 | End: 2025-02-10

## 2024-11-19 ENCOUNTER — CLINICAL SUPPORT (OUTPATIENT)
Dept: ALLERGY | Facility: CLINIC | Age: 69
End: 2024-11-19
Payer: MEDICARE

## 2024-11-19 DIAGNOSIS — J30.81 ALLERGY TO DOGS: ICD-10-CM

## 2024-11-19 DIAGNOSIS — J30.81 ALLERGY TO CATS: Primary | ICD-10-CM

## 2024-11-19 DIAGNOSIS — Z92.89 HISTORY OF IMMUNOTHERAPY: ICD-10-CM

## 2024-11-19 DIAGNOSIS — J30.1 SEASONAL ALLERGIC RHINITIS DUE TO POLLEN: ICD-10-CM

## 2024-11-19 PROCEDURE — 95117 IMMUNOTHERAPY INJECTIONS: CPT | Mod: PBBFAC

## 2024-11-19 PROCEDURE — 99999 PR PBB SHADOW E&M-EST. PATIENT-LVL III: CPT | Mod: PBBFAC,,,

## 2024-11-25 ENCOUNTER — PATIENT MESSAGE (OUTPATIENT)
Dept: INTERNAL MEDICINE | Facility: CLINIC | Age: 69
End: 2024-11-25
Payer: MEDICARE

## 2024-11-26 ENCOUNTER — CLINICAL SUPPORT (OUTPATIENT)
Dept: ALLERGY | Facility: CLINIC | Age: 69
End: 2024-11-26
Payer: MEDICARE

## 2024-11-26 DIAGNOSIS — J30.81 ALLERGY TO DOGS: ICD-10-CM

## 2024-11-26 DIAGNOSIS — J30.81 ALLERGY TO CATS: Primary | ICD-10-CM

## 2024-11-26 DIAGNOSIS — J30.1 SEASONAL ALLERGIC RHINITIS DUE TO POLLEN: ICD-10-CM

## 2024-11-26 DIAGNOSIS — J30.1 ALLERGIC RHINITIS DUE TO GRASS POLLEN: ICD-10-CM

## 2024-11-26 PROCEDURE — 99999 PR PBB SHADOW E&M-EST. PATIENT-LVL III: CPT | Mod: PBBFAC,,,

## 2024-11-26 PROCEDURE — 95117 IMMUNOTHERAPY INJECTIONS: CPT | Mod: PBBFAC

## 2024-12-03 ENCOUNTER — OFFICE VISIT (OUTPATIENT)
Dept: ALLERGY | Facility: CLINIC | Age: 69
End: 2024-12-03
Payer: MEDICARE

## 2024-12-03 ENCOUNTER — CLINICAL SUPPORT (OUTPATIENT)
Dept: ALLERGY | Facility: CLINIC | Age: 69
End: 2024-12-03
Payer: MEDICARE

## 2024-12-03 VITALS
TEMPERATURE: 98 F | HEART RATE: 78 BPM | DIASTOLIC BLOOD PRESSURE: 66 MMHG | SYSTOLIC BLOOD PRESSURE: 110 MMHG | BODY MASS INDEX: 21.13 KG/M2 | WEIGHT: 127 LBS

## 2024-12-03 DIAGNOSIS — J30.1 SEASONAL ALLERGIC RHINITIS DUE TO POLLEN: Primary | ICD-10-CM

## 2024-12-03 DIAGNOSIS — J30.1 ALLERGIC RHINITIS DUE TO GRASS POLLEN: ICD-10-CM

## 2024-12-03 DIAGNOSIS — J30.1 SEASONAL ALLERGIC RHINITIS DUE TO POLLEN: ICD-10-CM

## 2024-12-03 DIAGNOSIS — J30.81 ALLERGY TO CATS: ICD-10-CM

## 2024-12-03 DIAGNOSIS — J30.81 ALLERGIC RHINITIS DUE TO ANIMAL (CAT) (DOG) HAIR AND DANDER: Primary | ICD-10-CM

## 2024-12-03 DIAGNOSIS — Z88.9 MULTIPLE DRUG ALLERGIES: ICD-10-CM

## 2024-12-03 DIAGNOSIS — J30.81 ALLERGY TO DOGS: ICD-10-CM

## 2024-12-03 PROCEDURE — 95117 IMMUNOTHERAPY INJECTIONS: CPT | Mod: PBBFAC

## 2024-12-03 PROCEDURE — 99999 PR PBB SHADOW E&M-EST. PATIENT-LVL III: CPT | Mod: PBBFAC,,, | Performed by: ALLERGY & IMMUNOLOGY

## 2024-12-03 PROCEDURE — 99215 OFFICE O/P EST HI 40 MIN: CPT | Mod: S$PBB,,, | Performed by: ALLERGY & IMMUNOLOGY

## 2024-12-03 PROCEDURE — G2211 COMPLEX E/M VISIT ADD ON: HCPCS | Mod: S$PBB,,, | Performed by: ALLERGY & IMMUNOLOGY

## 2024-12-03 PROCEDURE — 99213 OFFICE O/P EST LOW 20 MIN: CPT | Mod: PBBFAC | Performed by: ALLERGY & IMMUNOLOGY

## 2024-12-03 NOTE — PROGRESS NOTES
"Subjective:      Patient ID: Rita Dejesus is a 69 y.o. female.        Chief Complaint:  Follow-up        HPI: 12/3/2024: 69 year old female with a history of allergic rhinitis- dog, cat, pollen here for follow up. Last seen 6/3/2024.  On SCIT- doing well- 1:10 dilution  No systemic reactions  NO runny nose or nasal congestion, occasional post nasal drip  Astelin. Flonase- "one in the morning and one at night", xyzal and singulair    Sulfa- nausea and vomiting  Nitrofuran- nausea and vomiting  Zoloft -rash      Not avoiding any foods  No food allergies/anaphylaxis    Sulfa drug allergy      She denies asthma.  She denies atopic dermatitis.  She denies insect sting allergy.    Malt- tickle in her throat        Past Medical History:   Diagnosis Date    General anesthetics causing adverse effect in therapeutic use     difficulty breathing when waking    GERD (gastroesophageal reflux disease)     History of cervical cancer     History of rectal or anal cancer         Family History   Problem Relation Name Age of Onset    Diabetes Mother          type II    Hyperlipidemia Mother      Heart disease Father      Hypertension Father      Cataracts Father      Hypertension Sister      Diabetes Maternal Grandmother      Diabetes Maternal Grandfather          Current Outpatient Medications on File Prior to Visit   Medication Sig Dispense Refill    azelastine (ASTELIN) 137 mcg (0.1 %) nasal spray 1 spray (137 mcg total) by Nasal route 2 (two) times daily. 20 mL 5    betamethasone dipropionate 0.05 % cream Apply topically 2 (two) times daily. For up to 2-4 weeks 45 g 0    bran/gum/fib/eliecer/psyl/kelp/pec (FIBER 6 ORAL) Take 1 tablet by mouth once daily. 12 mg collin      calcium carbonate 650 mg calcium (1,625 mg) tablet Take 1 tablet by mouth once daily.      COLLAGEN MISC by Misc.(Non-Drug; Combo Route) route.      cyanocobalamin (VITAMIN B-12) 250 MCG tablet Take 250 mcg by mouth once daily.      diclofenac sodium " (SOLARAZE) 3 % gel AAA tid 100 g 2    EPINEPHrine (EPIPEN) 0.3 mg/0.3 mL AtIn Inject 0.3 mLs (0.3 mg total) into the muscle as needed (anaphylaxis). 2 each 3    fluticasone propionate (FLONASE) 50 mcg/actuation nasal spray 1 spray (50 mcg total) by Each Nostril route once daily. 32 g 0    ketoconazole (NIZORAL) 2 % shampoo Wash hair with medicated shampoo at least 2x/week - let sit on scalp at least 5 minutes prior to rinsing 120 mL 5    levocetirizine (XYZAL) 5 MG tablet Take 5 mg by mouth every evening.      montelukast (SINGULAIR) 10 mg tablet Take 10 mg by mouth every evening.      multivitamin capsule Take 1 capsule by mouth once daily.      omeprazole (PRILOSEC) 40 MG capsule Take 1 capsule (40 mg total) by mouth 2 (two) times daily before meals. 180 capsule 0    oxybutynin (DITROPAN-XL) 10 MG 24 hr tablet TAKE 1 TABLET BY MOUTH EVERY DAY 30 tablet 11    simvastatin (ZOCOR) 40 MG tablet TAKE 1 TABLET BY MOUTH EVERY EVENING 90 tablet 3    venlafaxine (EFFEXOR-XR) 150 MG Cp24 Take 1 capsule (150 mg total) by mouth once daily. 30 capsule 11    vitamin D (VITAMIN D3) 1000 units Tab Take 185 mg by mouth.      buPROPion (WELLBUTRIN XL) 150 MG TB24 tablet TAKE 1 TABLET BY MOUTH ONCE DAILY 90 tablet 1    clobetasoL (TEMOVATE) 0.05 % external solution Use on scalp one - two times daily as needed for scaling or itching for up to 4 weeks per course 50 mL 3     No current facility-administered medications on file prior to visit.        Review of patient's allergies indicates:   Allergen Reactions    Sulfa (sulfonamide antibiotics) Nausea And Vomiting    Nitrofuran analogues Nausea And Vomiting     Microdantin     Zoloft [sertraline] Rash        Environmental History: Pets in the home: dogs (5).  Tobacco Smoke in Home: no  Review of Systems   Constitutional:  Negative for chills and fever.   HENT:  Positive for postnasal drip. Negative for congestion and rhinorrhea.    Eyes:  Negative for discharge and itching.    Respiratory:  Negative for cough, shortness of breath and wheezing.    Gastrointestinal:         GERD   Skin:  Negative for rash.   Allergic/Immunologic: Positive for environmental allergies. Negative for food allergies and immunocompromised state.   Neurological:  Negative for facial asymmetry and speech difficulty.   Psychiatric/Behavioral:  Negative for behavioral problems and suicidal ideas.        Objective:   Physical Exam  Vitals reviewed.   Constitutional:       General: She is not in acute distress.     Appearance: Normal appearance. She is well-developed. She is not ill-appearing, toxic-appearing or diaphoretic.   HENT:      Head: Normocephalic and atraumatic.      Right Ear: Tympanic membrane, ear canal and external ear normal. There is no impacted cerumen.      Left Ear: Tympanic membrane, ear canal and external ear normal. There is no impacted cerumen.      Nose: Nose normal. No congestion or rhinorrhea.      Mouth/Throat:      Pharynx: No oropharyngeal exudate or posterior oropharyngeal erythema.   Eyes:      General: No scleral icterus.        Right eye: No discharge.         Left eye: No discharge.      Pupils: Pupils are equal, round, and reactive to light.   Neck:      Thyroid: No thyromegaly.   Cardiovascular:      Rate and Rhythm: Normal rate and regular rhythm.      Heart sounds: Normal heart sounds. No murmur heard.     No friction rub. No gallop.   Pulmonary:      Effort: Pulmonary effort is normal. No respiratory distress.      Breath sounds: Normal breath sounds. No stridor. No wheezing, rhonchi or rales.   Chest:      Chest wall: No tenderness.   Musculoskeletal:         General: No swelling, tenderness, deformity or signs of injury. Normal range of motion.      Cervical back: Normal range of motion and neck supple. No rigidity. No muscular tenderness.      Right lower leg: No edema.      Left lower leg: No edema.   Lymphadenopathy:      Cervical: No cervical adenopathy.   Skin:      General: Skin is warm.      Coloration: Skin is not jaundiced or pale.      Findings: No bruising, erythema or rash.   Neurological:      General: No focal deficit present.      Mental Status: She is alert and oriented to person, place, and time.      Gait: Gait normal.   Psychiatric:         Mood and Affect: Mood normal.         Behavior: Behavior normal.         Thought Content: Thought content normal.         Judgment: Judgment normal.         Assessment:      1. Allergic rhinitis due to animal (cat) (dog) hair and dander    2. Seasonal allergic rhinitis due to pollen    3. Multiple drug allergies            Plan:     Allergic rhinitis due to animal (cat) (dog) hair and dander    Seasonal allergic rhinitis due to pollen    Multiple drug allergies        Continue SCIT/allergy immunotherapy    Continue Xyzal,Flonase, Astelin and Singulair      Recommend avoidance of sulf drugs, and other medications that have caused symptoms.      Visit today included increased complexity associated with the care of the episodic problem  Allergic rhinitis and drug allergies addressed and managing the longitudinal care of the patient due to the serious and/or complex managed problem(s)  Allergic rhinitis and drug allergies.       RTC  1 year   HERNESTO RIVERA spent a total of 42 minutes on the day of the visit.This includes face to face time and non-face to face time preparing to see the patient (eg, review of tests), obtaining and/or reviewing separately obtained history, documenting clinical information in the electronic or other health record, independently interpreting results and communicating results to the patient/family/caregiver, or care coordinator.

## 2024-12-04 DIAGNOSIS — N18.31 CHRONIC KIDNEY DISEASE, STAGE 3A: ICD-10-CM

## 2024-12-05 ENCOUNTER — TELEPHONE (OUTPATIENT)
Dept: INTERNAL MEDICINE | Facility: CLINIC | Age: 69
End: 2024-12-05
Payer: MEDICARE

## 2024-12-05 DIAGNOSIS — E78.5 HYPERLIPIDEMIA, UNSPECIFIED HYPERLIPIDEMIA TYPE: ICD-10-CM

## 2024-12-05 DIAGNOSIS — R73.9 HYPERGLYCEMIA: ICD-10-CM

## 2024-12-05 DIAGNOSIS — N18.31 CHRONIC KIDNEY DISEASE, STAGE 3A: Primary | ICD-10-CM

## 2024-12-05 NOTE — TELEPHONE ENCOUNTER
Patient need to know if she need fasting blood work prior to her appointment on 12/11/24?  If so please input orders.  Patient will come in tomorrow to be completed.

## 2024-12-06 NOTE — TELEPHONE ENCOUNTER
Called [facility] and informed him that he received Taxotere. He verbalized understanding and had no further questions.    Lab orders are in

## 2024-12-06 NOTE — TELEPHONE ENCOUNTER
Left message on machine fasting labs have been placed. To be completed prior to appointment on 12/11.

## 2024-12-09 ENCOUNTER — LAB VISIT (OUTPATIENT)
Dept: LAB | Facility: HOSPITAL | Age: 69
End: 2024-12-09
Attending: FAMILY MEDICINE
Payer: MEDICARE

## 2024-12-09 DIAGNOSIS — R73.9 HYPERGLYCEMIA: ICD-10-CM

## 2024-12-09 DIAGNOSIS — E78.5 HYPERLIPIDEMIA, UNSPECIFIED HYPERLIPIDEMIA TYPE: ICD-10-CM

## 2024-12-09 DIAGNOSIS — N18.31 CHRONIC KIDNEY DISEASE, STAGE 3A: ICD-10-CM

## 2024-12-09 LAB
ALBUMIN SERPL BCP-MCNC: 3.9 G/DL (ref 3.5–5.2)
ALP SERPL-CCNC: 96 U/L (ref 40–150)
ALT SERPL W/O P-5'-P-CCNC: 9 U/L (ref 10–44)
ANION GAP SERPL CALC-SCNC: 12 MMOL/L (ref 8–16)
AST SERPL-CCNC: 24 U/L (ref 10–40)
BASOPHILS # BLD AUTO: 0.04 K/UL (ref 0–0.2)
BASOPHILS NFR BLD: 0.7 % (ref 0–1.9)
BILIRUB SERPL-MCNC: 0.4 MG/DL (ref 0.1–1)
BUN SERPL-MCNC: 15 MG/DL (ref 8–23)
CALCIUM SERPL-MCNC: 10.3 MG/DL (ref 8.7–10.5)
CHLORIDE SERPL-SCNC: 110 MMOL/L (ref 95–110)
CHOLEST SERPL-MCNC: 160 MG/DL (ref 120–199)
CHOLEST/HDLC SERPL: 3.1 {RATIO} (ref 2–5)
CO2 SERPL-SCNC: 21 MMOL/L (ref 23–29)
CREAT SERPL-MCNC: 1.2 MG/DL (ref 0.5–1.4)
DIFFERENTIAL METHOD BLD: ABNORMAL
EOSINOPHIL # BLD AUTO: 0.3 K/UL (ref 0–0.5)
EOSINOPHIL NFR BLD: 4.5 % (ref 0–8)
ERYTHROCYTE [DISTWIDTH] IN BLOOD BY AUTOMATED COUNT: 14.4 % (ref 11.5–14.5)
EST. GFR  (NO RACE VARIABLE): 48.7 ML/MIN/1.73 M^2
ESTIMATED AVG GLUCOSE: 108 MG/DL (ref 68–131)
GLUCOSE SERPL-MCNC: 89 MG/DL (ref 70–110)
HBA1C MFR BLD: 5.4 % (ref 4–5.6)
HCT VFR BLD AUTO: 44.3 % (ref 37–48.5)
HDLC SERPL-MCNC: 51 MG/DL (ref 40–75)
HDLC SERPL: 31.9 % (ref 20–50)
HGB BLD-MCNC: 14 G/DL (ref 12–16)
IMM GRANULOCYTES # BLD AUTO: 0.01 K/UL (ref 0–0.04)
IMM GRANULOCYTES NFR BLD AUTO: 0.2 % (ref 0–0.5)
LDLC SERPL CALC-MCNC: 87 MG/DL (ref 63–159)
LYMPHOCYTES # BLD AUTO: 2 K/UL (ref 1–4.8)
LYMPHOCYTES NFR BLD: 36.7 % (ref 18–48)
MCH RBC QN AUTO: 28.6 PG (ref 27–31)
MCHC RBC AUTO-ENTMCNC: 31.6 G/DL (ref 32–36)
MCV RBC AUTO: 90 FL (ref 82–98)
MONOCYTES # BLD AUTO: 0.6 K/UL (ref 0.3–1)
MONOCYTES NFR BLD: 10.5 % (ref 4–15)
NEUTROPHILS # BLD AUTO: 2.6 K/UL (ref 1.8–7.7)
NEUTROPHILS NFR BLD: 47.4 % (ref 38–73)
NONHDLC SERPL-MCNC: 109 MG/DL
NRBC BLD-RTO: 0 /100 WBC
PLATELET # BLD AUTO: 232 K/UL (ref 150–450)
PMV BLD AUTO: 15 FL (ref 9.2–12.9)
POTASSIUM SERPL-SCNC: 4.5 MMOL/L (ref 3.5–5.1)
PROT SERPL-MCNC: 7.3 G/DL (ref 6–8.4)
RBC # BLD AUTO: 4.9 M/UL (ref 4–5.4)
SODIUM SERPL-SCNC: 143 MMOL/L (ref 136–145)
T4 FREE SERPL-MCNC: 0.82 NG/DL (ref 0.71–1.51)
TRIGL SERPL-MCNC: 110 MG/DL (ref 30–150)
TSH SERPL DL<=0.005 MIU/L-ACNC: 6.05 UIU/ML (ref 0.4–4)
WBC # BLD AUTO: 5.51 K/UL (ref 3.9–12.7)

## 2024-12-09 PROCEDURE — 84443 ASSAY THYROID STIM HORMONE: CPT | Performed by: FAMILY MEDICINE

## 2024-12-09 PROCEDURE — 83036 HEMOGLOBIN GLYCOSYLATED A1C: CPT | Performed by: FAMILY MEDICINE

## 2024-12-09 PROCEDURE — 84439 ASSAY OF FREE THYROXINE: CPT | Performed by: FAMILY MEDICINE

## 2024-12-09 PROCEDURE — 80053 COMPREHEN METABOLIC PANEL: CPT | Performed by: FAMILY MEDICINE

## 2024-12-09 PROCEDURE — 85025 COMPLETE CBC W/AUTO DIFF WBC: CPT | Performed by: FAMILY MEDICINE

## 2024-12-09 PROCEDURE — 80061 LIPID PANEL: CPT | Performed by: FAMILY MEDICINE

## 2024-12-10 ENCOUNTER — CLINICAL SUPPORT (OUTPATIENT)
Dept: ALLERGY | Facility: CLINIC | Age: 69
End: 2024-12-10
Payer: MEDICARE

## 2024-12-10 DIAGNOSIS — J30.81 ALLERGY TO CATS: Primary | ICD-10-CM

## 2024-12-10 DIAGNOSIS — J30.1 SEASONAL ALLERGIC RHINITIS DUE TO POLLEN: ICD-10-CM

## 2024-12-10 DIAGNOSIS — J30.81 ALLERGY TO DOGS: ICD-10-CM

## 2024-12-10 DIAGNOSIS — J30.1 ALLERGIC RHINITIS DUE TO GRASS POLLEN: ICD-10-CM

## 2024-12-10 PROCEDURE — 99999 PR PBB SHADOW E&M-EST. PATIENT-LVL III: CPT | Mod: PBBFAC,,,

## 2024-12-10 PROCEDURE — 95117 IMMUNOTHERAPY INJECTIONS: CPT | Mod: PBBFAC

## 2024-12-11 ENCOUNTER — OFFICE VISIT (OUTPATIENT)
Dept: INTERNAL MEDICINE | Facility: CLINIC | Age: 69
End: 2024-12-11
Payer: MEDICARE

## 2024-12-11 VITALS
OXYGEN SATURATION: 100 % | TEMPERATURE: 97 F | SYSTOLIC BLOOD PRESSURE: 114 MMHG | BODY MASS INDEX: 23.32 KG/M2 | DIASTOLIC BLOOD PRESSURE: 64 MMHG | HEIGHT: 62 IN | WEIGHT: 126.75 LBS | HEART RATE: 82 BPM

## 2024-12-11 DIAGNOSIS — E78.5 HYPERLIPIDEMIA, UNSPECIFIED HYPERLIPIDEMIA TYPE: ICD-10-CM

## 2024-12-11 DIAGNOSIS — Z23 NEEDS FLU SHOT: ICD-10-CM

## 2024-12-11 DIAGNOSIS — N18.31 CHRONIC KIDNEY DISEASE, STAGE 3A: ICD-10-CM

## 2024-12-11 DIAGNOSIS — F32.4 MAJOR DEPRESSIVE DISORDER IN PARTIAL REMISSION, UNSPECIFIED WHETHER RECURRENT: ICD-10-CM

## 2024-12-11 DIAGNOSIS — R73.9 HYPERGLYCEMIA: ICD-10-CM

## 2024-12-11 DIAGNOSIS — R79.89 ABNORMAL TSH: ICD-10-CM

## 2024-12-11 DIAGNOSIS — R94.6 ABNORMAL RESULTS OF THYROID FUNCTION STUDIES: ICD-10-CM

## 2024-12-11 DIAGNOSIS — R00.2 PALPITATIONS: Primary | ICD-10-CM

## 2024-12-11 PROCEDURE — G0008 ADMIN INFLUENZA VIRUS VAC: HCPCS | Mod: PBBFAC,PO

## 2024-12-11 PROCEDURE — 99999PBSHW PR PBB SHADOW TECHNICAL ONLY FILED TO HB: Mod: PBBFAC,,,

## 2024-12-11 PROCEDURE — 90653 IIV ADJUVANT VACCINE IM: CPT | Mod: PBBFAC,PO

## 2024-12-11 PROCEDURE — 99214 OFFICE O/P EST MOD 30 MIN: CPT | Mod: PBBFAC,PO,25 | Performed by: FAMILY MEDICINE

## 2024-12-11 PROCEDURE — 99999 PR PBB SHADOW E&M-EST. PATIENT-LVL IV: CPT | Mod: PBBFAC,,, | Performed by: FAMILY MEDICINE

## 2024-12-11 RX ORDER — VENLAFAXINE HYDROCHLORIDE 75 MG/1
75 CAPSULE, EXTENDED RELEASE ORAL DAILY
Qty: 30 CAPSULE | Refills: 11 | Status: SHIPPED | OUTPATIENT
Start: 2024-12-11 | End: 2025-12-11

## 2024-12-11 RX ORDER — BUPROPION HYDROCHLORIDE 150 MG/1
150 TABLET ORAL DAILY
Qty: 90 TABLET | Refills: 1 | Status: SHIPPED | OUTPATIENT
Start: 2024-12-11

## 2024-12-11 RX ADMIN — INFLUENZA A VIRUS A/VICTORIA/4897/2022 IVR-238 (H1N1) ANTIGEN (FORMALDEHYDE INACTIVATED), INFLUENZA A VIRUS A/THAILAND/8/2022 IVR-237 (H3N2) ANTIGEN (FORMALDEHYDE INACTIVATED), INFLUENZA B VIRUS B/AUSTRIA/1359417/2021 BVR-26 ANTIGEN (FORMALDEHYDE INACTIVATED) 0.5 ML: 15; 15; 15 INJECTION, SUSPENSION INTRAMUSCULAR at 08:12

## 2024-12-11 NOTE — PROGRESS NOTES
"Subjective:      Patient ID: Rita Dejesus is a 69 y.o. female.    Chief Complaint: Follow-up      History of Present Illness    CHIEF COMPLAINT:  Ms. Dejesus presents today for routine follow up - reviewed recent labs today with patient. She is also with concerns about irregular heartbeats and medication management.    CARDIOVASCULAR:  She notices occasional skipped heartbeats with varying intervals. She denies feeling these irregularities. She had noticed initially when checking her heartbeat. Previous EKGs have shown irregularities but not sure what type. After undergoing chemotherapy in the past, she was referred to a cardiologist to assess for potential cardiac damage. At that time, the cardiac evaluation was reportedly normal.    PSYCHIATRIC:  She expresses a desire to discontinue venlafaxine, reporting it does not feel effective. She was diagnosed with ADHD approximately 20 years ago but was prescribed antidepressants instead of ADHD-specific medications. She acknowledges experiencing withdrawal symptoms when missing venlafaxine doses, indicating the need for a slow tapering process. She was on Wellbutrin from January to May.    FAMILY HISTORY:  She reports a family history of diabetes in her mother, maternal grandmother, and maternal great-grandparents. Both of her parents had thyroid issues.    DIET:  She recently started eating meat again due to her gynecologist's concerns about protein intake. However, she expresses a dislike for the taste of meat, describing it as "awful." She indicates a preference for fish over other meats.    LABS:  Chronic kidney disease stage 3 remains stable. Liver function tests are within normal limits. Thyroid stimulating hormone is slightly elevated, but T4 is within normal range. A1C remains in the normal range. Cholesterol levels have increased slightly.        Past Medical History:   Diagnosis Date    General anesthetics causing adverse effect in therapeutic use     " difficulty breathing when waking    GERD (gastroesophageal reflux disease)     History of cervical cancer     History of rectal or anal cancer           Past Surgical History:   Procedure Laterality Date    ADENOIDECTOMY      carpal tunel Bilateral     CATARACT EXTRACTION Left 05/06/2021    NEAR    CATARACT EXTRACTION W/ INTRAOCULAR LENS IMPLANT Right 05/20/2021    CERVICAL CONIZATION   W/ LASER      COLONOSCOPY N/A 11/17/2020    Procedure: COLONOSCOPY;  Surgeon: Kendy De Leon MD;  Location: [facility];  Service: Endoscopy;  Laterality: N/A;    ESOPHAGOGASTRODUODENOSCOPY N/A 12/22/2020    Procedure: ESOPHAGOGASTRODUODENOSCOPY (EGD);  Surgeon: Goyo Kapadia MD;  Location: [facility];  Service: Endoscopy;  Laterality: N/A;    ESOPHAGOGASTRODUODENOSCOPY N/A 05/09/2022    Procedure: EGD (ESOPHAGOGASTRODUODENOSCOPY);  Surgeon: Misa Crocker MD;  Location: [facility];  Service: Gastroenterology;  Laterality: N/A;    ESOPHAGOGASTRODUODENOSCOPY N/A 9/7/2022    Procedure: EGD (ESOPHAGOGASTRODUODENOSCOPY);  Surgeon: Misa Crocker MD;  Location: [facility];  Service: Gastroenterology;  Laterality: N/A;    ESOPHAGOGASTRODUODENOSCOPY N/A 6/28/2024    Procedure: EGD (ESOPHAGOGASTRODUODENOSCOPY);  Surgeon: Misa Crocker MD;  Location: [facility];  Service: Gastroenterology;  Laterality: N/A;    HERNIA REPAIR      IMPLANTATION OF PERMANENT SACRAL NERVE STIMULATOR N/A 1/26/2023    Procedure: INSERTION, NEUROSTIMULATOR, PERMANENT, SACRAL;  Surgeon: Andra Painting MD;  Location: [facility];  Service: Urology;  Laterality: N/A;    INSERTION, NEUROSTIMULATOR, TEMPORARY, SACRAL N/A 1/12/2023    Procedure: INSERTION, NEUROSTIMULATOR, TEMPORARY, SACRAL;  Surgeon: Andra Painting MD;  Location: [facility];  Service: Urology;  Laterality: N/A;  stage 1    TONSILLECTOMY       Family History   Problem Relation Name Age of Onset    Diabetes Mother          type II    Hyperlipidemia Mother      Heart disease Father       Hypertension Father      Cataracts Father      Hypertension Sister      Diabetes Maternal Grandmother      Diabetes Maternal Grandfather       Social History     Socioeconomic History    Marital status:    Tobacco Use    Smoking status: Never    Smokeless tobacco: Never   Substance and Sexual Activity    Alcohol use: Yes     Comment: Social , hold told prior to sx    Drug use: Never    Sexual activity: Not Currently     Partners: Male     Social Drivers of Health     Financial Resource Strain: Low Risk  (7/26/2023)    Overall Financial Resource Strain (CARDIA)     Difficulty of Paying Living Expenses: Not hard at all   Food Insecurity: No Food Insecurity (7/26/2023)    Hunger Vital Sign     Worried About Running Out of Food in the Last Year: Never true     Ran Out of Food in the Last Year: Never true   Transportation Needs: No Transportation Needs (7/26/2023)    PRAPARE - Transportation     Lack of Transportation (Medical): No     Lack of Transportation (Non-Medical): No   Physical Activity: Inactive (7/26/2023)    Exercise Vital Sign     Days of Exercise per Week: 0 days     Minutes of Exercise per Session: 0 min   Stress: No Stress Concern Present (7/26/2023)    [place] [employer] - Occupational Stress Questionnaire     Feeling of Stress : Only a little   Housing Stability: Low Risk  (7/26/2023)    Housing Stability Vital Sign     Unable to Pay for Housing in the Last Year: No     Number of Places Lived in the Last Year: 1     Unstable Housing in the Last Year: No     Review of patient's allergies indicates:   Allergen Reactions    Sulfa (sulfonamide antibiotics) Nausea And Vomiting    Nitrofuran analogues Nausea And Vomiting     Microdantin     Zoloft [sertraline] Rash       Review of Systems   Constitutional:  Negative for chills, fever and malaise/fatigue.   HENT:  Negative for congestion.    Respiratory:  Negative for cough and shortness of breath.    Cardiovascular:  Negative for  "chest pain, palpitations, claudication and leg swelling.   Gastrointestinal:  Negative for abdominal pain and nausea.   Musculoskeletal:  Negative for myalgias.   Skin:  Negative for rash.     Objective:       /64   Pulse 82   Temp 97.1 °F (36.2 °C) (Tympanic)   Ht 5' 2" (1.575 m)   Wt 57.5 kg (126 lb 12.2 oz)   SpO2 100%   BMI 23.19 kg/m²   Physical Exam    Cardiovascular: Premature beats heard on auscultation.        Physical Exam  Vitals reviewed.   Constitutional:       General: She is not in acute distress.     Appearance: Normal appearance. She is well-developed. She is not ill-appearing or diaphoretic.   HENT:      Head: Normocephalic and atraumatic.      Right Ear: Hearing, tympanic membrane, ear canal and external ear normal.      Left Ear: Hearing, tympanic membrane, ear canal and external ear normal.      Nose: Nose normal.      Mouth/Throat:      Pharynx: Uvula midline. No oropharyngeal exudate.   Eyes:      Conjunctiva/sclera: Conjunctivae normal.      Pupils: Pupils are equal, round, and reactive to light.   Neck:      Thyroid: No thyromegaly.      Trachea: No tracheal deviation.   Cardiovascular:      Rate and Rhythm: Normal rate. Rhythm irregular.      Heart sounds: Normal heart sounds. No murmur heard.  Pulmonary:      Effort: Pulmonary effort is normal. No respiratory distress.      Breath sounds: Normal breath sounds.   Abdominal:      General: Bowel sounds are normal.      Palpations: Abdomen is soft.      Tenderness: There is no abdominal tenderness. There is no guarding.      Hernia: No hernia is present.   Musculoskeletal:         General: Normal range of motion.      Cervical back: Normal range of motion and neck supple.      Right lower leg: No edema.      Left lower leg: No edema.   Lymphadenopathy:      Cervical: No cervical adenopathy.   Skin:     General: Skin is warm and dry.      Capillary Refill: Capillary refill takes less than 2 seconds.   Neurological:      General: No " focal deficit present.      Mental Status: She is alert and oriented to person, place, and time.   Psychiatric:         Mood and Affect: Mood normal.         Behavior: Behavior normal.         Thought Content: Thought content normal.         Judgment: Judgment normal.         Assessment:     1. Palpitations    2. Chronic kidney disease, stage 3a    3. Hyperglycemia    4. Hyperlipidemia, unspecified hyperlipidemia type    5. Major depressive disorder in partial remission, unspecified whether recurrent    6. Abnormal TSH    7. Abnormal results of thyroid function studies    8. Needs flu shot      Plan:   Assessment & Plan    ABNORMAL HEART RHYTHM:  - Identified irregular heartbeat with premature beats during exam, not present in previous EKGs.  - Ordered 48-hour Holter monitor to further evaluate arrhythmia.  - Considering referral to cardiologist pending Holter monitor results.  - Contact the office if experiencing lightheadedness, shortness of breath, or severe heart rhythm irregularities during Holter monitor period.    ATTENTION-DEFICIT HYPERACTIVITY DISORDER (ADHD):  - Recommend restarting Wellbutrin (bupropion) for ADHD management, as safer alternative to stimulants  - Explained that Wellbutrin (bupropion) acts on stimulant receptors in the brain, making it suitable for ADHD treatment.  - Started Wellbutrin (bupropion).    CHRONIC KIDNEY DISEASE:  - Noted stable chronic kidney disease (stage 3).    HYPERLIPIDEMIA:  - Observed slight increase in LDL cholesterol, likely due to patient resuming meat consumption.    HYPOTHYROIDISM:  - Identified elevated TSH with normal T4; plan to recheck in 6-8 weeks.  - Discussed significance of TSH and T4 levels in thyroid function assessment.  - Ordered thyroid function test (TSH and T4) for 6-8 weeks.    MEDICATION MANAGEMENT AND COUNSELING:  - Addressed patient's request to discontinue venlafaxine; agreed to taper off slowly.  - Discussed importance of gradual tapering for  "venlafaxine to minimize withdrawal effects.  - Educated on symptoms of venlafaxine withdrawal, including dizziness, nausea, and "brain zaps".  - Decreased venlafaxine: Taper from 150 mg to 75 mg extended release, taken every other day for 1 month.  - Then transition to 75 mg daily for 1 month.  - Further taper to 37.5 mg to be determined.  - Message through patient portal if any issues arise during venlafaxine taper.    IMMUNIZATION:  - Administered flu vaccine during current visit.    FOLLOW-UP AND MONITORING:  - Ordered comprehensive metabolic panel, lipid panel, and A1C for 6 months.  - Follow up in 6 months for lab work and reevaluation.        Palpitations  -     Holter monitor - 48 hour; Future    Chronic kidney disease, stage 3a  -     Hemoglobin A1C; Future; Expected date: 06/09/2025  -     Comprehensive Metabolic Panel; Future; Expected date: 06/09/2025  -     Lipid Panel; Future; Expected date: 06/09/2025  -     CBC Auto Differential; Future; Expected date: 06/09/2025  -     TSH; Future; Expected date: 06/09/2025  -     T4, Free; Future; Expected date: 03/11/2025    Hyperglycemia  -     Hemoglobin A1C; Future; Expected date: 06/09/2025  -     Comprehensive Metabolic Panel; Future; Expected date: 06/09/2025  -     Lipid Panel; Future; Expected date: 06/09/2025  -     CBC Auto Differential; Future; Expected date: 06/09/2025  -     TSH; Future; Expected date: 06/09/2025  -     T4, Free; Future; Expected date: 03/11/2025    Hyperlipidemia, unspecified hyperlipidemia type  -     Hemoglobin A1C; Future; Expected date: 06/09/2025  -     Comprehensive Metabolic Panel; Future; Expected date: 06/09/2025  -     Lipid Panel; Future; Expected date: 06/09/2025  -     CBC Auto Differential; Future; Expected date: 06/09/2025  -     TSH; Future; Expected date: 06/09/2025  -     T4, Free; Future; Expected date: 03/11/2025    Major depressive disorder in partial remission, unspecified whether recurrent    Abnormal TSH  -     " TSH; Future; Expected date: 12/11/2024  -     T4, Free; Future; Expected date: 03/11/2025    Abnormal results of thyroid function studies  -     TSH; Future; Expected date: 12/11/2024  -     T4, Free; Future; Expected date: 03/11/2025    Needs flu shot  -     influenza (adjuvanted) (Fluad) 45 mcg/0.5 mL IM vaccine (> or = 66 yo) 0.5 mL    Other orders  -     venlafaxine (EFFEXOR-XR) 75 MG 24 hr capsule; Take 1 capsule (75 mg total) by mouth once daily.  Dispense: 30 capsule; Refill: 11  -     buPROPion (WELLBUTRIN XL) 150 MG TB24 tablet; Take 1 tablet (150 mg total) by mouth once daily.  Dispense: 90 tablet; Refill: 1      Medication List with Changes/Refills   New Medications    VENLAFAXINE (EFFEXOR-XR) 75 MG 24 HR CAPSULE    Take 1 capsule (75 mg total) by mouth once daily.   Current Medications    AZELASTINE (ASTELIN) 137 MCG (0.1 %) NASAL SPRAY    1 spray (137 mcg total) by Nasal route 2 (two) times daily.    BETAMETHASONE DIPROPIONATE 0.05 % CREAM    Apply topically 2 (two) times daily. For up to 2-4 weeks    BRAN/GUM/FIB/SOLANGE/PSYL/KELP/PEC (FIBER 6 ORAL)    Take 1 tablet by mouth once daily. 12 mg collin    CALCIUM CARBONATE 650 MG CALCIUM (1,625 MG) TABLET    Take 1 tablet by mouth once daily.    CLOBETASOL (TEMOVATE) 0.05 % EXTERNAL SOLUTION    Use on scalp one - two times daily as needed for scaling or itching for up to 4 weeks per course    COLLAGEN MISC    by Misc.(Non-Drug; Combo Route) route.    CYANOCOBALAMIN (VITAMIN B-12) 250 MCG TABLET    Take 250 mcg by mouth once daily.    DICLOFENAC SODIUM (SOLARAZE) 3 % GEL    AAA tid    EPINEPHRINE (EPIPEN) 0.3 MG/0.3 ML ATIN    Inject 0.3 mLs (0.3 mg total) into the muscle as needed (anaphylaxis).    FLUTICASONE PROPIONATE (FLONASE) 50 MCG/ACTUATION NASAL SPRAY    1 spray (50 mcg total) by Each Nostril route once daily.    KETOCONAZOLE (NIZORAL) 2 % SHAMPOO    Wash hair with medicated shampoo at least 2x/week - let sit on scalp at least 5 minutes prior to  rinsing    LEVOCETIRIZINE (XYZAL) 5 MG TABLET    Take 5 mg by mouth every evening.    MONTELUKAST (SINGULAIR) 10 MG TABLET    Take 10 mg by mouth every evening.    MULTIVITAMIN CAPSULE    Take 1 capsule by mouth once daily.    OMEPRAZOLE (PRILOSEC) 40 MG CAPSULE    Take 1 capsule (40 mg total) by mouth 2 (two) times daily before meals.    OXYBUTYNIN (DITROPAN-XL) 10 MG 24 HR TABLET    TAKE 1 TABLET BY MOUTH EVERY DAY    SIMVASTATIN (ZOCOR) 40 MG TABLET    TAKE 1 TABLET BY MOUTH EVERY EVENING    VENLAFAXINE (EFFEXOR-XR) 150 MG CP24    Take 1 capsule (150 mg total) by mouth once daily.    VITAMIN D (VITAMIN D3) 1000 UNITS TAB    Take 185 mg by mouth.   Changed and/or Refilled Medications    Modified Medication Previous Medication    BUPROPION (WELLBUTRIN XL) 150 MG TB24 TABLET buPROPion (WELLBUTRIN XL) 150 MG TB24 tablet       Take 1 tablet (150 mg total) by mouth once daily.    TAKE 1 TABLET BY MOUTH ONCE DAILY       This note was generated with the assistance of ambient listening technology. Verbal consent was obtained by the patient and accompanying visitor(s) for the recording of patient appointment to facilitate this note. I attest to having reviewed and edited the generated note for accuracy, though some syntax or spelling errors may persist. Please contact the author of this note for any clarification.

## 2024-12-13 ENCOUNTER — HOSPITAL ENCOUNTER (OUTPATIENT)
Dept: CARDIOLOGY | Facility: HOSPITAL | Age: 69
Discharge: HOME | End: 2024-12-13
Attending: FAMILY MEDICINE
Payer: MEDICARE

## 2024-12-13 DIAGNOSIS — R00.2 PALPITATIONS: ICD-10-CM

## 2024-12-13 PROCEDURE — 93226 XTRNL ECG REC<48 HR SCAN A/R: CPT

## 2024-12-13 PROCEDURE — 93227 XTRNL ECG REC<48 HR R&I: CPT | Mod: ,,, | Performed by: INTERNAL MEDICINE

## 2024-12-17 ENCOUNTER — CLINICAL SUPPORT (OUTPATIENT)
Dept: ALLERGY | Facility: CLINIC | Age: 69
End: 2024-12-17
Payer: MEDICARE

## 2024-12-17 DIAGNOSIS — J30.81 ALLERGY TO CATS: Primary | ICD-10-CM

## 2024-12-17 DIAGNOSIS — J30.1 SEASONAL ALLERGIC RHINITIS DUE TO POLLEN: ICD-10-CM

## 2024-12-17 DIAGNOSIS — J30.81 ALLERGY TO DOGS: ICD-10-CM

## 2024-12-17 DIAGNOSIS — J30.1 ALLERGIC RHINITIS DUE TO GRASS POLLEN: ICD-10-CM

## 2024-12-17 PROCEDURE — 95117 IMMUNOTHERAPY INJECTIONS: CPT | Mod: PBBFAC

## 2024-12-17 PROCEDURE — 99999 PR PBB SHADOW E&M-EST. PATIENT-LVL II: CPT | Mod: PBBFAC,,,

## 2024-12-18 LAB
OHS CV EVENT MONITOR DAY: 0
OHS CV HOLTER LENGTH DECIMAL HOURS: 47.98
OHS CV HOLTER LENGTH HOURS: 47
OHS CV HOLTER LENGTH MINUTES: 59
OHS CV HOLTER SINUS AVERAGE HR: 89
OHS CV HOLTER SINUS MAX HR: 150
OHS CV HOLTER SINUS MIN HR: 69

## 2024-12-19 ENCOUNTER — PATIENT MESSAGE (OUTPATIENT)
Dept: INTERNAL MEDICINE | Facility: CLINIC | Age: 69
End: 2024-12-19
Payer: MEDICARE

## 2024-12-22 RX ORDER — VENLAFAXINE HYDROCHLORIDE 150 MG/1
150 CAPSULE, EXTENDED RELEASE ORAL
Qty: 30 CAPSULE | Refills: 11 | OUTPATIENT
Start: 2024-12-22

## 2024-12-22 NOTE — TELEPHONE ENCOUNTER
No care due was identified.  [facility] Embedded Care Due Messages. Reference number: [id number].   12/22/2024 8:04:18 AM CST

## 2024-12-23 NOTE — TELEPHONE ENCOUNTER
Refill Decision Note   Rita Dejesus  is requesting a refill authorization.  Brief Assessment and Rationale for Refill:  Quick Discontinue     Medication Therapy Plan:  Patient is tapering off of Venlafaxine. Dose lowered.      Comments:     Note composed:6:38 PM 12/22/2024

## 2024-12-26 ENCOUNTER — CLINICAL SUPPORT (OUTPATIENT)
Dept: ALLERGY | Facility: CLINIC | Age: 69
End: 2024-12-26
Payer: MEDICARE

## 2024-12-26 DIAGNOSIS — J30.81 ALLERGY TO CATS: Primary | ICD-10-CM

## 2024-12-26 DIAGNOSIS — J30.1 ALLERGIC RHINITIS DUE TO GRASS POLLEN: ICD-10-CM

## 2024-12-26 DIAGNOSIS — J30.81 ALLERGY TO DOGS: ICD-10-CM

## 2024-12-26 PROCEDURE — 95117 IMMUNOTHERAPY INJECTIONS: CPT | Mod: PBBFAC

## 2025-01-02 ENCOUNTER — CLINICAL SUPPORT (OUTPATIENT)
Dept: ALLERGY | Facility: CLINIC | Age: 70
End: 2025-01-02
Payer: MEDICARE

## 2025-01-02 DIAGNOSIS — J30.81 ALLERGIC RHINITIS DUE TO ANIMAL (CAT) (DOG) HAIR AND DANDER: ICD-10-CM

## 2025-01-02 DIAGNOSIS — J30.81 ALLERGY TO DOGS: ICD-10-CM

## 2025-01-02 DIAGNOSIS — J30.1 ALLERGIC RHINITIS DUE TO GRASS POLLEN: ICD-10-CM

## 2025-01-02 DIAGNOSIS — Z88.9 MULTIPLE DRUG ALLERGIES: ICD-10-CM

## 2025-01-02 DIAGNOSIS — J30.81 ALLERGY TO CATS: Primary | ICD-10-CM

## 2025-01-02 DIAGNOSIS — J30.1 SEASONAL ALLERGIC RHINITIS DUE TO POLLEN: ICD-10-CM

## 2025-01-16 ENCOUNTER — CLINICAL SUPPORT (OUTPATIENT)
Dept: ALLERGY | Facility: CLINIC | Age: 70
End: 2025-01-16
Payer: MEDICARE

## 2025-01-16 DIAGNOSIS — J30.81 ALLERGY TO DOGS: ICD-10-CM

## 2025-01-16 DIAGNOSIS — J30.1 ALLERGIC RHINITIS DUE TO GRASS POLLEN: ICD-10-CM

## 2025-01-16 DIAGNOSIS — J30.81 ALLERGY TO CATS: Primary | ICD-10-CM

## 2025-01-16 PROCEDURE — 99999 PR PBB SHADOW E&M-EST. PATIENT-LVL III: CPT | Mod: PBBFAC,,,

## 2025-01-16 PROCEDURE — 95117 IMMUNOTHERAPY INJECTIONS: CPT | Mod: PBBFAC

## 2025-01-29 ENCOUNTER — CLINICAL SUPPORT (OUTPATIENT)
Dept: ALLERGY | Facility: CLINIC | Age: 70
End: 2025-01-29
Payer: MEDICARE

## 2025-01-29 DIAGNOSIS — J30.81 ALLERGY TO CATS: Primary | ICD-10-CM

## 2025-01-29 DIAGNOSIS — J30.1 ALLERGIC RHINITIS DUE TO GRASS POLLEN: ICD-10-CM

## 2025-01-29 DIAGNOSIS — J30.81 ALLERGY TO DOGS: ICD-10-CM

## 2025-01-29 PROCEDURE — 95117 IMMUNOTHERAPY INJECTIONS: CPT | Mod: PBBFAC

## 2025-01-29 PROCEDURE — 99999 PR PBB SHADOW E&M-EST. PATIENT-LVL III: CPT | Mod: PBBFAC,,,

## 2025-02-05 ENCOUNTER — CLINICAL SUPPORT (OUTPATIENT)
Dept: ALLERGY | Facility: CLINIC | Age: 70
End: 2025-02-05
Payer: MEDICARE

## 2025-02-05 DIAGNOSIS — J30.1 ALLERGIC RHINITIS DUE TO GRASS POLLEN: ICD-10-CM

## 2025-02-05 DIAGNOSIS — J30.81 ALLERGY TO CATS: Primary | ICD-10-CM

## 2025-02-05 DIAGNOSIS — J30.81 ALLERGY TO DOGS: ICD-10-CM

## 2025-02-05 PROCEDURE — 99999 PR PBB SHADOW E&M-EST. PATIENT-LVL III: CPT | Mod: PBBFAC,,,

## 2025-02-05 PROCEDURE — 95117 IMMUNOTHERAPY INJECTIONS: CPT | Mod: PBBFAC

## 2025-02-11 ENCOUNTER — PATIENT MESSAGE (OUTPATIENT)
Dept: INTERNAL MEDICINE | Facility: CLINIC | Age: 70
End: 2025-02-11
Payer: MEDICARE

## 2025-02-11 RX ORDER — VENLAFAXINE HYDROCHLORIDE 37.5 MG/1
37.5 CAPSULE, EXTENDED RELEASE ORAL DAILY
Qty: 30 CAPSULE | Refills: 11 | Status: SHIPPED | OUTPATIENT
Start: 2025-02-11 | End: 2026-02-11

## 2025-02-12 ENCOUNTER — PROCEDURE VISIT (OUTPATIENT)
Dept: ALLERGY | Facility: CLINIC | Age: 70
End: 2025-02-12
Payer: MEDICARE

## 2025-02-12 ENCOUNTER — LAB VISIT (OUTPATIENT)
Dept: LAB | Facility: HOSPITAL | Age: 70
End: 2025-02-12
Attending: FAMILY MEDICINE
Payer: MEDICARE

## 2025-02-12 DIAGNOSIS — R73.9 HYPERGLYCEMIA: ICD-10-CM

## 2025-02-12 DIAGNOSIS — J30.81 ALLERGY TO CATS: Primary | ICD-10-CM

## 2025-02-12 DIAGNOSIS — J30.1 ALLERGIC RHINITIS DUE TO GRASS POLLEN: ICD-10-CM

## 2025-02-12 DIAGNOSIS — E78.5 HYPERLIPIDEMIA, UNSPECIFIED HYPERLIPIDEMIA TYPE: ICD-10-CM

## 2025-02-12 DIAGNOSIS — N18.31 CHRONIC KIDNEY DISEASE, STAGE 3A: ICD-10-CM

## 2025-02-12 DIAGNOSIS — J30.1 SEASONAL ALLERGIC RHINITIS DUE TO POLLEN: ICD-10-CM

## 2025-02-12 DIAGNOSIS — J30.81 ALLERGY TO DOGS: ICD-10-CM

## 2025-02-12 LAB
T4 FREE SERPL-MCNC: 0.88 NG/DL (ref 0.71–1.51)
TSH SERPL DL<=0.005 MIU/L-ACNC: 3.97 UIU/ML (ref 0.4–4)

## 2025-02-12 PROCEDURE — 84439 ASSAY OF FREE THYROXINE: CPT | Performed by: FAMILY MEDICINE

## 2025-02-12 PROCEDURE — 36415 COLL VENOUS BLD VENIPUNCTURE: CPT | Mod: PO | Performed by: FAMILY MEDICINE

## 2025-02-12 PROCEDURE — 84443 ASSAY THYROID STIM HORMONE: CPT | Performed by: FAMILY MEDICINE

## 2025-02-12 PROCEDURE — 95117 IMMUNOTHERAPY INJECTIONS: CPT | Mod: PBBFAC

## 2025-02-13 ENCOUNTER — PATIENT MESSAGE (OUTPATIENT)
Dept: INTERNAL MEDICINE | Facility: CLINIC | Age: 70
End: 2025-02-13
Payer: MEDICARE

## 2025-02-17 DIAGNOSIS — K21.9 GASTROESOPHAGEAL REFLUX DISEASE, UNSPECIFIED WHETHER ESOPHAGITIS PRESENT: ICD-10-CM

## 2025-02-18 RX ORDER — OMEPRAZOLE 40 MG/1
40 CAPSULE, DELAYED RELEASE ORAL
Qty: 180 CAPSULE | Refills: 0 | Status: SHIPPED | OUTPATIENT
Start: 2025-02-18 | End: 2025-05-19

## 2025-02-19 ENCOUNTER — PROCEDURE VISIT (OUTPATIENT)
Dept: ALLERGY | Facility: CLINIC | Age: 70
End: 2025-02-19
Payer: MEDICARE

## 2025-02-19 DIAGNOSIS — J30.81 ALLERGY TO DOGS: ICD-10-CM

## 2025-02-19 DIAGNOSIS — J30.81 ALLERGIC RHINITIS DUE TO ANIMAL (CAT) (DOG) HAIR AND DANDER: ICD-10-CM

## 2025-02-19 DIAGNOSIS — J30.81 ALLERGY TO CATS: Primary | ICD-10-CM

## 2025-02-19 DIAGNOSIS — J30.1 SEASONAL ALLERGIC RHINITIS DUE TO POLLEN: ICD-10-CM

## 2025-02-19 DIAGNOSIS — J30.1 ALLERGIC RHINITIS DUE TO POLLEN: ICD-10-CM

## 2025-02-19 DIAGNOSIS — J30.1 ALLERGIC RHINITIS DUE TO GRASS POLLEN: ICD-10-CM

## 2025-02-19 PROCEDURE — 95117 IMMUNOTHERAPY INJECTIONS: CPT | Mod: PBBFAC

## 2025-02-19 RX ORDER — MONTELUKAST SODIUM 10 MG/1
10 TABLET ORAL NIGHTLY
Qty: 30 TABLET | Refills: 11 | Status: SHIPPED | OUTPATIENT
Start: 2025-02-19

## 2025-02-24 ENCOUNTER — HOSPITAL ENCOUNTER (OUTPATIENT)
Dept: RADIOLOGY | Facility: CLINIC | Age: 70
Discharge: HOME | End: 2025-02-24
Attending: NURSE PRACTITIONER
Payer: MEDICARE

## 2025-02-24 ENCOUNTER — RESULTS FOLLOW-UP (OUTPATIENT)
Dept: URGENT CARE | Facility: CLINIC | Age: 70
End: 2025-02-24

## 2025-02-24 ENCOUNTER — OFFICE VISIT (OUTPATIENT)
Dept: URGENT CARE | Facility: CLINIC | Age: 70
End: 2025-02-24
Payer: MEDICARE

## 2025-02-24 VITALS
DIASTOLIC BLOOD PRESSURE: 79 MMHG | BODY MASS INDEX: 23.32 KG/M2 | WEIGHT: 126.75 LBS | HEART RATE: 84 BPM | HEIGHT: 62 IN | TEMPERATURE: 97 F | SYSTOLIC BLOOD PRESSURE: 118 MMHG | OXYGEN SATURATION: 100 % | RESPIRATION RATE: 16 BRPM
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
## 2025-02-24 DIAGNOSIS — K21.9 GASTROESOPHAGEAL REFLUX DISEASE, UNSPECIFIED WHETHER ESOPHAGITIS PRESENT: ICD-10-CM
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
## 2025-02-24 DIAGNOSIS — N18.31 STAGE 3A CHRONIC KIDNEY DISEASE: ICD-10-CM

## 2025-02-24 DIAGNOSIS — S63.501A SPRAIN OF RIGHT WRIST, INITIAL ENCOUNTER: ICD-10-CM

## 2025-02-24 DIAGNOSIS — Z00.00 ENCOUNTER FOR MEDICARE ANNUAL WELLNESS EXAM: ICD-10-CM
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
## 2025-02-24 PROCEDURE — 73110 X-RAY EXAM OF WRIST: CPT | Mod: RT,S$GLB,, | Performed by: RADIOLOGY

## 2025-02-24 PROCEDURE — 99214 OFFICE O/P EST MOD 30 MIN: CPT | Mod: S$GLB,,, | Performed by: NURSE PRACTITIONER

## 2025-02-24 NOTE — PROGRESS NOTES
"Subjective:      Patient ID: Rita Dejesus is a 69 y.o. female.    Vitals:  height is 5' 2" (1.575 m) and weight is 57.5 kg (126 lb 12.2 oz). Her tympanic temperature is 97.4 °F (36.3 °C). Her blood pressure is 118/79 and her pulse is 84. Her respiration is 16 and oxygen saturation is 100%.     Chief Complaint: Fall    Pt fell on 02/22/25 and landed on her right wrist and hit the right side. Pt is having pain around her rib cage area and her left wrist. There was no loc. Pt has taken tylenol for the pain with no relief. Pain to wrist is 7-9/10 with movement, rib pain is 8/10 with movement. Patient requesting xrays today    Fall  The accident occurred 2 days ago. The fall occurred while walking. She fell from a height of 3 to 5 ft. She landed on Grass. There was no blood loss. The point of impact was the left wrist. The pain is present in the left wrist (letf side). The pain is at a severity of 8/10. The pain is mild. The symptoms are aggravated by movement and use of injured limb. Pertinent negatives include no abdominal pain, headaches, nausea or vomiting. She has tried acetaminophen for the symptoms. The treatment provided no relief.       Constitution: Positive for activity change. Negative for appetite change, chills and sweating.   HENT:  Negative for ear pain and ear discharge.    Neck: Negative for neck pain and neck stiffness.   Eyes:  Negative for eye pain, eye redness and blurred vision.   Respiratory:  Negative for cough, shortness of breath and wheezing.    Gastrointestinal:  Negative for abdominal pain, nausea, vomiting and diarrhea.   Genitourinary:  Negative for dysuria, frequency and urgency.   Musculoskeletal:  Positive for pain, trauma and joint pain.        + right rib pain, anterior and lateral   Skin:  Negative for rash.   Neurological:  Negative for dizziness, light-headedness, headaches and disorientation.   Psychiatric/Behavioral:  Negative for disorientation, confusion, agitation and " nervous/anxious. The patient is not nervous/anxious.       Objective:     Physical Exam   Constitutional: She is oriented to person, place, and time. She appears well-developed. She is cooperative.   HENT:   Head: Normocephalic and atraumatic.   Ears:   Right Ear: Hearing normal.   Left Ear: Hearing normal.   Nose: Nose normal. No mucosal edema or nasal deformity. No epistaxis. Right sinus exhibits no maxillary sinus tenderness and no frontal sinus tenderness. Left sinus exhibits no maxillary sinus tenderness and no frontal sinus tenderness.   Mouth/Throat: Uvula is midline, oropharynx is clear and moist and mucous membranes are normal. No trismus in the jaw. Normal dentition. No uvula swelling.   Eyes: Conjunctivae and lids are normal.   Neck: Trachea normal and phonation normal. Neck supple.   Pulmonary/Chest: Effort normal. Chest wall is not dull to percussion. She exhibits tenderness and bony tenderness. She exhibits no mass, no laceration, no crepitus, no edema, no deformity, no swelling and no retraction.       Abdominal: Normal appearance.   Musculoskeletal:      Right wrist: She exhibits decreased range of motion and tenderness (radial aspect). She exhibits no bony tenderness, no swelling, no effusion, no crepitus, no deformity and no laceration.   Neurological: She is alert and oriented to person, place, and time. She exhibits normal muscle tone.   Skin: Skin is warm, dry and intact.   Psychiatric: Her speech is normal and behavior is normal. Judgment and thought content normal.   Nursing note and vitals reviewed.      Assessment:     1. Fall in home, initial encounter    2. Stage 3a chronic kidney disease    3. Gastroesophageal reflux disease, unspecified whether esophagitis present    4. Sprain of right wrist, initial encounter        Plan:       Fall in home, initial encounter  -     X-Ray Wrist Complete 3 views Right; Future; Expected date: 02/24/2025  -     X-Ray Ribs 2 View Right; Future; Expected  date: 02/24/2025  Xrays with no acute findings  Wrist brace given  Recommend rice therapy  Heating pad for rib pain    Stage 3a chronic kidney disease  Chronic stable no nsaids  This chronic condition affected today's medical decision making and treatment plan.       Gastroesophageal reflux disease, unspecified whether esophagitis present  Stable on ppi, no nsaids  This chronic condition affected today's medical decision making and treatment plan.       Sprain of right wrist, initial encounter  -     WRIST BRACE FOR HOME USE  Rice therapy advised  Start tylenol 500mg tablets -  2 tablets for a total of 1,000mg three times a day  The max dose of tylenol in a 24 hours period is 3,000mg

## 2025-02-26 ENCOUNTER — PROCEDURE VISIT (OUTPATIENT)
Dept: ALLERGY | Facility: CLINIC | Age: 70
End: 2025-02-26
Payer: MEDICARE

## 2025-02-26 DIAGNOSIS — J30.81 ALLERGY TO DOGS: Primary | ICD-10-CM

## 2025-02-26 DIAGNOSIS — J30.1 ALLERGIC RHINITIS DUE TO GRASS POLLEN: ICD-10-CM

## 2025-02-26 DIAGNOSIS — J30.1 SEASONAL ALLERGIC RHINITIS DUE TO POLLEN: ICD-10-CM

## 2025-02-26 DIAGNOSIS — J30.81 ALLERGY TO CATS: ICD-10-CM

## 2025-02-26 PROCEDURE — 95117 IMMUNOTHERAPY INJECTIONS: CPT | Mod: PBBFAC

## 2025-03-06 ENCOUNTER — PROCEDURE VISIT (OUTPATIENT)
Dept: ALLERGY | Facility: CLINIC | Age: 70
End: 2025-03-06
Payer: MEDICARE

## 2025-03-06 DIAGNOSIS — J30.81 ALLERGY TO CATS: Primary | ICD-10-CM

## 2025-03-06 DIAGNOSIS — J30.81 ALLERGY TO DOGS: ICD-10-CM

## 2025-03-06 DIAGNOSIS — J30.1 SEASONAL ALLERGIC RHINITIS DUE TO POLLEN: ICD-10-CM

## 2025-03-06 PROCEDURE — 95117 IMMUNOTHERAPY INJECTIONS: CPT | Mod: PBBFAC

## 2025-03-12 ENCOUNTER — PROCEDURE VISIT (OUTPATIENT)
Dept: ALLERGY | Facility: CLINIC | Age: 70
End: 2025-03-12
Payer: MEDICARE

## 2025-03-12 DIAGNOSIS — J30.81 ALLERGY TO DOGS: ICD-10-CM

## 2025-03-12 DIAGNOSIS — J30.81 ALLERGY TO CATS: Primary | ICD-10-CM

## 2025-03-12 DIAGNOSIS — J30.1 SEASONAL ALLERGIC RHINITIS DUE TO POLLEN: ICD-10-CM

## 2025-03-12 PROCEDURE — 95117 IMMUNOTHERAPY INJECTIONS: CPT | Mod: PBBFAC

## 2025-03-19 ENCOUNTER — PROCEDURE VISIT (OUTPATIENT)
Dept: ALLERGY | Facility: CLINIC | Age: 70
End: 2025-03-19
Payer: MEDICARE

## 2025-03-19 DIAGNOSIS — J30.81 ALLERGY TO CATS: Primary | ICD-10-CM

## 2025-03-19 DIAGNOSIS — J30.81 ALLERGY TO DOGS: ICD-10-CM

## 2025-03-19 DIAGNOSIS — J30.1 SEASONAL ALLERGIC RHINITIS DUE TO POLLEN: ICD-10-CM

## 2025-03-19 PROCEDURE — 95117 IMMUNOTHERAPY INJECTIONS: CPT | Mod: PBBFAC

## 2025-03-24 DIAGNOSIS — J30.81 ALLERGIC RHINITIS DUE TO ANIMAL (CAT) (DOG) HAIR AND DANDER: ICD-10-CM

## 2025-03-24 DIAGNOSIS — J30.1 SEASONAL ALLERGIC RHINITIS DUE TO POLLEN: ICD-10-CM

## 2025-03-24 RX ORDER — AZELASTINE 1 MG/ML
1 SPRAY, METERED NASAL 2 TIMES DAILY
Qty: 30 ML | Refills: 5 | Status: SHIPPED | OUTPATIENT
Start: 2025-03-24

## 2025-03-26 ENCOUNTER — OFFICE VISIT (OUTPATIENT)
Dept: INTERNAL MEDICINE | Facility: CLINIC | Age: 70
End: 2025-03-26
Payer: MEDICARE

## 2025-03-26 ENCOUNTER — PROCEDURE VISIT (OUTPATIENT)
Dept: ALLERGY | Facility: CLINIC | Age: 70
End: 2025-03-26
Payer: MEDICARE

## 2025-03-26 VITALS
RESPIRATION RATE: 18 BRPM | HEIGHT: 62 IN | HEART RATE: 89 BPM | SYSTOLIC BLOOD PRESSURE: 92 MMHG | BODY MASS INDEX: 23.05 KG/M2 | OXYGEN SATURATION: 99 % | DIASTOLIC BLOOD PRESSURE: 60 MMHG | WEIGHT: 125.25 LBS

## 2025-03-26 DIAGNOSIS — J30.81 ALLERGY TO CATS: Primary | ICD-10-CM

## 2025-03-26 DIAGNOSIS — E78.5 HYPERLIPIDEMIA, UNSPECIFIED HYPERLIPIDEMIA TYPE: ICD-10-CM

## 2025-03-26 DIAGNOSIS — K21.9 GASTROESOPHAGEAL REFLUX DISEASE, UNSPECIFIED WHETHER ESOPHAGITIS PRESENT: ICD-10-CM

## 2025-03-26 DIAGNOSIS — J30.81 ALLERGY TO DOGS: ICD-10-CM

## 2025-03-26 DIAGNOSIS — Z12.11 SCREEN FOR COLON CANCER: ICD-10-CM

## 2025-03-26 DIAGNOSIS — Z11.59 ENCOUNTER FOR HEPATITIS C SCREENING TEST FOR LOW RISK PATIENT: ICD-10-CM

## 2025-03-26 DIAGNOSIS — F32.4 MAJOR DEPRESSIVE DISORDER IN PARTIAL REMISSION, UNSPECIFIED WHETHER RECURRENT: ICD-10-CM

## 2025-03-26 DIAGNOSIS — N18.31 CHRONIC KIDNEY DISEASE, STAGE 3A: ICD-10-CM

## 2025-03-26 DIAGNOSIS — Z85.048 HISTORY OF RECTAL OR ANAL CANCER: ICD-10-CM

## 2025-03-26 DIAGNOSIS — Z00.00 ENCOUNTER FOR MEDICARE ANNUAL WELLNESS EXAM: Primary | ICD-10-CM

## 2025-03-26 DIAGNOSIS — J30.1 SEASONAL ALLERGIC RHINITIS DUE TO POLLEN: ICD-10-CM

## 2025-03-26 PROCEDURE — 99999 PR PBB SHADOW E&M-EST. PATIENT-LVL V: CPT | Mod: PBBFAC,,, | Performed by: NURSE PRACTITIONER

## 2025-03-26 PROCEDURE — 95117 IMMUNOTHERAPY INJECTIONS: CPT | Mod: PBBFAC

## 2025-03-26 PROCEDURE — G0439 PPPS, SUBSEQ VISIT: HCPCS | Mod: ,,, | Performed by: NURSE PRACTITIONER

## 2025-03-26 PROCEDURE — 99215 OFFICE O/P EST HI 40 MIN: CPT | Mod: PBBFAC,PO | Performed by: NURSE PRACTITIONER

## 2025-03-26 NOTE — PROGRESS NOTES
"  Rita Dejesus presented for a  Medicare AWV and comprehensive Health Risk Assessment today. The following components were reviewed and updated:    Medical history  Family History  Social history  Allergies and Current Medications  Health Risk Assessment  Health Maintenance  Care Team         ** See Completed Assessments for Annual Wellness Visit within the encounter summary.**         The following assessments were completed:  Living Situation  CAGE  Depression Screening  Timed Get Up and Go  Whisper Test  Cognitive Function Screening    Nutrition Screening  ADL Screening  PAQ Screening  Has urine leakage ever interrupted your daily activites or sleep? No  Do you think you could use some help to better manage urine leakage?No       Opioid documentation:      Patient does not have a current opioid prescription.        Vitals:    03/26/25 1106   BP: 92/60   Pulse: 89   Resp: 18   SpO2: 99%   Weight: 56.8 kg (125 lb 3.5 oz)   Height: 5' 1.5" (1.562 m)     Body mass index is 23.28 kg/m².  Physical Exam          Diagnoses and health risks identified today and associated recommendations/orders:    1. Encounter for Medicare annual wellness exam  Screenings performed, as noted above.  Personal preventative testing needs reviewed.    - Referral to Enhanced Annual Wellness Visit (eAWV) W+1    2. Screen for colon cancer  Due for scope this year, will be contacted  - Ambulatory referral/consult to Endo Procedure ; Future    3. Encounter for hepatitis C screening test for low risk patient  Assess, will have it checked next blood draw for screening  - Hepatitis C Antibody; Future    4. Major depressive disorder in partial remission, unspecified whether recurrent  Treated with Wellbutrin, stable, states is working well for her, no SI/HI/hallucinations    5. History of rectal or anal cancer  Treated in the past, stable, follow up with GI and Dr Mccurdy as indicated    6. Chronic kidney disease, stage 3a  Monitored, " stable,    7. Hyperlipidemia, unspecified hyperlipidemia type  Treated with simvastatin, stable, cont tx    8. Gastroesophageal reflux disease, unspecified whether esophagitis present  Treated with omeprazole, stable, cont tx      Provided Ginger with a 5-10 year written screening schedule and personal prevention plan. Recommendations were developed using the USPSTF age appropriate recommendations. Education, counseling, and referrals were provided as needed. After Visit Summary printed and given to patient which includes a list of additional screenings\tests needed.    No follow-ups on file.    Son Lujan NP  I offered to discuss advanced care planning, including how to pick a person who would make decisions for you if you were unable to make them for yourself, called a health care power of , and what kind of decisions you might make such as use of life sustaining treatments such as ventilators and tube feeding when faced with a life limiting illness recorded on a living will that they will need to know. (How you want to be cared for as you near the end of your natural life)     X Patient is interested in learning more about how to make advanced directives.  I provided them paperwork and offered to discuss this with them.

## 2025-03-26 NOTE — PATIENT INSTRUCTIONS
Counseling and Referral of Other Preventative  (Italic type indicates deductible and co-insurance are waived)    Patient Name: Rita Dejesus  Today's Date: 3/26/2025    Health Maintenance       Date Due Completion Date    Hepatitis C Screening Never done ---    COVID-19 Vaccine (7 - 2024-25 season) 09/01/2024 11/8/2022    Colorectal Cancer Screening 11/17/2025 11/17/2020    Lipid Panel 12/09/2025 12/9/2024    Mammogram 03/20/2026 3/20/2025    DEXA Scan 03/18/2027 3/18/2024    RSV Vaccine (Age 60+ and Pregnant patients) (1 - 1-dose 75+ series) 11/17/2029 ---    TETANUS VACCINE 01/03/2033 1/3/2023        Orders Placed This Encounter   Procedures    Hepatitis C Antibody    Ambulatory referral/consult to Endo Procedure      The following information is provided to all patients.  This information is to help you find resources for any of the problems found today that may be affecting your health:                  Living healthy guide: www.[facility].[place].gov      Understanding Diabetes: www.diabetes.org      Eating healthy: www.cdc.gov/healthyweight      CDC home safety checklist: www.cdc.gov/steadi/patient.html      Agency on Aging: www.goea.[place].gov      Alcoholics anonymous (AA): www.aa.org      Physical Activity: www.ariel.nih.gov/uv9kman      Tobacco use: www.quitwithusla.org

## 2025-04-03 ENCOUNTER — PROCEDURE VISIT (OUTPATIENT)
Dept: ALLERGY | Facility: CLINIC | Age: 70
End: 2025-04-03
Payer: MEDICARE

## 2025-04-03 DIAGNOSIS — J30.81 ALLERGY TO DOGS: ICD-10-CM

## 2025-04-03 DIAGNOSIS — J30.81 ALLERGY TO CATS: Primary | ICD-10-CM

## 2025-04-03 DIAGNOSIS — J30.1 SEASONAL ALLERGIC RHINITIS DUE TO POLLEN: ICD-10-CM

## 2025-04-03 PROCEDURE — 95117 IMMUNOTHERAPY INJECTIONS: CPT | Mod: PBBFAC

## 2025-04-09 ENCOUNTER — PROCEDURE VISIT (OUTPATIENT)
Dept: ALLERGY | Facility: CLINIC | Age: 70
End: 2025-04-09
Payer: MEDICARE

## 2025-04-09 DIAGNOSIS — J30.1 SEASONAL ALLERGIC RHINITIS DUE TO POLLEN: ICD-10-CM

## 2025-04-09 DIAGNOSIS — J30.81 ALLERGY TO CATS: ICD-10-CM

## 2025-04-09 DIAGNOSIS — J30.81 ALLERGY TO DOGS: Primary | ICD-10-CM

## 2025-04-09 PROCEDURE — 95117 IMMUNOTHERAPY INJECTIONS: CPT | Mod: PBBFAC

## 2025-04-16 ENCOUNTER — PROCEDURE VISIT (OUTPATIENT)
Dept: ALLERGY | Facility: CLINIC | Age: 70
End: 2025-04-16
Payer: MEDICARE

## 2025-04-16 DIAGNOSIS — J30.81 ALLERGY TO DOGS: Primary | ICD-10-CM

## 2025-04-16 DIAGNOSIS — J30.1 SEASONAL ALLERGIC RHINITIS DUE TO POLLEN: ICD-10-CM

## 2025-04-16 DIAGNOSIS — J30.81 ALLERGY TO CATS: ICD-10-CM

## 2025-04-16 PROCEDURE — 95117 IMMUNOTHERAPY INJECTIONS: CPT | Mod: PBBFAC

## 2025-04-23 ENCOUNTER — PROCEDURE VISIT (OUTPATIENT)
Dept: ALLERGY | Facility: CLINIC | Age: 70
End: 2025-04-23
Payer: MEDICARE

## 2025-04-23 DIAGNOSIS — J30.1 SEASONAL ALLERGIC RHINITIS DUE TO POLLEN: ICD-10-CM

## 2025-04-23 DIAGNOSIS — J30.81 ALLERGY TO CATS: ICD-10-CM

## 2025-04-23 DIAGNOSIS — J30.81 ALLERGY TO DOGS: Primary | ICD-10-CM

## 2025-04-23 PROCEDURE — 95117 IMMUNOTHERAPY INJECTIONS: CPT | Mod: PBBFAC

## 2025-04-30 ENCOUNTER — PROCEDURE VISIT (OUTPATIENT)
Dept: ALLERGY | Facility: CLINIC | Age: 70
End: 2025-04-30
Payer: MEDICARE

## 2025-04-30 DIAGNOSIS — J30.81 ALLERGY TO DOGS: Primary | ICD-10-CM

## 2025-04-30 DIAGNOSIS — J30.1 SEASONAL ALLERGIC RHINITIS DUE TO POLLEN: ICD-10-CM

## 2025-04-30 DIAGNOSIS — J30.81 ALLERGY TO CATS: ICD-10-CM

## 2025-04-30 PROCEDURE — 95117 IMMUNOTHERAPY INJECTIONS: CPT | Mod: PBBFAC

## 2025-05-07 ENCOUNTER — PROCEDURE VISIT (OUTPATIENT)
Dept: ALLERGY | Facility: CLINIC | Age: 70
End: 2025-05-07
Payer: MEDICARE

## 2025-05-07 DIAGNOSIS — J30.1 SEASONAL ALLERGIC RHINITIS DUE TO POLLEN: ICD-10-CM

## 2025-05-07 DIAGNOSIS — J30.81 ALLERGY TO DOGS: Primary | ICD-10-CM

## 2025-05-07 DIAGNOSIS — J30.81 ALLERGY TO CATS: ICD-10-CM

## 2025-05-07 PROCEDURE — 95117 IMMUNOTHERAPY INJECTIONS: CPT | Mod: PBBFAC

## 2025-05-14 ENCOUNTER — CLINICAL SUPPORT (OUTPATIENT)
Dept: ALLERGY | Facility: CLINIC | Age: 70
End: 2025-05-14
Payer: MEDICARE

## 2025-05-14 DIAGNOSIS — J30.1 SEASONAL ALLERGIC RHINITIS DUE TO POLLEN: ICD-10-CM

## 2025-05-14 DIAGNOSIS — J30.81 ALLERGIC RHINITIS DUE TO ANIMAL (CAT) (DOG) HAIR AND DANDER: ICD-10-CM

## 2025-05-14 DIAGNOSIS — Z92.89 HISTORY OF IMMUNOTHERAPY: Primary | ICD-10-CM

## 2025-05-14 PROCEDURE — 95165 ANTIGEN THERAPY SERVICES: CPT | Mod: PBBFAC | Performed by: ALLERGY & IMMUNOLOGY

## 2025-05-14 PROCEDURE — 95165 ANTIGEN THERAPY SERVICES: CPT | Mod: S$PBB,,, | Performed by: ALLERGY & IMMUNOLOGY

## 2025-05-29 ENCOUNTER — PROCEDURE VISIT (OUTPATIENT)
Dept: ALLERGY | Facility: CLINIC | Age: 70
End: 2025-05-29
Payer: MEDICARE

## 2025-05-29 DIAGNOSIS — J30.1 SEASONAL ALLERGIC RHINITIS DUE TO POLLEN: ICD-10-CM

## 2025-05-29 DIAGNOSIS — J30.81 ALLERGY TO DOGS: Primary | ICD-10-CM

## 2025-05-29 DIAGNOSIS — J30.81 ALLERGY TO CATS: ICD-10-CM

## 2025-05-29 PROCEDURE — 95117 IMMUNOTHERAPY INJECTIONS: CPT | Mod: PBBFAC

## 2025-05-29 RX ORDER — BUPROPION HYDROCHLORIDE 150 MG/1
150 TABLET ORAL
Qty: 90 TABLET | Refills: 1 | Status: SHIPPED | OUTPATIENT
Start: 2025-05-29

## 2025-05-29 NOTE — TELEPHONE ENCOUNTER
Refill Decision Note   Rita Dejesus  is requesting a refill authorization.  Brief Assessment and Rationale for Refill:  Approve     Medication Therapy Plan:         Comments:     Note composed:10:54 AM 05/29/2025

## 2025-05-29 NOTE — TELEPHONE ENCOUNTER
No care due was identified.  [facility] Embedded Care Due Messages. Reference number: [id number].   5/29/2025 9:09:09 AM CDT

## 2025-06-04 ENCOUNTER — PROCEDURE VISIT (OUTPATIENT)
Dept: ALLERGY | Facility: CLINIC | Age: 70
End: 2025-06-04
Payer: MEDICARE

## 2025-06-04 DIAGNOSIS — J30.1 SEASONAL ALLERGIC RHINITIS DUE TO POLLEN: ICD-10-CM

## 2025-06-04 DIAGNOSIS — J30.81 ALLERGY TO DOGS: ICD-10-CM

## 2025-06-04 DIAGNOSIS — J30.81 ALLERGY TO CATS: Primary | ICD-10-CM

## 2025-06-04 PROCEDURE — 95117 IMMUNOTHERAPY INJECTIONS: CPT | Mod: PBBFAC

## 2025-06-11 ENCOUNTER — PROCEDURE VISIT (OUTPATIENT)
Dept: ALLERGY | Facility: CLINIC | Age: 70
End: 2025-06-11
Payer: MEDICARE

## 2025-06-11 ENCOUNTER — LAB VISIT (OUTPATIENT)
Dept: LAB | Facility: HOSPITAL | Age: 70
End: 2025-06-11
Attending: FAMILY MEDICINE
Payer: MEDICARE

## 2025-06-11 DIAGNOSIS — R79.89 ABNORMAL TSH: ICD-10-CM

## 2025-06-11 DIAGNOSIS — R73.9 HYPERGLYCEMIA: ICD-10-CM

## 2025-06-11 DIAGNOSIS — E78.2 MIXED HYPERLIPIDEMIA: ICD-10-CM

## 2025-06-11 DIAGNOSIS — E78.5 HYPERLIPIDEMIA, UNSPECIFIED HYPERLIPIDEMIA TYPE: ICD-10-CM

## 2025-06-11 DIAGNOSIS — R94.6 ABNORMAL RESULTS OF THYROID FUNCTION STUDIES: ICD-10-CM

## 2025-06-11 DIAGNOSIS — N18.31 CHRONIC KIDNEY DISEASE, STAGE 3A: ICD-10-CM

## 2025-06-11 DIAGNOSIS — Z11.59 ENCOUNTER FOR HEPATITIS C SCREENING TEST FOR LOW RISK PATIENT: ICD-10-CM

## 2025-06-11 DIAGNOSIS — Z92.89 HISTORY OF IMMUNOTHERAPY: Primary | ICD-10-CM

## 2025-06-11 LAB
ABSOLUTE EOSINOPHIL (OHS): 0.13 K/UL
ABSOLUTE MONOCYTE (OHS): 0.48 K/UL (ref 0.3–1)
ABSOLUTE NEUTROPHIL COUNT (OHS): 2.4 K/UL (ref 1.8–7.7)
ALBUMIN SERPL BCP-MCNC: 3.7 G/DL (ref 3.5–5.2)
ALP SERPL-CCNC: 98 UNIT/L (ref 40–150)
ALT SERPL W/O P-5'-P-CCNC: 6 UNIT/L (ref 10–44)
ANION GAP (OHS): 8 MMOL/L (ref 8–16)
AST SERPL-CCNC: 17 UNIT/L (ref 11–45)
BASOPHILS # BLD AUTO: 0.03 K/UL
BASOPHILS NFR BLD AUTO: 0.7 %
BILIRUB SERPL-MCNC: 0.2 MG/DL (ref 0.1–1)
BUN SERPL-MCNC: 11 MG/DL (ref 8–23)
CALCIUM SERPL-MCNC: 9.3 MG/DL (ref 8.7–10.5)
CHLORIDE SERPL-SCNC: 111 MMOL/L (ref 95–110)
CHOLEST SERPL-MCNC: 128 MG/DL (ref 120–199)
CHOLEST/HDLC SERPL: 2.5 {RATIO} (ref 2–5)
CO2 SERPL-SCNC: 22 MMOL/L (ref 23–29)
CREAT SERPL-MCNC: 1.3 MG/DL (ref 0.5–1.4)
EAG (OHS): 105 MG/DL (ref 68–131)
ERYTHROCYTE [DISTWIDTH] IN BLOOD BY AUTOMATED COUNT: 13.8 % (ref 11.5–14.5)
GFR SERPLBLD CREATININE-BSD FMLA CKD-EPI: 44 ML/MIN/1.73/M2
GLUCOSE SERPL-MCNC: 89 MG/DL (ref 70–110)
HBA1C MFR BLD: 5.3 % (ref 4–5.6)
HCT VFR BLD AUTO: 39.6 % (ref 37–48.5)
HCV AB SERPL QL IA: NORMAL
HDLC SERPL-MCNC: 51 MG/DL (ref 40–75)
HDLC SERPL: 39.8 % (ref 20–50)
HGB BLD-MCNC: 12.4 GM/DL (ref 12–16)
IMM GRANULOCYTES # BLD AUTO: 0.01 K/UL (ref 0–0.04)
IMM GRANULOCYTES NFR BLD AUTO: 0.2 % (ref 0–0.5)
LDLC SERPL CALC-MCNC: 63 MG/DL (ref 63–159)
LYMPHOCYTES # BLD AUTO: 1.42 K/UL (ref 1–4.8)
MCH RBC QN AUTO: 27.9 PG (ref 27–31)
MCHC RBC AUTO-ENTMCNC: 31.3 G/DL (ref 32–36)
MCV RBC AUTO: 89 FL (ref 82–98)
NONHDLC SERPL-MCNC: 77 MG/DL
NUCLEATED RBC (/100WBC) (OHS): 0 /100 WBC
PLATELET # BLD AUTO: 209 K/UL (ref 150–450)
PMV BLD AUTO: ABNORMAL FL
POTASSIUM SERPL-SCNC: 4.4 MMOL/L (ref 3.5–5.1)
PROT SERPL-MCNC: 6.7 GM/DL (ref 6–8.4)
RBC # BLD AUTO: 4.44 M/UL (ref 4–5.4)
RELATIVE EOSINOPHIL (OHS): 2.9 %
RELATIVE LYMPHOCYTE (OHS): 31.8 % (ref 18–48)
RELATIVE MONOCYTE (OHS): 10.7 % (ref 4–15)
RELATIVE NEUTROPHIL (OHS): 53.7 % (ref 38–73)
SODIUM SERPL-SCNC: 141 MMOL/L (ref 136–145)
T4 FREE SERPL-MCNC: 1.05 NG/DL (ref 0.71–1.51)
TRIGL SERPL-MCNC: 70 MG/DL (ref 30–150)
TSH SERPL-ACNC: 3.37 UIU/ML (ref 0.4–4)
WBC # BLD AUTO: 4.47 K/UL (ref 3.9–12.7)

## 2025-06-11 PROCEDURE — 36415 COLL VENOUS BLD VENIPUNCTURE: CPT | Mod: PO

## 2025-06-11 PROCEDURE — 84439 ASSAY OF FREE THYROXINE: CPT

## 2025-06-11 PROCEDURE — 84443 ASSAY THYROID STIM HORMONE: CPT

## 2025-06-11 PROCEDURE — 85025 COMPLETE CBC W/AUTO DIFF WBC: CPT

## 2025-06-11 PROCEDURE — 86803 HEPATITIS C AB TEST: CPT

## 2025-06-11 PROCEDURE — 82565 ASSAY OF CREATININE: CPT

## 2025-06-11 PROCEDURE — 84478 ASSAY OF TRIGLYCERIDES: CPT

## 2025-06-11 PROCEDURE — 95117 IMMUNOTHERAPY INJECTIONS: CPT | Mod: PBBFAC

## 2025-06-11 PROCEDURE — 83036 HEMOGLOBIN GLYCOSYLATED A1C: CPT

## 2025-06-11 RX ORDER — SIMVASTATIN 40 MG/1
40 TABLET, FILM COATED ORAL NIGHTLY
Qty: 90 TABLET | Refills: 1 | Status: SHIPPED | OUTPATIENT
Start: 2025-06-11

## 2025-06-11 NOTE — TELEPHONE ENCOUNTER
No care due was identified.  [facility] Embedded Care Due Messages. Reference number: [id number].   6/11/2025 3:15:22 PM CDT

## 2025-06-11 NOTE — TELEPHONE ENCOUNTER
Refill Decision Note   Rita Dejesus  is requesting a refill authorization.  Brief Assessment and Rationale for Refill:  Approve     Medication Therapy Plan:         Comments:     Note composed:3:18 PM 06/11/2025

## 2025-06-12 ENCOUNTER — RESULTS FOLLOW-UP (OUTPATIENT)
Dept: INTERNAL MEDICINE | Facility: CLINIC | Age: 70
End: 2025-06-12

## 2025-06-20 ENCOUNTER — PROCEDURE VISIT (OUTPATIENT)
Dept: ALLERGY | Facility: CLINIC | Age: 70
End: 2025-06-20
Payer: MEDICARE

## 2025-06-20 DIAGNOSIS — Z92.89 HISTORY OF IMMUNOTHERAPY: Primary | ICD-10-CM

## 2025-06-25 ENCOUNTER — PROCEDURE VISIT (OUTPATIENT)
Dept: ALLERGY | Facility: CLINIC | Age: 70
End: 2025-06-25
Payer: MEDICARE

## 2025-06-25 DIAGNOSIS — Z92.89 HISTORY OF IMMUNOTHERAPY: Primary | ICD-10-CM

## 2025-06-25 PROCEDURE — 95117 IMMUNOTHERAPY INJECTIONS: CPT | Mod: PBBFAC

## 2025-07-02 ENCOUNTER — PROCEDURE VISIT (OUTPATIENT)
Dept: ALLERGY | Facility: CLINIC | Age: 70
End: 2025-07-02
Payer: MEDICARE

## 2025-07-02 ENCOUNTER — RESULTS FOLLOW-UP (OUTPATIENT)
Dept: INTERNAL MEDICINE | Facility: CLINIC | Age: 70
End: 2025-07-02

## 2025-07-02 ENCOUNTER — OFFICE VISIT (OUTPATIENT)
Dept: INTERNAL MEDICINE | Facility: CLINIC | Age: 70
End: 2025-07-02
Payer: MEDICARE

## 2025-07-02 ENCOUNTER — HOSPITAL ENCOUNTER (OUTPATIENT)
Dept: RADIOLOGY | Facility: HOSPITAL | Age: 70
Discharge: HOME | End: 2025-07-02
Attending: FAMILY MEDICINE
Payer: MEDICARE

## 2025-07-02 VITALS
DIASTOLIC BLOOD PRESSURE: 62 MMHG | BODY MASS INDEX: 22.36 KG/M2 | HEART RATE: 78 BPM | TEMPERATURE: 98 F | SYSTOLIC BLOOD PRESSURE: 116 MMHG | WEIGHT: 121.5 LBS | OXYGEN SATURATION: 97 % | HEIGHT: 62 IN

## 2025-07-02 DIAGNOSIS — E78.2 MIXED HYPERLIPIDEMIA: Primary | ICD-10-CM

## 2025-07-02 DIAGNOSIS — F41.9 ANXIETY: ICD-10-CM

## 2025-07-02 DIAGNOSIS — R10.31 RIGHT GROIN PAIN: ICD-10-CM

## 2025-07-02 DIAGNOSIS — R73.9 HYPERGLYCEMIA: ICD-10-CM

## 2025-07-02 DIAGNOSIS — M79.621 PAIN OF RIGHT UPPER ARM: ICD-10-CM

## 2025-07-02 DIAGNOSIS — K21.9 GASTROESOPHAGEAL REFLUX DISEASE, UNSPECIFIED WHETHER ESOPHAGITIS PRESENT: ICD-10-CM

## 2025-07-02 DIAGNOSIS — Z92.89 HISTORY OF IMMUNOTHERAPY: Primary | ICD-10-CM

## 2025-07-02 PROCEDURE — 95117 IMMUNOTHERAPY INJECTIONS: CPT | Mod: PBBFAC

## 2025-07-02 PROCEDURE — G2211 COMPLEX E/M VISIT ADD ON: HCPCS | Mod: ,,, | Performed by: FAMILY MEDICINE

## 2025-07-02 PROCEDURE — 99999 PR PBB SHADOW E&M-EST. PATIENT-LVL IV: CPT | Mod: PBBFAC,,, | Performed by: FAMILY MEDICINE

## 2025-07-02 PROCEDURE — 73502 X-RAY EXAM HIP UNI 2-3 VIEWS: CPT | Mod: 26,RT,, | Performed by: RADIOLOGY

## 2025-07-02 PROCEDURE — 73030 X-RAY EXAM OF SHOULDER: CPT | Mod: TC,PO,RT

## 2025-07-02 PROCEDURE — 73502 X-RAY EXAM HIP UNI 2-3 VIEWS: CPT | Mod: TC,PO,RT

## 2025-07-02 PROCEDURE — 73030 X-RAY EXAM OF SHOULDER: CPT | Mod: 26,RT,, | Performed by: RADIOLOGY

## 2025-07-02 PROCEDURE — 99214 OFFICE O/P EST MOD 30 MIN: CPT | Mod: S$PBB,,, | Performed by: FAMILY MEDICINE

## 2025-07-02 PROCEDURE — 99214 OFFICE O/P EST MOD 30 MIN: CPT | Mod: PBBFAC,25,PO | Performed by: FAMILY MEDICINE

## 2025-07-02 RX ORDER — BUSPIRONE HYDROCHLORIDE 5 MG/1
5 TABLET ORAL 3 TIMES DAILY PRN
Qty: 90 TABLET | Refills: 11 | Status: SHIPPED | OUTPATIENT
Start: 2025-07-02 | End: 2026-07-02

## 2025-07-02 RX ORDER — PHENAZOPYRIDINE HYDROCHLORIDE 95 MG/1
95 TABLET ORAL DAILY PRN
COMMUNITY

## 2025-07-02 RX ORDER — OMEPRAZOLE 40 MG/1
40 CAPSULE, DELAYED RELEASE ORAL EVERY MORNING
Qty: 90 CAPSULE | Refills: 3 | Status: SHIPPED | OUTPATIENT
Start: 2025-07-02 | End: 2026-06-27

## 2025-07-02 NOTE — PROGRESS NOTES
Subjective:      Patient ID: Rita Dejesus is a 69 y.o. female.    Chief Complaint: Annual Exam      History of Present Illness    CHIEF COMPLAINT:  Ms. Dejesus presents today for follow up    MUSCULOSKELETAL PAIN:  She reports ongoing musculoskeletal pain in multiple areas. She has shoulder pain radiating down her arm that is exacerbated by certain movements, which she attributes to a fall in February. The pain is located in the muscle area rather than the joint. She also experiences intermittent wrist pain that is less severe. Her hip pain originates from the hip area and radiates downward, improving with exercise, specifically stretch classes and gym attendance. She manages symptoms through exercise and stretching, declining physical therapy and interventional treatments like injections.    GASTROINTESTINAL:  She reports intermittent esophageal indigestion occurring every 2-3 months without clear dietary triggers. She experiences frequent bowel incontinence with approximately 3-4 accidents daily, characterized by small, pebble-like stool, though noting gradual improvement. She denies current abdominal pain.    ALLERGIES:  She reports significant improvement in allergy symptoms over the past two months, particularly resolution of persistent throat mucus with medications and allergy shots. Her allergies have previously impacted her GI system.    ANXIETY:  She reports feeling jittery inside with current mild jitteriness. She acknowledges external stressors, including media consumption, may contribute to her anxiety. She denies need for anxiety medication, stating symptoms are not severe enough to warrant pharmacological intervention.    CURRENT MEDICATIONS:  She takes Tylenol PRN for pain (typically one pill, not nightly), Prilosec daily (recently reduced from twice daily due to improving allergies), Wellbutrin, and Simvastatin (recently had one week gap without noted adverse effects).        Past Medical  History:   Diagnosis Date    General anesthetics causing adverse effect in therapeutic use     difficulty breathing when waking    GERD (gastroesophageal reflux disease)     History of cervical cancer     History of rectal or anal cancer           Past Surgical History:   Procedure Laterality Date    ADENOIDECTOMY      carpal tunel Bilateral     CATARACT EXTRACTION Left 05/06/2021    NEAR    CATARACT EXTRACTION W/ INTRAOCULAR LENS IMPLANT Right 05/20/2021    CERVICAL CONIZATION   W/ LASER      COLONOSCOPY N/A 11/17/2020    Procedure: COLONOSCOPY;  Surgeon: Kendy De Leon MD;  Location: [facility];  Service: Endoscopy;  Laterality: N/A;    ESOPHAGOGASTRODUODENOSCOPY N/A 12/22/2020    Procedure: ESOPHAGOGASTRODUODENOSCOPY (EGD);  Surgeon: Goyo Kapadia MD;  Location: [facility];  Service: Endoscopy;  Laterality: N/A;    ESOPHAGOGASTRODUODENOSCOPY N/A 05/09/2022    Procedure: EGD (ESOPHAGOGASTRODUODENOSCOPY);  Surgeon: Misa Crocker MD;  Location: [facility];  Service: Gastroenterology;  Laterality: N/A;    ESOPHAGOGASTRODUODENOSCOPY N/A 9/7/2022    Procedure: EGD (ESOPHAGOGASTRODUODENOSCOPY);  Surgeon: Misa Crocker MD;  Location: [facility];  Service: Gastroenterology;  Laterality: N/A;    ESOPHAGOGASTRODUODENOSCOPY N/A 6/28/2024    Procedure: EGD (ESOPHAGOGASTRODUODENOSCOPY);  Surgeon: Misa Crocker MD;  Location: [facility];  Service: Gastroenterology;  Laterality: N/A;    HERNIA REPAIR      IMPLANTATION OF PERMANENT SACRAL NERVE STIMULATOR N/A 1/26/2023    Procedure: INSERTION, NEUROSTIMULATOR, PERMANENT, SACRAL;  Surgeon: Andra Painting MD;  Location: [facility];  Service: Urology;  Laterality: N/A;    INSERTION, NEUROSTIMULATOR, TEMPORARY, SACRAL N/A 1/12/2023    Procedure: INSERTION, NEUROSTIMULATOR, TEMPORARY, SACRAL;  Surgeon: Andra Painting MD;  Location: [facility] OR;  Service: Urology;  Laterality: N/A;  stage 1    TONSILLECTOMY       Family History   Problem Relation Name Age of Onset  "   Diabetes Mother          type II    Hyperlipidemia Mother      Heart disease Father      Hypertension Father      Cataracts Father      Hypertension Sister      Diabetes Maternal Grandmother      Diabetes Maternal Grandfather       Social History[1]  Review of patient's allergies indicates:   Allergen Reactions    Sulfa (sulfonamide antibiotics) Nausea And Vomiting    Dog dander     Nitrofuran analogues Nausea And Vomiting     Microdantin     Zoloft [sertraline] Rash       Review of Systems   Constitutional:  Negative for chills, fever and malaise/fatigue.   HENT:  Negative for congestion.    Respiratory:  Negative for cough and shortness of breath.    Cardiovascular:  Negative for chest pain and palpitations.   Gastrointestinal:  Positive for constipation and diarrhea. Negative for abdominal pain.   Musculoskeletal:  Positive for joint pain and myalgias.   Skin:  Negative for rash.   Psychiatric/Behavioral:  Negative for depression. The patient is nervous/anxious and has insomnia.      Objective:       /62   Pulse 78   Temp 97.8 °F (36.6 °C) (Tympanic)   Ht 5' 1.5" (1.562 m)   Wt 55.1 kg (121 lb 7.6 oz)   SpO2 97%   BMI 22.58 kg/m²   Physical Exam    MSK: Shoulder - Right: Pain with shoulder movement radiating down arm.        Physical Exam  Vitals reviewed.   Constitutional:       General: She is not in acute distress.     Appearance: Normal appearance. She is well-developed. She is not ill-appearing or diaphoretic.   HENT:      Head: Normocephalic and atraumatic.      Right Ear: Hearing, tympanic membrane, ear canal and external ear normal.      Left Ear: Hearing, tympanic membrane, ear canal and external ear normal.      Nose: Nose normal.      Mouth/Throat:      Pharynx: Uvula midline. No oropharyngeal exudate.   Eyes:      Conjunctiva/sclera: Conjunctivae normal.      Pupils: Pupils are equal, round, and reactive to light.   Neck:      Thyroid: No thyromegaly.      Trachea: No tracheal " deviation.   Cardiovascular:      Rate and Rhythm: Normal rate and regular rhythm.      Heart sounds: Normal heart sounds. No murmur heard.  Pulmonary:      Effort: Pulmonary effort is normal. No respiratory distress.      Breath sounds: Normal breath sounds.   Abdominal:      General: Bowel sounds are normal.      Palpations: Abdomen is soft.      Tenderness: There is no abdominal tenderness. There is no guarding.      Hernia: No hernia is present.   Musculoskeletal:         General: Normal range of motion.      Cervical back: Normal range of motion and neck supple.   Lymphadenopathy:      Cervical: No cervical adenopathy.   Skin:     General: Skin is warm and dry.      Capillary Refill: Capillary refill takes less than 2 seconds.   Neurological:      General: No focal deficit present.      Mental Status: She is alert and oriented to person, place, and time.   Psychiatric:         Mood and Affect: Mood normal.         Behavior: Behavior normal.         Thought Content: Thought content normal.         Judgment: Judgment normal.         Assessment:     1. Mixed hyperlipidemia    2. Hyperglycemia    3. Pain of right upper arm    4. Gastroesophageal reflux disease, unspecified whether esophagitis present    5. Anxiety    6. Right groin pain      Plan:   Assessment & Plan    MEDICAL DECISION MAKING:  - Reviewed thyroid function tests (TSH and T4), A1C, renal function, liver function tests (AST, ALT), cholesterol levels, and blood count - all WNL or stable.  - Evaluated shoulder pain radiating down arm, potentially related to fall in February.  - Assessed esophageal indigestion, potentially stress-related.  - Evaluated anxiety symptoms and considered medication options.    PATIENT EDUCATION:  - Explained that Tylenol does not affect kidneys, unlike ibuprofen or Aleve.  - Explained the current understanding of ulcers and reflux, noting bacterial causes in addition to stress.  - Explained the safety profile of buspirone  compared to benzodiazepines like Xanax, noting lower addiction potential and cognitive side effects.    ACTION ITEMS/LIFESTYLE:  - Ms. Dejesus to avoid watching news to reduce stress and anxiety.  - Recommend continuing engagement in stress-reducing activities like watching archaeology videos.    MEDICATIONS:  - Started buspirone at lowest dose for as-needed anxiety management.  - Continued Prilosec (omeprazole) daily.  - Contact the office if buspirone dose needs adjustment.    ORDERS:  - Ordered XR Shoulder.  - Ordered XR Hip.  - Ordered metabolic panel and blood count in 6 months.    FOLLOW UP:  - Follow up in 6 months for metabolic panel and blood count.        Mixed hyperlipidemia  -     Comprehensive Metabolic Panel; Future; Expected date: 12/29/2025  -     CBC Auto Differential; Future; Expected date: 12/29/2025    Hyperglycemia    Pain of right upper arm  -     X-ray Shoulder 2 or More Views Right; Future; Expected date: 07/02/2025    Gastroesophageal reflux disease, unspecified whether esophagitis present  -     omeprazole (PRILOSEC) 40 MG capsule; Take 1 capsule (40 mg total) by mouth every morning.  Dispense: 90 capsule; Refill: 3    Anxiety    Right groin pain  -     X-Ray Hip 2 or 3 views Right with Pelvis when performed; Future; Expected date: 07/02/2025    Other orders  -     busPIRone (BUSPAR) 5 MG Tab; Take 1 tablet (5 mg total) by mouth 3 (three) times daily as needed (anxiety).  Dispense: 90 tablet; Refill: 11      Medication List with Changes/Refills   New Medications    BUSPIRONE (BUSPAR) 5 MG TAB    Take 1 tablet (5 mg total) by mouth 3 (three) times daily as needed (anxiety).   Current Medications    AZELASTINE (ASTELIN) 137 MCG (0.1 %) NASAL SPRAY    USE 1 SPRAY IN EACH NOSTRIL TWICE DAILY    BETAMETHASONE DIPROPIONATE 0.05 % CREAM    Apply topically 2 (two) times daily. For up to 2-4 weeks    BRAN/GUM/FIB/SOLANGE/PSYL/KELP/PEC (FIBER 6 ORAL)    Take 1 tablet by mouth once daily. 12 mg collin     BUPROPION (WELLBUTRIN XL) 150 MG TB24 TABLET    TAKE 1 TABLET BY MOUTH ONCE DAILY    CALCIUM CARBONATE 650 MG CALCIUM (1,625 MG) TABLET    Take 1 tablet by mouth once daily.    CLOBETASOL (TEMOVATE) 0.05 % EXTERNAL SOLUTION    Use on scalp one - two times daily as needed for scaling or itching for up to 4 weeks per course    COLLAGEN MISC    by Misc.(Non-Drug; Combo Route) route.    CYANOCOBALAMIN (VITAMIN B-12) 250 MCG TABLET    Take 250 mcg by mouth once daily.    DICLOFENAC SODIUM (SOLARAZE) 3 % GEL    AAA tid    EPINEPHRINE (EPIPEN) 0.3 MG/0.3 ML ATIN    Inject 0.3 mLs (0.3 mg total) into the muscle as needed (anaphylaxis).    FLUTICASONE PROPIONATE (FLONASE) 50 MCG/ACTUATION NASAL SPRAY    USE 1 SPRAY IN EACH NOSTRIL DAILY    KETOCONAZOLE (NIZORAL) 2 % SHAMPOO    Wash hair with medicated shampoo at least 2x/week - let sit on scalp at least 5 minutes prior to rinsing    LEVOCETIRIZINE (XYZAL) 5 MG TABLET    Take 5 mg by mouth every evening.    MONTELUKAST (SINGULAIR) 10 MG TABLET    TAKE 1 TABLET BY MOUTH EVERY EVENING    MULTIVITAMIN CAPSULE    Take 1 capsule by mouth once daily.    OXYBUTYNIN (DITROPAN-XL) 10 MG 24 HR TABLET    TAKE 1 TABLET BY MOUTH EVERY DAY    PHENAZOPYRIDINE (PYRIDIUM) 95 MG TABLET    Take 95 mg by mouth daily as needed for Pain.    SIMVASTATIN (ZOCOR) 40 MG TABLET    TAKE 1 TABLET BY MOUTH EVERY EVENING    VITAMIN D (VITAMIN D3) 1000 UNITS TAB    Take 185 mg by mouth.   Changed and/or Refilled Medications    Modified Medication Previous Medication    OMEPRAZOLE (PRILOSEC) 40 MG CAPSULE omeprazole (PRILOSEC) 40 MG capsule       Take 1 capsule (40 mg total) by mouth every morning.    Take 1 capsule (40 mg total) by mouth 2 (two) times daily before meals.       This note was generated with the assistance of ambient listening technology. Verbal consent was obtained by the patient and accompanying visitor(s) for the recording of patient appointment to facilitate this note. I attest to having  reviewed and edited the generated note for accuracy, though some syntax or spelling errors may persist. Please contact the author of this note for any clarification.            [1]   Social History  Socioeconomic History    Marital status:    Tobacco Use    Smoking status: Never    Smokeless tobacco: Never   Substance and Sexual Activity    Alcohol use: Yes     Comment: Social , hold told prior to sx    Drug use: Never    Sexual activity: Not Currently     Partners: Male     Social Drivers of Health     Financial Resource Strain: Low Risk  (3/26/2025)    Overall Financial Resource Strain (CARDIA)     Difficulty of Paying Living Expenses: Not hard at all   Food Insecurity: No Food Insecurity (3/26/2025)    Hunger Vital Sign     Worried About Running Out of Food in the Last Year: Never true     Ran Out of Food in the Last Year: Never true   Transportation Needs: No Transportation Needs (3/26/2025)    PRAPARE - Transportation     Lack of Transportation (Medical): No     Lack of Transportation (Non-Medical): No   Physical Activity: Insufficiently Active (3/26/2025)    Exercise Vital Sign     Days of Exercise per Week: 1 day     Minutes of Exercise per Session: 60 min   Stress: No Stress Concern Present (3/26/2025)    [employer] - Occupational Stress Questionnaire     Feeling of Stress : Not at all   Housing Stability: Low Risk  (3/26/2025)    Housing Stability Vital Sign     Unable to Pay for Housing in the Last Year: No     Number of Times Moved in the Last Year: 0     Homeless in the Last Year: No

## 2025-07-10 ENCOUNTER — PROCEDURE VISIT (OUTPATIENT)
Dept: ALLERGY | Facility: CLINIC | Age: 70
End: 2025-07-10
Payer: MEDICARE

## 2025-07-10 DIAGNOSIS — Z92.89 HISTORY OF IMMUNOTHERAPY: Primary | ICD-10-CM

## 2025-07-10 PROCEDURE — 95117 IMMUNOTHERAPY INJECTIONS: CPT | Mod: PBBFAC

## 2025-07-16 ENCOUNTER — PROCEDURE VISIT (OUTPATIENT)
Dept: ALLERGY | Facility: CLINIC | Age: 70
End: 2025-07-16
Payer: MEDICARE

## 2025-07-16 DIAGNOSIS — Z92.89 HISTORY OF IMMUNOTHERAPY: Primary | ICD-10-CM

## 2025-07-16 PROCEDURE — 95117 IMMUNOTHERAPY INJECTIONS: CPT | Mod: PBBFAC

## 2025-07-18 ENCOUNTER — PATIENT MESSAGE (OUTPATIENT)
Dept: INTERNAL MEDICINE | Facility: CLINIC | Age: 70
End: 2025-07-18
Payer: MEDICARE

## 2025-07-18 DIAGNOSIS — R79.89 ABNORMAL TSH: Primary | ICD-10-CM

## 2025-07-18 DIAGNOSIS — R94.6 ABNORMAL RESULTS OF THYROID FUNCTION STUDIES: ICD-10-CM

## 2025-07-18 DIAGNOSIS — R00.2 PALPITATIONS: ICD-10-CM

## 2025-07-23 RX ORDER — BUPROPION HYDROCHLORIDE 150 MG/1
150 TABLET ORAL
Qty: 90 TABLET | Refills: 3 | Status: SHIPPED | OUTPATIENT
Start: 2025-07-23

## 2025-07-23 NOTE — TELEPHONE ENCOUNTER
No care due was identified.  [facility] Embedded Care Due Messages. Reference number: [id number].   7/23/2025 8:02:27 AM CDT

## 2025-07-23 NOTE — TELEPHONE ENCOUNTER
Refill Decision Note   Rita Dejesus  is requesting a refill authorization.  Brief Assessment and Rationale for Refill:  Approve     Medication Therapy Plan:         Comments:     Note composed:11:31 AM 07/23/2025

## 2025-07-30 ENCOUNTER — PROCEDURE VISIT (OUTPATIENT)
Dept: ALLERGY | Facility: CLINIC | Age: 70
End: 2025-07-30
Payer: MEDICARE

## 2025-07-30 DIAGNOSIS — Z92.89 HISTORY OF IMMUNOTHERAPY: Primary | ICD-10-CM

## 2025-07-30 PROCEDURE — 95117 IMMUNOTHERAPY INJECTIONS: CPT | Mod: PBBFAC

## 2025-08-13 ENCOUNTER — PROCEDURE VISIT (OUTPATIENT)
Dept: ALLERGY | Facility: CLINIC | Age: 70
End: 2025-08-13
Payer: MEDICARE

## 2025-08-13 DIAGNOSIS — Z92.89 HISTORY OF IMMUNOTHERAPY: Primary | ICD-10-CM

## 2025-08-13 PROCEDURE — 95117 IMMUNOTHERAPY INJECTIONS: CPT | Mod: PBBFAC

## 2025-09-03 ENCOUNTER — PROCEDURE VISIT (OUTPATIENT)
Dept: ALLERGY | Facility: CLINIC | Age: 70
End: 2025-09-03
Payer: MEDICARE

## 2025-09-03 DIAGNOSIS — Z92.89 HISTORY OF IMMUNOTHERAPY: Primary | ICD-10-CM

## 2025-09-03 PROCEDURE — 95117 IMMUNOTHERAPY INJECTIONS: CPT | Mod: PBBFAC

## (undated) DEVICE — SCREENER ISTIM EXT NEROSTIMLTR

## (undated) DEVICE — DRAPE T TRNSVRS LAP 102X78X121

## (undated) DEVICE — NDL INTERSTIM FORAMN 18.5G 5IN

## (undated) DEVICE — DECANTER 6 VIAL

## (undated) DEVICE — MANIFOLD 4 PORT

## (undated) DEVICE — GOWN POLY REINF BRTH SLV XL

## (undated) DEVICE — SUT VICRYL 3-0 27 SH

## (undated) DEVICE — SYR ONLY LUER LOCK 20CC

## (undated) DEVICE — HANDSET INTERSTIM X COMM

## (undated) DEVICE — DRAPE INVISISHIELD TOWEL SMALL

## (undated) DEVICE — NDL SAFETY 22G X 1.5 ECLIPSE

## (undated) DEVICE — PACK BASIC SETUP SC BR

## (undated) DEVICE — DRESSING ANTIMICROBIAL 1 INCH

## (undated) DEVICE — GLOVE SURGICAL LATEX SZ 6

## (undated) DEVICE — KIT INTERSTIM II PERC LEAD EXT

## (undated) DEVICE — ADHESIVE DERMABOND ADVANCED

## (undated) DEVICE — DRAPE THREE-QTR REINF 53X77IN

## (undated) DEVICE — SUT VICRYL CTD 2-0 GI 27 SH

## (undated) DEVICE — SUT CHROMIC 3-0 SH 27IN GUT

## (undated) DEVICE — COVER LIGHT HANDLE 80/CA

## (undated) DEVICE — TOWEL OR DISP STRL BLUE 4/PK

## (undated) DEVICE — NDL SPINAL 20GX3.5 HUB

## (undated) DEVICE — ELECTRODE REM PLYHSV RETURN 9

## (undated) DEVICE — NDL SPINAL 22GX7 SPINOCAN

## (undated) DEVICE — NDL ECLIPSE SAFETY 18GX1-1/2IN

## (undated) DEVICE — DRAPE MOBILE C-ARM

## (undated) DEVICE — SUT MCRYL PLUS 4-0 PS2 27IN

## (undated) DEVICE — APPLICATOR CHLORAPREP ORN 26ML

## (undated) DEVICE — DRAPE C-ARMOR EQUIPMENT COVER

## (undated) DEVICE — NDL SPINAL SPINOCAN 22GX3.5

## (undated) DEVICE — SUT SILK 2-0 STRANDS 30IN

## (undated) DEVICE — SUT MONOCRYL 4.0 PS2 CP496G

## (undated) DEVICE — DRESSING TRANS 4X4 TEGADERM

## (undated) DEVICE — NDL HYPO 27G X 1 1/2

## (undated) DEVICE — INSERT CUSHIONPRONE VIEW LARGE